# Patient Record
Sex: FEMALE | Race: BLACK OR AFRICAN AMERICAN | Employment: FULL TIME | URBAN - METROPOLITAN AREA
[De-identification: names, ages, dates, MRNs, and addresses within clinical notes are randomized per-mention and may not be internally consistent; named-entity substitution may affect disease eponyms.]

---

## 2018-08-27 ENCOUNTER — OFFICE VISIT (OUTPATIENT)
Dept: SLEEP MEDICINE | Age: 48
End: 2018-08-27

## 2018-08-27 VITALS
SYSTOLIC BLOOD PRESSURE: 130 MMHG | WEIGHT: 246.8 LBS | RESPIRATION RATE: 18 BRPM | OXYGEN SATURATION: 98 % | HEART RATE: 89 BPM | DIASTOLIC BLOOD PRESSURE: 80 MMHG | BODY MASS INDEX: 49.76 KG/M2 | HEIGHT: 59 IN | TEMPERATURE: 98.9 F

## 2018-08-27 DIAGNOSIS — Z86.79 H/O: HTN (HYPERTENSION): ICD-10-CM

## 2018-08-27 DIAGNOSIS — G47.33 OSA (OBSTRUCTIVE SLEEP APNEA): Primary | ICD-10-CM

## 2018-08-27 PROCEDURE — 99204 OFFICE O/P NEW MOD 45 MIN: CPT | Performed by: PSYCHIATRY & NEUROLOGY

## 2018-08-27 ASSESSMENT — SLEEP AND FATIGUE QUESTIONNAIRES
HOW LIKELY ARE YOU TO NOD OFF OR FALL ASLEEP IN A CAR, WHILE STOPPED FOR A FEW MINUTES IN TRAFFIC: 0
NECK CIRCUMFERENCE (INCHES): 17
HOW LIKELY ARE YOU TO NOD OFF OR FALL ASLEEP WHILE SITTING AND READING: 0
ESS TOTAL SCORE: 3
HOW LIKELY ARE YOU TO NOD OFF OR FALL ASLEEP WHILE SITTING INACTIVE IN A PUBLIC PLACE: 0
HOW LIKELY ARE YOU TO NOD OFF OR FALL ASLEEP WHEN YOU ARE A PASSENGER IN A CAR FOR AN HOUR WITHOUT A BREAK: 0
HOW LIKELY ARE YOU TO NOD OFF OR FALL ASLEEP WHILE LYING DOWN TO REST IN THE AFTERNOON WHEN CIRCUMSTANCES PERMIT: 1
HOW LIKELY ARE YOU TO NOD OFF OR FALL ASLEEP WHILE SITTING QUIETLY AFTER LUNCH WITHOUT ALCOHOL: 0
HOW LIKELY ARE YOU TO NOD OFF OR FALL ASLEEP WHILE SITTING AND TALKING TO SOMEONE: 1
HOW LIKELY ARE YOU TO NOD OFF OR FALL ASLEEP WHILE WATCHING TV: 1

## 2018-08-27 ASSESSMENT — ENCOUNTER SYMPTOMS
RESPIRATORY NEGATIVE: 1
EYES NEGATIVE: 1
ALLERGIC/IMMUNOLOGIC NEGATIVE: 1
GASTROINTESTINAL NEGATIVE: 1

## 2018-08-27 NOTE — PROGRESS NOTES
- N/A  FAA/'s license - N/A      Previous Report(s) Reviewed: historical medical records         Social History     Social History    Marital status: Single     Spouse name: N/A    Number of children: N/A    Years of education: N/A     Occupational History    Not on file. Social History Main Topics    Smoking status: Never Smoker    Smokeless tobacco: Never Used    Alcohol use No    Drug use: No    Sexual activity: Not on file     Other Topics Concern    Not on file     Social History Narrative    No narrative on file       Prior to Admission medications    Medication Sig Start Date End Date Taking? Authorizing Provider   lisinopril-hydrochlorothiazide (PRINZIDE;ZESTORETIC) 20-12.5 MG per tablet Take 1 tablet by mouth daily 18  Yes Oscar Olmedo MD       Allergies as of 2018    (No Known Allergies)       There is no problem list on file for this patient. Past Medical History:   Diagnosis Date    Hypertension     Obesity        Past Surgical History:   Procedure Laterality Date     SECTION         History reviewed. No pertinent family history. Review of Systems   Constitutional: Positive for fatigue. HENT: Negative. Eyes: Negative. Respiratory: Negative. Cardiovascular: Negative. Negative for leg swelling. Gastrointestinal: Negative. Endocrine: Negative. Genitourinary: Positive for frequency (nighttime). Musculoskeletal: Negative. Allergic/Immunologic: Negative. Neurological: Negative. Hematological: Negative. Psychiatric/Behavioral: Negative. All other systems reviewed and are negative. Objective:     Vitals:  Weight BMI Neck circumference    Wt Readings from Last 3 Encounters:   18 246 lb 12.8 oz (111.9 kg)   18 249 lb (112.9 kg)    Body mass index is 49.85 kg/m².  Neck circumference: 17     BP HR SaO2   BP Readings from Last 3 Encounters:   18 130/80   18 128/82    Pulse Readings from Last 3 Encounters:   08/27/18 89   08/13/18 81    SpO2 Readings from Last 3 Encounters:   08/27/18 98%   08/13/18 98%        The mandibular molar Class :   [x]1 []2 []3      Mallampati I Airway Classification:   []1 []2 []3 [x]4        Physical Exam   Constitutional: No distress. HENT:   Mallampati class 4, no retrognathia or hypognathia , normal airflow in bilateral nostrils, no septum deviation , crowded oropharynx with low soft palate, high arched hard palate,no tonsils enlargement. Eyes: EOM are normal.   Neck: Normal range of motion. Neck supple. No tracheal deviation present. No thyromegaly present. Cardiovascular: Normal rate, normal heart sounds and intact distal pulses. Pulmonary/Chest: Effort normal and breath sounds normal. No respiratory distress. She has no wheezes. Musculoskeletal: Normal range of motion. She exhibits no edema or tenderness. Neurological: She is alert. She has normal reflexes. No cranial nerve deficit. Skin: Skin is warm. Psychiatric: She has a normal mood and affect. Nursing note and vitals reviewed. Assessment:   Obstructive sleep apnea especially with snoring,  daytime sleepiness, large neck circumference, Mallampati class of 4 and obesity. Diagnosis Orders   1. EMERY (obstructive sleep apnea)  Home Sleep Study    Sleep Study with PAP Titration   2. H/O: HTN (hypertension)  Home Sleep Study   3. Class 3 obesity with body mass index (BMI) of 45.0 to 49.9 in adult, unspecified obesity type, unspecified whether serious comorbidity present [facility])  Home Sleep Study     Plan:     Patient was counseled about the pathophysiology of obstructive sleep apnea syndrome and the methods for evaluating its presence and severity. Patient was counseled to avoid driving and other potentially hazardous circumstances if the patient is experiencing excessive sleepiness.   Treatment considerations include the use of nasal CPAP, oral dental appliance or a surgical intervention,

## 2018-09-28 DIAGNOSIS — G47.33 OSA (OBSTRUCTIVE SLEEP APNEA): ICD-10-CM

## 2018-09-28 DIAGNOSIS — Z86.79 H/O: HTN (HYPERTENSION): ICD-10-CM

## 2018-10-01 ENCOUNTER — TELEPHONE (OUTPATIENT)
Dept: PULMONOLOGY | Age: 48
End: 2018-10-01

## 2018-11-20 ENCOUNTER — TELEPHONE (OUTPATIENT)
Dept: PULMONOLOGY | Age: 48
End: 2018-11-20

## 2018-11-20 NOTE — TELEPHONE ENCOUNTER
Liane Irwin called stating she received a letter in the mail indicating we had sleep study results since we had tried to call and her vm was full.   Per prior phone encounter I gave her her results indicating she was positive for miild sleep apnea and gave the number to the sleep lab to call and schedule the recommended titration study

## 2018-12-27 ENCOUNTER — HOSPITAL ENCOUNTER (OUTPATIENT)
Dept: SLEEP CENTER | Age: 48
Discharge: HOME | End: 2018-12-27
Payer: COMMERCIAL

## 2018-12-27 DIAGNOSIS — G47.33 OSA (OBSTRUCTIVE SLEEP APNEA): ICD-10-CM

## 2018-12-27 PROCEDURE — 95811 POLYSOM 6/>YRS CPAP 4/> PARM: CPT

## 2018-12-28 PROCEDURE — 95811 POLYSOM 6/>YRS CPAP 4/> PARM: CPT | Performed by: PSYCHIATRY & NEUROLOGY

## 2019-01-02 ENCOUNTER — TELEPHONE (OUTPATIENT)
Dept: PULMONOLOGY | Age: 49
End: 2019-01-02

## 2019-01-03 ENCOUNTER — TELEPHONE (OUTPATIENT)
Dept: PULMONOLOGY | Age: 49
End: 2019-01-03

## 2019-08-01 ENCOUNTER — OFFICE VISIT (OUTPATIENT)
Dept: SLEEP MEDICINE | Age: 49
End: 2019-08-01
Payer: COMMERCIAL

## 2019-08-01 VITALS
HEIGHT: 59 IN | WEIGHT: 245 LBS | OXYGEN SATURATION: 98 % | HEART RATE: 86 BPM | SYSTOLIC BLOOD PRESSURE: 177 MMHG | BODY MASS INDEX: 49.39 KG/M2 | DIASTOLIC BLOOD PRESSURE: 116 MMHG | RESPIRATION RATE: 16 BRPM

## 2019-08-01 DIAGNOSIS — G47.33 OBSTRUCTIVE SLEEP APNEA: Primary | ICD-10-CM

## 2019-08-01 PROCEDURE — 99212 OFFICE O/P EST SF 10 MIN: CPT | Performed by: PSYCHIATRY & NEUROLOGY

## 2019-08-01 ASSESSMENT — SLEEP AND FATIGUE QUESTIONNAIRES
HOW LIKELY ARE YOU TO NOD OFF OR FALL ASLEEP WHILE LYING DOWN TO REST IN THE AFTERNOON WHEN CIRCUMSTANCES PERMIT: 3
ESS TOTAL SCORE: 13
HOW LIKELY ARE YOU TO NOD OFF OR FALL ASLEEP WHILE SITTING INACTIVE IN A PUBLIC PLACE: 3
HOW LIKELY ARE YOU TO NOD OFF OR FALL ASLEEP WHILE SITTING QUIETLY AFTER LUNCH WITHOUT ALCOHOL: 0
HOW LIKELY ARE YOU TO NOD OFF OR FALL ASLEEP IN A CAR, WHILE STOPPED FOR A FEW MINUTES IN TRAFFIC: 0
HOW LIKELY ARE YOU TO NOD OFF OR FALL ASLEEP WHEN YOU ARE A PASSENGER IN A CAR FOR AN HOUR WITHOUT A BREAK: 1
HOW LIKELY ARE YOU TO NOD OFF OR FALL ASLEEP WHILE SITTING AND READING: 3
HOW LIKELY ARE YOU TO NOD OFF OR FALL ASLEEP WHILE WATCHING TV: 3
HOW LIKELY ARE YOU TO NOD OFF OR FALL ASLEEP WHILE SITTING AND TALKING TO SOMEONE: 0

## 2019-08-01 NOTE — PROGRESS NOTES
per session: Not on file    Stress: Not on file   Relationships    Social connections:     Talks on phone: Not on file     Gets together: Not on file     Attends Orthodoxy service: Not on file     Active member of club or organization: Not on file     Attends meetings of clubs or organizations: Not on file     Relationship status: Not on file    Intimate partner violence:     Fear of current or ex partner: Not on file     Emotionally abused: Not on file     Physically abused: Not on file     Forced sexual activity: Not on file   Other Topics Concern    Not on file   Social History Narrative    Not on file       Prior to Admission medications    Medication Sig Start Date End Date Taking? Authorizing Provider   amLODIPine-benazepril (LOTREL) 5-20 MG per capsule Take 1 capsule by mouth daily 19  Jessi Mendez MD   metFORMIN (GLUCOPHAGE) 500 MG tablet Take 1 tablet by mouth 2 times daily (with meals) 19   Jessi Mendez MD   furosemide (LASIX) 20 MG tablet Take 1 tablet by mouth daily 19   Jessi Mendez MD       Allergies as of 2019    (No Known Allergies)       Patient Active Problem List   Diagnosis    EMERY (obstructive sleep apnea)       Past Medical History:   Diagnosis Date    Hypertension     Obesity        Past Surgical History:   Procedure Laterality Date     SECTION         No family history on file. Review of Systems    Objective:     Vitals:  Weight BMI Neck circumference    Wt Readings from Last 3 Encounters:   19 245 lb (111.1 kg)   19 244 lb 8 oz (110.9 kg)   19 239 lb (108.4 kg)    Body mass index is 49.48 kg/m².        BP HR SaO2   BP Readings from Last 3 Encounters:   19 (!) 175/102   19 130/78   19 130/80    Pulse Readings from Last 3 Encounters:   19 86   19 78   19 84    SpO2 Readings from Last 3 Encounters:   19 98%   19 92%   18 98%      Themandibular molar Class :

## 2020-07-06 ENCOUNTER — APPOINTMENT (OUTPATIENT)
Dept: CT IMAGING | Age: 50
DRG: 853 | End: 2020-07-06
Payer: COMMERCIAL

## 2020-07-06 ENCOUNTER — HOSPITAL ENCOUNTER (INPATIENT)
Age: 50
LOS: 18 days | Discharge: HOME | DRG: 853 | End: 2020-07-24
Attending: STUDENT IN AN ORGANIZED HEALTH CARE EDUCATION/TRAINING PROGRAM | Admitting: INTERNAL MEDICINE
Payer: COMMERCIAL

## 2020-07-06 ENCOUNTER — APPOINTMENT (OUTPATIENT)
Dept: ULTRASOUND IMAGING | Age: 50
DRG: 853 | End: 2020-07-06
Payer: COMMERCIAL

## 2020-07-06 PROBLEM — N17.9 ARF (ACUTE RENAL FAILURE) (HCC): Status: ACTIVE | Noted: 2020-07-06

## 2020-07-06 PROBLEM — D72.829 LEUKOCYTOSIS: Status: ACTIVE | Noted: 2020-07-06

## 2020-07-06 PROBLEM — K56.609 SBO (SMALL BOWEL OBSTRUCTION) (HCC): Status: ACTIVE | Noted: 2020-07-06

## 2020-07-06 LAB
A/G RATIO: 0.5 (ref 1.1–2.2)
ALBUMIN SERPL-MCNC: 2.3 G/DL (ref 3.4–5)
ALP BLD-CCNC: 122 U/L (ref 40–129)
ALT SERPL-CCNC: 74 U/L (ref 10–40)
ANION GAP SERPL CALCULATED.3IONS-SCNC: 28 MMOL/L (ref 3–16)
AST SERPL-CCNC: 165 U/L (ref 15–37)
BILIRUB SERPL-MCNC: 3.3 MG/DL (ref 0–1)
BUN BLDV-MCNC: 54 MG/DL (ref 7–20)
CALCIUM SERPL-MCNC: 8.4 MG/DL (ref 8.3–10.6)
CHLORIDE BLD-SCNC: 88 MMOL/L (ref 99–110)
CO2: 13 MMOL/L (ref 21–32)
CREAT SERPL-MCNC: 3.8 MG/DL (ref 0.6–1.1)
GFR AFRICAN AMERICAN: 15
GFR NON-AFRICAN AMERICAN: 13
GLOBULIN: 4.9 G/DL
GLUCOSE BLD-MCNC: 103 MG/DL (ref 70–99)
GLUCOSE BLD-MCNC: 76 MG/DL (ref 70–99)
HCG QUALITATIVE: NEGATIVE
LACTIC ACID, SEPSIS: 4.2 MMOL/L (ref 0.4–1.9)
LACTIC ACID, SEPSIS: 4.9 MMOL/L (ref 0.4–1.9)
LACTIC ACID: 5.8 MMOL/L (ref 0.4–2)
LIPASE: 12 U/L (ref 13–60)
PERFORMED ON: NORMAL
POTASSIUM REFLEX MAGNESIUM: 4.4 MMOL/L (ref 3.5–5.1)
SODIUM BLD-SCNC: 129 MMOL/L (ref 136–145)
TOTAL PROTEIN: 7.2 G/DL (ref 6.4–8.2)

## 2020-07-06 PROCEDURE — 94760 N-INVAS EAR/PLS OXIMETRY 1: CPT

## 2020-07-06 PROCEDURE — 2580000003 HC RX 258: Performed by: INTERNAL MEDICINE

## 2020-07-06 PROCEDURE — 2580000003 HC RX 258: Performed by: STUDENT IN AN ORGANIZED HEALTH CARE EDUCATION/TRAINING PROGRAM

## 2020-07-06 PROCEDURE — 36415 COLL VENOUS BLD VENIPUNCTURE: CPT

## 2020-07-06 PROCEDURE — 80053 COMPREHEN METABOLIC PANEL: CPT

## 2020-07-06 PROCEDURE — APPNB180 APP NON BILLABLE TIME > 60 MINS: Performed by: PHYSICIAN ASSISTANT

## 2020-07-06 PROCEDURE — 83690 ASSAY OF LIPASE: CPT

## 2020-07-06 PROCEDURE — 99254 IP/OBS CNSLTJ NEW/EST MOD 60: CPT | Performed by: SURGERY

## 2020-07-06 PROCEDURE — 84703 CHORIONIC GONADOTROPIN ASSAY: CPT

## 2020-07-06 PROCEDURE — 74176 CT ABD & PELVIS W/O CONTRAST: CPT

## 2020-07-06 PROCEDURE — 99285 EMERGENCY DEPT VISIT HI MDM: CPT

## 2020-07-06 PROCEDURE — 6360000002 HC RX W HCPCS: Performed by: STUDENT IN AN ORGANIZED HEALTH CARE EDUCATION/TRAINING PROGRAM

## 2020-07-06 PROCEDURE — APPSS180 APP SPLIT SHARED TIME > 60 MINUTES: Performed by: PHYSICIAN ASSISTANT

## 2020-07-06 PROCEDURE — 87040 BLOOD CULTURE FOR BACTERIA: CPT

## 2020-07-06 PROCEDURE — 2060000000 HC ICU INTERMEDIATE R&B

## 2020-07-06 PROCEDURE — 6360000002 HC RX W HCPCS: Performed by: INTERNAL MEDICINE

## 2020-07-06 PROCEDURE — 85025 COMPLETE CBC W/AUTO DIFF WBC: CPT

## 2020-07-06 PROCEDURE — 83605 ASSAY OF LACTIC ACID: CPT

## 2020-07-06 PROCEDURE — 76705 ECHO EXAM OF ABDOMEN: CPT

## 2020-07-06 PROCEDURE — 96374 THER/PROPH/DIAG INJ IV PUSH: CPT

## 2020-07-06 RX ORDER — MORPHINE SULFATE 2 MG/ML
2 INJECTION, SOLUTION INTRAMUSCULAR; INTRAVENOUS
Status: DISCONTINUED | OUTPATIENT
Start: 2020-07-06 | End: 2020-07-08

## 2020-07-06 RX ORDER — LEVOFLOXACIN 5 MG/ML
250 INJECTION, SOLUTION INTRAVENOUS EVERY 24 HOURS
Status: DISCONTINUED | OUTPATIENT
Start: 2020-07-07 | End: 2020-07-08

## 2020-07-06 RX ORDER — ACETAMINOPHEN 325 MG/1
650 TABLET ORAL EVERY 6 HOURS PRN
Status: DISCONTINUED | OUTPATIENT
Start: 2020-07-06 | End: 2020-07-10

## 2020-07-06 RX ORDER — PROMETHAZINE HYDROCHLORIDE 25 MG/1
12.5 TABLET ORAL EVERY 6 HOURS PRN
Status: DISCONTINUED | OUTPATIENT
Start: 2020-07-06 | End: 2020-07-24 | Stop reason: HOSPADM

## 2020-07-06 RX ORDER — 0.9 % SODIUM CHLORIDE 0.9 %
2000 INTRAVENOUS SOLUTION INTRAVENOUS ONCE
Status: COMPLETED | OUTPATIENT
Start: 2020-07-06 | End: 2020-07-06

## 2020-07-06 RX ORDER — HEPARIN SODIUM 5000 [USP'U]/ML
5000 INJECTION, SOLUTION INTRAVENOUS; SUBCUTANEOUS 2 TIMES DAILY
Status: DISCONTINUED | OUTPATIENT
Start: 2020-07-06 | End: 2020-07-24 | Stop reason: HOSPADM

## 2020-07-06 RX ORDER — ONDANSETRON 2 MG/ML
4 INJECTION INTRAMUSCULAR; INTRAVENOUS ONCE
Status: COMPLETED | OUTPATIENT
Start: 2020-07-06 | End: 2020-07-06

## 2020-07-06 RX ORDER — ACETAMINOPHEN 650 MG/1
650 SUPPOSITORY RECTAL EVERY 6 HOURS PRN
Status: DISCONTINUED | OUTPATIENT
Start: 2020-07-06 | End: 2020-07-10

## 2020-07-06 RX ORDER — SODIUM CHLORIDE 0.9 % (FLUSH) 0.9 %
10 SYRINGE (ML) INJECTION PRN
Status: DISCONTINUED | OUTPATIENT
Start: 2020-07-06 | End: 2020-07-20 | Stop reason: SDUPTHER

## 2020-07-06 RX ORDER — SODIUM CHLORIDE 9 MG/ML
INJECTION, SOLUTION INTRAVENOUS CONTINUOUS
Status: DISCONTINUED | OUTPATIENT
Start: 2020-07-06 | End: 2020-07-07

## 2020-07-06 RX ORDER — OXYCODONE HYDROCHLORIDE 5 MG/1
5 TABLET ORAL ONCE
Status: DISCONTINUED | OUTPATIENT
Start: 2020-07-06 | End: 2020-07-06

## 2020-07-06 RX ORDER — LEVOFLOXACIN 5 MG/ML
500 INJECTION, SOLUTION INTRAVENOUS ONCE
Status: COMPLETED | OUTPATIENT
Start: 2020-07-06 | End: 2020-07-06

## 2020-07-06 RX ORDER — PANTOPRAZOLE SODIUM 40 MG/1
40 TABLET, DELAYED RELEASE ORAL
Status: DISCONTINUED | OUTPATIENT
Start: 2020-07-07 | End: 2020-07-08

## 2020-07-06 RX ORDER — SODIUM CHLORIDE 0.9 % (FLUSH) 0.9 %
10 SYRINGE (ML) INJECTION EVERY 12 HOURS SCHEDULED
Status: DISCONTINUED | OUTPATIENT
Start: 2020-07-06 | End: 2020-07-24 | Stop reason: HOSPADM

## 2020-07-06 RX ORDER — ONDANSETRON 2 MG/ML
4 INJECTION INTRAMUSCULAR; INTRAVENOUS EVERY 6 HOURS PRN
Status: DISCONTINUED | OUTPATIENT
Start: 2020-07-06 | End: 2020-07-24 | Stop reason: HOSPADM

## 2020-07-06 RX ORDER — MORPHINE SULFATE 4 MG/ML
4 INJECTION, SOLUTION INTRAMUSCULAR; INTRAVENOUS
Status: DISCONTINUED | OUTPATIENT
Start: 2020-07-06 | End: 2020-07-08

## 2020-07-06 RX ADMIN — MORPHINE SULFATE 2 MG: 2 INJECTION, SOLUTION INTRAMUSCULAR; INTRAVENOUS at 20:40

## 2020-07-06 RX ADMIN — PIPERACILLIN AND TAZOBACTAM 3.38 G: 3; .375 INJECTION, POWDER, LYOPHILIZED, FOR SOLUTION INTRAVENOUS at 16:11

## 2020-07-06 RX ADMIN — ONDANSETRON 4 MG: 2 INJECTION INTRAMUSCULAR; INTRAVENOUS at 13:42

## 2020-07-06 RX ADMIN — SODIUM CHLORIDE, PRESERVATIVE FREE 10 ML: 5 INJECTION INTRAVENOUS at 22:12

## 2020-07-06 RX ADMIN — HEPARIN SODIUM 5000 UNITS: 5000 INJECTION INTRAVENOUS; SUBCUTANEOUS at 22:13

## 2020-07-06 RX ADMIN — SODIUM CHLORIDE 2000 ML: 9 INJECTION, SOLUTION INTRAVENOUS at 14:31

## 2020-07-06 RX ADMIN — LEVOFLOXACIN 500 MG: 5 INJECTION, SOLUTION INTRAVENOUS at 17:26

## 2020-07-06 RX ADMIN — SODIUM CHLORIDE: 9 INJECTION, SOLUTION INTRAVENOUS at 17:26

## 2020-07-06 ASSESSMENT — PAIN SCALES - GENERAL
PAINLEVEL_OUTOF10: 4
PAINLEVEL_OUTOF10: 10

## 2020-07-06 ASSESSMENT — PAIN DESCRIPTION - DESCRIPTORS: DESCRIPTORS: SHARP

## 2020-07-06 ASSESSMENT — PAIN DESCRIPTION - PROGRESSION: CLINICAL_PROGRESSION: GRADUALLY WORSENING

## 2020-07-06 ASSESSMENT — PAIN DESCRIPTION - FREQUENCY: FREQUENCY: CONTINUOUS

## 2020-07-06 ASSESSMENT — PAIN DESCRIPTION - ORIENTATION: ORIENTATION: UPPER;LOWER

## 2020-07-06 ASSESSMENT — PAIN DESCRIPTION - LOCATION: LOCATION: ABDOMEN

## 2020-07-06 ASSESSMENT — PAIN DESCRIPTION - PAIN TYPE: TYPE: ACUTE PAIN

## 2020-07-06 NOTE — PROGRESS NOTES
Pharmacy to dose Levofloxacin per Dr. Maricruz White MD:    CLcr ~ 23 mL/min (using adjusted body weight of 70.9 kg)  Levofloxacin 500 mg IVPB x 1 followed by 250 mg IVPB Q24H thereafter. Will continue to follow renal function and adjust dose accordingly.     Sierra Stewart PharmD, BCPS  7/6/2020 4:33 PM

## 2020-07-06 NOTE — CONSULTS
Surgery Consult Note     Rimma Beltran PA-C  Pt Name: Néstor Reza  MRN: [id number]  [place]: 1970  Date of evaluation: 2020  Primary Care Physician: Anselmo Anderson MD  Chief Complaint:abdominal pain  IMPRESSIONS:   1. SBO  2. KIKI  3. Tachycardic   4. Leukocytosis 27.7  PLANS:   1. To be admitted to the hospital  2. NPO  3. Place NGT to LWS  4. IVF  5. Monitor and control pain  6. Repeat abdominal xrays tomorrow AM  SUBJECTIVE:   History of Chief Complaint:    Néstor Reza is a 53 y.o. female who presents with abdominal pain. She stated that this pain began one week ago, and was seen on 20 at [facility] And was diagnoses with some gastroenteritis. The pain has continued to increase in intensity and she came to [facility]. She stated that she hasn't had a BM since Saturday, 20. He has significant diffuse abdominal pain. She had a CT scan performed and that showed her to have a mid to distal small bowel obstruction. She admits to having associated nausea and emesis. She denies any CP ,SOB, cough, lightheadedness or dizziness. Past Medical History  Reviewed  has a past medical history of Hypertension and Obesity. Past Surgical History  Reviewed has a past surgical history that includes  section. Medications  Prior to Admission medications    Medication Sig Start Date End Date Taking?  Authorizing Provider   ferrous sulfate (FE TABS) 325 (65 Fe) MG EC tablet Take 1 tablet by mouth 2 times daily 6/3/20  Yes Anselmo Anderson MD   Multiple Vitamins-Minerals (THERAPEUTIC MULTIVITAMIN-MINERALS) tablet Take 1 tablet by mouth daily 6/3/20 6/3/21 Yes Anselmo Anderson MD   furosemide (LASIX) 20 MG tablet Take 1 tablet by mouth 2 times daily 6/3/20  Yes Anselmo Anderson MD   metFORMIN (GLUCOPHAGE) 500 MG tablet Take 1 tablet by mouth 2 times daily (with meals) 6/3/20  Yes Anselmo Anderson MD   amLODIPine-benazepril (LOTREL) 5-20 MG per capsule Take 1 capsule by mouth daily 6/3/20 6/3/21 Yes Amanda Nelsno MD   vitamin D (ERGOCALCIFEROL) 1.25 MG (11668 UT) CAPS capsule Take 1 capsule by mouth once a week 6/3/20  Yes Amanda Nelson MD    Scheduled Meds:   sodium chloride  2,000 mL Intravenous Once    sodium chloride flush  10 mL Intravenous 2 times per day    enoxaparin  40 mg Subcutaneous Daily    [START ON 7/7/2020] pantoprazole  40 mg Oral QAM AC    heparin (porcine)  5,000 Units Subcutaneous BID    insulin lispro  0-6 Units Subcutaneous TID WC    insulin lispro  0-3 Units Subcutaneous Nightly    piperacillin-tazobactam  3.375 g Intravenous Once     Continuous Infusions:   sodium chloride       PRN Meds:.sodium chloride flush, acetaminophen **OR** acetaminophen, promethazine **OR** ondansetron, morphine **OR** morphine, iopamidol  Allergies  has No Known Allergies. Family History  Reviewedfamily history is not on file. Social History   reports that she has never smoked. She has never used smokeless tobacco. She reports that she does not drink alcohol or use drugs. EDUCATION  Patient educated about their illness/diagnosis, stated above, and all questions answered. We discussed the importance of nutrition, medications they are taking, and healthy lifestyle. Review of Systems:  General Denies any fever or chills  HEENT Denies any diplopia, tinnitus or vertigo  Resp Denies any shortness of breath, cough or wheezing  Cardiac Denies any chest pain, palpitations, claudication or edema  GI Denies any melena, hematochezia, hematemesis or pyrosis   Denies any frequency, urgency, hesitancy or incontinence  Heme Denies bruising or bleeding easily  Neuro Denies any focal motor or sensory deficits  OBJECTIVE:   VITALS:  height is 4' 11\" (1.499 m) and weight is 240 lb 4.8 oz (109 kg). Her oral temperature is 97.9 °F (36.6 °C). Her blood pressure is 138/75 and her pulse is 110. Her respiration is 29 and oxygen saturation is 94%.    CONSTITUTIONAL: Alert and oriented times 3, no acute distress and cooperative to examination with proper mood and affect. SKIN: Skin color, texture, turgor normal. No rashes or lesions. LYMPH: no cervical nodes, no inguinal nodes  HEENT: Head is normocephalic, atraumatic. EOMI, PERRLA. NECK: Supple, symmetrical, trachea midline, no adenopathy, thyroid symmetric, not enlarged and no tenderness, skin normal.  CHEST/LUNGS: chest symmetric with normal A/P diameter, normal respiratory rate and rhythm  CARDIOVASCULAR: Tachycardia  ABDOMEN: obese. Tenderness: diffuse  RECTAL: deferred, not clinically indicated  NEUROLOGIC: There are no focalizing motor or sensory deficits. CN II-XII are grossly intact. Ricka Distad EXTREMITIES: no cyanosis, no clubbing and no edema.   LABS:     Recent Labs     07/06/20  1337   WBC 27.7*   HGB 11.2*   HCT 37.5   *   *   K 4.4   CL 88*   CO2 13*   BUN 54*   CREATININE 3.8*   CALCIUM 8.4   *   ALT 74*   BILITOT 3.3*     Recent Labs     07/06/20  1337   ALKPHOS 122   ALT 74*   *   BILITOT 3.3*   LABALBU 2.3*   LIPASE 12.0*     CBC:   Lab Results   Component Value Date    WBC 27.7 07/06/2020    RBC 5.91 07/06/2020    HGB 11.2 07/06/2020    HCT 37.5 07/06/2020    MCV 63.4 07/06/2020    MCH 18.9 07/06/2020    MCHC 29.8 07/06/2020    RDW 19.0 07/06/2020     07/06/2020    MPV 8.1 07/06/2020     CMP:    Lab Results   Component Value Date     07/06/2020    K 4.4 07/06/2020    CL 88 07/06/2020    CO2 13 07/06/2020    BUN 54 07/06/2020    CREATININE 3.8 07/06/2020    GFRAA 15 07/06/2020    AGRATIO 0.5 07/06/2020    LABGLOM 13 07/06/2020    GLUCOSE 103 07/06/2020    PROT 7.2 07/06/2020    LABALBU 2.3 07/06/2020    CALCIUM 8.4 07/06/2020    BILITOT 3.3 07/06/2020    ALKPHOS 122 07/06/2020     07/06/2020    ALT 74 07/06/2020     Urine Culture:  No components found for: CURINE  Blood Culture:  No components found for: CBLOOD, CFUNGUSBL  RADIOLOGY:     CT scan abd/pelvis:  Mid to distal small bowel obstruction     Ultrasound:   Questionable small amount of sludge within the gallbladder.  Otherwise   unremarkable right upper quadrant ultrasound. Tonie Saucedo [facility][note 3]  General and Vascular Surgery [phone]  Electronically signed by RUBÉN Godoy on 7/6/2020 at 4:29 PM      Surgery Staff  I have examined this patient and read and agree with the note by Christal Lee PA-C from today. 54 yo female with history HTN, obesity. Reports 4 day history abdominal pain, nausea, emesis. Preceded by diarrhea. No aggravating or alleviating factors. Evaluated by OSH 4 days ago and CT with likely enteritis. Symptoms worsened and returned today    Vitals:    07/06/20 1701 07/06/20 1716 07/06/20 1747 07/06/20 1801   BP: (!) 145/84 (!) 142/87 (!) 137/91 136/83   Pulse: 108 111 115 115   Resp: 29 25 30 28   Temp:       TempSrc:       SpO2: 92% 91%     Weight:       Height:       NAD, alert oriented  Clear bilaterally  Sinus tachy  Abdomen soft, distended with diffuse tenderness. Vertical midline scars    WBC 27.7k, Cr 3.8 with lactic acidosis. LFTs mildly elevated as well with Tbili 3.3  CT noncon with likely SBO    SBO - place NG tube, NPO, IVF resuscitation. Repeat films in AM.  May need to repeat CT with contrast vs SBFT if not improved in a day or two. No need for abx from my standpoint. Leukocytosis likely reactive as part of severe dehydration    KIKI - secondary to SBO. Continue hydration    Lactic acidosis secondary to dehydration. Continue to resuscitate. Repeat labs this evening    Elevated LFTs nonspecific. Consider checking U/S in AM.  ?  Related to viral gastroenteritis as suspected at outside hospital    Electronically signed by Erwin Sierra MD on 7/6/2020 at 6:36 PM

## 2020-07-06 NOTE — ED NOTES
ED SBAR report provider to [facility]. Patient to be transported to Room 5113 via stretcher by transport tech. Patient transported with bedside cardiac monitor and with IV medications infusing. IV site clean, dry, and intact. MEWS score and pain assessed as 6 and documented. Updated patient on plan of care.        Ty Burden RN  07/06/20 5603

## 2020-07-06 NOTE — ED NOTES
RN attempted to straight cath patient. No urine output at this time. Fluids infusing. Patent to CT. Patient alert and orientated x4. Patient is lethargic. RN to monitor.        Tim Bar RN  07/06/20 1489

## 2020-07-06 NOTE — ED PROVIDER NOTES
Primary Care Physician: Kit Hoyt MD   Attending Physician: Hong Otero MD     History   Chief Complaint   Patient presents with    Abdominal Pain     for 8 days was seen at [facility] and told she bowel inflammation  No emesis        HPI   Lily Astudillo is a 53 y.o. female history of hypertension, obesity who presents this morning with complaint of abdominal pain started 7 days ago and persisted forcing her to seek care. Describes her pain is right upper quadrant sharp in nature 8 on 10 and now she feels weak. Denies any dysuria, no abdominal surgeries, no nausea, vomiting, fevers, or chills. She denies any indication that she could be pregnant at this time. She was seen at [facility] 8 days ago with a CT scan that showed inflammatory changes however, she was discharged home. Past Medical History:   Diagnosis Date    Hypertension     Obesity         Past Surgical History:   Procedure Laterality Date     SECTION          History reviewed. No pertinent family history.      Social History     Socioeconomic History    Marital status: Single     Spouse name: None    Number of children: None    Years of education: None    Highest education level: None   Occupational History    None   Social Needs    Financial resource strain: None    Food insecurity     Worry: None     Inability: None    Transportation needs     Medical: None     Non-medical: None   Tobacco Use    Smoking status: Never Smoker    Smokeless tobacco: Never Used   Substance and Sexual Activity    Alcohol use: No    Drug use: No    Sexual activity: None   Lifestyle    Physical activity     Days per week: None     Minutes per session: None    Stress: None   Relationships    Social connections     Talks on phone: None     Gets together: None     Attends Mu-ism service: None     Active member of club or organization: None     Attends meetings of clubs or organizations: None     Relationship status: None  Intimate partner violence     Fear of current or ex partner: None     Emotionally abused: None     Physically abused: None     Forced sexual activity: None   Other Topics Concern    None   Social History Narrative    None        Review of Systems   10 total systems reviewed and found to be negative unless otherwise noted in HPI     Physical Exam   BP (!) 144/84   Pulse 112   Temp 97.9 °F (36.6 °C) (Oral)   Resp 28   Ht 4' 11\" (1.499 m)   Wt 240 lb 4.8 oz (109 kg)   SpO2 98%   BMI 48.53 kg/m²      CONSTITUTIONAL: Ill looking  HEAD: atraumatic, normocephalic   EYES: PERRL, No injection, discharge or scleral icterus. ENT: Moist mucous membranes. NECK: Normal ROM, NO LAD   CARDIOVASCULAR: Regular rate and rhythm. No murmurs or gallop. PULMONARY/CHEST: Airway patent. No retractions. Breath sounds clear with good air entry bilaterally. ABDOMEN: firm, distended and tender to palpation without guarding or rebound. SKIN: Acyanotic, warm, dry   MUSCULOSKELETAL: No swelling, tenderness or deformity   NEUROLOGICAL: Lethargic sleeping more  Nursing note and vitals reviewed.      ED Course & Medical Decision Making   Medications   0.9 % sodium chloride bolus (2,000 mLs Intravenous New Bag 7/6/20 1431)   iopamidol (ISOVUE-370) 76 % injection 75 mL (has no administration in time range)   piperacillin-tazobactam (ZOSYN) 3.375 g in dextrose 5 % 50 mL IVPB (mini-bag) (has no administration in time range)   ondansetron (ZOFRAN) injection 4 mg (4 mg Intravenous Given 7/6/20 1342)      Labs Reviewed   CBC WITH AUTO DIFFERENTIAL - Abnormal; Notable for the following components:       Result Value    WBC 27.7 (*)     RBC 5.91 (*)     Hemoglobin 11.2 (*)     MCV 63.4 (*)     MCH 18.9 (*)     MCHC 29.8 (*)     RDW 19.0 (*)     Platelets 878 (*)     All other components within normal limits    Narrative:     Performed at:  [facility]  [address] [place] [address]   Phone unremarkable right upper quadrant ultrasound. CT ABDOMEN PELVIS WO CONTRAST Additional Contrast? None    (Results Pending)      Ct Abdomen Pelvis W Iv Contrast Result Date: 7/2/2020  Site: Brody Johnson #: [id number] #: [id number]: BNEDAccount #: [de-identified] #: [id number] #: [id number]: CT ABDOMEN PELVIS W CONTRASTExam Date/Time: 07/02/2020 05:25 AMAdmitting Diagnosis: Abd infection (incl peritonitis)Reason for Exam: Abd infection (incl peritonitis) Dictated by: Elza Alanis SUJIT: 07/02/2020 05:47 AMT: This document is confidential medical information. Unauthorized disclosure or use of this information is prohibited by law. If you are not the intended recipient of this document, please advise us by calling immediately [phone]. Impression/Conclusion below 75 HISTORY:   Abd infection (incl peritonitis) centeral abd pain COMPARISON:  None TECHNIQUE: Post IV contrast multiplanar CT images of the abdomen and pelvis NOTE:  If there are questions about the content of this report, please contact [facility] radiology by calling [phone] FINDINGS: LOWER CHEST:  Unremarkable LIVER:  Unremarkable GALLBLADDER/BILE DUCTS:  Unremarkable. No opaque gallstones PANCREAS:  Unremarkable. No mass or duct dilation SPLEEN:  Unremarkable ADRENALS:  Unremarkable  KIDNEYS/URETERS:  Unremarkable. No hydronephrosis, stone, or suspicious mass GI TRACT:  No small bowel obstruction. There is mural thickening of multiple contiguous loops of small bowel in the left hemiabdomen with associated mesenteric stranding. Several of these loops appear matted together but there is no evidence of a discrete fistula or stricture. No discrete abscess. No free intraperitoneal gas. Normal appendix. No colitis or diverticulitis. VESSELS:  Unremarkable. No aneurysm or dissection LYMPH NODES: Unremarkable. No enlarged lymph nodes ABD WALL:  Midline divarication PELVIS:  Calcified uterine leiomyomas. Decompressed bladder. BONES:  Unremarkable OTHER:  None IMPRESSION: 1. Left hemiabdomen enteritis. Infectious enteritis is the leading differential consideration but inflammatory bowel disease could have this appearance. SIGNED BY: Romario Cummings MD on 7/2/2020  5:44 AM   [address] [facility] [phone] [facility] Call Center: [phone]       PROCEDURES:   Procedures    ASSESSMENT AND PLAN:  Ghanshyam Carrillo is a 53 y.o. female who presents this morning complaining of abdominal pain and discomfort on the right upper quadrant with some distention. Exam patient appears ill, but nontoxic hemodynamic stable. However, her belly is distended with tender to palpation right upper quadrant and appears to be firm. Given her presentation and exam I was concerned for possible abdominal surgical pathology and did obtain labs as well as imaging. Labs showed elevated lactate of 5.6, elevated creatinine above 3, BUN of greater than 100 and leukocytosis of 27,000. LFTs were also abnormal.  Additional labs pending. Ultrasound showed gallbladder sludge. In the distention of her abdomen I did obtain a CT which is pending. Nonetheless, patient will need admission for further treatment and evaluation. Blood cultures were obtained and patient started on antibiotics. She will need GI consult for an ERCP. her symptoms are concerning for gallbladder disease probably secondary to choledocholithiasis versus cholangitis. Internal medicine was consulted and patient admitted to their service for further treatment. ClINICAL IMPRESSION:  1. Septicemia ([address]    2. KIKI (acute kidney injury) ([address]    3. Gallbladder disease    4. Acidosis        DISPOSITION    Internal medicine admit  -Findings and recommendations explained to patient. She expressed understanding and agreed with the plan.   Gerardo Oleary MD (electronically signed)  7/6/2020  _________________________________________________________________________________________  Amount and/or Complexity of Data Reviewed:  Clinical lab tests: ordered and reviewed   Tests in the radiology section of CPT®: ordered and reviewed   Tests in the medicine section of CPT®: ordered and reviewed   Decide to obtain previous medical records or to obtain history from someone other than the patient: no  Obtain history from someone other than the patient: no  Review and summarize past medical records:yes  I looked up the patient in our electronic medical record:yes  Discuss the patient with other providers:yes  Independent visualization of images, tracings, or specimens:yes  Risk of Complications, Morbidity, and/or Mortality:Moderate  Presenting problems: moderate  Management options: moderate     Critical Care Attestation   Total critical care time: 35 minutes minimum   Critical care time does not include separately billable procedures and treating other patients. Critical care was necessary to treat or prevent imminent or life-threatening deterioration of the following conditions: Sepsis probably secondary to gallbladder infection. Patient treated urgently in the ED with antibiotics. Case discussed with consultants.     _________________________________________________________________________________________  This record is transcribed utilizing voice recognition technology. There are inherent limitations in this technology. In addition, there may be limitations in editing of this report. If there are any discrepancies, please contact me directly.         Anna Crowder MD  07/06/20 1252

## 2020-07-06 NOTE — ED NOTES
Per MD ok to wait till bolus fluids complete before redrawing lactic.        Danyel Bar RN  07/06/20 0101

## 2020-07-07 ENCOUNTER — APPOINTMENT (OUTPATIENT)
Dept: MRI IMAGING | Age: 50
DRG: 853 | End: 2020-07-07
Payer: COMMERCIAL

## 2020-07-07 LAB
ALBUMIN SERPL-MCNC: 2.1 G/DL (ref 3.4–5)
ALP BLD-CCNC: 121 U/L (ref 40–129)
ALT SERPL-CCNC: 100 U/L (ref 10–40)
ANION GAP SERPL CALCULATED.3IONS-SCNC: 23 MMOL/L (ref 3–16)
ANISOCYTOSIS: ABNORMAL
ANISOCYTOSIS: ABNORMAL
AST SERPL-CCNC: 252 U/L (ref 15–37)
ATYPICAL LYMPHOCYTE RELATIVE PERCENT: 2 % (ref 0–6)
BACTERIA: ABNORMAL /HPF
BANDED NEUTROPHILS RELATIVE PERCENT: 10 % (ref 0–7)
BANDED NEUTROPHILS RELATIVE PERCENT: 15 % (ref 0–7)
BASOPHILS ABSOLUTE: 0 K/UL (ref 0–0.2)
BASOPHILS ABSOLUTE: 0 K/UL (ref 0–0.2)
BASOPHILS RELATIVE PERCENT: 0 %
BASOPHILS RELATIVE PERCENT: 0 %
BILIRUB SERPL-MCNC: 2.9 MG/DL (ref 0–1)
BILIRUBIN DIRECT: 2.5 MG/DL (ref 0–0.3)
BILIRUBIN URINE: ABNORMAL
BILIRUBIN, INDIRECT: 0.4 MG/DL (ref 0–1)
BLOOD, URINE: ABNORMAL
BUN BLDV-MCNC: 71 MG/DL (ref 7–20)
CALCIUM SERPL-MCNC: 7.5 MG/DL (ref 8.3–10.6)
CHLORIDE BLD-SCNC: 96 MMOL/L (ref 99–110)
CLARITY: ABNORMAL
CO2: 14 MMOL/L (ref 21–32)
COLOR: ABNORMAL
COMMENT UA: ABNORMAL
CREAT SERPL-MCNC: 4.3 MG/DL (ref 0.6–1.1)
CREATININE URINE: 53.9 MG/DL (ref 28–259)
DOHLE BODIES: PRESENT
EOSINOPHILS ABSOLUTE: 0 K/UL (ref 0–0.6)
EOSINOPHILS ABSOLUTE: 0 K/UL (ref 0–0.6)
EOSINOPHILS RELATIVE PERCENT: 0 %
EOSINOPHILS RELATIVE PERCENT: 0 %
EPITHELIAL CELLS, UA: 2 /HPF (ref 0–5)
GFR AFRICAN AMERICAN: 13
GFR NON-AFRICAN AMERICAN: 11
GLUCOSE BLD-MCNC: 53 MG/DL (ref 70–99)
GLUCOSE BLD-MCNC: 57 MG/DL (ref 70–99)
GLUCOSE BLD-MCNC: 58 MG/DL (ref 70–99)
GLUCOSE BLD-MCNC: 62 MG/DL (ref 70–99)
GLUCOSE BLD-MCNC: 64 MG/DL (ref 70–99)
GLUCOSE BLD-MCNC: 66 MG/DL (ref 70–99)
GLUCOSE BLD-MCNC: 69 MG/DL (ref 70–99)
GLUCOSE BLD-MCNC: 69 MG/DL (ref 70–99)
GLUCOSE BLD-MCNC: 77 MG/DL (ref 70–99)
GLUCOSE BLD-MCNC: 80 MG/DL (ref 70–99)
GLUCOSE BLD-MCNC: 91 MG/DL (ref 70–99)
GLUCOSE URINE: 100 MG/DL
HCT VFR BLD CALC: 32.5 % (ref 36–48)
HCT VFR BLD CALC: 37.5 % (ref 36–48)
HEMATOLOGY PATH CONSULT: NO
HEMATOLOGY PATH CONSULT: NO
HEMOGLOBIN: 11.2 G/DL (ref 12–16)
HEMOGLOBIN: 9.9 G/DL (ref 12–16)
HYALINE CASTS: 1 /LPF (ref 0–8)
HYPOCHROMIA: ABNORMAL
HYPOCHROMIA: ABNORMAL
KETONES, URINE: NEGATIVE MG/DL
LEUKOCYTE ESTERASE, URINE: ABNORMAL
LYMPHOCYTES ABSOLUTE: 1.1 K/UL (ref 1–5.1)
LYMPHOCYTES ABSOLUTE: 2.5 K/UL (ref 1–5.1)
LYMPHOCYTES RELATIVE PERCENT: 5 %
LYMPHOCYTES RELATIVE PERCENT: 7 %
MCH RBC QN AUTO: 18.9 PG (ref 26–34)
MCH RBC QN AUTO: 19.1 PG (ref 26–34)
MCHC RBC AUTO-ENTMCNC: 29.8 G/DL (ref 31–36)
MCHC RBC AUTO-ENTMCNC: 30.4 G/DL (ref 31–36)
MCV RBC AUTO: 62.9 FL (ref 80–100)
MCV RBC AUTO: 63.4 FL (ref 80–100)
METAMYELOCYTES RELATIVE PERCENT: 1 %
MICROCYTES: ABNORMAL
MICROCYTES: ABNORMAL
MICROSCOPIC EXAMINATION: YES
MONOCYTES ABSOLUTE: 1.1 K/UL (ref 0–1.3)
MONOCYTES ABSOLUTE: 1.9 K/UL (ref 0–1.3)
MONOCYTES RELATIVE PERCENT: 5 %
MONOCYTES RELATIVE PERCENT: 7 %
MYELOCYTE PERCENT: 3 %
MYELOCYTE PERCENT: 3 %
NEUTROPHILS ABSOLUTE: 20.4 K/UL (ref 1.7–7.7)
NEUTROPHILS ABSOLUTE: 23.3 K/UL (ref 1.7–7.7)
NEUTROPHILS RELATIVE PERCENT: 70 %
NEUTROPHILS RELATIVE PERCENT: 72 %
NITRITE, URINE: NEGATIVE
NUCLEATED RED BLOOD CELLS: 0 /100 WBC
NUCLEATED RED BLOOD CELLS: 0 /100 WBC
NUCLEATED RED BLOOD CELLS: 1 /100 WBC
NUCLEATED RED BLOOD CELLS: 1 /100 WBC
OVALOCYTES: ABNORMAL
PDW BLD-RTO: 18.8 % (ref 12.4–15.4)
PDW BLD-RTO: 19 % (ref 12.4–15.4)
PERFORMED ON: ABNORMAL
PERFORMED ON: NORMAL
PH UA: 6 (ref 5–8)
PLATELET # BLD: 614 K/UL (ref 135–450)
PLATELET # BLD: 749 K/UL (ref 135–450)
PMV BLD AUTO: 7.9 FL (ref 5–10.5)
PMV BLD AUTO: 8.1 FL (ref 5–10.5)
POLYCHROMASIA: ABNORMAL
POTASSIUM SERPL-SCNC: 4.8 MMOL/L (ref 3.5–5.1)
PROTEIN UA: 100 MG/DL
RBC # BLD: 5.17 M/UL (ref 4–5.2)
RBC # BLD: 5.91 M/UL (ref 4–5.2)
RBC UA: ABNORMAL /HPF (ref 0–4)
SLIDE REVIEW: ABNORMAL
SLIDE REVIEW: ABNORMAL
SODIUM BLD-SCNC: 133 MMOL/L (ref 136–145)
SODIUM URINE: 25 MMOL/L
SPECIFIC GRAVITY UA: 1.01 (ref 1–1.03)
TARGET CELLS: ABNORMAL
TEAR DROP CELLS: ABNORMAL
TOTAL PROTEIN: 6.4 G/DL (ref 6.4–8.2)
TOXIC GRANULATION: PRESENT
UREA NITROGEN, UR: 449.6 MG/DL (ref 800–1666)
URINE TYPE: ABNORMAL
UROBILINOGEN, URINE: 1 E.U./DL
VACUOLATED NEUTROPHILS: PRESENT
VACUOLATED NEUTROPHILS: PRESENT
WBC # BLD: 22.7 K/UL (ref 4–11)
WBC # BLD: 27.7 K/UL (ref 4–11)
WBC UA: 26 /HPF (ref 0–5)
YEAST: PRESENT /HPF

## 2020-07-07 PROCEDURE — 81001 URINALYSIS AUTO W/SCOPE: CPT

## 2020-07-07 PROCEDURE — 2580000003 HC RX 258

## 2020-07-07 PROCEDURE — 82570 ASSAY OF URINE CREATININE: CPT

## 2020-07-07 PROCEDURE — 99232 SBSQ HOSP IP/OBS MODERATE 35: CPT | Performed by: SURGERY

## 2020-07-07 PROCEDURE — 2500000003 HC RX 250 WO HCPCS: Performed by: INTERNAL MEDICINE

## 2020-07-07 PROCEDURE — 94760 N-INVAS EAR/PLS OXIMETRY 1: CPT

## 2020-07-07 PROCEDURE — 36415 COLL VENOUS BLD VENIPUNCTURE: CPT

## 2020-07-07 PROCEDURE — APPSS15 APP SPLIT SHARED TIME 0-15 MINUTES: Performed by: PHYSICIAN ASSISTANT

## 2020-07-07 PROCEDURE — 99223 1ST HOSP IP/OBS HIGH 75: CPT | Performed by: INTERNAL MEDICINE

## 2020-07-07 PROCEDURE — 74181 MRI ABDOMEN W/O CONTRAST: CPT

## 2020-07-07 PROCEDURE — APPNB30 APP NON BILLABLE TIME 0-30 MINS: Performed by: PHYSICIAN ASSISTANT

## 2020-07-07 PROCEDURE — 80076 HEPATIC FUNCTION PANEL: CPT

## 2020-07-07 PROCEDURE — 85025 COMPLETE CBC W/AUTO DIFF WBC: CPT

## 2020-07-07 PROCEDURE — 2060000000 HC ICU INTERMEDIATE R&B

## 2020-07-07 PROCEDURE — 2580000003 HC RX 258: Performed by: INTERNAL MEDICINE

## 2020-07-07 PROCEDURE — 6360000002 HC RX W HCPCS: Performed by: INTERNAL MEDICINE

## 2020-07-07 PROCEDURE — 80048 BASIC METABOLIC PNL TOTAL CA: CPT

## 2020-07-07 PROCEDURE — 84300 ASSAY OF URINE SODIUM: CPT

## 2020-07-07 PROCEDURE — 84540 ASSAY OF URINE/UREA-N: CPT

## 2020-07-07 RX ORDER — DEXTROSE MONOHYDRATE 50 MG/ML
100 INJECTION, SOLUTION INTRAVENOUS PRN
Status: DISCONTINUED | OUTPATIENT
Start: 2020-07-07 | End: 2020-07-24 | Stop reason: HOSPADM

## 2020-07-07 RX ORDER — DEXTROSE MONOHYDRATE 25 G/50ML
INJECTION, SOLUTION INTRAVENOUS
Status: COMPLETED
Start: 2020-07-07 | End: 2020-07-07

## 2020-07-07 RX ORDER — NICOTINE POLACRILEX 4 MG
15 LOZENGE BUCCAL PRN
Status: DISCONTINUED | OUTPATIENT
Start: 2020-07-07 | End: 2020-07-24 | Stop reason: HOSPADM

## 2020-07-07 RX ORDER — DEXTROSE MONOHYDRATE 25 G/50ML
12.5 INJECTION, SOLUTION INTRAVENOUS PRN
Status: DISCONTINUED | OUTPATIENT
Start: 2020-07-07 | End: 2020-07-24 | Stop reason: HOSPADM

## 2020-07-07 RX ADMIN — DEXTROSE MONOHYDRATE 50 ML: 25 INJECTION, SOLUTION INTRAVENOUS at 16:40

## 2020-07-07 RX ADMIN — DEXTROSE MONOHYDRATE 12.5 G: 25 INJECTION, SOLUTION INTRAVENOUS at 11:20

## 2020-07-07 RX ADMIN — SODIUM CHLORIDE: 9 INJECTION, SOLUTION INTRAVENOUS at 09:24

## 2020-07-07 RX ADMIN — SODIUM CHLORIDE: 9 INJECTION, SOLUTION INTRAVENOUS at 02:08

## 2020-07-07 RX ADMIN — LEVOFLOXACIN 250 MG: 5 INJECTION, SOLUTION INTRAVENOUS at 17:52

## 2020-07-07 RX ADMIN — DEXTROSE MONOHYDRATE 12.5 G: 25 INJECTION, SOLUTION INTRAVENOUS at 22:53

## 2020-07-07 RX ADMIN — HEPARIN SODIUM 5000 UNITS: 5000 INJECTION INTRAVENOUS; SUBCUTANEOUS at 11:31

## 2020-07-07 RX ADMIN — HEPARIN SODIUM 5000 UNITS: 5000 INJECTION INTRAVENOUS; SUBCUTANEOUS at 22:56

## 2020-07-07 RX ADMIN — SODIUM BICARBONATE: 84 INJECTION, SOLUTION INTRAVENOUS at 17:53

## 2020-07-07 RX ADMIN — DEXTROSE MONOHYDRATE 12.5 G: 25 INJECTION, SOLUTION INTRAVENOUS at 17:00

## 2020-07-07 RX ADMIN — MORPHINE SULFATE 2 MG: 2 INJECTION, SOLUTION INTRAMUSCULAR; INTRAVENOUS at 00:48

## 2020-07-07 RX ADMIN — DEXTROSE MONOHYDRATE 12.5 G: 25 INJECTION, SOLUTION INTRAVENOUS at 16:40

## 2020-07-07 ASSESSMENT — PAIN SCALES - GENERAL
PAINLEVEL_OUTOF10: 6
PAINLEVEL_OUTOF10: 10
PAINLEVEL_OUTOF10: 2
PAINLEVEL_OUTOF10: 9

## 2020-07-07 NOTE — PROGRESS NOTES
General and Vascular Surgery                                                           Daily Progress Note                                                             Souleymane Aguirre PA-C     Pt Name: Karsten Lange  Medical Record Number: [id number]  Date of Birth 1970   Today's Date: 7/7/2020    Chief Complaint: abdominal pain    ASSESSMENT/PLAN  1. SBO. +abdominal pain and distention  2. KIKI   3. Elevated LFT's:  MRCP done this AM showed no biliary ductal obstruction  4. Tachycardic   5. Leukocytosis 27.7-->22.7  6. NPO  7. Continue NGT to LWS  8. IVF  9. Monitor and control pain  10. OOB as tolerated  11. Will obtain abdominal xray's tomorrow AM to further evaluate      EDUCATION  Patient educated about their illness/diagnosis, stated above, and all questions answered. We discussed the importance of nutrition, medications they are taking, and healthy lifestyle. Aida Thomasrow is unchanged from yesterday. Pain is well controlled. OBJECTIVE  VITALS:  height is 4' 11\" (1.499 m) and weight is 242 lb 4.6 oz (109.9 kg). Her oral temperature is 98 °F (36.7 °C). Her blood pressure is 133/85 and her pulse is 102. Her respiration is 28 and oxygen saturation is 90%. VITALS:  /85   Pulse 102   Temp 98 °F (36.7 °C) (Oral)   Resp 28   Ht 4' 11\" (1.499 m)   Wt 242 lb 4.6 oz (109.9 kg)   SpO2 90%   BMI 48.94 kg/m²   GENERAL: alert, no distress  ABDOMEN: tenderness present- diffuse,  without rebound and guarding and distention present  I/O last 3 completed shifts: In: 1700 [I.V.:1700]  Out: 1000 [Urine:450; Emesis/NG output:550]  No intake/output data recorded.     LABS  Recent Labs     07/07/20  0506   WBC 22.7*   HGB 9.9*   HCT 32.5*   *   *   K 4.8   CL 96*   CO2 14*   BUN 71*   CREATININE 4.3*   CALCIUM 7.5*   *   *   BILITOT 2.9*   BILIDIR 2.5*     CBC:   Lab Results   Component Value Date    WBC 22.7 07/07/2020 RBC 5.17 07/07/2020    HGB 9.9 07/07/2020    HCT 32.5 07/07/2020    MCV 62.9 07/07/2020    MCH 19.1 07/07/2020    MCHC 30.4 07/07/2020    RDW 18.8 07/07/2020     07/07/2020    MPV 7.9 07/07/2020     CMP:    Lab Results   Component Value Date     07/07/2020    K 4.8 07/07/2020    K 4.4 07/06/2020    CL 96 07/07/2020    CO2 14 07/07/2020    BUN 71 07/07/2020    CREATININE 4.3 07/07/2020    GFRAA 13 07/07/2020    AGRATIO 0.5 07/06/2020    LABGLOM 11 07/07/2020    GLUCOSE 64 07/07/2020    PROT 6.4 07/07/2020    LABALBU 2.1 07/07/2020    CALCIUM 7.5 07/07/2020    BILITOT 2.9 07/07/2020    ALKPHOS 121 07/07/2020     07/07/2020     07/07/2020         Patricio Barrios PA-C  Electronically signed 7/7/2020 at 1:10 PM      Surgery Staff  I have examined this patient and read and agree with the note by Linda Wang PA-C from today. Pain mildly better. Denies flatus or BM    Vitals:    07/06/20 2337 07/07/20 0412 07/07/20 0843 07/07/20 1110   BP: (!) 158/95 (!) 144/96 (!) 147/91 133/85   Pulse: 80 106 107 102   Resp: 16 16 24 28   Temp: 98.6 °F (37 °C) 98.1 °F (36.7 °C) 98.2 °F (36.8 °C) 98 °F (36.7 °C)   TempSrc: Oral Oral Oral Oral   SpO2: 93% 91% 90% 90%   Weight:  242 lb 4.6 oz (109.9 kg)     Height:         NAD, alert  Abdomen remains distended. Diffusely tender however slightly improved from yesterday  WBC trending down but Cr up. LFTs fairly stable  MRCP without choledocholithaisis    SBO vs viral enteritis  Continue NG, NPO, supportive care another day. Repeat KUB today. May need exploration if no significant improvement  GI following for elevated LFTs. Await their opinion. Nephrology consulted for KIKI.   Electronically signed by Mannie Vazquez MD on 7/7/2020 at 2:39 PM

## 2020-07-07 NOTE — PROGRESS NOTES
Patient arrived to unit via stretcher to Rm 2113. Patient assisted to bed and placed on monitors per MD orders. NG to right nare hooked up to suction. Green drainage noted, Patient oriented to room, pain management and call light procedure. Admission completed. Physical assessment completed. See low sheets. Plan of care discussed and all questions answered. Bed locked and lowered. Side rails up 2/4. Bedside table, call light and personal belongings within reach. Medicated for pain per Mar. Patient denies additional needs at this time.

## 2020-07-07 NOTE — H&P
Hospital Medicine History & Physical    Patient:  Cullen Terry  YOB: 1970  Date of Service: 20  MRN: [id number]    PCP: Blaise Smith MD    Date of Admission: 2020      Chief Complaint:    Chief Complaint   Patient presents with    Abdominal Pain     [place] 8 days [place] [facility] [place] bowel inflammation  No emesis         History Of Present Illness: The patient is a 53 y.o. female who presents to [facility] with c/o as above  Still is in pain  Has ngt  going for mri  Denies any other complaints  More alert than yesterday    Past Medical History:        Diagnosis Date    Hypertension     Obesity        Past Surgical History:        Procedure Laterality Date     SECTION         Family History:  History reviewed. No pertinent family history. Medications Prior to Admission:    Prior to Admission medications    Medication Sig Start Date End Date Taking? Authorizing Provider   ferrous sulfate (FE TABS) 325 (65 Fe) MG EC tablet Take 1 tablet by mouth 2 times daily 6/3/20  Yes Will MD Sarah   Multiple Vitamins-Minerals (THERAPEUTIC MULTIVITAMIN-MINERALS) tablet Take 1 tablet by mouth daily 6/3/20 6/3/21 Yes Will MD Sarah   furosemide (LASIX) 20 MG tablet Take 1 tablet by mouth 2 times daily 6/3/20  Yes Will MD Sarah   metFORMIN (GLUCOPHAGE) 500 MG tablet Take 1 tablet by mouth 2 times daily (with meals) 6/3/20  Yes Will MD Sarah   amLODIPine-benazepril (LOTREL) 5-20 MG per capsule Take 1 capsule by mouth daily 6/3/20 6/3/21 Yes Will MD Sarah   vitamin D (ERGOCALCIFEROL) 1.25 MG (60741 UT) CAPS capsule Take 1 capsule by mouth once a week 6/3/20  Yes Will MD Sarah       Allergies:  Patient has no known allergies.     Social History:    Social History     Socioeconomic History    Marital status: Single     Spouse name: Not on file    Number of children: Not on file    Years of education: Not on file    Highest education level: Not on file   Occupational History    Not on file   Social Needs    Financial resource strain: Not on file    Food insecurity     Worry: Not on file     Inability: Not on file    Transportation needs     Medical: Not on file     Non-medical: Not on file   Tobacco Use    Smoking status: Never Smoker    Smokeless tobacco: Never Used   Substance and Sexual Activity    Alcohol use: No    Drug use: No    Sexual activity: Not on file   Lifestyle    Physical activity     Days per week: Not on file     Minutes per session: Not on file    Stress: Not on file   Relationships    Social connections     Talks on phone: Not on file     Gets together: Not on file     Attends Judaism service: Not on file     Active member of club or organization: Not on file     Attends meetings of clubs or organizations: Not on file     Relationship status: Not on file    Intimate partner violence     Fear of current or ex partner: Not on file     Emotionally abused: Not on file     Physically abused: Not on file     Forced sexual activity: Not on file   Other Topics Concern    Not on file   Social History Narrative    Not on file       TOBACCO:   reports that she has never smoked. She has never used smokeless tobacco.  ETOH:   reports no history of alcohol use. REVIEW OF SYSTEMS:    Vital: /79   Pulse 105   Temp 98 °F (36.7 °C) (Oral)   Resp 20   Ht 4' 11\" (1.499 m)   Wt 242 lb 4.6 oz (109.9 kg)   SpO2 (!) 89%   BMI 48.94 kg/m²   Constitutional: no fever, no night sweats, no fatigue  Head: no headache, no head injury, no migranes. Eye: no blurring of vision, no double vision.   Ears: no hearing difficulty, no tinnitus  Mouth/throat: no ulceration, dental caries, dysphagia  Lungs: no cough, no shortness of breath, no wheeze  CVS: no palpitation, no chest pain, no shortness of breath  GI: no abdominal pain, no nausea , no vomiting, no constipation  ANGELA: no dysuria, frequency and urgency, no hematuria, no kidney stones  Musculoskeletal:back pain  Endocrine: no polyuria, polydypsia, no cold or heat intolerence  Hematology: no anemia, no easy brusing or bleeding, no hx of clotting disorder  Dermatology: no skin rash, no eczema, no prurities,  Psychiatry: no depression, no anxiety,no panic attacks, no suicide ideation  Neurology: no syncope, no seizures, no numbness or tingling of hands, no numbness or tingling of feet, no paresis      PHYSICAL EXAM:  General:  Awake, alert, not in distress. Appears to be stated age. HEENT: Atraumatic, normocephalic. PERRLA. EOM intact. Pink conjunctiva, anicteric sclera. Pink and moist oral mucosa. No lymphadenopathy. Trachea midline. Thyroid non palpable. Neck supple. No carotid bruit. No JVD. Chest: Bilateal air entry, Clear to auscultation, no wheezing, rhonchi or rales. Cardiovascular: RRR, K3I5, no murmur, click, rub or gallop. No lower extremity edema. Pedal and posterior tibialis 2+. Abdomen: Soft,  tender to palpation. Active bowel sounds x 4 quadrants. distended  Musculoskeletal: Limitation of the rom of the joints and LSS  SLR is positive on the affected side  No gross weak ness appreciated  CNS: Oriented to person, place and time. CXR:   MRI ABDOMEN WO CONTRAST MRCP   Final Result   No biliary ductal obstruction. No findings to explain elevated LFTs. CT ABDOMEN PELVIS WO CONTRAST Additional Contrast? None   Final Result   Mid to distal small bowel obstruction         US GALLBLADDER RUQ   Final Result   Questionable small amount of sludge within the gallbladder. Otherwise   unremarkable right upper quadrant ultrasound. XR ABDOMEN (2 VIEWS)    (Results Pending)     EKG:  I have reviewed the EKG.     CBC   Recent Labs     07/06/20  1337 07/07/20  0506   WBC 27.7* 22.7*   HGB 11.2* 9.9*   HCT 37.5 32.5*   PLT 749* 614*      RENAL  Recent Labs     07/06/20  1337 07/07/20  0506   * 133*   K 4.4 4.8   CL 88* 96*   CO2 13* 14*   BUN 54* 71*   CREATININE 3.8* 4.3*     LFT'S  Recent Labs     07/06/20  1337 07/07/20  0506   * 252*   ALT 74* 100*   BILIDIR  --  2.5*   BILITOT 3.3* 2.9*   ALKPHOS 122 121     COAG  No results for input(s): INR in the last 72 hours. CARDIAC ENZYMES  No results for input(s): CKTOTAL, CKMB, CKMBINDEX, TROPONINI in the last 72 hours. U/A:  No results found for: NITRITE, COLORU, WBCUA, RBCUA, MUCUS, BACTERIA, CLARITYU, SPECGRAV, LEUKOCYTESUR, BLOODU, GLUCOSEU, AMORPHOUS    ABG  No results found for: NJH8AHD, BEART, A0CEECZU, PHART, THGBART, MQH2NDM, PO2ART, QMW8VEM        Active Hospital Problems    Diagnosis Date Noted    ARF (acute renal failure) ([facility] [address] [N17.9] 07/06/2020    SBO (small bowel obstruction) ([facility] [address] [K56.609] 07/06/2020    Leukocytosis [D72.829] 07/06/2020           ASSESSMENT/PLAN:  SBO= ngt suction  Abnormal LFTS  htn-monitor  uti-on abx  Dm-monitor  KIKI- seen by  - on ringer lactate    Pt is stable. Discussed with staff and pt about the plan. See the orders for further plan. Will proceed further acc to pt's progress.     Electronically signed by Isai Burton MD on 7/7/2020 at 5:38 PM

## 2020-07-07 NOTE — PROGRESS NOTES
Inventory of home medication brought in by patient:    Betamethasone cream  1 tube  Saline Mist Nasal Spray  Furosemide 20 mg  # 59  Metformin  500mg  #6  Furosemide 20mg  #13  Ondansetron ODT 4mg  #4  Daily Marcelle tabs # 30  Amlodipine 5/20mg  # 28  Dicyclomine 20mg  #22  Metformin 500mg  # 56    Meds locked up in safe in patient's room. Safe code id [id number]    Patient wallet, credit cards and cash also locked up in safe.

## 2020-07-07 NOTE — PLAN OF CARE
Problem: Pain:  Goal: Pain level will decrease  Description: Pain level will decrease  Outcome: Ongoing     Problem: Falls - Risk of:  Goal: Will remain free from falls  Description: Will remain free from falls  Outcome: Ongoing     Problem: Nausea/Vomiting:  Goal: Absence of nausea/vomiting  Description: Absence of nausea/vomiting  Outcome: Ongoing

## 2020-07-07 NOTE — CONSULTS
GASTROENTEROLOGY INPATIENT CONSULTATION      IDENTIFYING DATA/REASON FOR CONSULTATION   PATIENT:  Edel Huitron  MRN:  [id number]  ADMIT DATE: 7/6/2020  TIME OF EVALUATION: 7/7/2020 6:35 AM  HOSPITAL STAY:   LOS: 1 day     REASON FOR CONSULTATION:  Possible choledocholithiasis     HISTORY OF PRESENT ILLNESS   Edel Huitron is a 53 y.o. female with a PMH of hypertension and obesity who presented on 7/6/2020 with abdominal pain. CT A/P showed mid to distal small bowel obstruction. She was also noted to have elevated LFTs. GI consulted regarding possible choledocholithiasis. Patient reports pain started last week. She was initially seen at [facility].  CT at that time showed mural thickening of multiple small bowel loops in the left hemiabdomen with associated mesenteric stranding. Her LFTs were normal at that time. She was diagnosed with presumed infectious enteritis and discharged home on dicyclomine and Zofran. She reports her pain did not improve pain located mid stomach. Last bowel movement was Saturday and very runny. She is unsure when her last normal BM was. She has not been passing flatus. she has not been able to tolerate food very well. She ate applesauce yesterday. Eating made the pain worse. She denies vomiting. No fevers. She denies being around any recent sick contacts. No new medications. She is a history of prior 2 C-sections. Repeat CT A/P here showed distended multiple proximal loops of small bowel with transition point in the right lower quadrant. Blood work noted white count of 22.7, elevated lactic acid, BUN 54, creatinine 3.8, bilirubin 3.3, , ALT 74. Right upper quadrant ultrasound showed questionable small amount of sludge within the gallbladder otherwise unremarkable. She has been evaluated by General Surgery. NG tube placed for decompression.   She has been started on Zosyn and Levaquin        PAST MEDICAL, SURGICAL, FAMILY, and SOCIAL HISTORY Past Medical History:   Diagnosis Date    Hypertension     Obesity      Past Surgical History:   Procedure Laterality Date     SECTION       History reviewed. No pertinent family history.   Social History     Socioeconomic History    Marital status: Single     Spouse name: None    Number of children: None    Years of education: None    Highest education level: None   Occupational History    None   Social Needs    Financial resource strain: None    Food insecurity     Worry: None     Inability: None    Transportation needs     Medical: None     Non-medical: None   Tobacco Use    Smoking status: Never Smoker    Smokeless tobacco: Never Used   Substance and Sexual Activity    Alcohol use: No    Drug use: No    Sexual activity: None   Lifestyle    Physical activity     Days per week: None     Minutes per session: None    Stress: None   Relationships    Social connections     Talks on phone: None     Gets together: None     Attends Zoroastrianism service: None     Active member of club or organization: None     Attends meetings of clubs or organizations: None     Relationship status: None    Intimate partner violence     Fear of current or ex partner: None     Emotionally abused: None     Physically abused: None     Forced sexual activity: None   Other Topics Concern    None   Social History Narrative    None       MEDICATIONS   SCHEDULED:  sodium chloride flush, 10 mL, 2 times per day  pantoprazole, 40 mg, QAM AC  heparin (porcine), 5,000 Units, BID  insulin lispro, 0-6 Units, TID WC  insulin lispro, 0-3 Units, Nightly  levofloxacin, 250 mg, Q24H      FLUIDS/DRIPS:     sodium chloride 125 mL/hr at 20 0208     PRNs: sodium chloride flush, 10 mL, PRN  acetaminophen, 650 mg, Q6H PRN    Or  acetaminophen, 650 mg, Q6H PRN  promethazine, 12.5 mg, Q6H PRN    Or  ondansetron, 4 mg, Q6H PRN  morphine, 2 mg, Q2H PRN    Or  morphine, 4 mg, Q2H PRN  iopamidol, 75 mL, ONCE PRN      ALLERGIES:  She No Known Allergies    REVIEW OF SYSTEMS   Pertinent ROS noted in HPI    PHYSICAL EXAM     Vitals:    07/06/20 2000 07/06/20 2127 07/06/20 2337 07/07/20 0412   BP: 115/82  (!) 158/95 (!) 144/96   Pulse: 115  80 106   Resp: 22 20 16 16   Temp: 98 °F (36.7 °C)  98.6 °F (37 °C) 98.1 °F (36.7 °C)   TempSrc: Oral  Oral Oral   SpO2: 95% 97% 93% 91%   Weight: 238 lb 15.7 oz (108.4 kg)   242 lb 4.6 oz (109.9 kg)   Height: 4' 11\" (1.499 m)          No intake/output data recorded. Physical Exam:  General appearance: alert, cooperative, +NGT  Eyes: Anicteric  Head: Normocephalic, without obvious abnormality  Lungs: clear to auscultation bilaterally, Normal Effort  Heart: regular rate and rhythm, normal S1 and S2, no murmurs or rubs  Abdomen: distended, diffusely tender. No rebound or guarding. Extremities: atraumatic, no cyanosis or edema  Skin: warm and dry, no jaundice  Neuro: Grossly intact, A&OX3      LABS AND IMAGING   Laboratory   Recent Labs     07/06/20  1337 07/07/20  0506   WBC 27.7* 22.7*   HGB 11.2* 9.9*   HCT 37.5 32.5*   MCV 63.4* 62.9*   * 614*     Recent Labs     07/06/20  1337   *   K 4.4   CL 88*   CO2 13*   BUN 54*   CREATININE 3.8*     Recent Labs     07/06/20  1337   *   ALT 74*   BILITOT 3.3*   ALKPHOS 122     Recent Labs     07/06/20  1337   LIPASE 12.0*     No results for input(s): PROTIME, INR in the last 72 hours. Imaging  CT ABDOMEN PELVIS WO CONTRAST Additional Contrast? None   Final Result   Mid to distal small bowel obstruction         US GALLBLADDER RUQ   Final Result   Questionable small amount of sludge within the gallbladder. Otherwise   unremarkable right upper quadrant ultrasound. ASSESSMENT AND RECOMMENDATIONS   53 y.o. female with a PMH of hypertension and obesity who presented on 7/6/2020 with abdominal pain. CT A/P showed mid to distal small bowel obstruction. She was also noted to have elevated LFTs.   GI consulted regarding possible choledocholithiasis. IMPRESSION:  1. Small Bowel Obstruction: Surgery following. NG tube placed for decompression  2. Elevated LFTs: Labs were normal 1 week ago. Right upper quadrant ultrasound showed sludge in the gallbladder otherwise unremarkable. Will obtain MRCP to further evaluate the biliary ducts  3. Leukocytosis  4. KIKI      RECOMMENDATIONS:    Will order MRCP  Monitor LFT trend  NG tube management per General Surgery      If you have any questions or need any further information, please feel free to contact our consult team.  Thank you for allowing us to participate in the care of Karly Shen. The note was completed using Dragon voice recognition transcription. Every effort was made to ensure accuracy; however, inadvertent transcription errors may be present despite my best efforts to edit errors.       Stephan Morocho PA-C

## 2020-07-07 NOTE — CONSULTS
Nephrology ([facility]) Consult Note    Claudia Callaway is a 53 y.o. female whom we were asked to see for KIKI. The patient presents with a 1 week history of RUQ abdominal discomfort, similar to her menses discomfort, but it did not resolve. She was seen at [facility] ER and diagnosed with gastroenteritis. Because of worsening symptoms, she came here. Abdominal CT showed small bowel obstruction. She reports decreased poor PO intake. She is on benazepril, and she did take ibuprofen x 2. She reported \"blood\" on Purewick. Past Medical History:  HTN  obesity  DM2  h/o     Review of System:  Otherwise unremarkable    Allergies:  Patient has no known allergies. Medications:  Current medications reviewed. Social History:  no tobacco  Family Medical History:  Negative for kidney disase    Physical Exam:  Blood pressure 134/79, pulse 105, temperature 98 °F (36.7 °C), temperature source Oral, resp. rate 20, height 4' 11\" (1.499 m), weight 242 lb 4.6 oz (109.9 kg), SpO2 (!) 89 %, not currently breastfeeding. General:  NAD, A+Ox3, ill-appearing, obese body habitus  HEENT:  deferred due to pandemic  Neck:  deferred  Chest:  deferred  CV:  deferred  Abdomen:  deferred  Extremities:  No peripheral edema  Neurological:  deferred  Lymphatics:  deferred  Skin:  No rash, no jaundice  Psychiatric:  deferred    Laboratory Studies:  Lab Results   Component Value Date/Time     (L) 2020 05:06 AM    K 4.8 2020 05:06 AM    K 4.4 2020 01:37 PM    CL 96 (L) 2020 05:06 AM    CO2 14 (LL) 2020 05:06 AM    BUN 71 (H) 2020 05:06 AM    CREATININE 4.3 (H) 2020 05:06 AM    CALCIUM 7.5 (L) 2020 05:06 AM     Lab Results   Component Value Date/Time    WBC 22.7 (H) 2020 05:06 AM    HGB 9.9 (L) 2020 05:06 AM    HCT 32.5 (L) 2020 05:06 AM     (H) 2020 05:06 AM       Assessment/Plan:  Reviewed old records and labs.     1) KIKI   -

## 2020-07-07 NOTE — PROGRESS NOTES
Patient blood sugar was 62 at 1114 this morning. Half an amp of D50 was administered, per protocol. Blood sugar was rechecked at 1125 and was 91. Blood sugar at 1400 was 80. At 9845 8544, blood sugar was tested again and was 53. Half an amp of D50 was administered. Blood sugar was rechecked at 1647 and was 58. Another half amp of D50 was administered at that point and blood sugar aisha to 69, at 1713. Dr. Ann Gale was called, per protocol. She suggested the patient's fluids be changed to D5 half-normal. While attending to the patient, nephrology changed her fluid orders to sodium bicarb in dextrose 5%. Called pharmacy and confirmed that this would be a comparable solution for the patient's varying blood glucose levels. Will continue to monitor blood glucose levels q 4 hours.     Electronically signed by Meghann Moffett RN on 7/7/2020 at 8:03 PM

## 2020-07-08 ENCOUNTER — ANESTHESIA (OUTPATIENT)
Dept: OPERATING ROOM | Age: 50
DRG: 853 | End: 2020-07-08
Payer: COMMERCIAL

## 2020-07-08 ENCOUNTER — APPOINTMENT (OUTPATIENT)
Dept: GENERAL RADIOLOGY | Age: 50
DRG: 853 | End: 2020-07-08
Payer: COMMERCIAL

## 2020-07-08 ENCOUNTER — ANESTHESIA EVENT (OUTPATIENT)
Dept: OPERATING ROOM | Age: 50
DRG: 853 | End: 2020-07-08
Payer: COMMERCIAL

## 2020-07-08 VITALS
TEMPERATURE: 98.8 F | OXYGEN SATURATION: 93 % | SYSTOLIC BLOOD PRESSURE: 104 MMHG | RESPIRATION RATE: 14 BRPM | DIASTOLIC BLOOD PRESSURE: 54 MMHG

## 2020-07-08 LAB
A/G RATIO: 0.5 (ref 1.1–2.2)
ALBUMIN SERPL-MCNC: 2.2 G/DL (ref 3.4–5)
ALBUMIN SERPL-MCNC: 2.2 G/DL (ref 3.4–5)
ALP BLD-CCNC: 146 U/L (ref 40–129)
ALP BLD-CCNC: 150 U/L (ref 40–129)
ALT SERPL-CCNC: 70 U/L (ref 10–40)
ALT SERPL-CCNC: 71 U/L (ref 10–40)
ANION GAP SERPL CALCULATED.3IONS-SCNC: 22 MMOL/L (ref 3–16)
ANISOCYTOSIS: ABNORMAL
AST SERPL-CCNC: 153 U/L (ref 15–37)
AST SERPL-CCNC: 154 U/L (ref 15–37)
ATYPICAL LYMPHOCYTE RELATIVE PERCENT: 0 % (ref 0–6)
BANDED NEUTROPHILS RELATIVE PERCENT: 8 % (ref 0–7)
BASE EXCESS ARTERIAL: -5.1 MMOL/L (ref -3–3)
BASOPHILS ABSOLUTE: 0 K/UL (ref 0–0.2)
BASOPHILS RELATIVE PERCENT: 0 %
BILIRUB SERPL-MCNC: 3.1 MG/DL (ref 0–1)
BILIRUB SERPL-MCNC: 3.1 MG/DL (ref 0–1)
BILIRUBIN DIRECT: 2.7 MG/DL (ref 0–0.3)
BILIRUBIN, INDIRECT: 0.4 MG/DL (ref 0–1)
BUN BLDV-MCNC: 105 MG/DL (ref 7–20)
CALCIUM SERPL-MCNC: 7.1 MG/DL (ref 8.3–10.6)
CARBOXYHEMOGLOBIN ARTERIAL: 1.4 % (ref 0–1.5)
CHLORIDE BLD-SCNC: 90 MMOL/L (ref 99–110)
CO2: 22 MMOL/L (ref 21–32)
CREAT SERPL-MCNC: 4.1 MG/DL (ref 0.6–1.1)
DOHLE BODIES: PRESENT
EOSINOPHILS ABSOLUTE: 0 K/UL (ref 0–0.6)
EOSINOPHILS RELATIVE PERCENT: 0 %
GFR AFRICAN AMERICAN: 14
GFR NON-AFRICAN AMERICAN: 12
GLOBULIN: 4.7 G/DL
GLUCOSE BLD-MCNC: 102 MG/DL (ref 70–99)
GLUCOSE BLD-MCNC: 102 MG/DL (ref 70–99)
GLUCOSE BLD-MCNC: 119 MG/DL (ref 70–99)
GLUCOSE BLD-MCNC: 93 MG/DL (ref 70–99)
GLUCOSE BLD-MCNC: 94 MG/DL (ref 70–99)
GLUCOSE BLD-MCNC: 95 MG/DL (ref 70–99)
HAV IGM SER IA-ACNC: NORMAL
HCG QUALITATIVE: NEGATIVE
HCO3 ARTERIAL: 19.4 MMOL/L (ref 21–29)
HCT VFR BLD CALC: 28.5 % (ref 36–48)
HEMATOLOGY PATH CONSULT: NO
HEMOGLOBIN, ART, EXTENDED: 8.5 G/DL (ref 12–16)
HEMOGLOBIN: 8.9 G/DL (ref 12–16)
INR BLD: 1.32 (ref 0.86–1.14)
LACTIC ACID: 3.1 MMOL/L (ref 0.4–2)
LYMPHOCYTES ABSOLUTE: 3 K/UL (ref 1–5.1)
LYMPHOCYTES RELATIVE PERCENT: 9 %
MCH RBC QN AUTO: 18.9 PG (ref 26–34)
MCHC RBC AUTO-ENTMCNC: 31.3 G/DL (ref 31–36)
MCV RBC AUTO: 60.3 FL (ref 80–100)
METHEMOGLOBIN ARTERIAL: 1.1 %
MICROCYTES: ABNORMAL
MONOCYTES ABSOLUTE: 2.7 K/UL (ref 0–1.3)
MONOCYTES RELATIVE PERCENT: 8 %
MYELOCYTE PERCENT: 3 %
NEUTROPHILS ABSOLUTE: 27.9 K/UL (ref 1.7–7.7)
NEUTROPHILS RELATIVE PERCENT: 72 %
NUCLEATED RED BLOOD CELLS: 2 /100 WBC
NUCLEATED RED BLOOD CELLS: 2 /100 WBC
O2 CONTENT ARTERIAL: 11 ML/DL
O2 SAT, ARTERIAL: 93.1 %
O2 THERAPY: ABNORMAL
OVALOCYTES: ABNORMAL
PCO2 ARTERIAL: 33 MMHG (ref 35–45)
PDW BLD-RTO: 18.9 % (ref 12.4–15.4)
PERFORMED ON: ABNORMAL
PERFORMED ON: NORMAL
PERFORMED ON: NORMAL
PH ARTERIAL: 7.38 (ref 7.35–7.45)
PLATELET # BLD: 617 K/UL (ref 135–450)
PMV BLD AUTO: 7.9 FL (ref 5–10.5)
PO2 ARTERIAL: 72.9 MMHG (ref 75–108)
POLYCHROMASIA: ABNORMAL
POTASSIUM REFLEX MAGNESIUM: 4.1 MMOL/L (ref 3.5–5.1)
POTASSIUM SERPL-SCNC: 4.1 MMOL/L (ref 3.5–5.1)
PROTHROMBIN TIME: 15.4 SEC (ref 10–13.2)
RBC # BLD: 4.72 M/UL (ref 4–5.2)
SARS-COV-2, NAAT: NOT DETECTED
SLIDE REVIEW: ABNORMAL
SODIUM BLD-SCNC: 134 MMOL/L (ref 136–145)
TARGET CELLS: ABNORMAL
TCO2 ARTERIAL: 20.4 MMOL/L
TEAR DROP CELLS: ABNORMAL
TOTAL CK: 229 U/L (ref 26–192)
TOTAL PROTEIN: 6.8 G/DL (ref 6.4–8.2)
TOTAL PROTEIN: 6.9 G/DL (ref 6.4–8.2)
TOXIC GRANULATION: PRESENT
VACUOLATED NEUTROPHILS: PRESENT
WBC # BLD: 33.6 K/UL (ref 4–11)

## 2020-07-08 PROCEDURE — 0DBU0ZZ EXCISION OF OMENTUM, OPEN APPROACH: ICD-10-PCS | Performed by: SURGERY

## 2020-07-08 PROCEDURE — P9041 ALBUMIN (HUMAN),5%, 50ML: HCPCS | Performed by: NURSE ANESTHETIST, CERTIFIED REGISTERED

## 2020-07-08 PROCEDURE — 94002 VENT MGMT INPAT INIT DAY: CPT

## 2020-07-08 PROCEDURE — 82803 BLOOD GASES ANY COMBINATION: CPT

## 2020-07-08 PROCEDURE — 2580000003 HC RX 258: Performed by: INTERNAL MEDICINE

## 2020-07-08 PROCEDURE — 2500000003 HC RX 250 WO HCPCS: Performed by: INTERNAL MEDICINE

## 2020-07-08 PROCEDURE — 6360000002 HC RX W HCPCS: Performed by: SURGERY

## 2020-07-08 PROCEDURE — 83605 ASSAY OF LACTIC ACID: CPT

## 2020-07-08 PROCEDURE — 0UT60ZZ RESECTION OF LEFT FALLOPIAN TUBE, OPEN APPROACH: ICD-10-PCS | Performed by: SURGERY

## 2020-07-08 PROCEDURE — 82550 ASSAY OF CK (CPK): CPT

## 2020-07-08 PROCEDURE — 2580000003 HC RX 258: Performed by: SURGERY

## 2020-07-08 PROCEDURE — 86708 HEPATITIS A ANTIBODY: CPT

## 2020-07-08 PROCEDURE — 3600000014 HC SURGERY LEVEL 4 ADDTL 15MIN: Performed by: SURGERY

## 2020-07-08 PROCEDURE — 87176 TISSUE HOMOGENIZATION CULTR: CPT

## 2020-07-08 PROCEDURE — 86663 EPSTEIN-BARR ANTIBODY: CPT

## 2020-07-08 PROCEDURE — 36415 COLL VENOUS BLD VENIPUNCTURE: CPT

## 2020-07-08 PROCEDURE — 85610 PROTHROMBIN TIME: CPT

## 2020-07-08 PROCEDURE — 0WJG0ZZ INSPECTION OF PERITONEAL CAVITY, OPEN APPROACH: ICD-10-PCS | Performed by: SURGERY

## 2020-07-08 PROCEDURE — 2580000003 HC RX 258: Performed by: ANESTHESIOLOGY

## 2020-07-08 PROCEDURE — 2500000003 HC RX 250 WO HCPCS: Performed by: NURSE ANESTHETIST, CERTIFIED REGISTERED

## 2020-07-08 PROCEDURE — 88312 SPECIAL STAINS GROUP 1: CPT

## 2020-07-08 PROCEDURE — 86664 EPSTEIN-BARR NUCLEAR ANTIGEN: CPT

## 2020-07-08 PROCEDURE — 86665 EPSTEIN-BARR CAPSID VCA: CPT

## 2020-07-08 PROCEDURE — 3700000000 HC ANESTHESIA ATTENDED CARE: Performed by: SURGERY

## 2020-07-08 PROCEDURE — 05HM33Z INSERTION OF INFUSION DEVICE INTO RIGHT INTERNAL JUGULAR VEIN, PERCUTANEOUS APPROACH: ICD-10-PCS | Performed by: SURGERY

## 2020-07-08 PROCEDURE — 94760 N-INVAS EAR/PLS OXIMETRY 1: CPT

## 2020-07-08 PROCEDURE — 87205 SMEAR GRAM STAIN: CPT

## 2020-07-08 PROCEDURE — 85025 COMPLETE CBC W/AUTO DIFF WBC: CPT

## 2020-07-08 PROCEDURE — 87591 N.GONORRHOEAE DNA AMP PROB: CPT

## 2020-07-08 PROCEDURE — 0BH17EZ INSERTION OF ENDOTRACHEAL AIRWAY INTO TRACHEA, VIA NATURAL OR ARTIFICIAL OPENING: ICD-10-PCS | Performed by: ANESTHESIOLOGY

## 2020-07-08 PROCEDURE — U0002 COVID-19 LAB TEST NON-CDC: HCPCS

## 2020-07-08 PROCEDURE — 2000000000 HC ICU R&B

## 2020-07-08 PROCEDURE — 84703 CHORIONIC GONADOTROPIN ASSAY: CPT

## 2020-07-08 PROCEDURE — 86709 HEPATITIS A IGM ANTIBODY: CPT

## 2020-07-08 PROCEDURE — 6360000002 HC RX W HCPCS: Performed by: NURSE ANESTHETIST, CERTIFIED REGISTERED

## 2020-07-08 PROCEDURE — C9113 INJ PANTOPRAZOLE SODIUM, VIA: HCPCS | Performed by: INTERNAL MEDICINE

## 2020-07-08 PROCEDURE — 86645 CMV ANTIBODY IGM: CPT

## 2020-07-08 PROCEDURE — 99233 SBSQ HOSP IP/OBS HIGH 50: CPT | Performed by: INTERNAL MEDICINE

## 2020-07-08 PROCEDURE — 74019 RADEX ABDOMEN 2 VIEWS: CPT

## 2020-07-08 PROCEDURE — 5A1955Z RESPIRATORY VENTILATION, GREATER THAN 96 CONSECUTIVE HOURS: ICD-10-PCS | Performed by: ANESTHESIOLOGY

## 2020-07-08 PROCEDURE — 87491 CHLMYD TRACH DNA AMP PROBE: CPT

## 2020-07-08 PROCEDURE — 6370000000 HC RX 637 (ALT 250 FOR IP): Performed by: INTERNAL MEDICINE

## 2020-07-08 PROCEDURE — APPNB30 APP NON BILLABLE TIME 0-30 MINS: Performed by: PHYSICIAN ASSISTANT

## 2020-07-08 PROCEDURE — 87070 CULTURE OTHR SPECIMN AEROBIC: CPT

## 2020-07-08 PROCEDURE — 3600000004 HC SURGERY LEVEL 4 BASE: Performed by: SURGERY

## 2020-07-08 PROCEDURE — 80053 COMPREHEN METABOLIC PANEL: CPT

## 2020-07-08 PROCEDURE — 88305 TISSUE EXAM BY PATHOLOGIST: CPT

## 2020-07-08 PROCEDURE — 6360000002 HC RX W HCPCS: Performed by: INTERNAL MEDICINE

## 2020-07-08 PROCEDURE — 3700000001 HC ADD 15 MINUTES (ANESTHESIA): Performed by: SURGERY

## 2020-07-08 PROCEDURE — B543ZZA ULTRASONOGRAPHY OF RIGHT JUGULAR VEINS, GUIDANCE: ICD-10-PCS | Performed by: SURGERY

## 2020-07-08 PROCEDURE — 87076 CULTURE ANAEROBE IDENT EACH: CPT

## 2020-07-08 PROCEDURE — 71045 X-RAY EXAM CHEST 1 VIEW: CPT

## 2020-07-08 PROCEDURE — 2709999900 HC NON-CHARGEABLE SUPPLY: Performed by: SURGERY

## 2020-07-08 PROCEDURE — C9290 INJ, BUPIVACAINE LIPOSOME: HCPCS | Performed by: SURGERY

## 2020-07-08 PROCEDURE — 2720000010 HC SURG SUPPLY STERILE: Performed by: SURGERY

## 2020-07-08 RX ORDER — FENTANYL CITRATE 50 UG/ML
25 INJECTION, SOLUTION INTRAMUSCULAR; INTRAVENOUS
Status: DISCONTINUED | OUTPATIENT
Start: 2020-07-08 | End: 2020-07-10

## 2020-07-08 RX ORDER — PANTOPRAZOLE SODIUM 40 MG/10ML
40 INJECTION, POWDER, LYOPHILIZED, FOR SOLUTION INTRAVENOUS DAILY
Status: DISCONTINUED | OUTPATIENT
Start: 2020-07-08 | End: 2020-07-19

## 2020-07-08 RX ORDER — OXYCODONE HYDROCHLORIDE AND ACETAMINOPHEN 5; 325 MG/1; MG/1
2 TABLET ORAL PRN
Status: DISCONTINUED | OUTPATIENT
Start: 2020-07-08 | End: 2020-07-08 | Stop reason: HOSPADM

## 2020-07-08 RX ORDER — PROPOFOL 10 MG/ML
12 INJECTION, EMULSION INTRAVENOUS CONTINUOUS PRN
Status: DISCONTINUED | OUTPATIENT
Start: 2020-07-08 | End: 2020-07-08

## 2020-07-08 RX ORDER — FENTANYL CITRATE 50 UG/ML
25 INJECTION, SOLUTION INTRAMUSCULAR; INTRAVENOUS EVERY 5 MIN PRN
Status: DISCONTINUED | OUTPATIENT
Start: 2020-07-08 | End: 2020-07-08 | Stop reason: HOSPADM

## 2020-07-08 RX ORDER — ONDANSETRON 2 MG/ML
INJECTION INTRAMUSCULAR; INTRAVENOUS PRN
Status: DISCONTINUED | OUTPATIENT
Start: 2020-07-08 | End: 2020-07-08 | Stop reason: SDUPTHER

## 2020-07-08 RX ORDER — CLONIDINE 0.3 MG/24H
1 PATCH, EXTENDED RELEASE TRANSDERMAL WEEKLY
Status: DISCONTINUED | OUTPATIENT
Start: 2020-07-08 | End: 2020-07-14

## 2020-07-08 RX ORDER — OXYCODONE HYDROCHLORIDE AND ACETAMINOPHEN 5; 325 MG/1; MG/1
1 TABLET ORAL PRN
Status: DISCONTINUED | OUTPATIENT
Start: 2020-07-08 | End: 2020-07-08 | Stop reason: HOSPADM

## 2020-07-08 RX ORDER — ONDANSETRON 2 MG/ML
4 INJECTION INTRAMUSCULAR; INTRAVENOUS
Status: DISCONTINUED | OUTPATIENT
Start: 2020-07-08 | End: 2020-07-08 | Stop reason: HOSPADM

## 2020-07-08 RX ORDER — PROPOFOL 10 MG/ML
20 INJECTION, EMULSION INTRAVENOUS
Status: DISCONTINUED | OUTPATIENT
Start: 2020-07-08 | End: 2020-07-08

## 2020-07-08 RX ORDER — SODIUM CHLORIDE 0.9 % (FLUSH) 0.9 %
10 SYRINGE (ML) INJECTION EVERY 12 HOURS SCHEDULED
Status: DISCONTINUED | OUTPATIENT
Start: 2020-07-08 | End: 2020-07-08 | Stop reason: HOSPADM

## 2020-07-08 RX ORDER — MAGNESIUM HYDROXIDE 1200 MG/15ML
LIQUID ORAL CONTINUOUS PRN
Status: COMPLETED | OUTPATIENT
Start: 2020-07-08 | End: 2020-07-08

## 2020-07-08 RX ORDER — LIDOCAINE HYDROCHLORIDE 20 MG/ML
INJECTION, SOLUTION EPIDURAL; INFILTRATION; INTRACAUDAL; PERINEURAL PRN
Status: DISCONTINUED | OUTPATIENT
Start: 2020-07-08 | End: 2020-07-08 | Stop reason: SDUPTHER

## 2020-07-08 RX ORDER — SODIUM CHLORIDE 9 MG/ML
INJECTION, SOLUTION INTRAVENOUS CONTINUOUS
Status: DISCONTINUED | OUTPATIENT
Start: 2020-07-08 | End: 2020-07-08

## 2020-07-08 RX ORDER — SODIUM CHLORIDE 9 MG/ML
10 INJECTION INTRAVENOUS DAILY
Status: DISCONTINUED | OUTPATIENT
Start: 2020-07-08 | End: 2020-07-24 | Stop reason: HOSPADM

## 2020-07-08 RX ORDER — ROCURONIUM BROMIDE 10 MG/ML
INJECTION, SOLUTION INTRAVENOUS PRN
Status: DISCONTINUED | OUTPATIENT
Start: 2020-07-08 | End: 2020-07-08 | Stop reason: SDUPTHER

## 2020-07-08 RX ORDER — DILTIAZEM HYDROCHLORIDE 5 MG/ML
10 INJECTION INTRAVENOUS ONCE
Status: COMPLETED | OUTPATIENT
Start: 2020-07-08 | End: 2020-07-08

## 2020-07-08 RX ORDER — SODIUM CHLORIDE 0.9 % (FLUSH) 0.9 %
10 SYRINGE (ML) INJECTION PRN
Status: DISCONTINUED | OUTPATIENT
Start: 2020-07-08 | End: 2020-07-08 | Stop reason: HOSPADM

## 2020-07-08 RX ORDER — SODIUM CHLORIDE 9 MG/ML
INJECTION, SOLUTION INTRAVENOUS CONTINUOUS
Status: DISCONTINUED | OUTPATIENT
Start: 2020-07-08 | End: 2020-07-10

## 2020-07-08 RX ORDER — SUCCINYLCHOLINE/SOD CL,ISO/PF 200MG/10ML
SYRINGE (ML) INTRAVENOUS PRN
Status: DISCONTINUED | OUTPATIENT
Start: 2020-07-08 | End: 2020-07-08 | Stop reason: SDUPTHER

## 2020-07-08 RX ORDER — PHENYLEPHRINE HCL IN 0.9% NACL 1 MG/10 ML
SYRINGE (ML) INTRAVENOUS PRN
Status: DISCONTINUED | OUTPATIENT
Start: 2020-07-08 | End: 2020-07-08 | Stop reason: SDUPTHER

## 2020-07-08 RX ORDER — MIDAZOLAM HYDROCHLORIDE 1 MG/ML
INJECTION INTRAMUSCULAR; INTRAVENOUS PRN
Status: DISCONTINUED | OUTPATIENT
Start: 2020-07-08 | End: 2020-07-08 | Stop reason: SDUPTHER

## 2020-07-08 RX ORDER — FENTANYL CITRATE 50 UG/ML
INJECTION, SOLUTION INTRAMUSCULAR; INTRAVENOUS PRN
Status: DISCONTINUED | OUTPATIENT
Start: 2020-07-08 | End: 2020-07-08 | Stop reason: SDUPTHER

## 2020-07-08 RX ORDER — PROPOFOL 10 MG/ML
INJECTION, EMULSION INTRAVENOUS
Status: DISCONTINUED
Start: 2020-07-08 | End: 2020-07-09

## 2020-07-08 RX ORDER — ALBUMIN, HUMAN INJ 5% 5 %
SOLUTION INTRAVENOUS PRN
Status: DISCONTINUED | OUTPATIENT
Start: 2020-07-08 | End: 2020-07-08 | Stop reason: SDUPTHER

## 2020-07-08 RX ORDER — CHLORHEXIDINE GLUCONATE 0.12 MG/ML
15 RINSE ORAL 2 TIMES DAILY
Status: DISCONTINUED | OUTPATIENT
Start: 2020-07-08 | End: 2020-07-09

## 2020-07-08 RX ORDER — PROPOFOL 10 MG/ML
INJECTION, EMULSION INTRAVENOUS PRN
Status: DISCONTINUED | OUTPATIENT
Start: 2020-07-08 | End: 2020-07-08 | Stop reason: SDUPTHER

## 2020-07-08 RX ADMIN — HEPARIN SODIUM 5000 UNITS: 5000 INJECTION INTRAVENOUS; SUBCUTANEOUS at 20:52

## 2020-07-08 RX ADMIN — PANTOPRAZOLE SODIUM 40 MG: 40 INJECTION, POWDER, FOR SOLUTION INTRAVENOUS at 20:51

## 2020-07-08 RX ADMIN — SODIUM BICARBONATE: 84 INJECTION, SOLUTION INTRAVENOUS at 04:37

## 2020-07-08 RX ADMIN — Medication 200 MCG: at 16:25

## 2020-07-08 RX ADMIN — SODIUM CHLORIDE: 9 INJECTION, SOLUTION INTRAVENOUS at 16:56

## 2020-07-08 RX ADMIN — CHLORHEXIDINE GLUCONATE 15 ML: 1.2 RINSE ORAL at 20:38

## 2020-07-08 RX ADMIN — Medication 160 MG: at 15:06

## 2020-07-08 RX ADMIN — FENTANYL CITRATE 50 MCG: 50 INJECTION INTRAMUSCULAR; INTRAVENOUS at 15:03

## 2020-07-08 RX ADMIN — MIDAZOLAM 1 MG: 1 INJECTION INTRAMUSCULAR; INTRAVENOUS at 15:20

## 2020-07-08 RX ADMIN — MIDAZOLAM 1 MG: 1 INJECTION INTRAMUSCULAR; INTRAVENOUS at 14:52

## 2020-07-08 RX ADMIN — DILTIAZEM HYDROCHLORIDE 10 MG: 5 INJECTION INTRAVENOUS at 09:54

## 2020-07-08 RX ADMIN — SODIUM CHLORIDE, PRESERVATIVE FREE 10 ML: 5 INJECTION INTRAVENOUS at 20:51

## 2020-07-08 RX ADMIN — Medication 10 ML: at 20:51

## 2020-07-08 RX ADMIN — Medication 200 MCG: at 16:42

## 2020-07-08 RX ADMIN — Medication 50 MCG/HR: at 18:26

## 2020-07-08 RX ADMIN — ROCURONIUM BROMIDE 20 MG: 10 INJECTION INTRAVENOUS at 16:43

## 2020-07-08 RX ADMIN — HYDROMORPHONE HYDROCHLORIDE 1 MG: 1 INJECTION, SOLUTION INTRAMUSCULAR; INTRAVENOUS; SUBCUTANEOUS at 15:41

## 2020-07-08 RX ADMIN — LIDOCAINE HYDROCHLORIDE 100 MG: 20 INJECTION, SOLUTION EPIDURAL; INFILTRATION; INTRACAUDAL; PERINEURAL at 15:06

## 2020-07-08 RX ADMIN — PROPOFOL 200 MG: 10 INJECTION, EMULSION INTRAVENOUS at 15:06

## 2020-07-08 RX ADMIN — Medication 100 MCG: at 16:22

## 2020-07-08 RX ADMIN — HEPARIN SODIUM 5000 UNITS: 5000 INJECTION INTRAVENOUS; SUBCUTANEOUS at 09:54

## 2020-07-08 RX ADMIN — FENTANYL CITRATE 50 MCG: 50 INJECTION INTRAMUSCULAR; INTRAVENOUS at 15:20

## 2020-07-08 RX ADMIN — Medication 200 MCG: at 16:32

## 2020-07-08 RX ADMIN — MEROPENEM 500 MG: 500 INJECTION, POWDER, FOR SOLUTION INTRAVENOUS at 19:06

## 2020-07-08 RX ADMIN — SODIUM CHLORIDE: 9 INJECTION, SOLUTION INTRAVENOUS at 14:48

## 2020-07-08 RX ADMIN — SODIUM CHLORIDE: 9 INJECTION, SOLUTION INTRAVENOUS at 20:45

## 2020-07-08 RX ADMIN — MIDAZOLAM 2 MG: 1 INJECTION INTRAMUSCULAR; INTRAVENOUS at 16:38

## 2020-07-08 RX ADMIN — ALBUMIN (HUMAN) 250 ML: 12.5 INJECTION, SOLUTION INTRAVENOUS at 16:24

## 2020-07-08 RX ADMIN — VANCOMYCIN HYDROCHLORIDE 1250 MG: 10 INJECTION, POWDER, LYOPHILIZED, FOR SOLUTION INTRAVENOUS at 20:55

## 2020-07-08 RX ADMIN — ALBUMIN (HUMAN) 250 ML: 12.5 INJECTION, SOLUTION INTRAVENOUS at 16:42

## 2020-07-08 RX ADMIN — Medication 200 MCG: at 16:46

## 2020-07-08 RX ADMIN — ONDANSETRON 4 MG: 2 INJECTION INTRAMUSCULAR; INTRAVENOUS at 16:56

## 2020-07-08 RX ADMIN — ROCURONIUM BROMIDE 20 MG: 10 INJECTION INTRAVENOUS at 16:19

## 2020-07-08 RX ADMIN — ROCURONIUM BROMIDE 50 MG: 10 INJECTION INTRAVENOUS at 15:19

## 2020-07-08 ASSESSMENT — PULMONARY FUNCTION TESTS
PIF_VALUE: 36
PIF_VALUE: 36
PIF_VALUE: 7
PIF_VALUE: 31
PIF_VALUE: 29
PIF_VALUE: 35
PIF_VALUE: 30
PIF_VALUE: 31
PIF_VALUE: 34
PIF_VALUE: 32
PIF_VALUE: 34
PIF_VALUE: 31
PIF_VALUE: 30
PIF_VALUE: 36
PIF_VALUE: 33
PIF_VALUE: 36
PIF_VALUE: 33
PIF_VALUE: 32
PIF_VALUE: 35
PIF_VALUE: 31
PIF_VALUE: 32
PIF_VALUE: 35
PIF_VALUE: 31
PIF_VALUE: 31
PIF_VALUE: 35
PIF_VALUE: 36
PIF_VALUE: 29
PIF_VALUE: 34
PIF_VALUE: 30
PIF_VALUE: 36
PIF_VALUE: 33
PIF_VALUE: 32
PIF_VALUE: 34
PIF_VALUE: 33
PIF_VALUE: 32
PIF_VALUE: 0
PIF_VALUE: 36
PIF_VALUE: 29
PIF_VALUE: 34
PIF_VALUE: 36
PIF_VALUE: 31
PIF_VALUE: 0
PIF_VALUE: 32
PIF_VALUE: 34
PIF_VALUE: 34
PIF_VALUE: 35
PIF_VALUE: 34
PIF_VALUE: 31
PIF_VALUE: 6
PIF_VALUE: 35
PIF_VALUE: 34
PIF_VALUE: 30
PIF_VALUE: 36
PIF_VALUE: 31
PIF_VALUE: 35
PIF_VALUE: 30
PIF_VALUE: 0
PIF_VALUE: 31
PIF_VALUE: 34
PIF_VALUE: 31
PIF_VALUE: 6
PIF_VALUE: 31
PIF_VALUE: 33
PIF_VALUE: 30
PIF_VALUE: 34
PIF_VALUE: 34
PIF_VALUE: 33
PIF_VALUE: 36
PIF_VALUE: 29
PIF_VALUE: 31
PIF_VALUE: 29
PIF_VALUE: 1
PIF_VALUE: 36
PIF_VALUE: 31
PIF_VALUE: 32
PIF_VALUE: 33
PIF_VALUE: 34
PIF_VALUE: 30
PIF_VALUE: 32
PIF_VALUE: 7
PIF_VALUE: 35
PIF_VALUE: 33
PIF_VALUE: 32
PIF_VALUE: 35
PIF_VALUE: 35
PIF_VALUE: 36
PIF_VALUE: 31
PIF_VALUE: 34
PIF_VALUE: 33
PIF_VALUE: 34
PIF_VALUE: 33
PIF_VALUE: 34
PIF_VALUE: 30
PIF_VALUE: 30
PIF_VALUE: 34
PIF_VALUE: 36
PIF_VALUE: 36
PIF_VALUE: 32
PIF_VALUE: 34
PIF_VALUE: 35
PIF_VALUE: 34
PIF_VALUE: 34
PIF_VALUE: 36
PIF_VALUE: 33
PIF_VALUE: 31
PIF_VALUE: 29
PIF_VALUE: 34
PIF_VALUE: 35
PIF_VALUE: 30
PIF_VALUE: 29
PIF_VALUE: 0
PIF_VALUE: 30
PIF_VALUE: 32
PIF_VALUE: 30
PIF_VALUE: 31
PIF_VALUE: 32
PIF_VALUE: 32
PIF_VALUE: 33
PIF_VALUE: 1
PIF_VALUE: 32
PIF_VALUE: 29
PIF_VALUE: 35
PIF_VALUE: 32
PIF_VALUE: 35
PIF_VALUE: 32
PIF_VALUE: 32
PIF_VALUE: 8
PIF_VALUE: 34
PIF_VALUE: 30
PIF_VALUE: 32
PIF_VALUE: 34
PIF_VALUE: 31
PIF_VALUE: 32
PIF_VALUE: 33
PIF_VALUE: 36
PIF_VALUE: 29
PIF_VALUE: 34
PIF_VALUE: 32
PIF_VALUE: 30
PIF_VALUE: 31
PIF_VALUE: 34
PIF_VALUE: 32
PIF_VALUE: 34
PIF_VALUE: 31
PIF_VALUE: 30
PIF_VALUE: 29
PIF_VALUE: 31
PIF_VALUE: 32
PIF_VALUE: 36
PIF_VALUE: 33
PIF_VALUE: 32

## 2020-07-08 ASSESSMENT — PAIN SCALES - GENERAL
PAINLEVEL_OUTOF10: 0
PAINLEVEL_OUTOF10: 3
PAINLEVEL_OUTOF10: 0
PAINLEVEL_OUTOF10: 0

## 2020-07-08 ASSESSMENT — PAIN DESCRIPTION - DESCRIPTORS: DESCRIPTORS: SHARP

## 2020-07-08 ASSESSMENT — PAIN - FUNCTIONAL ASSESSMENT: PAIN_FUNCTIONAL_ASSESSMENT: 0-10

## 2020-07-08 NOTE — ANESTHESIA PROCEDURE NOTES
Arterial Line:    An arterial line was placed using ultrasound guidance, in the pre-op for the following indication(s): continuous blood pressure monitoring and blood sampling needed. A 20 gauge (size), 1 and 3/8 inch (length), Arrow (type) catheter was placed, Seldinger technique used, into the left radial artery, secured by tape and Tegaderm. Anesthesia type: Local  Local infiltration: Injection    Events:  patient tolerated procedure well with no complications and EBL 0mL. Additional notes:  Dynamic ultrasound-guided placement of left radial arterial line for intraoperative monitoring and potential postop ICU use. One attempt. No blood loss. No complications. Pt tolerated well. 1% lidocaine for anesthetic.    7/8/2020 1:54 PM7/8/2020 1:54 PM  Anesthesiologist: Jeny Catalan MD  Performed: Anesthesiologist   Preanesthetic Checklist  Completed: patient identified, site marked, surgical consent, pre-op evaluation, timeout performed, IV checked, risks and benefits discussed, monitors and equipment checked, anesthesia consent given, oxygen available and patient being monitored

## 2020-07-08 NOTE — PROGRESS NOTES
Pt to ICU from OR. Report given by OR RN and CRNA Juli Cordova. Pt placed on bedside monitor on arrival.  VSS. No sedation orders at that time. MD Maya flores served and request to call RN back. MD Gonzales with walker called and placed sedation orders when no answer from MD Wynn Read immediatly. MD Wynn Read did call and was informed on pts procedure/history/status. Ordereds placed and ordered labs sent. Pt on vent. Pt not moving spontaneously on arrival.  Pt did begin to open eyes and move head when RNs at bedside for night shift report/handoff. Pts daughter called and was breifley updated on status.

## 2020-07-08 NOTE — H&P
Update History & Physical    The patient's History and Physical of July 6, 2020 was reviewed with the patient and I examined the patient. There was no change. The surgical site was confirmed by the patient and me. Abdomen remains distended but less tenderness. Unable to draw blood today to eval kidney function and WBC. AXR with stable SBO. Plan exploration today, possible bowel resection. Discussed with Dr. Gabe Santos - plan insertion HD catheter as well. Plan: The risks, benefits, expected outcome, and alternative to the recommended procedure have been discussed with the patient. Patient understands and wants to proceed with the procedure.      Electronically signed by Juan Pablo Pitts MD on 7/8/2020 at 1:13 PM

## 2020-07-08 NOTE — PLAN OF CARE
Problem: Pain:  Goal: Pain level will decrease  Description: Pain level will decrease  Outcome: Ongoing     Problem: Falls - Risk of:  Goal: Will remain free from falls  Description: Will remain free from falls  Outcome: Ongoing     Problem:  Bowel/Gastric:  Goal: Control of bowel function will improve  Description: Control of bowel function will improve  Outcome: Ongoing     Problem: Nausea/Vomiting:  Goal: Absence of nausea/vomiting  Description: Absence of nausea/vomiting  Outcome: Ongoing     Problem: Skin Integrity:  Goal: Will show no infection signs and symptoms  Description: Will show no infection signs and symptoms  Outcome: Ongoing

## 2020-07-08 NOTE — PROGRESS NOTES
INPATIENT CONSULTATION:    IDENTIFYING DATA/REASON FOR CONSULTATION   PATIENT:  Elizabeth Mcdonald  MRN:  [id number]  ADMIT DATE: 2020  TIME OF EVALUATION: 2020 7:09 AM  HOSPITAL STAY:   LOS: 2 days   CONSULTING PHYSICIAN: Amanda Nelson MD   REASON FOR CONSULTATION: elevated LFTs, SBO    Subjective:    Patient reports no BMs or passing flatus. Abd remains distended and tender with palpitation, rates 4/10. MEDICATIONS   SCHEDULED:  sodium chloride flush, 10 mL, 2 times per day  pantoprazole, 40 mg, QAM AC  heparin (porcine), 5,000 Units, BID  insulin lispro, 0-6 Units, TID WC  insulin lispro, 0-3 Units, Nightly  levofloxacin, 250 mg, Q24H      FLUIDS/DRIPS:     dextrose      IV infusion builder 125 mL/hr at 20 0437     PRNs: glucose, 15 g, PRN  dextrose, 12.5 g, PRN  glucagon (rDNA), 1 mg, PRN  dextrose, 100 mL/hr, PRN  sodium chloride flush, 10 mL, PRN  acetaminophen, 650 mg, Q6H PRN    Or  acetaminophen, 650 mg, Q6H PRN  promethazine, 12.5 mg, Q6H PRN    Or  ondansetron, 4 mg, Q6H PRN  morphine, 2 mg, Q2H PRN    Or  morphine, 4 mg, Q2H PRN  iopamidol, 75 mL, ONCE PRN      ALLERGIES:  No Known Allergies      PHYSICAL EXAM   VITALS:  /81   Pulse 102   Temp 98.4 °F (36.9 °C) (Oral)   Resp 16   Ht 4' 11\" (1.499 m)   Wt 244 lb 11.4 oz (111 kg)   SpO2 (!) 89%   BMI 49.43 kg/m²   TEMPERATURE:  Current - Temp: 98.4 °F (36.9 °C); Max - Temp  Av.2 °F (36.8 °C)  Min: 98 °F (36.7 °C)  Max: 98.4 °F (36.9 °C)    Physical Exam:  General appearance: alert, cooperative, +NGT  Eyes: Anicteric  Head: Normocephalic, without obvious abnormality  Lungs: clear to auscultation bilaterally, Normal Effort  Heart: regular rate and rhythm, normal S1 and S2, no murmurs or rubs  Abdomen: distended, diffusely tender. No rebound or guarding.     Extremities: atraumatic, no cyanosis or edema  Skin: warm and dry, no jaundice  Neuro: Grossly intact, A&OX3    LABS AND IMAGING   Laboratory   Recent Labs 07/06/20  1337 07/07/20  0506   WBC 27.7* 22.7*   HGB 11.2* 9.9*   HCT 37.5 32.5*   MCV 63.4* 62.9*   * 614*     Recent Labs     07/06/20  1337 07/07/20  0506   * 133*   K 4.4 4.8   CL 88* 96*   CO2 13* 14*   BUN 54* 71*   CREATININE 3.8* 4.3*     Recent Labs     07/06/20  1337 07/07/20  0506   * 252*   ALT 74* 100*   BILIDIR  --  2.5*   BILITOT 3.3* 2.9*   ALKPHOS 122 121     Recent Labs     07/06/20  1337   LIPASE 12.0*     No results for input(s): PROTIME, INR in the last 72 hours. Imaging  MRI ABDOMEN WO CONTRAST MRCP   Final Result   No biliary ductal obstruction. No findings to explain elevated LFTs. CT ABDOMEN PELVIS WO CONTRAST Additional Contrast? None   Final Result   Mid to distal small bowel obstruction         US GALLBLADDER RUQ   Final Result   Questionable small amount of sludge within the gallbladder. Otherwise   unremarkable right upper quadrant ultrasound. XR ABDOMEN (2 VIEWS)    (Results Pending)       Endoscopy      ASSESSMENT AND RECOMMENDATIONS   Edi Pratt is a 53 y.o. female with PMH of hypertension and obesity who presented on 7/6/2020 with abdominal pain. CT A/P showed mid to distal small bowel obstruction. She was also noted to have elevated LFTs. GI consulted regarding possible choledocholithiasis. 1. Small bowel obstruction: Remains significantly distended. NG tube in place to wall suction. Surgery following considering abdominal exploration later today. 2. Abnormal LFTs: Repeat blood work today pending. Etiology unclear at this time. MRCP negative for biliary obstruction. Viral serologies sent and pending. Possibly related to cholestasis from sepsis  3. Leukocytosis: On Levaquin. Suspect secondary to #1  4. KIKI: Nephrology following. On IV fluids. Repeat labs today pending      RECOMMENDATIONS:    Abdominal x-ray ordered for today.   Surgery following considering abdominal exploration later today  We will follow-up on viral

## 2020-07-08 NOTE — PROGRESS NOTES
Hospitalist Progress Note  7/8/2020 1:35 PM  Subjective:   Admit Date: 7/6/2020  PCP: [place] Philippe MD  Interval History: pt is lethargic  Feels slightly better  meds are helping  Denies any other complaints  Blood test was not done  Chart reviewed. Diet: Diet NPO Effective Now  Medications:   Scheduled Meds:   cloNIDine  1 patch Transdermal Weekly    sodium chloride flush  10 mL Intravenous 2 times per day    sodium chloride flush  10 mL Intravenous 2 times per day    pantoprazole  40 mg Oral QAM AC    heparin (porcine)  5,000 Units Subcutaneous BID    insulin lispro  0-6 Units Subcutaneous TID WC    insulin lispro  0-3 Units Subcutaneous Nightly    levofloxacin  250 mg Intravenous Q24H     Continuous Infusions:   sodium chloride      dextrose      IV infusion builder 125 mL/hr at 07/08/20 0437     CBC:   Recent Labs     07/06/20  1337 07/07/20  0506   WBC 27.7* 22.7*   HGB 11.2* 9.9*   * 614*     BMP:    Recent Labs     07/06/20  1337 07/07/20  0506   * 133*   K 4.4 4.8   CL 88* 96*   CO2 13* 14*   BUN 54* 71*   CREATININE 3.8* 4.3*   GLUCOSE 103* 64*     Hepatic:   Recent Labs     07/06/20  1337 07/07/20  0506   * 252*   ALT 74* 100*   BILITOT 3.3* 2.9*   ALKPHOS 122 121     Troponin: No results for input(s): TROPONINI in the last 72 hours. BNP: No results for input(s): BNP in the last 72 hours. Lipids: No results for input(s): CHOL, HDL in the last 72 hours. Invalid input(s): LDLCALCU  INR: No results for input(s): INR in the last 72 hours. Objective:   Vitals: BP (!) 148/86   Pulse 103   Temp 97.1 °F (36.2 °C) (Temporal)   Resp 26   Ht 4' 11\" (1.499 m)   Wt 244 lb 11.4 oz (111 kg)   SpO2 94%   BMI 49.43 kg/m²   General appearance: lethargic  Has NGT  HEENT: Head: Normal, normocephalic, atraumatic, anicteric, pupils are reactive to light. Dry mucous membrane.   Neck: no adenopathy, no carotid bruit, supple, symmetrical, trachea midline and thyroid not enlarged, symmetric, no tenderness/mass/nodules  Lungs: clear to auscultation bilaterally  Heart: regular rate and rhythm, S1, S2 normal, no murmur, click, rub or gallop  Abdomen: soft, -tender; bowel sounds normal; no masses,  no organomegaly  Extremities: extremities normal, atraumatic, no cyanosis or edema  Neurologic: Mental status: lethargi    Assessment and Plan:   1. KIKI= check blood test results  2. Uncontrolled HTN-see orders  3. Dm- monitor sugars  4. SBO- for surgery today    Patient Active Problem List:     EMERY (obstructive sleep apnea)     ARF (acute renal failure) (HCC)     SBO (small bowel obstruction) (HCC)     Leukocytosis     Septicemia (HCC)     Acidosis     KIKI (acute kidney injury) ([facility]     Gallbladder disease     Hypertension     Type 2 diabetes mellitus with hyperglycemia ([facility] [facility]    Pt is stable. Discussed with staff  about the plan. See the orders for further plan. Will proceed further acc to pt's progress.   Time spent with management of the pt is-30 minutes      Electronically signed by: Eric Chisholm MD, on 7/8/2020, at 1:35 PM

## 2020-07-08 NOTE — PROGRESS NOTES
Called to give report to Yajaira Hinkle RN in ICU on patient upon learning they were being transferred to ICU post-op. ICU nurse unable to talk at time of call, so gave her my phone number should she have any questions.     Electronically signed by Pelon Tao RN on 7/8/2020 at 6:03 PM

## 2020-07-08 NOTE — BRIEF OP NOTE
Brief Postoperative Note      Patient: Alejandra Han  YOB: 1970  MRN: [id number]    Date of Procedure: 7/8/2020    Pre-Op Diagnosis: SBO. Post-Op Diagnosis: PURULENT PERITONITIS       Procedure(s):  EXPLORATORY LAPAROTOMY, OMENTECTOMY, LEFT SALPINGECTOMY WITH REMOVAL OF FIBROID, REPAIR SEROSAL TEAR, U/S GUIDED INSERTION R IJ HD CATHETER    Surgeon(s):  MD Leeann Gray MD Yolande Forth, MD    Assistant:  Surgical Assistant: Marcus Gonzalez    Anesthesia: General    Estimated Blood Loss (mL): less than 647     Complications: None    Specimens:   ID Type Source Tests Collected by Time Destination   1 : Omentum Tissue Tissue CULTURE, TISSUE Luis Carlos Bhardwaj MD 7/8/2020 1644    A : left fallopian tube Specimen Abdomen SURGICAL PATHOLOGY Nereida Duke MD 7/8/2020 1631    B : fibroid Specimen Abdomen 1350 Gurmeet Lewis MD 7/8/2020 1632        Implants:  * No implants in log *      Drains:   Closed/Suction Drain Inferior Abdomen Bulb (Active)       NG/OG/NJ/NE Tube Nasogastric 16 fr Right nostril (Active)   Surrounding Skin Intact; Non reddened 7/7/2020  2:00 PM   Securement device Yes 7/7/2020  2:00 PM   Status Suction-low intermittent 7/7/2020  2:00 PM   NG/OG/NJ/NE External Measurement (cm) 53 cm 7/7/2020  2:00 PM   Drainage Appearance Green;Brown 7/7/2020  2:00 PM   Output (mL) 550 ml 7/8/2020  6:24 AM       Urethral Catheter Non-latex 16 fr (Active)       Findings: DIFFUSE PURULENT PERITONITIS SUSPICIOUS FOR RUPTURED TOA. REPAIR SEROSAL TEAR INHERENT TO THE DENSE INFLAMMATION.   NECROTIC OMENTUM SENT FOR TISSUE CULTURE    Electronically signed by Luis Carlos Bhardwaj MD on 7/8/2020 at 4:55 PM

## 2020-07-08 NOTE — ANESTHESIA PRE PROCEDURE
(CATAPRES) 0.3 MG/24HR 1 patch  1 patch Transdermal Weekly Eric Chisholm MD   1 patch at 07/08/20 0954    glucose (GLUTOSE) 40 % oral gel 15 g  15 g Oral PRN Eric Chisholm MD        dextrose 50 % IV solution  12.5 g Intravenous PRN Eric Chisholm MD   12.5 g at 07/07/20 2253    glucagon (rDNA) injection 1 mg  1 mg Intramuscular PRN Eric Chisholm MD        dextrose 5 % solution  100 mL/hr Intravenous PRN Eric Chisholm MD        sodium bicarbonate 150 mEq in dextrose 5 % 1,000 mL infusion   Intravenous Continuous Alethea Cancino  mL/hr at 07/08/20 0437      sodium chloride flush 0.9 % injection 10 mL  10 mL Intravenous 2 times per day Eric Chisholm MD   10 mL at 07/06/20 2212    sodium chloride flush 0.9 % injection 10 mL  10 mL Intravenous PRN Eric Chisholm MD        acetaminophen (TYLENOL) tablet 650 mg  650 mg Oral Q6H PRN Eric Chisholm MD        Or    acetaminophen (TYLENOL) suppository 650 mg  650 mg Rectal Q6H PRN Eric Chisholm MD        promethazine (PHENERGAN) tablet 12.5 mg  12.5 mg Oral Q6H PRN Teresa Olson MD        Or    ondansetron (ZOFRAN) injection 4 mg  4 mg Intravenous Q6H PRN Eric Chisholm MD        pantoprazole (PROTONIX) tablet 40 mg  40 mg Oral QAM AC Eric Chisholm MD   Stopped at 07/07/20 0700    morphine (PF) injection 2 mg  2 mg Intravenous Q2H PRN Eric Chisholm MD   2 mg at 07/07/20 0048    Or    morphine (PF) injection 4 mg  4 mg Intravenous Q2H PRN Eric Chisholm MD        heparin (porcine) injection 5,000 Units  5,000 Units Subcutaneous BID Eric Chisholm MD   5,000 Units at 07/08/20 0954    insulin lispro (HUMALOG) injection vial 0-6 Units  0-6 Units Subcutaneous TID WC Teresa Olson MD        insulin lispro (HUMALOG) injection vial 0-3 Units  0-3 Units Subcutaneous Nightly Teresa Olson MD        iopamidol (ISOVUE-370) 76 % injection 75 mL  75 mL Intravenous ONCE PRN Nsehniitooh A MD Man        levoFLOXacin (LEVAQUIN) 250 MG/50ML infusion 250 mg  250 mg Intravenous Q24H Dc Nelson MD   Stopped at 20 1852     Vital Signs (Current)   Vitals:    20 0424 20 0800 20 0912 20 1156   BP:  (!) 168/118  (!) 164/95   Pulse:  106  102   Resp:  28  28   Temp:  97.7 °F (36.5 °C)  98.4 °F (36.9 °C)   TempSrc:  Oral  Oral   SpO2:  94% 92% 90%   Weight: 244 lb 11.4 oz (111 kg)      Height:                                              BP Readings from Last 3 Encounters:   20 (!) 164/95   20 100/78   20 130/78     Vital Signs Statistics (for past 48 hrs)     Temp  Av.1 °F (36.7 °C)  Min: 97.7 °F (36.5 °C)   Min taken time: 20 0800  Max: 98.6 °F (37 °C)   Max taken time: 20 2337  Pulse  Av.4  Min: [de-identified]   Min taken time: 20 2337  Max: 116   Max taken time: 20 1901  Resp  Av.8  Min: 12   Min taken time: 20 0320  Max: 35   Max taken time: 20 1852  BP  Min: 111/63   Min taken time: 20 1315  Max: 168/118   Max taken time: 20 0800  MAP (mmHg)  Av.2  Min: 79   Min taken time: 20 1817  Max: 135   Max taken time: 20 0800  SpO2  Av.8 %  Min: 89 %   Min taken time: 20 0320  Max: 98 %   Max taken time: 20 1315  BP Readings from Last 3 Encounters:   20 (!) 164/95   20 100/78   20 130/78       BMI  Body mass index is 49.43 kg/m². Estimated body mass index is 49.43 kg/m² as calculated from the following:    Height as of this encounter: 4' 11\" (1.499 m). Weight as of this encounter: 244 lb 11.4 oz (111 kg).     CBC   Lab Results   Component Value Date    WBC 22.7 2020    RBC 5.17 2020    HGB 9.9 2020    HCT 32.5 2020    MCV 62.9 2020    RDW 18.8 2020     2020     CMP    Lab Results   Component Value Date     2020    K 4.8 2020    K 4.4 2020    CL 96 2020    CO2 14 2020

## 2020-07-08 NOTE — ANESTHESIA POSTPROCEDURE EVALUATION
Department of Anesthesiology  Postprocedure Note    Patient: Claudia Callaway  MRN: [id number]  YOB: 1970  Date of evaluation: 7/8/2020  Time:  6:05 PM     Procedure Summary     Date:  07/08/20 Room / Location:  [address] [place] [address]    Anesthesia Start:  3078 Anesthesia Stop:  7975    Procedure:  EXPLORATORY LAPAROTOMY, OMENTECTOMY, LEFT SALPINGECTOMY WITH REMOVAL OF FIBROID (N/A Abdomen) Diagnosis:  (.)    Surgeon:  Shalom Hudson MD Responsible Provider:  Loly Baker MD    Anesthesia Type:  general ASA Status:  4          Anesthesia Type: general    Hai Phase I: Hai Score: 9    Hai Phase II:      Last vitals: Reviewed and per EMR flowsheets. Anesthesia Post Evaluation    Patient location during evaluation: ICU  Patient participation: complete - patient cannot participate  Level of consciousness: awake and sedated and ventilated  Pain score: 0  Airway patency: patent  Nausea & Vomiting: no nausea and no vomiting  Complications: no  Cardiovascular status: blood pressure returned to baseline  Respiratory status: acceptable and intubated  Hydration status: euvolemic  Comments: Critically ill. Stable. Spoke with recovery nurse and added orders.

## 2020-07-08 NOTE — PROGRESS NOTES
Nephrology ([facility]) Progress Note    CC: KIKI    Subjective:    HPI:  Breathing comfortably. Hypoglycemia overnight. Still has abdominal pain with no flatus or BM. Unable to draw blood this AM.  ROS:  In bed. No fever. [address] [place]:  medications reviewed. Objective:  Blood pressure (!) 148/86, pulse 103, temperature 97.1 °F (36.2 °C), temperature source Temporal, resp. rate 26, height 4' 11\" (1.499 m), weight 244 lb 11.4 oz (111 kg), SpO2 94 %, not currently breastfeeding. Intake/Output Summary (Last 24 hours) at 7/8/2020 1400  Last data filed at 7/8/2020 1053  Gross per 24 hour   Intake 1578 ml   Output 590 ml   Net 988 ml     General:  NAD, A+Ox3  Chest:  deferred due to pandemic  CVS:  deferred  Abdominal:  deferred  Extremities:  no edema  Skin:  no rash    Labs:  Renal panel:  Lab Results   Component Value Date/Time     (L) 07/07/2020 05:06 AM    K 4.8 07/07/2020 05:06 AM    K 4.4 07/06/2020 01:37 PM    CO2 14 (LL) 07/07/2020 05:06 AM    BUN 71 (H) 07/07/2020 05:06 AM    CREATININE 4.3 (H) 07/07/2020 05:06 AM    CALCIUM 7.5 (L) 07/07/2020 05:06 AM     CBC:  Lab Results   Component Value Date/Time    WBC 33.6 (H) 07/08/2020 01:25 PM    HGB 8.9 (L) 07/08/2020 01:25 PM    HCT 28.5 (L) 07/08/2020 01:25 PM     (H) 07/08/2020 01:25 PM       Assessment/Plan:  Reviewed old records and labs.     1) KIKI              - baseline 06/03/20 Cr 0.6              - ddx:  ATN vs. pre-renal and benazepril and ibuprofen did not help              - awaiting labs   - marginal UO              - unclear if she will need dialysis or not              - on IVF              - hold benazepril              - counseled against NSAID use     2) SBO              - surgery consulted              - NG to wall suction   - d/w Dr. Kathleen Mahoney and since he is taking her to OR, he will place temporary dialysis catheter     3) ID              - on empiric zosyn, levaquin     4) FEN              - hyponatremia - monitor              - metabolic acidosis                          - on NaHCO3 gtt     5) \"hematuria\"   - UA does show moderate blood   - unclear significance    Addendum:  Patient seen after surgery. Exp lap showed purulent peritonitis s/p omentectomy, s/p left salpingectomy with fibroid removal, and repair or serosal tear. She has been transferred to the ICU. Her urine output remains poor. Labs were obtained, and renal function appears stable, but that could be due to hemodilution. Metabolic acidosis appears better, so will use 0.9NS for volume resuscitation.   Appreciate empiric dialysis catheter placement by Dr. Damien Bradley.    critical care:  30+ min

## 2020-07-09 LAB
A/G RATIO: 0.6 (ref 1.1–2.2)
ALBUMIN SERPL-MCNC: 2 G/DL (ref 3.4–5)
ALP BLD-CCNC: 101 U/L (ref 40–129)
ALT SERPL-CCNC: 48 U/L (ref 10–40)
ANION GAP SERPL CALCULATED.3IONS-SCNC: 25 MMOL/L (ref 3–16)
ANISOCYTOSIS: ABNORMAL
AST SERPL-CCNC: 103 U/L (ref 15–37)
ATYPICAL LYMPHOCYTE RELATIVE PERCENT: 1 % (ref 0–6)
BANDED NEUTROPHILS RELATIVE PERCENT: 5 % (ref 0–7)
BASE EXCESS ARTERIAL: -4.9 MMOL/L (ref -3–3)
BASE EXCESS ARTERIAL: -5.5 MMOL/L (ref -3–3)
BASOPHILS ABSOLUTE: 0 K/UL (ref 0–0.2)
BASOPHILS RELATIVE PERCENT: 0 %
BILIRUB SERPL-MCNC: 4.1 MG/DL (ref 0–1)
BILIRUBIN DIRECT: 4 MG/DL (ref 0–0.3)
BILIRUBIN, INDIRECT: 0.1 MG/DL (ref 0–1)
BUN BLDV-MCNC: 114 MG/DL (ref 7–20)
CALCIUM IONIZED: 0.81 MMOL/L (ref 1.12–1.32)
CALCIUM SERPL-MCNC: 5.8 MG/DL (ref 8.3–10.6)
CARBOXYHEMOGLOBIN ARTERIAL: 1.2 % (ref 0–1.5)
CARBOXYHEMOGLOBIN ARTERIAL: 1.2 % (ref 0–1.5)
CHLORIDE BLD-SCNC: 95 MMOL/L (ref 99–110)
CO2: 17 MMOL/L (ref 21–32)
CREAT SERPL-MCNC: 4.5 MG/DL (ref 0.6–1.1)
DOHLE BODIES: PRESENT
EOSINOPHILS ABSOLUTE: 0 K/UL (ref 0–0.6)
EOSINOPHILS RELATIVE PERCENT: 0 %
GFR AFRICAN AMERICAN: 13
GFR NON-AFRICAN AMERICAN: 10
GLOBULIN: 3.2 G/DL
GLUCOSE BLD-MCNC: 101 MG/DL (ref 70–99)
GLUCOSE BLD-MCNC: 104 MG/DL (ref 70–99)
GLUCOSE BLD-MCNC: 110 MG/DL (ref 70–99)
GLUCOSE BLD-MCNC: 116 MG/DL (ref 70–99)
GLUCOSE BLD-MCNC: 122 MG/DL (ref 70–99)
GLUCOSE BLD-MCNC: 128 MG/DL (ref 70–99)
GLUCOSE BLD-MCNC: 132 MG/DL (ref 70–99)
HCO3 ARTERIAL: 18.5 MMOL/L (ref 21–29)
HCO3 ARTERIAL: 18.8 MMOL/L (ref 21–29)
HCT VFR BLD CALC: 24.8 % (ref 36–48)
HEMATOLOGY PATH CONSULT: NO
HEMOGLOBIN, ART, EXTENDED: 7.8 G/DL (ref 12–16)
HEMOGLOBIN, ART, EXTENDED: 7.8 G/DL (ref 12–16)
HEMOGLOBIN: 7.5 G/DL (ref 12–16)
HYPOCHROMIA: ABNORMAL
INR BLD: 1.29 (ref 0.86–1.14)
LACTIC ACID: 1.6 MMOL/L (ref 0.4–2)
LACTIC ACID: 1.9 MMOL/L (ref 0.4–2)
LACTIC ACID: 2.5 MMOL/L (ref 0.4–2)
LACTIC ACID: 2.9 MMOL/L (ref 0.4–2)
LYMPHOCYTES ABSOLUTE: 2.2 K/UL (ref 1–5.1)
LYMPHOCYTES RELATIVE PERCENT: 5 %
MCH RBC QN AUTO: 18.3 PG (ref 26–34)
MCHC RBC AUTO-ENTMCNC: 30.3 G/DL (ref 31–36)
MCV RBC AUTO: 60.3 FL (ref 80–100)
METAMYELOCYTES RELATIVE PERCENT: 6 %
METHEMOGLOBIN ARTERIAL: 1 %
METHEMOGLOBIN ARTERIAL: 1.1 %
MICROCYTES: ABNORMAL
MONOCYTES ABSOLUTE: 0.4 K/UL (ref 0–1.3)
MONOCYTES RELATIVE PERCENT: 1 %
MYELOCYTE PERCENT: 3 %
NEUTROPHILS ABSOLUTE: 33.9 K/UL (ref 1.7–7.7)
NEUTROPHILS RELATIVE PERCENT: 79 %
NUCLEATED RED BLOOD CELLS: 2 /100 WBC
NUCLEATED RED BLOOD CELLS: 2 /100 WBC
O2 CONTENT ARTERIAL: 10 ML/DL
O2 CONTENT ARTERIAL: 11 ML/DL
O2 SAT, ARTERIAL: 95.3 %
O2 SAT, ARTERIAL: 96.7 %
O2 THERAPY: ABNORMAL
O2 THERAPY: ABNORMAL
OVALOCYTES: ABNORMAL
PCO2 ARTERIAL: 28.7 MMHG (ref 35–45)
PCO2 ARTERIAL: 29.4 MMHG (ref 35–45)
PDW BLD-RTO: 18.6 % (ref 12.4–15.4)
PERFORMED ON: ABNORMAL
PH ARTERIAL: 7.41 (ref 7.35–7.45)
PH ARTERIAL: 7.42 (ref 7.35–7.45)
PH VENOUS: 7.37 (ref 7.35–7.45)
PHOSPHORUS: 7.8 MG/DL (ref 2.5–4.9)
PLATELET # BLD: 463 K/UL (ref 135–450)
PLATELET SLIDE REVIEW: ABNORMAL
PMV BLD AUTO: 8.1 FL (ref 5–10.5)
PO2 ARTERIAL: 80.8 MMHG (ref 75–108)
PO2 ARTERIAL: 88.8 MMHG (ref 75–108)
POIKILOCYTES: ABNORMAL
POTASSIUM REFLEX MAGNESIUM: 4.6 MMOL/L (ref 3.5–5.1)
PROTHROMBIN TIME: 15 SEC (ref 10–13.2)
RBC # BLD: 4.12 M/UL (ref 4–5.2)
SLIDE REVIEW: ABNORMAL
SODIUM BLD-SCNC: 137 MMOL/L (ref 136–145)
TARGET CELLS: ABNORMAL
TCO2 ARTERIAL: 19.4 MMOL/L
TCO2 ARTERIAL: 19.7 MMOL/L
TOTAL PROTEIN: 5.2 G/DL (ref 6.4–8.2)
TOXIC GRANULATION: PRESENT
VANCOMYCIN RANDOM: 13 UG/ML
WBC # BLD: 36.5 K/UL (ref 4–11)

## 2020-07-09 PROCEDURE — 99291 CRITICAL CARE FIRST HOUR: CPT | Performed by: INTERNAL MEDICINE

## 2020-07-09 PROCEDURE — 6360000002 HC RX W HCPCS: Performed by: PEDIATRICS

## 2020-07-09 PROCEDURE — 2580000003 HC RX 258: Performed by: INTERNAL MEDICINE

## 2020-07-09 PROCEDURE — 97530 THERAPEUTIC ACTIVITIES: CPT

## 2020-07-09 PROCEDURE — 82803 BLOOD GASES ANY COMBINATION: CPT

## 2020-07-09 PROCEDURE — 2580000003 HC RX 258: Performed by: SURGERY

## 2020-07-09 PROCEDURE — 83605 ASSAY OF LACTIC ACID: CPT

## 2020-07-09 PROCEDURE — APPNB30 APP NON BILLABLE TIME 0-30 MINS: Performed by: NURSE PRACTITIONER

## 2020-07-09 PROCEDURE — 94003 VENT MGMT INPAT SUBQ DAY: CPT

## 2020-07-09 PROCEDURE — 99255 IP/OBS CONSLTJ NEW/EST HI 80: CPT | Performed by: INTERNAL MEDICINE

## 2020-07-09 PROCEDURE — 99233 SBSQ HOSP IP/OBS HIGH 50: CPT | Performed by: INTERNAL MEDICINE

## 2020-07-09 PROCEDURE — 85025 COMPLETE CBC W/AUTO DIFF WBC: CPT

## 2020-07-09 PROCEDURE — C9113 INJ PANTOPRAZOLE SODIUM, VIA: HCPCS | Performed by: INTERNAL MEDICINE

## 2020-07-09 PROCEDURE — 80053 COMPREHEN METABOLIC PANEL: CPT

## 2020-07-09 PROCEDURE — 2580000003 HC RX 258: Performed by: PEDIATRICS

## 2020-07-09 PROCEDURE — 85610 PROTHROMBIN TIME: CPT

## 2020-07-09 PROCEDURE — 2000000000 HC ICU R&B

## 2020-07-09 PROCEDURE — 82330 ASSAY OF CALCIUM: CPT

## 2020-07-09 PROCEDURE — 6360000002 HC RX W HCPCS: Performed by: INTERNAL MEDICINE

## 2020-07-09 PROCEDURE — 99024 POSTOP FOLLOW-UP VISIT: CPT | Performed by: SURGERY

## 2020-07-09 PROCEDURE — 80202 ASSAY OF VANCOMYCIN: CPT

## 2020-07-09 PROCEDURE — 84100 ASSAY OF PHOSPHORUS: CPT

## 2020-07-09 PROCEDURE — 6360000002 HC RX W HCPCS: Performed by: SURGERY

## 2020-07-09 PROCEDURE — 97166 OT EVAL MOD COMPLEX 45 MIN: CPT

## 2020-07-09 PROCEDURE — 2500000003 HC RX 250 WO HCPCS: Performed by: INTERNAL MEDICINE

## 2020-07-09 PROCEDURE — 94750 HC PULMONARY COMPLIANCE STUDY: CPT

## 2020-07-09 RX ORDER — FLUCONAZOLE 2 MG/ML
100 INJECTION, SOLUTION INTRAVENOUS EVERY 24 HOURS
Status: DISCONTINUED | OUTPATIENT
Start: 2020-07-09 | End: 2020-07-14

## 2020-07-09 RX ADMIN — HYDROMORPHONE HYDROCHLORIDE 0.5 MG: 1 INJECTION, SOLUTION INTRAMUSCULAR; INTRAVENOUS; SUBCUTANEOUS at 13:06

## 2020-07-09 RX ADMIN — SODIUM CHLORIDE, PRESERVATIVE FREE 10 ML: 5 INJECTION INTRAVENOUS at 11:17

## 2020-07-09 RX ADMIN — FLUCONAZOLE 100 MG: 2 INJECTION, SOLUTION INTRAVENOUS at 17:15

## 2020-07-09 RX ADMIN — SODIUM CHLORIDE: 9 INJECTION, SOLUTION INTRAVENOUS at 00:34

## 2020-07-09 RX ADMIN — CALCIUM GLUCONATE 2 G: 98 INJECTION, SOLUTION INTRAVENOUS at 22:56

## 2020-07-09 RX ADMIN — Medication 10 ML: at 11:16

## 2020-07-09 RX ADMIN — PANTOPRAZOLE SODIUM 40 MG: 40 INJECTION, POWDER, FOR SOLUTION INTRAVENOUS at 11:16

## 2020-07-09 RX ADMIN — HEPARIN SODIUM 5000 UNITS: 5000 INJECTION INTRAVENOUS; SUBCUTANEOUS at 21:21

## 2020-07-09 RX ADMIN — SODIUM CHLORIDE, PRESERVATIVE FREE 10 ML: 5 INJECTION INTRAVENOUS at 21:23

## 2020-07-09 RX ADMIN — MEROPENEM 500 MG: 500 INJECTION, POWDER, FOR SOLUTION INTRAVENOUS at 18:28

## 2020-07-09 RX ADMIN — SODIUM BICARBONATE 50 MEQ: 84 INJECTION, SOLUTION INTRAVENOUS at 11:16

## 2020-07-09 RX ADMIN — SODIUM CHLORIDE: 9 INJECTION, SOLUTION INTRAVENOUS at 11:35

## 2020-07-09 RX ADMIN — SODIUM CHLORIDE: 9 INJECTION, SOLUTION INTRAVENOUS at 19:45

## 2020-07-09 RX ADMIN — HEPARIN SODIUM 5000 UNITS: 5000 INJECTION INTRAVENOUS; SUBCUTANEOUS at 11:17

## 2020-07-09 RX ADMIN — FENTANYL CITRATE 25 MCG: 50 INJECTION, SOLUTION INTRAMUSCULAR; INTRAVENOUS at 04:13

## 2020-07-09 RX ADMIN — Medication 50 MCG/HR: at 04:00

## 2020-07-09 RX ADMIN — MEROPENEM 500 MG: 500 INJECTION, POWDER, FOR SOLUTION INTRAVENOUS at 11:15

## 2020-07-09 RX ADMIN — MEROPENEM 500 MG: 500 INJECTION, POWDER, FOR SOLUTION INTRAVENOUS at 02:07

## 2020-07-09 ASSESSMENT — PAIN DESCRIPTION - PAIN TYPE: TYPE: ACUTE PAIN

## 2020-07-09 ASSESSMENT — PAIN SCALES - GENERAL
PAINLEVEL_OUTOF10: 5
PAINLEVEL_OUTOF10: 0
PAINLEVEL_OUTOF10: 8
PAINLEVEL_OUTOF10: 3
PAINLEVEL_OUTOF10: 0

## 2020-07-09 ASSESSMENT — PULMONARY FUNCTION TESTS
PIF_VALUE: 29
PIF_VALUE: 30
PIF_VALUE: 30
PIF_VALUE: 10
PIF_VALUE: 30
PIF_VALUE: 30
PIF_VALUE: 28
PIF_VALUE: 11
PIF_VALUE: 11
PIF_VALUE: 29
PIF_VALUE: 12
PIF_VALUE: 12
PIF_VALUE: 30
PIF_VALUE: 39
PIF_VALUE: 30

## 2020-07-09 ASSESSMENT — PAIN DESCRIPTION - FREQUENCY: FREQUENCY: INTERMITTENT

## 2020-07-09 ASSESSMENT — PAIN DESCRIPTION - ONSET: ONSET: ON-GOING

## 2020-07-09 ASSESSMENT — PAIN DESCRIPTION - DESCRIPTORS: DESCRIPTORS: SHARP

## 2020-07-09 ASSESSMENT — PAIN DESCRIPTION - ORIENTATION: ORIENTATION: UPPER

## 2020-07-09 ASSESSMENT — PAIN DESCRIPTION - LOCATION: LOCATION: ABDOMEN

## 2020-07-09 ASSESSMENT — PAIN - FUNCTIONAL ASSESSMENT: PAIN_FUNCTIONAL_ASSESSMENT: PREVENTS OR INTERFERES WITH MANY ACTIVE NOT PASSIVE ACTIVITIES

## 2020-07-09 ASSESSMENT — PAIN DESCRIPTION - PROGRESSION: CLINICAL_PROGRESSION: RAPIDLY WORSENING

## 2020-07-09 NOTE — PROGRESS NOTES
Physician Progress Note      PATIENT:               Devonte Perdomo  [facility] #:                  [id number]  :                       1970  ADMIT DATE:       2020 12:11 PM  [address] [facility] DATE:  RESPONDING  PROVIDER #:        Simon Otero MD          QUERY TEXT:    Sepsis [facility]    Pt admitted with SBO and KIKI. Pt noted to have Leukocytosis, Lactic Acidosis,   tachycardia,+UTI  . If possible, please document in the progress notes and   discharge summary if you are evaluating and /or treating any of the following: The medical record reflects the following:  Risk Factors:Hx HTN, Obesity, Current SBO and +UTI and KIKI  Clinical Indicators: Presents in ED with Abd pain, EV=835, Resp=28,  WBC=27.7,   Neutrophils=23.3, Lactic acid=5.8, Creat=3.8, ED notes Septicemia  Treatment: Tx with bolus IV fluids 2L, Zosyn IV, Levaquin  Options provided:  -- Sepsis, present on admission  -- Sepsis, now resolved,  -- Sepsis, not present on admission,  -- No Sepsis, localized infection only  -- Sepsis was ruled out  -- Refer to Clinical Documentation Reviewer    PROVIDER RESPONSE TEXT:    This patient has sepsis which was present on admission. Query created by: Karime Acosta on 2020 10:27 AM      QUERY TEXT:    Abnormal Lab/Diagnostic Finding [facility]    Patient admitted with SBO/KIKI, noted to have Elevated Liver Enzymes. If   possible, please document in progress notes and discharge summary if you are   evaluating and/or treating any of the following: The medical record reflects the following:  Risk Factors: Hx HTN, Obesity, Current SBO, KIKI, UTI, Leukocytosis, Lactic   Acidosis, Tachycardia  Clinical Indicators:  ALT/AST=74/165,Bilirubin=3.3 and  AST/ALT=252/100,   bili=2.9, Bili direct=2.5  GI notes The patient has a history of normal liver function tests, including   2020.   Treatment: GI consult, monitor labs  Options provided:  -- Acute and Subacute Hepatic Failure without coma  -- Elevated Liver Enzymes not

## 2020-07-09 NOTE — PROGRESS NOTES
NPO  Continued Inpatient Monitoring    Nutrition Evaluation:   · Evaluation: Goals set   · Goals:  Tolerate the most appropriate form of nutrition    · Monitoring: Nutrition Progression, Wound Healing, Skin Integrity, Weight, I&O, Monitor Bowel Function      Electronically signed by Мария Fitzpatrick RD, ROSALINDA on 7/9/20 at 8:18 AM EDT    Contact Number: [phone]

## 2020-07-09 NOTE — CONSULTS
PRN **OR** acetaminophen (TYLENOL) suppository 650 mg, 650 mg, Rectal, Q6H PRN  promethazine (PHENERGAN) tablet 12.5 mg, 12.5 mg, Oral, Q6H PRN **OR** ondansetron (ZOFRAN) injection 4 mg, 4 mg, Intravenous, Q6H PRN  heparin (porcine) injection 5,000 Units, 5,000 Units, Subcutaneous, BID  insulin lispro (HUMALOG) injection vial 0-6 Units, 0-6 Units, Subcutaneous, TID WC  insulin lispro (HUMALOG) injection vial 0-3 Units, 0-3 Units, Subcutaneous, Nightly  iopamidol (ISOVUE-370) 76 % injection 75 mL, 75 mL, Intravenous, ONCE PRN      ALLERGIES:  Patient has No Known Allergies.     FAMILY HISTORY:  Unable to obtain due to intubation    SOCIAL HISTORY:  Social History     Socioeconomic History    Marital status: Single     Spouse name: Not on file    Number of children: Not on file    Years of education: Not on file    Highest education level: Not on file   Occupational History    Not on file   Social Needs    Financial resource strain: Not on file    Food insecurity     Worry: Not on file     Inability: Not on file    Transportation needs     Medical: Not on file     Non-medical: Not on file   Tobacco Use    Smoking status: Never Smoker    Smokeless tobacco: Never Used   Substance and Sexual Activity    Alcohol use: No    Drug use: No    Sexual activity: Not on file   Lifestyle    Physical activity     Days per week: Not on file     Minutes per session: Not on file    Stress: Not on file   Relationships    Social connections     Talks on phone: Not on file     Gets together: Not on file     Attends Advent service: Not on file     Active member of club or organization: Not on file     Attends meetings of clubs or organizations: Not on file     Relationship status: Not on file    Intimate partner violence     Fear of current or ex partner: Not on file     Emotionally abused: Not on file     Physically abused: Not on file     Forced sexual activity: Not on file   Other Topics Concern    Not on file Social History Narrative    Not on file      reports that she has never smoked. She has never used smokeless tobacco.    REVIEW OF SYSTEMS:  Unable to obtain due to intubation    Objective:   PHYSICAL EXAM:  Blood pressure 107/68, pulse 108, temperature 99.5 °F (37.5 °C), temperature source Rectal, resp. rate 26, height 4' 11\" (1.499 m), weight 246 lb 4.1 oz (111.7 kg), SpO2 96 %, not currently breastfeeding. on PS 5/5, 40%    Gen: Well developed; well nourished  Eyes: No scleral icterus. No conjunctival injection. ENT:  Oropharynx with ETT. External appearance of ears and nose normal.  Neck: Trachea midline. No obvious mass. No visible thyroid enlargement    Respiratory: Clear to auscultation bilaterally on anterior exam, no accessory muscle use  Cardiovascular: Regular rate and rhythm, no appreciable murmurs. No edema. Gastrointestinal: Soft, dressing c/d/i. No hernia. MECHE drain with serosanguinous fluid   Skin: Warm and dry. No rashes or ulcers on visible areas. Normal texture and turgor  Lymphatic: No cervical LAD. No supraclavicular LAD. Musculoskeletal: No cyanosis, clubbing or joint deformity.     Psychiatric: Intubated, but awake and interactive    Data Reviewed:   LABS:  CBC:   Recent Labs     07/07/20  0506 07/08/20  1325 07/09/20  0545   WBC 22.7* 33.6* 36.5*   HGB 9.9* 8.9* 7.5*   HCT 32.5* 28.5* 24.8*   MCV 62.9* 60.3* 60.3*   * 617* 463*     BMP:   Recent Labs     07/07/20  0506 07/08/20  1325 07/09/20  0545   * 134* 137   K 4.8 4.1  4.1 4.6   CL 96* 90* 95*   CO2 14* 22 17*   BUN 71* 105* 114*   CREATININE 4.3* 4.1* 4.5*     LIVER PROFILE:   Recent Labs     07/06/20  1337 07/07/20  0506 07/08/20  1325 07/09/20  0545   * 252* 154*  153* 103*   ALT 74* 100* 70*  71* 48*   LIPASE 12.0*  --   --   --    BILIDIR  --  2.5* 2.7* 4.0*   BILITOT 3.3* 2.9* 3.1*  3.1* 4.1*   ALKPHOS 122 121 150*  146* 101     PT/INR:   Recent Labs     07/08/20  1344 07/09/20  0545   PROTIME 15.4* 15.0*   INR 1.32* 1.29*     APTT: No results for input(s): APTT in the last 72 hours. Images and reports from chest imaging were reviewed by me. My interpretation is:  CXR (7/8/20): Clear lung fields; ETT, HD catheter and gastric tube in place    Assessment:     Sepsis  Peritonitis  Lactic acidosis  Acute pulmonary insufficiency   Acute kidney injury  Metabolic acidosis  Elevated LFTs    Plan:      Sepsis  - Suspected ruptured tubo-ovarian abscess. S/p left salpingectomy  - IV fluids  - Meropenem and vancomycin     Peritonitis  - Suspected ruptured tubo-ovarian abscess. S/p left salpingectomy  - Meropenem and vancomycin    Lactic acidosis  - IV fluids  - Cycle every 6 hours    Acute pulmonary insufficiency   - On SBT. Check ABG    Acute kidney injury  - IV fluids  - Strict I/Os  - Nephrology following    Metabolic acidosis  - Give 1 amp sodium bicarbonate    Elevated LFTs  - May be due to sepsis  - Check labs in a.m  - GI following      Prophylaxis  DVT- SQ heparin  GI- protonix  VAP- peridex    I spent 55 minutes of critical care time with this patient excluding procedures.     Valerie Green MD  [facility] Pulmonology, Critical Care and Sleep

## 2020-07-09 NOTE — PROGRESS NOTES
no jaundice  Neuro: Grossly intact    LABS AND IMAGING   Laboratory   Recent Labs     07/07/20  0506 07/08/20  1325 07/09/20  0545   WBC 22.7* 33.6* 36.5*   HGB 9.9* 8.9* 7.5*   HCT 32.5* 28.5* 24.8*   MCV 62.9* 60.3* 60.3*   * 617* 463*     Recent Labs     07/07/20  0506 07/08/20  1325 07/09/20  0545   * 134* 137   K 4.8 4.1  4.1 4.6   CL 96* 90* 95*   CO2 14* 22 17*   BUN 71* 105* 114*   CREATININE 4.3* 4.1* 4.5*     Recent Labs     07/07/20  0506 07/08/20  1325 07/09/20  0545   * 154*  153* 103*   * 70*  71* 48*   BILIDIR 2.5* 2.7* 4.0*   BILITOT 2.9* 3.1*  3.1* 4.1*   ALKPHOS 121 150*  146* 101     Recent Labs     07/06/20  1337   LIPASE 12.0*     Recent Labs     07/08/20  1344 07/09/20  0545   PROTIME 15.4* 15.0*   INR 1.32* 1.29*         Imaging  XR CHEST PORTABLE   Final Result   Status post placement of right internal jugular central venous catheter,   without gross pneumothorax. Endotracheal tube tip is approximately 1.3 cm proximal to the kelly and   could be retracted approximately 1-2 cm. Low volume study with basilar atelectasis. Findings were called by the radiology call center. XR ABDOMEN (2 VIEWS)   Final Result   Obstructed bowel gas pattern, similar to prior CT         MRI ABDOMEN WO CONTRAST MRCP   Final Result   No biliary ductal obstruction. No findings to explain elevated LFTs. CT ABDOMEN PELVIS WO CONTRAST Additional Contrast? None   Final Result   Mid to distal small bowel obstruction         US GALLBLADDER RUQ   Final Result   Questionable small amount of sludge within the gallbladder. Otherwise   unremarkable right upper quadrant ultrasound. Endoscopy      ASSESSMENT AND RECOMMENDATIONS   Renetta Falcon is a 53 y.o. female with PMH of hypertension and obesity who presented on 7/6/2020 with abdominal pain. CT A/P showed mid to distal small bowel obstruction. NGT placed and Surgery consulted.   She was taken to OR for ex lap which noted diffuse purulent peritonitis suspicious for ruptured TOA. Underwent omentectomy, left salpingectomy with removal of fibroid, repair of serosal tear. 1. Peritonitis suspected 2/2 ruptured tubo-ovarian abscess. S/p omentectomy, left salpingectomy with removal of fibroid, repair of serosal tear. POD #1  2. Abnormal LFTs: Possibly related to cholestasis from sepsis 2/2 #1. LFTs were normal prior to admission on7/2. MRCP negative for biliary obstruction. Hep A IgM neg. Additional viral serologies pending. No concerning medications  3. KIKI: Nephrology following. Temporary dialysis cath placed yesterday      RECOMMENDATIONS:    Post op care per Surgery and Critical Care team  Will follow along, monitor LFTs, follow up on viral serologies        If you have any questions or need any further information, please feel free to contact us [phone]. Thank you for allowing us to participate in the care of Lolita Perez. The note was completed using Dragon voice recognition transcription. Every effort was made to ensure accuracy; however, inadvertent transcription errors may be present despite my best efforts to edit errors.     Tate [place]

## 2020-07-09 NOTE — PROGRESS NOTES
POD #1    Patient extubated with NG. Has some pain     /76   Pulse 103   Temp 98.9 °F (37.2 °C) (Oral)   Resp (!) 32   Ht 4' 11\" (1.499 m)   Wt 246 lb 4.1 oz (111.7 kg)   SpO2 96%   BMI 49.74 kg/m²     WDWN in NAD  A and O x 3  ABD-dressing CDI, soft, appropriately tender  EXT-has compression boots on  Skin-warm and dry  Pelvic-EFG with normal EFG-no lesions seen, menstral blood seen, nothing in vagina    Lab Results   Component Value Date    WBC 36.5 (H) 07/09/2020    HGB 7.5 (L) 07/09/2020    HCT 24.8 (L) 07/09/2020    MCV 60.3 (L) 07/09/2020     (H) 07/09/2020     Lab Results   Component Value Date     07/09/2020    K 4.6 07/09/2020    CL 95 (L) 07/09/2020    CO2 17 (L) 07/09/2020     (HH) 07/09/2020    CREATININE 4.5 (H) 07/09/2020    GLUCOSE 104 (H) 07/09/2020    CALCIUM 5.8 (LL) 07/09/2020    PROT 5.2 (L) 07/09/2020    LABALBU 2.0 (L) 07/09/2020    BILITOT 4.1 (H) 07/09/2020    ALKPHOS 101 07/09/2020     (H) 07/09/2020    ALT 48 (H) 07/09/2020    LABGLOM 10 (A) 07/09/2020    GFRAA 13 (A) 07/09/2020    AGRATIO 0.6 (L) 07/09/2020    GLOB 3.2 07/09/2020       Plan-patient s/p exp lap, left salpingectomy, fibroid removal  1-ID-had intraabdominal infection. Did have pus in pelvis-left tube was dilated and collapsed ovary on left with ? Cyst area with rupture? Unsure if infection secondary to inflammation on left side or primary. Had CT at [facility] on 7/2/20 with no mass noted in pelvis? Had recent normal pelvic exam at Dr. Seth Posadas with negative gc/chlamydia. Repeat CT chlamydia. Did have IUD 2 years ago but was removed. Would be unusual presentation for PID. Need to treat abdominal infection like surgeons are. Pathology pending tube.

## 2020-07-09 NOTE — PROGRESS NOTES
Occupational Therapy   Occupational Therapy Initial Assessment  Date: 2020   Patient Name: Korey De La Cruz  MRN: [id number]     : 1970    Date of Service: 2020    Discharge Recommendations:  3-5 sessions per week, 5-7 sessions per week, Patient would benefit from continued therapy after discharge       Assessment   Performance deficits / Impairments: Decreased functional mobility ; Decreased ADL status; Decreased endurance;Decreased strength;Decreased balance;Decreased ROM  Assessment: Pt 52 y/o female s/p exploratory laparotomy, omentectomy, left salpingectomy with removal of fibriod (N/A abdomen) on 2020. Pt has NGT, MECHE drain, veras and 3L O2. PTA, pt was independent in all ADL tasks/functional mobility. Today, pt attempted to sit EOB but was unable to acheive due to resisting hip flexion despite max A x2. Minimal initiation by pt. Pt groggy and had eyes closed most of the time. Anticipate pt to be total A x2 for LE/UE bathing/dressing and SBA/supervision for grooming as pt unable to tolerate unsupported sitting/standing this date. Pt would benefit from skilled therapy but will continue to assess at pt tolerates activity and mobility. Daughter present and hopeful pt can return to her home after d/c. Prognosis: Good  Decision Making: Medium Complexity  OT Education: OT Role;Plan of Care  REQUIRES OT FOLLOW UP: Yes  Activity Tolerance  Activity Tolerance: Patient limited by pain  Activity Tolerance: Minimal initiation at times, hesitant to flex hips, eyes closed  Safety Devices  Safety Devices in place: Yes  Type of devices: Left in chair;Call light within reach;Nurse notified           Patient Diagnosis(es): The primary encounter diagnosis was Septicemia ([facility] [address] Diagnoses of KIKI (acute kidney injury) ([facility] [address] Gallbladder disease, and Acidosis were also pertinent to this visit. has a past medical history of Hypertension and Obesity.    has a past surgical history that includes  section and laparotomy (N/A, 7/8/2020). Restrictions  Restrictions/Precautions  Restrictions/Precautions: Up as Tolerated, Fall Risk  Position Activity Restriction  Other position/activity restrictions: MECHE drain, NGT, 3L of O2, veras    Subjective   General  Chart Reviewed: Yes  Patient assessed for rehabilitation services?: Yes  Additional Pertinent Hx: Pt is a 53 y.o. female admitted to ED 7/6/2020 with abdominal pain. Pt stated that this pain began one week ago, and was seen on 7/2/20 at [facility] And was diagnosed with some gastroenteritis. Pt had a CT scan performed that showed a mid to distal small bowel obstruction. S/p exploratory laparotomy, omentectomy, left salpingectomy with removal of fibriod (N/A abdomen) on 7/8/2020. Family / Caregiver Present: Yes(Daughter present)  Referring Practitioner: Shayna Olson MD  Subjective  Subjective: Pt in bed on arrival and agreeable to therapy. Pt has NGT and MECHE drain in abdomen. Daughter present. Social/Functional History  Social/Functional History  Lives With: Family(Currently lives with father and two children, but will be recovering at daughter's home- following will be home set up of daughter's home)  Type of Home: House  Home Layout: Multi-level(daughter states pt will be able to recover on main floor if needed)  Home Access: Stairs to enter with rails  Entrance Stairs - Number of Steps: 1  Bathroom Shower/Tub: Tub/Shower unit, Walk-in shower(tub/shower main floor, walk-in shower upstairs)  Bathroom Toilet: Standard  Bathroom Equipment: Shower chair  Home Equipment: Rolling walker  Receives Help From: Family  ADL Assistance: Independent  Homemaking Assistance: Independent  Ambulation Assistance: Independent  Transfer Assistance: Independent  Active : Yes  Occupation: Full time employment(working two jobs)  Additional Comments:  Will be living with daughter, son-in-law, grandchildren and daughter's mother-in-law upon d/c     Objective   Vision: Impaired  Vision Exceptions: Wears glasses at all times  Hearing: Within functional limits    Orientation  Overall Orientation Status: Within Normal Limits     Balance  Sitting Balance: (attempted to sit EOB but unable to achieve due to pt resisting flexion at hip despite max A X2)    Functional Mobility  Functional Mobility Comments: bed to chair via maxi ciro lift    ADL  Toileting: Dependent/Total(eda)  Additional Comments: Anticipate pt to be total A x2 for LE/UE bathing/dressing and SBA/supervision for grooming as pt unable to tolerate unsupported sitting/standing this date. Bed mobility  Supine to Sit: Unable to assess  Comment: Attempted to sit EOB. Pt resisting flexing hips despite max A x2. Minimal initiation by pt. Transfers  Transfer Comments:  Unable to assess     Cognition  Overall Cognitive Status: WNL  Cognition Comment: Pt groggy and minimal initiation       LUE AROM (degrees)  LUE AROM : WFL  Left Hand AROM (degrees)  Left Hand AROM: WFL  RUE AROM (degrees)  RUE AROM : WFL  Right Hand AROM (degrees)  Right Hand AROM: WFL  LUE Strength  LUE Strength Comment: not tested due to abdominal surgery, pain and grogginess  RUE Strength  RUE Strength Comment: not tested due to abdominal surgery, pain and grogginess      Plan   Plan  Times per week: 3-5  Current Treatment Recommendations: Strengthening, ROM, Gait Training, Balance Training, Self-Care / ADL, Functional Mobility Training, Endurance Training, Patient/Caregiver Education & Training      AM-PAC Score  AM-[facility] Inpatient Daily Activity Raw Score: 12 (07/09/20 1601)  AM-PAC Inpatient ADL T-Scale Score : 30.6 (07/09/20 1601)  ADL Inpatient CMS 0-100% Score: 66.57 (07/09/20 1601)  ADL Inpatient CMS G-Code Modifier : CL (07/09/20 1601)    Goals  Short term goals  Time Frame for Short term goals: Prior to d/c:  Short term goal 1: Pt will complete fxl transfer to/from ADL surfaces with mod A x2.   Short term goal 2: Pt will tolerate standing >3

## 2020-07-09 NOTE — OP NOTE
[address] [place]af[place]te                                 OPERATIVE REPORT    PATIENT NAME: Andreas Shaffer                     :        1970  MED REC NO:   [id number]                          ROOM:       2106  ACCOUNT NO:   [de-identified]                           ADMIT DATE: 2020  PROVIDER:     Shalom Hudson MD    DATE OF PROCEDURE:  2020    PREOPERATIVE DIAGNOSIS:  Small bowel obstruction. POSTOPERATIVE DIAGNOSIS:  Purulent peritonitis secondary to presumed  ruptured tubo-ovarian abscess. OPERATIONS PERFORMED:  1. Exploratory laparotomy with partial omentectomy. 2.  Left salpingectomy and removal of uterine fibroid. 3.  Primary repair of serosal tear. 4.  Ultrasound-guided insertion of right 12-Macedonian x 16 cm right  internal jugular vein hemodialysis Trialysis catheter. SURGEON:  Shalom Hudson MD    INTRAOPERATIVE CONSULTATION:  Jose Frazier MD    ANESTHESIA:  General endotracheal anesthesia. ESTIMATED BLOOD LOSS:  100 mL. SPECIMENS:  1. Partial omentum tissue for culture. 2.  Left fallopian tube and uterine fibroid. FINDINGS:  Diffuse purulent peritonitis, suspicious for ruptured  tubo-ovarian abscess with dense inflammation, intra-loop abscesses  throughout the abdomen. Necrotic omentum was sent for tissue culture. INDICATIONS:  The patient is a 45-year-old female who presented to the  Emergency Department 48 hours ago with abdominal pain that was diffuse  in nature. She had a CAT scan that showed a tubo-ovarian abscess. She  had leukocytosis, was in acute renal failure, and was admitted for  conservative measures with NG and aggressive resuscitation. She had a  slight improvement in her creatinine and white blood cells the following  day and no changes were noted in her management; however, the following  day, she has complained of worsening pain.   We had difficulty obtaining  labs and once blood work did return, her white blood cells were worse as  well as her creatinine. As a result, I recommended exploration with  possible bowel resection. I discussed this with Nephrology and the plan  was to place a temporary dialysis line as well. Risks, benefits, and  alternatives were discussed at length and she was agreeable to proceed. OPERATIVE PROCEDURE:  The patient was brought to the operating suite and  placed in supine position on the operating room table. General  endotracheal anesthesia was induced that she tolerated fairly well. A  Jaime catheter was sterilely placed and the abdomen prepped and draped  in the usual sterile fashion and a time-out performed. A midline incision was performed and carried down around the umbilicus. I entered the peritoneal cavity and immediately had gross purulence. It  was foul smelling in nature consistent with infection versus  perforation. I extended our incision both cephalad and caudad to allow  visualization given her morbid obesity. The omentum was noted to be  necrotic and reflected cephalad to begin visualizing the small bowel. The small bowel itself was dilated. She had multiple intra-loop  adhesions secondary to inflammation as well as purulence within these  loops consistent with intra-loop abscesses. I began by breaking free  these by gently manipulating these. I was able to run the small bowel  from the ligament of Treitz all the way to terminal ileum and no  ischemia or gross perforation was noted to create such an inflammatory  response. The omentum was then taken off the colon, and the transverse colon  appeared healthy. The right colon was severely thickened from reaction,  but no perforation was noted. The appendix was identified and  visualized to be normal as well.   The incision was then extended  cephalad to evaluate the stomach, and the stomach and the first and  second portions of the duodenum appeared normal as well. The  gallbladder was inspected and had normal appearance as well. We then turned our attention down to the left colon and the sigmoid  colon. She did have a boggy uterus with fibroid to this. She had a  purulence anterior to the uterus as well as posterior where the large  pocket was, between that and the sigmoid colon. This was adequately  drained. The colon itself again felt reactive secondarily, but no  perforation to account for operative findings was noted as well. At that point, I called my partner for the second look and he agreed the  appendix looked normal as well as the bowel. The right tube and ovary  visualized normal.  The left one was extremely thickened and fairly large  and about 2 cm in diameter. As a result, I called in intraoperative  consult with Dr. Angelica Damon. She agreed that this could possibly be a  tubo-ovarian abscess, and as a result, the left tube was  removed and will be dictated separately by her. At that point, no other  abnormalities were noted that would account for this. She did have a small serosal tear that was inherent to the dense nature  and a very thin dilated part of the small bowel, but with really minimal  reaction around it. I did not think this was the primary source of the  contamination. This was closed with interrupted 3-0 Vicryls and  imbricated with 2-0 silks over top. The only place I really did not  expect was the retroperitoneal duodenum, but felt that all of her  contamination was intraperitoneally. This was low likelihood that that  would be the source. Given her significant obesity, it would be a  difficult dissection to evaluate. At that point, a drain was left in  the pelvis, brought out through a right lower quadrant stab incision,  and secured with a 2-0 silk to the skin. The abdomen was copiously  irrigated and the fascia closed with 0 looped PDS and the skin with  staples.   She remained critically ill and will be left intubated for  further resuscitation in the ICU. I will ask Critical Care to assist with management of her postop care.         Audrey Rai MD    D: 07/09/2020 7:51:51       T: 07/09/2020 10:43:53     [email or web address]  Job#: [id number]     Doc#: [id number]    CC:  Anselmo Anderson MD

## 2020-07-09 NOTE — PROGRESS NOTES
Nephrology ([facility]) Progress Note    CC: KIKI    Subjective:    HPI:  Extubated. Renal function slightly worse. Non-oliguric. Surgical pain. ROS:  In bed. No fever. [address]:  medications reviewed. Objective:  Blood pressure 132/70, pulse 106, temperature 99.7 °F (37.6 °C), temperature source Axillary, resp. rate 30, height 4' 11\" (1.499 m), weight 246 lb 4.1 oz (111.7 kg), SpO2 96 %, not currently breastfeeding. Intake/Output Summary (Last 24 hours) at 7/9/2020 1310  Last data filed at 7/9/2020 1253  Gross per 24 hour   Intake 4047.75 ml   Output 1150 ml   Net 2897.75 ml     General:  NAD, A+Ox3  Chest:  deferred due to pandemic  CVS:  deferred  Abdominal:  deferred  Extremities:  no edema  Skin:  no rash    Labs:  Renal panel:  Lab Results   Component Value Date/Time     07/09/2020 05:45 AM    K 4.6 07/09/2020 05:45 AM    CO2 17 (L) 07/09/2020 05:45 AM     (HH) 07/09/2020 05:45 AM    CREATININE 4.5 (H) 07/09/2020 05:45 AM    CALCIUM 5.8 (LL) 07/09/2020 05:45 AM     CBC:  Lab Results   Component Value Date/Time    WBC 36.5 (H) 07/09/2020 05:45 AM    HGB 7.5 (L) 07/09/2020 05:45 AM    HCT 24.8 (L) 07/09/2020 05:45 AM     (H) 07/09/2020 05:45 AM       Assessment/Plan:  Reviewed old records and labs.     1) KIKI              - baseline 06/03/20 Cr 0.6              - ddx:  ATN vs. pre-renal and benazepril and ibuprofen did not help   - renal function slightly worse              - unclear if she will need dialysis or not   - appreciate dialysis catheter by Dr. Shaun Mclaughlin              - on IVF              - hold benazepril              - counseled against NSAID use     2) SBO              - surgery consulted              - NG to wall suction   - d/w Dr. Shaun Mclaughlin and since he is taking her to OR, he will place temporary dialysis catheter     3) ID              - on empiric zosyn, levaquin     4) FEN              - hyponatremia                          - monitor              - metabolic acidosis                          - off NaHCO3 gtt   - hypocalcemia    - check ionized calcium and phos     5) \"hematuria\"   - UA does show moderate blood   - unclear significance

## 2020-07-09 NOTE — PROGRESS NOTES
0413:Pt was restless and grimacing during bath, 25 mcg fentanyl given per PRN order.    Electronically signed by Rao Paredes RN on 7/9/2020 at 4:58 AM

## 2020-07-09 NOTE — PROGRESS NOTES
4 Eyes Skin Assessment     The patient is being assess for  Shift Handoff    I agree that 2 RN's have performed a thorough Head to Toe Skin Assessment on the patient. ALL assessment sites listed below have been assessed. Areas assessed by both nurses:   [x]   Head, Face, and Ears   [x]   Shoulders, Back, and Chest  [x]   Arms, Elbows, and Hands   [x]   Coccyx, Sacrum, and IschIum  [x]   Legs, Feet, and Heels        Does the Patient have Skin Breakdown?   No         Andrey Prevention initiated:  Yes   Wound Care Orders initiated:  No      [facility] nurse consulted for Pressure Injury (Stage 3,4, Unstageable, DTI, NWPT, and Complex wounds), New and Established Ostomies:  No      Nurse 1 eSignature: Electronically signed by Cyndi Ureña RN on 7/8/20 at 8:24 PM EDT    **SHARE this note so that the co-signing nurse is able to place an eSignature**    Nurse 2 eSignature: Electronically signed by Saeed Gaspar RN on 7/8/20 at 8:25 PM EDT

## 2020-07-09 NOTE — CONSULTS
Infectious Diseases Inpatient Consult Note      Reason for Consult: WBC elevation , sepsis and s/p Exp lap for Purulent peritonitis     Requesting Physician:  Briseida Becerra     Primary Care Physician:  Jon Gonzalez MD    History Obtained From:  Pt and Medical records   CHIEF COMPLAINT:     Chief Complaint   Patient presents with    Abdominal Pain     [place] 8 [place] [facility] [place] bowel inflammation  No emesis       Peritonitis and s/p Exp lap and WBC elevation     HISTORY OF PRESENT ILLNESS:  53 y.o. woman with morbid obesity, Dysfunctional uterine bleeding and previous Endometrial Biopsy and is followed by Gynecology and last office visit  and now admitted with acute abdominal pain and not feeling well CT scan with SBO and concern for infection taken to OR on   s/p Exlap ,ometectomy, Left salpingectocmy and removal of Fibroid and repair of serosal tear with HD line placement. She is now extubated in ICU and  Her WBC continue to rise despite IV abx and tissue cx in process. No h/o STDs no HSV or HPV. H/o IUD from  to  and no recent  instrumentation. Labs abnormal with WBC elevation, lactic acidosis, KIKI and Lft elevation on on going we are consulted for recommendations.       Past Medical History:    Past Medical History:   Diagnosis Date    Hypertension     Obesity        Past Surgical History:    Past Surgical History:   Procedure Laterality Date     SECTION      LAPAROTOMY N/A 2020    EXPLORATORY LAPAROTOMY, OMENTECTOMY, LEFT SALPINGECTOMY WITH REMOVAL OF FIBROID performed by Sunny Haji MD at [place] [address]       Current Medications:    Outpatient Medications Marked as Taking for the 20 encounter [facility] Encounter)   Medication Sig Dispense Refill    ferrous sulfate (FE TABS) 325 (65 Fe) MG EC tablet Take 1 tablet by mouth 2 times daily 60 tablet 3    Multiple Vitamins-Minerals (THERAPEUTIC MULTIVITAMIN-MINERALS) tablet Take 1 tablet by mouth daily 30 tablet 11    furosemide (LASIX) 20 MG tablet Take 1 tablet by mouth 2 times daily 60 tablet 3    metFORMIN (GLUCOPHAGE) 500 MG tablet Take 1 tablet by mouth 2 times daily (with meals) 60 tablet 5    amLODIPine-benazepril (LOTREL) 5-20 MG per capsule Take 1 capsule by mouth daily 30 capsule 0    vitamin D (ERGOCALCIFEROL) 1.25 MG (59022 UT) CAPS capsule Take 1 capsule by mouth once a week 4 capsule 5       Allergies:  Patient has no known allergies. Immunizations : There is no immunization history on file for this patient. Social History:    Social History     Tobacco Use    Smoking status: Never Smoker    Smokeless tobacco: Never Used   Substance Use Topics    Alcohol use: No    Drug use: No     Social History     Tobacco Use   Smoking Status Never Smoker   Smokeless Tobacco Never Used      Family History :Problem Relation Age of Onset    Other (Other) Other   ovarian cancer- moteher    Other (Other) Other   sister with \"traces of lupus\"    Ovarian cancer Mother    Diabetes Father    Breast cancer Neg Hx         From care every where office note  6/29  REVIEW OF SYSTEMS:    No fever / chills / sweats. No weight loss. No visual change, eye pain, eye discharge. No oral lesion, sore throat, dysphagia. Denies cough / sputum/Sob   Denies chest pain, palpitations/ dizziness  Denies nausea/ vomiting/abdominal pain/diarrhea. Denies dysuria or change in urinary function. Denies joint swelling or pain. No myalgia, arthralgia. No rashes, skin lesions   Denies focal weakness, sensory change or other neurologic symptoms  No lymph node swelling or tenderness.     abd pain, irregular menses     PHYSICAL EXAM:      Vitals:    /76   Pulse 103   Temp 98.9 °F (37.2 °C) (Oral)   Resp (!) 32   Ht 4' 11\" (1.499 m)   Wt 246 lb 4.1 oz (111.7 kg)   SpO2 96%   BMI 49.74 kg/m²     General Appearance: alert,in some acute distress, +  pallor, no icterus on nasal cannula   Skin: warm and dry, no rash Negative 07/07/2020    LEUKOCYTESUR SMALL 07/07/2020    LABMICR YES 07/07/2020    URINETYPE NotGiven 07/07/2020      Urine Microscopic:   Lab Results   Component Value Date    BACTERIA 1+ 07/07/2020    COMU see below 07/07/2020    HYALCAST 1 07/07/2020    WBCUA 26 07/07/2020    RBCUA  07/07/2020    EPIU 2 07/07/2020         Scheduled Meds:   cloNIDine  1 patch Transdermal Weekly    pantoprazole  40 mg Intravenous Daily    And    sodium chloride (PF)  10 mL Intravenous Daily    vancomycin (VANCOCIN) intermittent dosing (placeholder)   Other RX Placeholder    meropenem  500 mg Intravenous Q8H    sodium chloride flush  10 mL Intravenous 2 times per day    heparin (porcine)  5,000 Units Subcutaneous BID    insulin lispro  0-6 Units Subcutaneous TID WC    insulin lispro  0-3 Units Subcutaneous Nightly       Continuous Infusions:   sodium chloride 125 mL/hr at 07/09/20 1135    dextrose         PRN Meds:  HYDROmorphone **OR** HYDROmorphone, fentanNYL, glucose, dextrose, glucagon (rDNA), dextrose, sodium chloride flush, acetaminophen **OR** acetaminophen, promethazine **OR** ondansetron, iopamidol    Creat  4.5   Lactic acid  5.8  Down to  1.9     WBC  36.5     hB  11.2  DOWN TO  7.5     MICRO: cultures reviewed and updated by me   Procedure Component Value Units Date/Time   Culture, Tissue [[id number]] Collected: 07/08/20 1644   Order Status: Completed Specimen: Tissue Updated: 07/09/20 1307    Gram Stain Result No Epithelial Cells seen   No WBCs or organisms seen     WOUND/ABSCESS No growth to date    Anaerobic Culture Anaerobic culture further report to follow   Narrative:     ORDER#: [id number]                          [facility]  SOURCE: Tissue                             COLLECTED:  07/08/20 16:44  [facility] :                      RECEIVED :  07/08/20 17:13  Performed at:  [facility]  [address] [place] [address]   Phone [phone]   C.trachomatis N.gonorrhoeae DNA, Urine [[id number]] Collected: 07/08/20 1835   Order Status: Sent Specimen: Urine Updated: 07/09/20 0110   HSV PCR [[id number]] Collected: 07/08/20 0000   Order Status: Sent Specimen: Blood Updated: 07/08/20 1426   HSV PCR [[id number]]    Order Status: Canceled Specimen: Blood    HSV PCR [[id number]]    Order Status: Canceled Specimen: Blood    HSV PCR [[id number]]    Order Status: Canceled Specimen: Blood    Culture, Blood 1 [[id number]] Collected: 07/06/20 1536   Order Status: Completed Specimen: Blood Updated: 07/07/20 1715    Blood Culture, Routine No Growth to date.  Any change in status will be called. Narrative:     ORDER#: [id number]                          ORDERED BY: DANIAL LYNN  SOURCE: Blood                              COLLECTED:  07/06/20 15:36  ANT[facility]OTICS AT [facility] :                      RECEIVED :  07/06/20 15:42  If child <=2 yrs old please draw pediatric bottle. ~Blood Culture #1  Performed at:  [facility]  [address] [address]   Phone [phone]   Culture, Blood 2 [[id number]] Collected: 07/06/20 1602   Order Status: Completed Specimen: Blood Updated: 07/07/20 1715    Culture, Blood 2 No Growth to date.  Any change in status will be called. Narrative:     ORDER#: [id number]                          ORDERED BY: DANIAL LYNN  SOURCE: Blood                              COLLECTED:  07/06/20 16:02  ANTIBIOTICS AT [facility] :                      RECEIVED :  07/06/20 16:09  If child <=2 yrs old please draw pediatric bottle. ~Blood Culture #2  Performed at:  [facility]  [address] [facility] [address]   Phone [phone]         Urine Culture  No results for input(s): Juana Azar in the last 72 hours. Imaging:   XR CHEST PORTABLE   Final Result   Status post placement of right internal jugular central venous catheter,   without gross pneumothorax. Endotracheal tube tip is approximately 1.3 cm proximal to the kelly and   could be retracted approximately 1-2 cm. Low volume study with basilar atelectasis. Findings were called by the radiology call center. XR ABDOMEN (2 VIEWS)   Final Result   Obstructed bowel gas pattern, similar to prior CT         MRI ABDOMEN WO CONTRAST MRCP   Final Result   No biliary ductal obstruction. No findings to explain elevated LFTs. CT ABDOMEN PELVIS WO CONTRAST Additional Contrast? None   Final Result   Mid to distal small bowel obstruction         US GALLBLADDER RUQ   Final Result   Questionable small amount of sludge within the gallbladder. Otherwise   unremarkable right upper quadrant ultrasound.               Operative Findings :  7/8/20      Findings: DIFFUSE PURULENT PERITONITIS SUSPICIOUS FOR RUPTURED TOA. REPAIR SEROSAL TEAR INHERENT TO THE DENSE INFLAMMATION. NECROTIC OMENTUM SENT FOR TISSUE CULTURE     All pertinent images and reports for the current Hospitalization were reviewed by me.     IMPRESSION:    Patient Active Problem List   Diagnosis    EMERY (obstructive sleep apnea)    ARF (acute renal failure) (HCC)    SBO (small bowel obstruction) (HCC)    Leukocytosis    Septicemia (HCC)    Acidosis    KIKI (acute kidney injury) ([address]    Gallbladder disease    Hypertension    Type 2 diabetes mellitus with hyperglycemia (HCC)    Peritonitis (HCC)    Lactic acidosis    Acute pulmonary insufficiency    Metabolic acidosis    Elevated LFTs     Sepsis  Lactic acidosis  WBC elevation   KIKI   Purulent peritonitis per OR description  Admitted with abd pain and SBO  S/p Exp lap , omentectomy, Left salpingectomy and removal of Fibroid and repair of serosal tear on 7/8  Lft elevation  Anemia  Thrombocytosis    She remains very ill in ICU but making some progress since surgery and noted purulent peritonitis and per OP notes no bowel leak but Left Tuboovarian abscess suspected based on the

## 2020-07-09 NOTE — PROGRESS NOTES
Pt extubated to 4L nasal canula at 1047. Tolerated well. Able to cough up secretions. Daughter at the bedside.

## 2020-07-09 NOTE — CARE COORDINATION
INITIAL CASE MANAGEMENT ASSESSMENT    Reviewed chart, met with patient to assess possible discharge needs. Explained Case Management role/services. Therapies working w/ pt. Spoke w/ her daughter Mellisa Justice [phone]    Living Situation: pt in the process of moving to new address at [facility] [address]. But dtr tells me that pt will recover at her home at [address] [facility] [address]    ADLs: independent prior to this procedure     DME: pt has nothing, dtr has shr seat and RW    PT/OT Recs: pending     Active Services: none     Transportation: she was driving, dtr drives     Medications: confirmed BCBS as health coverage, rx are obtained at [place]    PCP: confirmed Dr Piter Allen      HD/PD: n/a    PLAN/COMMENTS: had HD line inserted but need remains pending. Provided dtr my contact # and will monitor    SW/CM provided contact information for patient or family to call with any questions. SW/CM will follow and assist as needed.

## 2020-07-09 NOTE — PLAN OF CARE
Problem: Nutrition  Goal: Optimal nutrition therapy  Outcome: Ongoing     Nutrition Problem: Increased nutrient needs  Intervention: Food and/or Nutrient Delivery: Continue NPO  Nutritional Goals:  Tolerate the most appropriate form of nutrition

## 2020-07-09 NOTE — PROGRESS NOTES
[facility] [phone]                                     Daily Progress Note                                                         Pt Name: Heavenly Caal  Medical Record Number: [id number]  Date of Birth 1970   Today's Date: 7/9/2020  Chief Complaint   Patient presents with    Abdominal Pain     for 8 days was seen at [facility] and told she bowel inflammation  No emesis        ASSESSMENT/PLAN  Diffuse purulent peritonitis, suspicious for ruptured TOA  -7/8 exploratory laparotomy, omentectomy, left salpingectomy with removal of fibroid, repair serosal tear, US guided right IJ vascath. Drain SS.   -Extubated this AM  -continue NG, anticipate slow return of GI function  -Trend LFTs    KIKI  -vas cath in place, nephro following             Discharge Planning: continue ICU care      Tad Labor has slightly improved from yesterday. Pain is well controlled. She has no nausea and no vomiting. She has not passed flatus and has not had a bowel movement. She is tolerating ice chips. OBJECTIVE  VITALS:  height is 4' 11\" (1.499 m) and weight is 246 lb 4.1 oz (111.7 kg). Her axillary temperature is 99.7 °F (37.6 °C). Her blood pressure is 132/70 and her pulse is 106. Her respiration is 30 and oxygen saturation is 96%. INTAKE/OUTPUT:    Intake/Output Summary (Last 24 hours) at 7/9/2020 1258  Last data filed at 7/9/2020 1253  Gross per 24 hour   Intake 4047.75 ml   Output 1150 ml   Net 2897.75 ml     GENERAL: alert, no distress  LUNGS: clear to ausculation, without wheezes, rales or rhonci  HEART: normal rate and regular rhythm  ABDOMEN: soft, appropriately-tender, incision c/d/i. Drain SS. EXTREMITY: no cyanosis, clubbing or edema    I/O last 3 completed shifts:   In: 3762.3 [I.V.:3362.3; NG/GT:50; IV Piggyback:350]  Out: 950 [Urine:360; Emesis/NG output:100; Drains:390; Blood:100]      LABS  Recent Labs     07/07/20  2300

## 2020-07-10 LAB
A/G RATIO: 0.6 (ref 1.1–2.2)
ABO/RH: NORMAL
ALBUMIN SERPL-MCNC: 2.1 G/DL (ref 3.4–5)
ALP BLD-CCNC: 113 U/L (ref 40–129)
ALT SERPL-CCNC: 42 U/L (ref 10–40)
ANION GAP SERPL CALCULATED.3IONS-SCNC: 21 MMOL/L (ref 3–16)
ANISOCYTOSIS: ABNORMAL
ANTIBODY SCREEN: NORMAL
AST SERPL-CCNC: 69 U/L (ref 15–37)
BANDED NEUTROPHILS RELATIVE PERCENT: 4 % (ref 0–7)
BASOPHILS ABSOLUTE: 0 K/UL (ref 0–0.2)
BASOPHILS RELATIVE PERCENT: 0 %
BILIRUB SERPL-MCNC: 3 MG/DL (ref 0–1)
BILIRUBIN DIRECT: 2.8 MG/DL (ref 0–0.3)
BILIRUBIN, INDIRECT: 0.2 MG/DL (ref 0–1)
BLOOD BANK DISPENSE STATUS: NORMAL
BLOOD BANK PRODUCT CODE: NORMAL
BLOOD CULTURE, ROUTINE: NORMAL
BPU ID: NORMAL
BUN BLDV-MCNC: 124 MG/DL (ref 7–20)
C-REACTIVE PROTEIN: 297 MG/L (ref 0–5.1)
C. TRACHOMATIS DNA ,URINE: NEGATIVE
CALCIUM IONIZED: 0.87 MMOL/L (ref 1.12–1.32)
CALCIUM IONIZED: 0.87 MMOL/L (ref 1.12–1.32)
CALCIUM SERPL-MCNC: 6.3 MG/DL (ref 8.3–10.6)
CHLORIDE BLD-SCNC: 100 MMOL/L (ref 99–110)
CO2: 19 MMOL/L (ref 21–32)
CREAT SERPL-MCNC: 4.7 MG/DL (ref 0.6–1.1)
CULTURE, BLOOD 2: NORMAL
CYTOMEGALOVIRUS IGM ANTIBODY: 9.3 AU/ML
DESCRIPTION BLOOD BANK: NORMAL
EOSINOPHILS ABSOLUTE: 0 K/UL (ref 0–0.6)
EOSINOPHILS RELATIVE PERCENT: 0 %
EPSTEIN BARR VIRUS NUCLEAR AB IGG: 487 U/ML (ref 0–21.9)
EPSTEIN-BARR EARLY ANTIGEN ANTIBODY: <5 U/ML (ref 0–10.9)
EPSTEIN-BARR VCA IGG: >750 U/ML (ref 0–21.9)
EPSTEIN-BARR VCA IGM: <10 U/ML (ref 0–43.9)
ESTIMATED AVERAGE GLUCOSE: 137 MG/DL
GFR AFRICAN AMERICAN: 12
GFR NON-AFRICAN AMERICAN: 10
GLOBULIN: 3.6 G/DL
GLUCOSE BLD-MCNC: 111 MG/DL (ref 70–99)
GLUCOSE BLD-MCNC: 116 MG/DL (ref 70–99)
GLUCOSE BLD-MCNC: 118 MG/DL (ref 70–99)
GLUCOSE BLD-MCNC: 84 MG/DL (ref 70–99)
GLUCOSE BLD-MCNC: 89 MG/DL (ref 70–99)
GLUCOSE BLD-MCNC: 96 MG/DL (ref 70–99)
HAV AB SERPL IA-ACNC: NEGATIVE
HAV IGM SER IA-ACNC: NORMAL
HBA1C MFR BLD: 6.4 %
HCT VFR BLD CALC: 20.8 % (ref 36–48)
HCT VFR BLD CALC: 25 % (ref 36–48)
HEMATOLOGY PATH CONSULT: NORMAL
HEMATOLOGY PATH CONSULT: YES
HEMOGLOBIN: 6.5 G/DL (ref 12–16)
HEMOGLOBIN: 7.9 G/DL (ref 12–16)
HEPATITIS B CORE IGM ANTIBODY: NORMAL
HEPATITIS B SURFACE ANTIGEN INTERPRETATION: NORMAL
HEPATITIS C ANTIBODY INTERPRETATION: NORMAL
HIV AG/AB: NORMAL
HIV ANTIGEN: NORMAL
HIV-1 ANTIBODY: NORMAL
HIV-2 AB: NORMAL
INR BLD: 1.31 (ref 0.86–1.14)
LYMPHOCYTES ABSOLUTE: 7.7 K/UL (ref 1–5.1)
LYMPHOCYTES RELATIVE PERCENT: 21 %
MCH RBC QN AUTO: 18.7 PG (ref 26–34)
MCHC RBC AUTO-ENTMCNC: 31.1 G/DL (ref 31–36)
MCV RBC AUTO: 60.3 FL (ref 80–100)
MONOCYTES ABSOLUTE: 0.4 K/UL (ref 0–1.3)
MONOCYTES RELATIVE PERCENT: 1 %
N. GONORRHOEAE DNA, URINE: NEGATIVE
NEUTROPHILS ABSOLUTE: 28.6 K/UL (ref 1.7–7.7)
NEUTROPHILS RELATIVE PERCENT: 74 %
NUCLEATED RED BLOOD CELLS: 1 /100 WBC
NUCLEATED RED BLOOD CELLS: 1 /100 WBC
PDW BLD-RTO: 18.5 % (ref 12.4–15.4)
PERFORMED ON: ABNORMAL
PERFORMED ON: NORMAL
PERFORMED ON: NORMAL
PH VENOUS: 7.33 (ref 7.35–7.45)
PH VENOUS: 7.35 (ref 7.35–7.45)
PHOSPHORUS: 9 MG/DL (ref 2.5–4.9)
PLATELET # BLD: 377 K/UL (ref 135–450)
PMV BLD AUTO: 7.8 FL (ref 5–10.5)
POTASSIUM REFLEX MAGNESIUM: 4.7 MMOL/L (ref 3.5–5.1)
PROTHROMBIN TIME: 15.2 SEC (ref 10–13.2)
RBC # BLD: 3.46 M/UL (ref 4–5.2)
SEDIMENTATION RATE, ERYTHROCYTE: 103 MM/HR (ref 0–20)
SLIDE REVIEW: ABNORMAL
SODIUM BLD-SCNC: 140 MMOL/L (ref 136–145)
TARGET CELLS: ABNORMAL
TOTAL PROTEIN: 5.7 G/DL (ref 6.4–8.2)
WBC # BLD: 36.7 K/UL (ref 4–11)

## 2020-07-10 PROCEDURE — 99232 SBSQ HOSP IP/OBS MODERATE 35: CPT | Performed by: INTERNAL MEDICINE

## 2020-07-10 PROCEDURE — 86923 COMPATIBILITY TEST ELECTRIC: CPT

## 2020-07-10 PROCEDURE — 2500000003 HC RX 250 WO HCPCS: Performed by: INTERNAL MEDICINE

## 2020-07-10 PROCEDURE — 86901 BLOOD TYPING SEROLOGIC RH(D): CPT

## 2020-07-10 PROCEDURE — 85610 PROTHROMBIN TIME: CPT

## 2020-07-10 PROCEDURE — 85025 COMPLETE CBC W/AUTO DIFF WBC: CPT

## 2020-07-10 PROCEDURE — 86900 BLOOD TYPING SEROLOGIC ABO: CPT

## 2020-07-10 PROCEDURE — 84100 ASSAY OF PHOSPHORUS: CPT

## 2020-07-10 PROCEDURE — APPNB30 APP NON BILLABLE TIME 0-30 MINS: Performed by: NURSE PRACTITIONER

## 2020-07-10 PROCEDURE — 6360000002 HC RX W HCPCS: Performed by: INTERNAL MEDICINE

## 2020-07-10 PROCEDURE — 2000000000 HC ICU R&B

## 2020-07-10 PROCEDURE — 36430 TRANSFUSION BLD/BLD COMPNT: CPT

## 2020-07-10 PROCEDURE — 99233 SBSQ HOSP IP/OBS HIGH 50: CPT | Performed by: INTERNAL MEDICINE

## 2020-07-10 PROCEDURE — 97530 THERAPEUTIC ACTIVITIES: CPT | Performed by: PHYSICAL THERAPIST

## 2020-07-10 PROCEDURE — 86702 HIV-2 ANTIBODY: CPT

## 2020-07-10 PROCEDURE — 2700000000 HC OXYGEN THERAPY PER DAY

## 2020-07-10 PROCEDURE — 6360000002 HC RX W HCPCS: Performed by: SURGERY

## 2020-07-10 PROCEDURE — 82330 ASSAY OF CALCIUM: CPT

## 2020-07-10 PROCEDURE — 85652 RBC SED RATE AUTOMATED: CPT

## 2020-07-10 PROCEDURE — 87390 HIV-1 AG IA: CPT

## 2020-07-10 PROCEDURE — 85014 HEMATOCRIT: CPT

## 2020-07-10 PROCEDURE — 97530 THERAPEUTIC ACTIVITIES: CPT

## 2020-07-10 PROCEDURE — 97163 PT EVAL HIGH COMPLEX 45 MIN: CPT | Performed by: PHYSICAL THERAPIST

## 2020-07-10 PROCEDURE — 86850 RBC ANTIBODY SCREEN: CPT

## 2020-07-10 PROCEDURE — 2580000003 HC RX 258: Performed by: SURGERY

## 2020-07-10 PROCEDURE — 85018 HEMOGLOBIN: CPT

## 2020-07-10 PROCEDURE — APPSS15 APP SPLIT SHARED TIME 0-15 MINUTES: Performed by: NURSE PRACTITIONER

## 2020-07-10 PROCEDURE — C9113 INJ PANTOPRAZOLE SODIUM, VIA: HCPCS | Performed by: INTERNAL MEDICINE

## 2020-07-10 PROCEDURE — 2580000003 HC RX 258: Performed by: INTERNAL MEDICINE

## 2020-07-10 PROCEDURE — 36415 COLL VENOUS BLD VENIPUNCTURE: CPT

## 2020-07-10 PROCEDURE — P9016 RBC LEUKOCYTES REDUCED: HCPCS

## 2020-07-10 PROCEDURE — 83036 HEMOGLOBIN GLYCOSYLATED A1C: CPT

## 2020-07-10 PROCEDURE — 86701 HIV-1ANTIBODY: CPT

## 2020-07-10 PROCEDURE — 99024 POSTOP FOLLOW-UP VISIT: CPT | Performed by: SURGERY

## 2020-07-10 PROCEDURE — 94760 N-INVAS EAR/PLS OXIMETRY 1: CPT

## 2020-07-10 PROCEDURE — 80053 COMPREHEN METABOLIC PANEL: CPT

## 2020-07-10 PROCEDURE — 86140 C-REACTIVE PROTEIN: CPT

## 2020-07-10 PROCEDURE — 80074 ACUTE HEPATITIS PANEL: CPT

## 2020-07-10 PROCEDURE — 2580000003 HC RX 258: Performed by: PEDIATRICS

## 2020-07-10 RX ORDER — ACETAMINOPHEN 650 MG/1
650 SUPPOSITORY RECTAL EVERY 6 HOURS PRN
Status: DISCONTINUED | OUTPATIENT
Start: 2020-07-10 | End: 2020-07-24 | Stop reason: HOSPADM

## 2020-07-10 RX ORDER — DEXTROSE MONOHYDRATE 50 MG/ML
INJECTION, SOLUTION INTRAVENOUS CONTINUOUS
Status: DISCONTINUED | OUTPATIENT
Start: 2020-07-10 | End: 2020-07-13

## 2020-07-10 RX ORDER — 0.9 % SODIUM CHLORIDE 0.9 %
20 INTRAVENOUS SOLUTION INTRAVENOUS ONCE
Status: COMPLETED | OUTPATIENT
Start: 2020-07-10 | End: 2020-07-10

## 2020-07-10 RX ORDER — ACETAMINOPHEN 325 MG/1
650 TABLET ORAL EVERY 6 HOURS PRN
Status: DISCONTINUED | OUTPATIENT
Start: 2020-07-10 | End: 2020-07-24 | Stop reason: HOSPADM

## 2020-07-10 RX ADMIN — SODIUM CHLORIDE, PRESERVATIVE FREE 10 ML: 5 INJECTION INTRAVENOUS at 08:04

## 2020-07-10 RX ADMIN — SODIUM CHLORIDE: 9 INJECTION, SOLUTION INTRAVENOUS at 03:04

## 2020-07-10 RX ADMIN — SODIUM CHLORIDE 20 ML: 9 INJECTION, SOLUTION INTRAVENOUS at 11:06

## 2020-07-10 RX ADMIN — PIPERACILLIN AND TAZOBACTAM 2.25 G: 2; .25 INJECTION, POWDER, LYOPHILIZED, FOR SOLUTION INTRAVENOUS at 22:47

## 2020-07-10 RX ADMIN — FLUCONAZOLE 100 MG: 2 INJECTION, SOLUTION INTRAVENOUS at 20:53

## 2020-07-10 RX ADMIN — DOXYCYCLINE 100 MG: 100 INJECTION, POWDER, LYOPHILIZED, FOR SOLUTION INTRAVENOUS at 17:04

## 2020-07-10 RX ADMIN — PANTOPRAZOLE SODIUM 40 MG: 40 INJECTION, POWDER, FOR SOLUTION INTRAVENOUS at 08:04

## 2020-07-10 RX ADMIN — MEROPENEM 500 MG: 500 INJECTION, POWDER, FOR SOLUTION INTRAVENOUS at 08:03

## 2020-07-10 RX ADMIN — DEXTROSE MONOHYDRATE: 50 INJECTION, SOLUTION INTRAVENOUS at 15:55

## 2020-07-10 RX ADMIN — MEROPENEM 500 MG: 500 INJECTION, POWDER, FOR SOLUTION INTRAVENOUS at 02:19

## 2020-07-10 RX ADMIN — SODIUM CHLORIDE, PRESERVATIVE FREE 10 ML: 5 INJECTION INTRAVENOUS at 21:37

## 2020-07-10 RX ADMIN — Medication 10 ML: at 08:04

## 2020-07-10 RX ADMIN — PIPERACILLIN AND TAZOBACTAM 2.25 G: 2; .25 INJECTION, POWDER, LYOPHILIZED, FOR SOLUTION INTRAVENOUS at 15:56

## 2020-07-10 RX ADMIN — VANCOMYCIN HYDROCHLORIDE 500 MG: 500 INJECTION, POWDER, LYOPHILIZED, FOR SOLUTION INTRAVENOUS at 00:22

## 2020-07-10 RX ADMIN — DEXTROSE MONOHYDRATE: 50 INJECTION, SOLUTION INTRAVENOUS at 22:47

## 2020-07-10 ASSESSMENT — PAIN SCALES - GENERAL
PAINLEVEL_OUTOF10: 0

## 2020-07-10 NOTE — PROGRESS NOTES
Hospitalist Progress Note  7/9/2020 10:01 PM  Subjective:   Admit Date: 7/6/2020  PCP: Jenny Mcallister MD  Interval History: pt is alert and awake  S/p exploratory lap  S/p omental resection  salpingectomy  S/p fibroid removal  Feels slightly better  meds are helping  Denies any other complaints    Chart reviewed. Diet: Diet NPO Effective Now  Medications:   Scheduled Meds:   fluconazole  100 mg Intravenous Q24H    cloNIDine  1 patch Transdermal Weekly    pantoprazole  40 mg Intravenous Daily    And    sodium chloride (PF)  10 mL Intravenous Daily    vancomycin (VANCOCIN) intermittent dosing (placeholder)   Other RX Placeholder    meropenem  500 mg Intravenous Q8H    sodium chloride flush  10 mL Intravenous 2 times per day    heparin (porcine)  5,000 Units Subcutaneous BID    insulin lispro  0-6 Units Subcutaneous TID WC    insulin lispro  0-3 Units Subcutaneous Nightly     Continuous Infusions:   sodium chloride 125 mL/hr at 07/09/20 1945    dextrose       CBC:   Recent Labs     07/07/20  0506 07/08/20  1325 07/09/20  0545   WBC 22.7* 33.6* 36.5*   HGB 9.9* 8.9* 7.5*   * 617* 463*     BMP:    Recent Labs     07/07/20  0506 07/08/20  1325 07/09/20  0545   * 134* 137   K 4.8 4.1  4.1 4.6   CL 96* 90* 95*   CO2 14* 22 17*   BUN 71* 105* 114*   CREATININE 4.3* 4.1* 4.5*   GLUCOSE 64* 95 104*     Hepatic:   Recent Labs     07/07/20  0506 07/08/20  1325 07/09/20  0545   * 154*  153* 103*   * 70*  71* 48*   BILITOT 2.9* 3.1*  3.1* 4.1*   ALKPHOS 121 150*  146* 101     Troponin: No results for input(s): TROPONINI in the last 72 hours. BNP: No results for input(s): BNP in the last 72 hours. Lipids: No results for input(s): CHOL, HDL in the last 72 hours.     Invalid input(s): LDLCALCU  INR:   Recent Labs     07/08/20  1344 07/09/20  0545   INR 1.32* 1.29*       Objective:   Vitals: /86   Pulse 101   Temp 98.5 °F (36.9 °C) (Axillary)   Resp 30   Ht 4' 11\" (1.499 m) Wt 246 lb 4.1 oz (111.7 kg)   SpO2 93%   BMI 49.74 kg/m²   General appearance: alert and awake  Has NGT  HEENT: Head: Normal, normocephalic, atraumatic, anicteric, pupils are reactive to light. Dry mucous membrane. Neck: no adenopathy, no carotid bruit, supple, symmetrical, trachea midline and thyroid not enlarged, symmetric, no tenderness/mass/nodules  Lungs: clear to auscultation bilaterally  Heart: regular rate and rhythm, S1, S2 normal, no murmur, click, rub or gallop  Abdomen: soft, -tender; bowel sounds normal; no masses,  no organomegaly, has drain  Dressing intact  Extremities: extremities normal, atraumatic, no cyanosis or edema  Neurologic: Mental status: alert and awake    Assessment and Plan:   1. KIKI= as per dr Diana Scott  2. Uncontrolled HTN-see orders  3. Dm- monitor sugars  Peritonitis- on iv abx  Patient Active Problem List:     EMERY (obstructive sleep apnea)     ARF (acute renal failure) (HCC)     SBO (small bowel obstruction) (HCC)     Leukocytosis     Septicemia (HCC)     Acidosis     KIKI (acute kidney injury) ([facility]     Gallbladder disease     Hypertension     Type 2 diabetes mellitus with hyperglycemia ([facility]    Pt is stable. Discussed with staff  about the plan. See the orders for further plan. Will proceed further acc to pt's progress.   Time spent with management of the pt is-30 minutes      Electronically signed by: Albania Philippe MD, on 7/9/2020, at 10:01 PM

## 2020-07-10 NOTE — PROGRESS NOTES
Infectious Disease Follow up Notes  Admit Date: 2020  Hospital Day: 5    Antibiotics :   IV Zosyn   IV Doxycycline   IV Fluconazole    CHIEF COMPLAINT:     Purulent peritonitis  Sepsis  WBC elevation  Anemia  KIKI  S/P Exp lap   Subjective interval History :  53 y.o. woman with morbid obesity, Dysfunctional uterine bleeding and previous Endometrial Biopsy and is followed by Gynecology and last office visit  and now admitted with acute abdominal pain and not feeling well CT scan with SBO and concern for infection taken to OR on   s/p Exlap ,ometectomy, Left salpingectocmy and removal of Fibroid and repair of serosal tear with HD line placement. She is now extubated in ICU and  Her WBC continue to rise despite IV abx and tissue cx in process. No h/o STDs no HSV or HPV. H/o IUD from  to  and no recent  instrumentation. Labs abnormal with WBC elevation, lactic acidosis, KIKI and Lft elevation on on going we are consulted for recommendations.     Remains sob in ICU on nasal cannula and NG tube in place no flatus making urine and WBC elevation and Hb low s/p PRBC today and creat is worse -     Past Medical History:    Past Medical History:   Diagnosis Date    Hypertension     Obesity        Past Surgical History:    Past Surgical History:   Procedure Laterality Date     SECTION      LAPAROTOMY N/A 2020    EXPLORATORY LAPAROTOMY, OMENTECTOMY, LEFT SALPINGECTOMY WITH REMOVAL OF FIBROID performed by Radha Madison MD at [facility]       Current Medications:    Outpatient Medications Marked as Taking for the 20 encounter [facility] Encounter)   Medication Sig Dispense Refill    ferrous sulfate (FE TABS) 325 (65 Fe) MG EC tablet Take 1 tablet by mouth 2 times daily 60 tablet 3    Multiple Vitamins-Minerals (THERAPEUTIC MULTIVITAMIN-MINERALS) tablet Take 1 tablet by mouth daily 30 tablet 11    furosemide (LASIX) 20 MG tablet Take 1 tablet by mouth 2 times daily 60 tablet 3    metFORMIN (GLUCOPHAGE) 500 MG tablet Take 1 tablet by mouth 2 times daily (with meals) 60 tablet 5    amLODIPine-benazepril (LOTREL) 5-20 MG per capsule Take 1 capsule by mouth daily 30 capsule 0    vitamin D (ERGOCALCIFEROL) 1.25 MG (78755 UT) CAPS capsule Take 1 capsule by mouth once a week 4 capsule 5       Allergies:  Patient has no known allergies. Immunizations : There is no immunization history on file for this patient. Social History:    Social History     Tobacco Use    Smoking status: Never Smoker    Smokeless tobacco: Never Used   Substance Use Topics    Alcohol use: No    Drug use: No     Social History     Tobacco Use   Smoking Status Never Smoker   Smokeless Tobacco Never Used      Family History : Other (Other) Other   ovarian cancer- moteher    Other (Other) Other   sister with \"traces of lupus\"    Ovarian cancer Mother    Diabetes Father    Breast cancer Neg Hx      Copied from Care everywhere note 6/29    REVIEW OF SYSTEMS:    No fever / chills / sweats. No weight loss. No visual change, eye pain, eye discharge. No oral lesion, sore throat, dysphagia. Denies cough / sputum/Sob   Denies chest pain, palpitations/ dizziness  Denies nausea/ vomiting/abdominal pain/diarrhea. Denies dysuria or change in urinary function. Denies joint swelling or pain. No myalgia, arthralgia. No rashes, skin lesions   Denies focal weakness, sensory change or other neurologic symptoms  No lymph node swelling or tenderness.     abd pain, irregular menses     PHYSICAL EXAM:      Vitals:    /76   Pulse 93   Temp 97.9 °F (36.6 °C) (Axillary)   Resp 23   Ht 4' 11\" (1.499 m)   Wt 252 lb 3.3 oz (114.4 kg)   SpO2 100%   BMI 50.94 kg/m²     General Appearance: alert,in some acute distress, +  pallor, no icterus on nasal cannula NG tube clamped   Skin: warm and dry, no rash or erythema  Head: normocephalic and atraumatic  Eyes: pupils equal, round, and reactive to light, conjunctivae normal  ENT: tympanic membrane, external ear and ear canal normal bilaterally, nose without deformity, nasal mucosa and turbinates normal without polyps  Neck: supple and non-tender without mass, no thyromegaly  no cervical lymphadenopathy  Pulmonary/Chest: bi basal crepts + bilaterally- no wheezes, rales or rhonchi, normal air movement, in respiratory distress  Cardiovascular: normal rate, regular rhythm, normal S1 and S2, no murmurs, rubs, clicks, or gallops, no carotid bruits  Abdomen: soft, -tender, distended, normal bowel sounds, no masses or organomegaly midline incision dressing+   Extremities: no cyanosis, clubbing or edema  Musculoskeletal: normal range of motion, no joint swelling, deformity or tenderness  Neurologic: reflexes normal and symmetric, no cranial nerve deficit  Psych:  Orientation, sensorium, mood normal  Lines:  IJ+  Jaime+              Data Review:    Lab Results   Component Value Date    WBC 36.7 (H) 07/10/2020    HGB 6.5 (LL) 07/10/2020    HCT 20.8 (LL) 07/10/2020    MCV 60.3 (L) 07/10/2020     07/10/2020     Lab Results   Component Value Date    CREATININE 4.7 (H) 07/10/2020     (HH) 07/10/2020     07/10/2020    K 4.7 07/10/2020     07/10/2020    CO2 19 (L) 07/10/2020       Hepatic Function Panel:   Lab Results   Component Value Date    ALKPHOS 113 07/10/2020    ALT 42 07/10/2020    AST 69 07/10/2020    PROT 5.7 07/10/2020    BILITOT 3.0 07/10/2020    BILIDIR 2.8 07/10/2020    IBILI 0.2 07/10/2020    LABALBU 2.1 07/10/2020       UA:  Lab Results   Component Value Date    COLORU DK YELLOW 07/07/2020    CLARITYU CLOUDY 07/07/2020    GLUCOSEU 100 07/07/2020    BILIRUBINUR SMALL 07/07/2020    KETUA Negative 07/07/2020    SPECGRAV 1.015 07/07/2020    BLOODU LARGE 07/07/2020    PHUR 6.0 07/07/2020    PROTEINU 100 07/07/2020    UROBILINOGEN 1.0 07/07/2020    NITRU Negative 07/07/2020 LEUKOCYTESUR SMALL 07/07/2020    LABMICR YES 07/07/2020    URINETYPE NotGiven 07/07/2020      Urine Microscopic:   Lab Results   Component Value Date    BACTERIA 1+ 07/07/2020    COMU see below 07/07/2020    HYALCAST 1 07/07/2020    WBCUA 26 07/07/2020    RBCUA  07/07/2020    EPIU 2 07/07/2020     Creat  4.5   Lactic acid  5.8  Down to  1.9      WBC  36.5      hB  11.2  DOWN TO  7.5     MICRO: cultures reviewed and updated by me            Culture, Tissue [[id number]] Collected: 07/08/20 1644   Order Status: Completed Specimen: Tissue Updated: 07/10/20 0904    Gram Stain Result No Epithelial Cells seen   No WBCs or organisms seen     WOUND/ABSCESS No growth to date    Anaerobic Culture --    Anaerobic culture further report to follow   No anaerobes isolated so far, Further report to follow    Narrative:     ORDER#: [id number]                          [facility]  SOURCE: Tissue Omentum                     COLLECTED:  07/08/20 16:44  ANTIBIOTICS AT [facility]. :                      RECEIVED :  07/08/20 17:13  Performed at:  [facility]  [address] [facility] [place], [place] [address]   Phone [phone]   C.trachomatis N.gonorrhoeae DNA, Urine [[id number]] Collected: 07/08/20 1835   Order Status: Sent Specimen: Urine Updated: 07/09/20 0110   HSV PCR [[id number]] Collected: 07/08/20 0000   Order Status: Sent Specimen: Blood Updated: 07/08/20 1426   HSV PCR [[id number]]    Order Status: Canceled Specimen: Blood    HSV PCR [[id number]]    Order Status: Canceled Specimen: Blood    HSV PCR [[id number]]    Order Status: Canceled Specimen: Blood    Culture, Blood 1 [[id number]] Collected: 07/06/20 1536   Order Status: Completed Specimen: Blood Updated: 07/07/20 1715    Blood Culture, Routine No Growth to date.  Any change in status will be called.    Narrative:     ORDER#: [id number]                          ORDERED BY: DANIAL LYNN  SOURCE: Blood                              COLLECTED:  07/06/20 15:36  ANTIBIOTICS AT [facility]. :                      RECEIVED :  07/06/20 15:42  If child <=2 yrs old please draw pediatric bottle. ~Blood Culture #1  Performed at:  [facility]  [address] [place] [address]   Phone [phone]   Culture, Blood 2 [[id number]] Collected: 07/06/20 1602   Order Status: Completed Specimen: Blood Updated: 07/07/20 1715    Culture, Blood 2 No Growth to date.  Any change in status will be called. Narrative:     ORDER#: [id number]                          ORDERED BY: DANIAL LYNN  SOURCE: Blood                              COLLECTED:  07/06/20 16:02  ANTIBIOTICS AT [facility]. :                      RECEIVED :  07/06/20 16:09  If child <=2 yrs old please draw pediatric bottle. ~Blood Culture #2  Performed at:  [facility]  [address] [place] [address] [place] [address]   Phone [phone]       IMAGING:    XR CHEST PORTABLE   Final Result   Status post placement of right internal jugular central venous catheter,   without gross pneumothorax. Endotracheal tube tip is approximately 1.3 cm proximal to the kelly and   could be retracted approximately 1-2 cm. Low volume study with basilar atelectasis. Findings were called by the radiology call center. XR ABDOMEN (2 VIEWS)   Final Result   Obstructed bowel gas pattern, similar to prior CT         MRI ABDOMEN WO CONTRAST MRCP   Final Result   No biliary ductal obstruction. No findings to explain elevated LFTs. CT ABDOMEN PELVIS WO CONTRAST Additional Contrast? None   Final Result   Mid to distal small bowel obstruction         US GALLBLADDER RUQ   Final Result   Questionable small amount of sludge within the gallbladder. Otherwise   unremarkable right upper quadrant ultrasound.                All the pertinent images and reports for the current Hospitalization were reviewed by me     Scheduled Meds:   sodium chloride 20 mL Intravenous Once    fluconazole  100 mg Intravenous Q24H    cloNIDine  1 patch Transdermal Weekly    pantoprazole  40 mg Intravenous Daily    And    sodium chloride (PF)  10 mL Intravenous Daily    vancomycin (VANCOCIN) intermittent dosing (placeholder)   Other RX Placeholder    meropenem  500 mg Intravenous Q8H    sodium chloride flush  10 mL Intravenous 2 times per day    [Held by provider] heparin (porcine)  5,000 Units Subcutaneous BID    insulin lispro  0-6 Units Subcutaneous TID WC    insulin lispro  0-3 Units Subcutaneous Nightly       Continuous Infusions:   sodium chloride 125 mL/hr at 07/10/20 0304    dextrose         PRN Meds:  HYDROmorphone **OR** HYDROmorphone, fentanNYL, glucose, dextrose, glucagon (rDNA), dextrose, sodium chloride flush, acetaminophen **OR** acetaminophen, promethazine **OR** ondansetron, iopamidol      Assessment:     Patient Active Problem List   Diagnosis    EMERY (obstructive sleep apnea)    ARF (acute renal failure) (HCC)    [facility] bowel obstruction) ([facility])    Leukocytosis    Septicemia (HCC)    Acidosis    KIKI (acute kidney injury) ([facility] [address]    Gallbladder disease    Hypertension    Type 2 diabetes mellitus with hyperglycemia (HCC)    Peritonitis (HCC)    Lactic acidosis    Acute pulmonary insufficiency    Metabolic acidosis    Elevated LFTs     Sepsis  Lactic acidosis  WBC elevation   KIKI   Purulent peritonitis per OR description  Admitted with abd pain and SBO  S/p Exp lap , omentectomy, Left salpingectomy and removal of Fibroid and repair of serosal tear on 7/8   Lft elevation  Anemia  Thrombocytosis     She remains very ill in ICU but making some progress since surgery and noted purulent peritonitis and per OP notes no bowel leak but Left Tuboovarian abscess suspected based on the presentation and pathology is pending.    OR cx in progress      Recent Gynecology eval was normal and previous Chlamydia and GC screen including HPV negative    Would suspected mixed infectious process GI and  esth to be in volved in the process      I suspect Lft elevation likely from Sepsis and very rarely Perihepatitis reported as part of Lokesh hug tena syndrome in patient with positive chlamydia        On going WBC elevation and OR cx negative and Path pending will adjust IV abx today and clinically slow progress if WBC not improving after adjusting IV abx will plan repeat CT concern would be for abscess formation    Labs, Microbiology, Radiology and all the pertinent results from current hospitalization and  care every where were reviewed  by me as a part of the evaluation   Plan:   1. D/C IV Meropenem AS no MDRO   2. D/C  IV Vancomycin GIVEN KIKI would be cautious  3. Cont V Fluconazole x 100 mg daily   4. IV Zosyn x 2.25 gm x Q 8 hrs will cover GI and  seth well   5. Add IV Doxycycline x 100 mg x Q 12 WILL cover atypical bacterial and chlamydia   6  HIV -ve and hepatitis screen -ve    7. ESR, CRP   8. GC probe on urine in process -ve          Discussed with patient/Family and Nursing   Risk of Complications/Morbidity: High      · Illness(es)/ Infection present that pose threat to bodily function. · There is potential for severe exacerbation of infection/side effects of treatment. · Therapy requires intensive monitoring for antimicrobial agent toxicity      Discussed with patient/Family and Nursing staff     Thanks for allowing me to participate in your patient's care and please call me with any questions or concerns.     Stan Martínez MD  Infectious Disease  [facility] Physician  Phone: [phone]   Fax : [phone]

## 2020-07-10 NOTE — PROGRESS NOTES
INPATIENT CONSULTATION:    IDENTIFYING DATA/REASON FOR CONSULTATION   PATIENT:  Vermell Dakin  MRN:  [id number]  ADMIT DATE: 2020  TIME OF EVALUATION: 7/10/2020 1:41 PM  HOSPITAL STAY:   LOS: 4 days   CONSULTING PHYSICIAN: Ming Pearl MD   REASON FOR CONSULTATION: elevated LFTs    Subjective:    Patient seen and examined. Extubated yesterday. NG tube remains in place, 550 bilious liquid out overnight. No flatus or BM. No evidence of GI bleeding. Patient denies any abdominal pain. Remains critically ill in the ICU. MEDICATIONS   SCHEDULED:  fluconazole, 100 mg, Q24H  cloNIDine, 1 patch, Weekly  pantoprazole, 40 mg, Daily    And  sodium chloride (PF), 10 mL, Daily  vancomycin (VANCOCIN) intermittent dosing (placeholder), , RX Placeholder  meropenem, 500 mg, Q8H  sodium chloride flush, 10 mL, 2 times per day  [Held by provider] heparin (porcine), 5,000 Units, BID  insulin lispro, 0-6 Units, TID WC  insulin lispro, 0-3 Units, Nightly      FLUIDS/DRIPS:     dextrose       PRNs: acetaminophen, 650 mg, Q6H PRN    Or  acetaminophen, 650 mg, Q6H PRN  glucose, 15 g, PRN  dextrose, 12.5 g, PRN  glucagon (rDNA), 1 mg, PRN  dextrose, 100 mL/hr, PRN  sodium chloride flush, 10 mL, PRN  promethazine, 12.5 mg, Q6H PRN    Or  ondansetron, 4 mg, Q6H PRN  iopamidol, 75 mL, ONCE PRN      ALLERGIES:  No Known Allergies      PHYSICAL EXAM   VITALS:  BP (!) 155/84   Pulse 89   Temp 97.8 °F (36.6 °C)   Resp 30   Ht 4' 11\" (1.499 m)   Wt 252 lb 3.3 oz (114.4 kg)   SpO2 93%   BMI 50.94 kg/m²   TEMPERATURE:  Current - Temp: 97.8 °F (36.6 °C);  Max - Temp  Av.1 °F (36.7 °C)  Min: 97.8 °F (36.6 °C)  Max: 98.5 °F (36.9 °C)    Physical Exam:  General appearance: Alert, fatigued  Eyes: Anicteric  Head: Normocephalic, without obvious abnormality, NG tube in place  Lungs: Clear to auscultation bilaterally, normal WOB  Heart: regular rate and rhythm, normal S1 and S2, no murmurs or rubs  Abdomen: Distended and hypertympanic to percussion. Absent bowel sounds. Non-tender on palpation. Extremities: atraumatic, no cyanosis or edema  Skin: warm and dry, no jaundice  Neuro: Grossly intact    LABS AND IMAGING   Laboratory   Recent Labs     07/08/20  1325 07/09/20  0545 07/10/20  0535   WBC 33.6* 36.5* 36.7*   HGB 8.9* 7.5* 6.5*   HCT 28.5* 24.8* 20.8*   MCV 60.3* 60.3* 60.3*   * 463* 377     Recent Labs     07/08/20  1325 07/09/20  0545 07/09/20  1355 07/10/20  0535   * 137  --  140   K 4.1  4.1 4.6  --  4.7   CL 90* 95*  --  100   CO2 22 17*  --  19*   PHOS  --   --  7.8* 9.0*   * 114*  --  124*   CREATININE 4.1* 4.5*  --  4.7*     Recent Labs     07/08/20  1325 07/09/20  0545 07/10/20  0535   *  153* 103* 69*   ALT 70*  71* 48* 42*   BILIDIR 2.7* 4.0* 2.8*   BILITOT 3.1*  3.1* 4.1* 3.0*   ALKPHOS 150*  146* 101 113     No results for input(s): LIPASE, AMYLASE in the last 72 hours. Recent Labs     07/08/20  1344 07/09/20  0545 07/10/20  0535   PROTIME 15.4* 15.0* 15.2*   INR 1.32* 1.29* 1.31*         Imaging  XR CHEST PORTABLE   Final Result   Status post placement of right internal jugular central venous catheter,   without gross pneumothorax. Endotracheal tube tip is approximately 1.3 cm proximal to the kelly and   could be retracted approximately 1-2 cm. Low volume study with basilar atelectasis. Findings were called by the radiology call center. XR ABDOMEN (2 VIEWS)   Final Result   Obstructed bowel gas pattern, similar to prior CT         MRI ABDOMEN WO CONTRAST MRCP   Final Result   No biliary ductal obstruction. No findings to explain elevated LFTs. CT ABDOMEN PELVIS WO CONTRAST Additional Contrast? None   Final Result   Mid to distal small bowel obstruction         US GALLBLADDER RUQ   Final Result   Questionable small amount of sludge within the gallbladder. Otherwise   unremarkable right upper quadrant ultrasound.              Endoscopy      ASSESSMENT AND RECOMMENDATIONS   Biju Mccracken is a 53 y.o. female with PMH of hypertension and obesity who presented on 7/6/2020 with abdominal pain. CT A/P showed mid to distal small bowel obstruction. NGT placed and Surgery consulted. She was taken to OR for ex lap which noted diffuse purulent peritonitis suspicious for ruptured TOA. Underwent omentectomy, left salpingectomy with removal of fibroid, repair of serosal tear. 61-year-old female with past medical history of HTN, obesity presented [facility] 7/6/2020 with abdominal pain.     1. Ruptured TOA with peritonitis s/p ex-lap 7/8/20: On Vanco and meropenem, right pelvic drain in place. 2. Lactic acidosis 2/2 #1: Normalized  3. SBO 2/2 #1: NG tube in place to wall suction, will plan for daily monitoring of NG output, with removal of NG tube and advancement of diet as bowel function returns. 4. Abnormal LFTs 2/2 cholestasis of sepsis.  The patient has a history of normal liver function tests, including 7/2/2020.  MRCP negative for obstruction. HAV IgM negative, EBV, CMV, HSV pending (ordered prior to etiology of acute illness). 5. KIKI: Presumed pre-renal versus ATN, nephrology consulted and following. May need CRRT versus iHD  6. Acute blood loss anemia, 2/2 ex-lap. No evidence of GI blood loss. Monitor H&H and transfuse to Hgb >7. RECOMMENDATIONS:    1U PRBC ordered today. Monitor H&H q12 and transfuse to Hgb >7 as needed. Post-op care per Surgery and Critical Care team  Will follow along, monitor LFTs, follow up on viral serologies    If you have any questions or need any further information, please feel free to contact us [phone]. Thank you for allowing us to participate in the care of Biju Mccracken. The note was completed using Dragon voice recognition transcription. Every effort was made to ensure accuracy; however, inadvertent transcription errors may be present despite my best efforts to edit errors.     Mitchell Schmidt MD

## 2020-07-10 NOTE — PROGRESS NOTES
Physical Therapy    Facility/Department: [facility] ICU  Initial Assessment    NAME: Analy Mcclain  : 1970  MRN: [id number]    Date of Service: 7/10/2020    Discharge Recommendations:  Patient would benefit from continued therapy after discharge, 3-5 sessions per week   PT Equipment Recommendations  Other: will continue to assess  Analy Mcclain scored a 8 on the AM-PAC short mobility form. Current research shows that an AM-PAC score of 17 or less is typically not associated with a discharge to the patient's home setting. Based on the patient's AM-PAC score and their current functional mobility deficits, it is recommended that the patient have 3-5 sessions per week of Physical Therapy at d/c to increase the patient's independence. Please see assessment section for further patient specific details. If patient discharges prior to next session this note will serve as a discharge summary. Please see below for the latest assessment towards goals. Assessment   Body structures, Functions, Activity limitations: Decreased functional mobility   Assessment: pt is a 52 yo female who was adm to hosp with abdominal pain, S/p exploratory laparotomy, omentectomy, left salpingectomy with removal of fibriod. Pt at baseline is Ind without an AD. Pt currently needing max A of 2 to sit forward in chair and unable to attain standing with walker or stedy despite max A of 2. Feel pt will need slower level rehab progression to address deficits to allow pt to regain her Ind with functional tasks. Pt is a high fall risk and not safe to discharge home at this time  Prognosis: Fair  Decision Making: High Complexity  Clinical Presentation: evolving  PT Education: General Safety;PT Role  Barriers to Learning: lethargy  REQUIRES PT FOLLOW UP: Yes  Activity Tolerance  Activity Tolerance: Patient limited by endurance; Patient limited by fatigue       Patient Diagnosis(es): The primary encounter diagnosis was Septicemia ([facility] [address] main floor if needed)  Home Access: Stairs to enter with rails  Entrance Stairs - Number of Steps: 1  Bathroom Shower/Tub: Tub/Shower unit, Walk-in shower(tub/shower main floor, walk-in shower upstairs)  Bathroom Toilet: Standard  Bathroom Equipment: Shower chair  Home Equipment: Rolling walker  Receives Help From: Family  ADL Assistance: Independent  Homemaking Assistance: Independent  Ambulation Assistance: Independent  Transfer Assistance: Independent  Active : Yes  Occupation: Full time employment(working two jobs)  Additional Comments:  Will be living with daughter, son-in-law, grandchildren and daughter's mother-in-law upon d/c  Cognition        Objective          AROM RLE (degrees)  RLE AROM: WFL  AROM LLE (degrees)  LLE AROM : WFL  Strength RLE  Comment: difficult to assess as pt not following commands for MMT  Strength LLE  Comment: difficult to assess as pt not following commands for MMT     Sensation  Overall Sensation Status: (N/T)  Bed mobility  Comment: pt up in chair at beginning and end of session  Transfers  Sit to Stand: (unable to attain upright standing despite max A x2 with walker and angela stedy lift)  Stand to sit: Unable to assess(max A of 2 to attempt)  Comment: pt will need maxi move or ciro lift due to assist level        Balance  Sitting - Static: Poor  Comments: pt unable to maintain sitting upright in chair; kept leaning back and needing max A of 1-2 to sit forward; unable to attain standing despite max A of 2 with walker or angela stedy        Plan   Plan  Times per week: 3-5  Current Treatment Recommendations: Functional Mobility Training  Safety Devices  Type of devices: Call light within reach, Left in chair, Nurse notified, Gait belt      AM-PAC Score  AM-PAC Inpatient Mobility Raw Score : 8 (07/10/20 1429)  AM-PAC Inpatient T-Scale Score : 28.52 (07/10/20 1429)  Mobility Inpatient CMS 0-100% Score: 86.62 (07/10/20 1429)  Mobility Inpatient CMS G-Code Modifier : CM (07/10/20 1429)          Goals  Short term goals  Time Frame for Short term goals: by discharge  Short term goal 1: bed mob min A  Short term goal 2: transfers mod A of 2  Short term goal 3: assess gait when able  Patient Goals   Patient goals : pt did not state a goal       Therapy Time   Individual Concurrent Group Co-treatment   Time In 1347         Time Out 1429         Minutes 42                 ALON SIMMS, PT

## 2020-07-10 NOTE — PROGRESS NOTES
Pulmonary Progress Note    CC: Sepsis  Peritonitis  Lactic acidosis  Acute pulmonary insufficiency   Acute kidney injury  Metabolic acidosis  Elevated LFTs    24 hr events:  Extubated yesterday. On room air. Had 1L of urine output in the past 24 hours. ROS:  +mild abdominal pain  No nausea  No SOB    PHYSICAL EXAM:  Blood pressure 136/76, pulse 93, temperature 97.9 °F (36.6 °C), temperature source Axillary, resp. rate 21, height 4' 11\" (1.499 m), weight 252 lb 3.3 oz (114.4 kg), SpO2 94 %, not currently breastfeeding. on RA    Intake/Output Summary (Last 24 hours) at 7/10/2020 0730  Last data filed at 7/10/2020 0903  Gross per 24 hour   Intake 3264.5 ml   Output 1550 ml   Net 1714.5 ml       Gen: Well developed; well nourished  Eyes: No scleral icterus. No conjunctival injection. ENT: External appearance of ears and nose normal.  Neck: Trachea midline. No obvious mass. No visible thyroid enlargement    Respiratory: Fine bibasilar crackles, no accessory muscle use  Cardiovascular: Regular rate and rhythm, no appreciable murmurs. No edema. Gastrointestinal: Soft, incision c/d/i. No hernia. MECHE drain with serosanguinous fluid   Skin: Warm and dry. No rashes or ulcers on visible areas. Normal texture and turgor  Musculoskeletal: No cyanosis, clubbing or joint deformity.     Psychiatric: Normal mood and affect; exhibits normal insight and judgement     Medications:  Current Facility-Administered Medications: 0.9 % sodium chloride bolus, 20 mL, Intravenous, Once  HYDROmorphone (DILAUDID) injection 0.25 mg, 0.25 mg, Intravenous, Q3H PRN **OR** HYDROmorphone (DILAUDID) injection 0.5 mg, 0.5 mg, Intravenous, Q3H PRN  fluconazole (DIFLUCAN) in 0.9 % sodium chloride IVPB 100 mg, 100 mg, Intravenous, Q24H  cloNIDine (CATAPRES) 0.3 MG/24HR 1 patch, 1 patch, Transdermal, Weekly  fentaNYL (SUBLIMAZE) injection 25 mcg, 25 mcg, Intravenous, Q1H PRN  pantoprazole (PROTONIX) injection 40 mg, 40 mg, Intravenous, Daily **AND** sodium chloride (PF) 0.9 % injection 10 mL, 10 mL, Intravenous, Daily  vancomycin (VANCOCIN) intermittent dosing (placeholder), , Other, RX Placeholder  0.9 % sodium chloride infusion, , Intravenous, Continuous  meropenem (MERREM) 500 mg IVPB (mini-bag), 500 mg, Intravenous, Q8H  glucose (GLUTOSE) 40 % oral gel 15 g, 15 g, Oral, PRN  dextrose 50 % IV solution, 12.5 g, Intravenous, PRN  glucagon (rDNA) injection 1 mg, 1 mg, Intramuscular, PRN  dextrose 5 % solution, 100 mL/hr, Intravenous, PRN  sodium chloride flush 0.9 % injection 10 mL, 10 mL, Intravenous, 2 times per day  sodium chloride flush 0.9 % injection 10 mL, 10 mL, Intravenous, PRN  acetaminophen (TYLENOL) tablet 650 mg, 650 mg, Oral, Q6H PRN **OR** acetaminophen (TYLENOL) suppository 650 mg, 650 mg, Rectal, Q6H PRN  promethazine (PHENERGAN) tablet 12.5 mg, 12.5 mg, Oral, Q6H PRN **OR** ondansetron (ZOFRAN) injection 4 mg, 4 mg, Intravenous, Q6H PRN  [Held by provider] heparin (porcine) injection 5,000 Units, 5,000 Units, Subcutaneous, BID  insulin lispro (HUMALOG) injection vial 0-6 Units, 0-6 Units, Subcutaneous, TID WC  insulin lispro (HUMALOG) injection vial 0-3 Units, 0-3 Units, Subcutaneous, Nightly  iopamidol (ISOVUE-370) 76 % injection 75 mL, 75 mL, Intravenous, ONCE PRN    Data reviewed:  Labs:  CBC:   Recent Labs     07/08/20  1325 07/09/20  0545 07/10/20  0535   WBC 33.6* 36.5* 36.7*   HGB 8.9* 7.5* 6.5*   HCT 28.5* 24.8* 20.8*   MCV 60.3* 60.3* 60.3*   * 463* 377     BMP:   Recent Labs     07/08/20  1325 07/09/20  0545 07/09/20  1355 07/10/20  0535   * 137  --  140   K 4.1  4.1 4.6  --  4.7   CL 90* 95*  --  100   CO2 22 17*  --  19*   PHOS  --   --  7.8* 9.0*   * 114*  --  124*   CREATININE 4.1* 4.5*  --  4.7*     LIVER PROFILE:   Recent Labs     07/08/20  1325 07/09/20  0545 07/10/20  0535   *  153* 103* 69*   ALT 70*  71* 48* 42*   BILIDIR 2.7* 4.0* 2.8*   BILITOT 3.1*  3.1* 4.1* 3.0*   ALKPHOS 150*  146* 101 113     PT/INR:   Recent Labs     07/08/20  1344 07/09/20  0545 07/10/20  0535   PROTIME 15.4* 15.0* 15.2*   INR 1.32* 1.29* 1.31*     APTT: No results for input(s): APTT in the last 72 hours. Cultures:   Blood culture (7/6): NGTD  Surgical culture (7/8): No WBCs; no organisms       Films:  Chest images and reports were reviewed by me. My interpretation is:  No new images      Assessment:     Sepsis  Peritonitis  Lactic acidosis  Acute pulmonary insufficiency   Acute kidney injury  Metabolic acidosis  Elevated LFTs      Plan:    Sepsis  - Suspected ruptured tubo-ovarian abscess. S/p left salpingectomy on 7/8/20  - Discontinue IV fluids  - Meropenem, vancomycin and fluconazole      Peritonitis  - Suspected ruptured tubo-ovarian abscess. S/p left salpingectomy on 7/8/20  - Meropenem, vancomycin and fluconazole per ID     Lactic acidosis  - Resolved     Acute pulmonary insufficiency   - Extubated yesterday. Now on room air      Acute kidney injury  - Strict I/Os  - Nephrology following     Metabolic acidosis  - Due to kidney injury  - Check labs in a.m.     Elevated LFTs  - May be due to sepsis  - Check daily labs  - GI following        Prophylaxis  DVT- SQ heparin  GI- protonix    Thank you for allowing me to participate in the care of this patient. Will sign off. Please call with any questions or concerns.     MD Emigdio Corcoran Pulmonary, Critical Care and Sleep

## 2020-07-10 NOTE — PROGRESS NOTES
Clinical Pharmacy Note  Vancomycin Consult    Elke Raymond is a 53 y.o. female ordered Vancomycin for intraabdominal coverage; consult received from Dr. Sherley Eckert to manage therapy. Also receiving fluconazole 100mg Q24H and meropenem 500mg Q8H. Patient Active Problem List   Diagnosis    EMERY (obstructive sleep apnea)    [facility]    [facility]    Leukocytosis    Septicemia (HCC)    Acidosis    [facility] [address]    Gallbladder disease    Hypertension    Type 2 diabetes mellitus with hyperglycemia (HCC)    Peritonitis (HCC)    Lactic acidosis    Acute pulmonary insufficiency    Metabolic acidosis    Elevated LFTs       Allergies:  Patient has no known allergies. Temp max:  Temp (24hrs), Av.9 °F (37.2 °C), Min:98.3 °F (36.8 °C), Max:99.7 °F (37.6 °C)      Recent Labs     20  0506 20  1325 20  0545   WBC 22.7* 33.6* 36.5*       Recent Labs     20  0506 20  1325 20  0545   BUN 71* 105* 114*   CREATININE 4.3* 4.1* 4.5*         Intake/Output Summary (Last 24 hours) at 2020 2317  Last data filed at 2020 2256  Gross per 24 hour   Intake 3791.5 ml   Output 985 ml   Net 2806.5 ml       Culture Results:   Tissue - No growth to date   Blood - No growth to date    Ht Readings from Last 1 Encounters:   20 4' 11\" (1.499 m)        Wt Readings from Last 1 Encounters:   20 246 lb 4.1 oz (111.7 kg)         CrCl cannot be calculated (Unknown ideal weight. ). Assessment/Plan:  Vanc random - 13mg/L after 24 hours  Creatinine is slowly rising, UOP still minimal. Will order vancomycin 500 mg IV x 1. Next level ordered for 2200 tomorrow evening. Thank you for the consult.      Arnel Horne, PharmD  2020 11:24 PM

## 2020-07-10 NOTE — PLAN OF CARE
Problem: Pain:  Goal: Pain level will decrease  Description: Pain level will decrease  Outcome: Ongoing  Note: Pt didn't complain of pain throughout the shift. Problem: Falls - Risk of:  Goal: Will remain free from falls  Description: Will remain free from falls  Outcome: Ongoing  Note: Falling star program remains in place. Call light and personal belongings within reach. Frequent visual monitoring continues. Toileting program inplace. Patient assisted in turning/repositioning at least once every 2 hours, and on a prn basis. Problem: Bowel/Gastric:  Goal: Control of bowel function will improve  Description: Control of bowel function will improve  Outcome: Ongoing     Problem: Skin Integrity:  Goal: Will show no infection signs and symptoms  Description: Will show no infection signs and symptoms  Outcome: Ongoing  Note: Monitoring patient skin integrity for skin breakdown, turning and repositioning q2h per protocol. Patient demonstrates turning and repositioning self.         Problem: Nutrition  Goal: Optimal nutrition therapy  Outcome: Ongoing

## 2020-07-10 NOTE — PROGRESS NOTES
Physical Therapy  Belkis Villalpando  [phone]  [id number]  Attempted to see for PT esau however pt's H&H low and she has orders for PRBCs; will attempt again after her transfusion  Meggan Monreal, [address] [facility], [address]

## 2020-07-10 NOTE — PROGRESS NOTES
Nephrology ([facility]) Progress Note    CC: KIKI    Subjective:    HPI:  Breathing unchanged. Renal function slightly worse. Azotemia worse. Non-oliguric. Surgical pain. ROS:  In chair. No fever. [address]:  medications reviewed. Family present. Objective:  Blood pressure 103/63, pulse 96, temperature 97.8 °F (36.6 °C), resp. rate 26, height 4' 11\" (1.499 m), weight 252 lb 3.3 oz (114.4 kg), SpO2 (!) 89 %, not currently breastfeeding. Intake/Output Summary (Last 24 hours) at 7/10/2020 1510  Last data filed at 7/10/2020 1400  Gross per 24 hour   Intake 3423.25 ml   Output 1685 ml   Net 1738.25 ml     General:  NAD, A+Ox3  Chest:  deferred due to pandemic  CVS:  deferred  Abdominal:  deferred  Extremities:  no edema  Skin:  no rash    Labs:  Renal panel:  Lab Results   Component Value Date/Time     07/10/2020 05:35 AM    K 4.7 07/10/2020 05:35 AM    CO2 19 (L) 07/10/2020 05:35 AM     (HH) 07/10/2020 05:35 AM    CREATININE 4.7 (H) 07/10/2020 05:35 AM    CALCIUM 6.3 (L) 07/10/2020 05:35 AM    PHOS 9.0 (H) 07/10/2020 05:35 AM     CBC:  Lab Results   Component Value Date/Time    WBC 36.7 (H) 07/10/2020 05:35 AM    HGB 6.5 (LL) 07/10/2020 05:35 AM    HCT 20.8 (LL) 07/10/2020 05:35 AM     07/10/2020 05:35 AM       Assessment/Plan:  Reviewed old records and labs.     1) KIKI              - baseline 06/03/20 Cr 0.6              - ddx:  ATN vs. pre-renal and benazepril and ibuprofen did not help   - renal function slightly worse   - appreciate dialysis catheter by Dr. Deandra Murdock              - will likely need to start dialysis tomorrow, mostly because of azotemia              - hold benazepril              - counseled against NSAID use   - patient has worsening azotemia, so I will start her on D5W at 150 ml/hr in order to reduce serum osmolality, which will also reduce urea recycling     2) SBO              - per surgery     3) ID              - on empiric zosyn, levaquin     4)

## 2020-07-10 NOTE — PROGRESS NOTES
output:550]      LABS  Recent Labs     07/07/20  2300  07/10/20  0535   WBC  --    < > 36.7*   HGB  --    < > 6.5*   HCT  --    < > 20.8*   PLT  --    < > 377   NA  --    < > 140   K  --    < > 4.7   CL  --    < > 100   CO2  --    < > 19*   BUN  --    < > 124*   CREATININE  --    < > 4.7*   PHOS  --    < > 9.0*   CALCIUM  --    < > 6.3*   INR  --    < > 1.31*   AST  --    < > 69*   ALT  --    < > 42*   BILITOT  --    < > 3.0*   BILIDIR  --    < > 2.8*   NITRU Negative  --   --    COLORU DK YELLOW  --   --    BACTERIA 1+*  --   --     < > = values in this interval not displayed. EDUCATION  Patient educated about Disease Process, Medications, Smoking Cessation, Oxygenation, Incentive Spirometry and Deep Breath and Cough, Diabetes, Hyperlipidemia, Smoking Cessation, Nutrition, Exercise and Hypertension    Electronically signed by FLEX Smims - CNP on 7/10/2020 at 7:25 AM      [facility]   [phone] Office  [phone] Cell       Surgery Staff  I have examined this patient and read and agree with the note by FLEX Weinstein from today. Tachycardia resolved. Abdomen appropriately tender  Drain serosang  WBC stabilized. Creatinine slightly up but UOP improved    Continue NG. Suspect she will need TPN in next day or two  Nephrology following KIKI. Gaetana Pap in place if needed  Continue abx per ID.   Await tissue culture  Electronically signed by Kelly Priest MD on 7/10/2020 at 1:44 PM

## 2020-07-10 NOTE — OP NOTE
[address] Angela Villaf[place]te                                 OPERATIVE REPORT    PATIENT NAME: Aileen Krueger                     :        1970  MED REC NO:   [id number]                          ROOM:       2106  ACCOUNT NO:   [de-identified]                           ADMIT DATE: 2020  PROVIDER:     Shima Clemons MD    DATE OF PROCEDURE:  2020    PREOPERATIVE DIAGNOSIS:  Small bowel obstruction. POSTOPERATIVE DIAGNOSIS:  Purulent peritonitis secondary to possible  ruptured pelvic abscess. OPERATION PERFORMED:  See Dr. Hima Wilks note for his procedure. My  procedure, left salpingectomy and fibroid removal.    SURGEON:  Shima Clemons MD    ANESTHESIA:  General.    FINDINGS:  Purulent pus in the pelvic cavity. Left dilated fallopian  tube. Ovary collapsed with possible area of ruptured cyst.  Uterus with  enlarged fibroid. Normal right ovary and tube although with some slight  swelling; however, no site of infection. EBL:  100 mL per Dr. Deandra Murdock. COMPLICATIONS:  None. DISPOSITION:  The patient was taken to the ICU in critical condition. INDICATIONS:  The patient is a 64-year-old female who presented with  symptoms of small bowel obstruction, elevated white count, was taken to  Surgery, see Dr. Hima Wilks note for this. I was called into Surgery and  visualized the pelvic cavity. The left fallopian tube appeared dilated  and appeared to have purulent material inside. The left ovary, however,  appeared collapsed and appeared to have possibly a cyst where she could  have potentially rupture; however, ovary was small. Color of ovary was  normal white. The right ovary and tube were slightly swollen, however,  there was no evidence of infection here. The uterine fundus had a  fibroid of approximately 3 to 4 cm. Incision was made to remove this  Subserosal fibroid.   So, using the LigaSure Impact, the left mesosalpinx was sealed  and divided until the left fallopian tube was removed. The left ovary  was adherent to the left pelvic sidewall and given its normal color and  appearance of collapsed cyst, it was felt that I could keep it in the  abdomen and pelvis because it appeared the cyst had already possibly  ruptured. The right ovary and tube were slightly swollen. There  was no evidence of infection on this side. There was a calcified  fibroid that looked a little inflamed, so I decided to remove this with  the LigaSure Impact as well. Hemostasis was achieved. Irrigation was  performed. See Dr. Teresa King notes for rest of the procedure.         Deja Chow MD    D: 07/09/2020 23:32:00       T: 07/10/2020 4:29:14     [email or web address]  Job#: [id number]     Doc#: [id number]    CC:

## 2020-07-10 NOTE — PROGRESS NOTES
(114.4 kg)   SpO2 (!) 89%   BMI 50.94 kg/m²   General appearance: alert and awake  Has NGT  HEENT: Head: Normal, normocephalic, atraumatic, anicteric, pupils are reactive to light. Dry mucous membrane. Neck: no adenopathy, no carotid bruit, supple, symmetrical, trachea midline and thyroid not enlarged, symmetric, no tenderness/mass/nodules  Lungs: clear to auscultation bilaterally  Heart: regular rate and rhythm, S1, S2 normal, no murmur, click, rub or gallop  Abdomen: soft, -tender; bowel sounds normal; no masses,  no organomegaly, has drain  Dressing intact  Extremities: extremities mild edemaNeurologic: Mental status: alert and awake    Assessment and Plan:   1. KIKI= as per dr Armen Boas  2. HTN  3. Sepsis-on iv abx  4. Dm- monitor sugars  Peritonitis- on iv abx  Patient Active Problem List:     EMERY (obstructive sleep apnea)     ARF (acute renal failure) (HCC)     SBO (small bowel obstruction) (HCC)     Leukocytosis     Septicemia (HCC)     Acidosis     KIKI (acute kidney injury) ([address]     Gallbladder disease     Hypertension     Type 2 diabetes mellitus with hyperglycemia ([address]    Pt is stable. Discussed with staff  about the plan. See the orders for further plan. Will proceed further acc to pt's progress.   Time spent with management of the pt is-30 minutes      Electronically signed by: Alejandra Conway MD, on 7/10/2020, at 5:24 PM

## 2020-07-10 NOTE — PROGRESS NOTES
Occupational Therapy    Attempted to see for OT tx however pt's H&H low and she has orders for PRBCs; will attempt again after her transfusion.     Electronically signed by Emir Cano OT on 7/10/2020 at 10:55 AM

## 2020-07-10 NOTE — CARE COORDINATION
Met w/ pt and dtr  She still has NG in place  Will transfer out of ICU  Discussed SNF stay at dc and left provider list   They will review   Electronically signed by Carlene Stark RN on 7/10/2020 at 2:53 PM

## 2020-07-10 NOTE — PROGRESS NOTES
Occupational Therapy  Facility/Department: [facility] 6R ICU  Daily Treatment Note  NAME: Carmela Mohs  : 1970  MRN: [id number]    Date of Service: 7/10/2020    Discharge Recommendations:  Patient would benefit from continued therapy after discharge, 3-5 sessions per week  OT Equipment Recommendations  Other: defer to DC facility    Carmela Mohs scored a 12/24 on the AM-PAC ADL Inpatient form. Current research shows that an AM-PAC score of 17 or less is typically not associated with a discharge to the patient's home setting. Based on the patient's AM-PAC score and their current ADL deficits, it is recommended that the patient have 3-5 sessions per week of Occupational Therapy at d/c to increase the patient's independence. Please see assessment section for further patient specific details. If patient discharges prior to next session this note will serve as a discharge summary. Please see below for the latest assessment towards goals. Assessment   Performance deficits / Impairments: Decreased functional mobility ; Decreased ADL status; Decreased endurance;Decreased strength;Decreased balance;Decreased ROM  Assessment: Pt 54 y/o female s/p exploratory laparotomy, omentectomy, left salpingectomy with removal of fibriod (N/A abdomen) on 2020. Pt has NGT, MECHE drain, veras and 3L O2. PTA, pt was independent in all ADL tasks/functional mobility. Today, pt in chair at start of session via maxi ciro with nursing assist. Pt required max/total A to bring trunk away from back of chair and significant difficulty without max A. Attempted to stand to RW and angela stedy but unable to clear buttock from chair with max A x 2. Pt has difficulty with anterior weight shifting/ hip flexion. Anticipate pt will require max/total assist for ADLs based on effort and strength. Pt would benefit from skilled therapy but will continue to assess at pt tolerates activity and mobility.  Daughter present and hopeful pt can return to her home after d/c. Prognosis: Good  OT Education: OT Role;Plan of Care  REQUIRES OT FOLLOW UP: Yes  Activity Tolerance  Activity Tolerance: Patient limited by fatigue  Activity Tolerance: Minimal initiation at times, resisting hip flexion/anterior weight shift forward, eyes closed majority of session  Safety Devices  Safety Devices in place: Yes  Type of devices: Left in chair;Call light within reach;Gait belt;Nurse notified         Patient Diagnosis(es): The primary encounter diagnosis was Septicemia ([facility] [address] Diagnoses of KIKI (acute kidney injury) ([facility] [address] Gallbladder disease, and Acidosis were also pertinent to this visit. has a past medical history of Hypertension and Obesity. has a past surgical history that includes  section and laparotomy (N/A, 2020). Restrictions  Restrictions/Precautions  Restrictions/Precautions: Up as Tolerated, Fall Risk  Position Activity Restriction  Other position/activity restrictions: MECHE drain, NGT, 3L of O2, veras  Subjective   General  Chart Reviewed: Yes  Patient assessed for rehabilitation services?: Yes  Additional Pertinent Hx: Pt is a 53 y.o. female admitted to ED 2020 with abdominal pain. Pt stated that this pain began one week ago, and was seen on 20 at [facility] And was diagnosed with some gastroenteritis. Pt had a CT scan performed that showed a mid to distal small bowel obstruction. S/p exploratory laparotomy, omentectomy, left salpingectomy with removal of fibriod (N/A abdomen) on 2020. Family / Caregiver Present: Yes(daughter)  Referring Practitioner: Yvette Jeong MD  Subjective  Subjective: Pt seen bedside and agreeable to therapy with encouragement. Eyes closed throughout session and minimal initiation  General Comment  Comments: Pt receiving blood but per RN ok for therapy. Objective    ADL  Toileting: Dependent/Total(veras)        Balance  Sitting Balance: (required max/total A 1-2 to bring trunk away from back of chair.  unable to hold self up without max A)  Functional Mobility  Functional Mobility Comments: attempted to stand to RW and angela forresterdy- unsuccessful to clear bottom from chair with max A x 2. Pt has difficulty with anterior weight shift/decreased effort. Bed mobility  Comment: pt in chair at start/end of session. Cognition  Cognition Comment: able to follow commands with repetition, groggy and minimal initiation/effort. Plan   Plan  Times per week: 3-5  Current Treatment Recommendations: Strengthening, ROM, Gait Training, Balance Training, Self-Care / ADL, Functional Mobility Training, Endurance Training, Patient/Caregiver Education & Training    AM-PAC Score  AM-[facility] Inpatient Daily Activity Raw Score: 12 (07/10/20 1429)  AM-PAC Inpatient ADL T-Scale Score : 30.6 (07/10/20 1429)  ADL Inpatient CMS 0-100% Score: 66.57 (07/10/20 1429)  ADL Inpatient CMS G-Code Modifier : CL (07/10/20 1429)    Goals  Short term goals  Time Frame for Short term goals: Prior to d/c: goals ongoing  Short term goal 1: Pt will complete fxl transfer to/from ADL surfaces with mod A x2. Short term goal 2: Pt will tolerate standing >3 minutes for ADL/fxl task with min A x1. Short term goal 3: Pt will tolerate sitting EOB >3 minutes in prep for ADL transfers with min A x1. Short term goal 4: Pt will complete bed mobility with mod A x1. Short term goal 5: Pt will complete function mobility with min A. Long term goals  Time Frame for Long term goals : STGS=LTGS  Patient Goals   Patient goals : to recover and return home with family       Therapy Time   Individual Concurrent Group Co-treatment   Time In 1355         Time Out 1425         Minutes 30         Timed Code Treatment Minutes: 30 Minutes     This note to serve as OT d/c summary if pt is d/c-ed prior to next therapy session.     Gris Weiner, OTR/L

## 2020-07-11 LAB
A/G RATIO: 0.6 (ref 1.1–2.2)
ALBUMIN SERPL-MCNC: 2.2 G/DL (ref 3.4–5)
ALP BLD-CCNC: 110 U/L (ref 40–129)
ALT SERPL-CCNC: 37 U/L (ref 10–40)
ANAEROBIC CULTURE: ABNORMAL
ANAEROBIC CULTURE: ABNORMAL
ANION GAP SERPL CALCULATED.3IONS-SCNC: 21 MMOL/L (ref 3–16)
AST SERPL-CCNC: 49 U/L (ref 15–37)
BILIRUB SERPL-MCNC: 2 MG/DL (ref 0–1)
BILIRUBIN DIRECT: 1.7 MG/DL (ref 0–0.3)
BILIRUBIN, INDIRECT: 0.3 MG/DL (ref 0–1)
BUN BLDV-MCNC: 117 MG/DL (ref 7–20)
CALCIUM IONIZED: 0.9 MMOL/L (ref 1.12–1.32)
CALCIUM SERPL-MCNC: 6.6 MG/DL (ref 8.3–10.6)
CHLORIDE BLD-SCNC: 92 MMOL/L (ref 99–110)
CO2: 18 MMOL/L (ref 21–32)
CREAT SERPL-MCNC: 4.6 MG/DL (ref 0.6–1.1)
GFR AFRICAN AMERICAN: 12
GFR NON-AFRICAN AMERICAN: 10
GLOBULIN: 4 G/DL
GLUCOSE BLD-MCNC: 101 MG/DL (ref 70–99)
GLUCOSE BLD-MCNC: 108 MG/DL (ref 70–99)
GLUCOSE BLD-MCNC: 117 MG/DL (ref 70–99)
GLUCOSE BLD-MCNC: 119 MG/DL (ref 70–99)
GLUCOSE BLD-MCNC: 120 MG/DL (ref 70–99)
GLUCOSE BLD-MCNC: 132 MG/DL (ref 70–99)
GRAM STAIN RESULT: ABNORMAL
HCT VFR BLD CALC: 23.3 % (ref 36–48)
HEMOGLOBIN: 7.4 G/DL (ref 12–16)
INR BLD: 1.28 (ref 0.86–1.14)
ORGANISM: ABNORMAL
PERFORMED ON: ABNORMAL
PH VENOUS: 7.35 (ref 7.35–7.45)
PHOSPHORUS: 8.8 MG/DL (ref 2.5–4.9)
POTASSIUM REFLEX MAGNESIUM: 4.3 MMOL/L (ref 3.5–5.1)
PROTHROMBIN TIME: 14.9 SEC (ref 10–13.2)
SODIUM BLD-SCNC: 131 MMOL/L (ref 136–145)
TOTAL PROTEIN: 6.2 G/DL (ref 6.4–8.2)
WOUND/ABSCESS: ABNORMAL
WOUND/ABSCESS: ABNORMAL

## 2020-07-11 PROCEDURE — 80053 COMPREHEN METABOLIC PANEL: CPT

## 2020-07-11 PROCEDURE — 2580000003 HC RX 258: Performed by: SURGERY

## 2020-07-11 PROCEDURE — 6360000002 HC RX W HCPCS: Performed by: INTERNAL MEDICINE

## 2020-07-11 PROCEDURE — 85018 HEMOGLOBIN: CPT

## 2020-07-11 PROCEDURE — 84100 ASSAY OF PHOSPHORUS: CPT

## 2020-07-11 PROCEDURE — 85610 PROTHROMBIN TIME: CPT

## 2020-07-11 PROCEDURE — 2580000003 HC RX 258: Performed by: INTERNAL MEDICINE

## 2020-07-11 PROCEDURE — 85014 HEMATOCRIT: CPT

## 2020-07-11 PROCEDURE — 2000000000 HC ICU R&B

## 2020-07-11 PROCEDURE — 99024 POSTOP FOLLOW-UP VISIT: CPT | Performed by: SURGERY

## 2020-07-11 PROCEDURE — 85025 COMPLETE CBC W/AUTO DIFF WBC: CPT

## 2020-07-11 PROCEDURE — 94761 N-INVAS EAR/PLS OXIMETRY MLT: CPT

## 2020-07-11 PROCEDURE — C9113 INJ PANTOPRAZOLE SODIUM, VIA: HCPCS | Performed by: INTERNAL MEDICINE

## 2020-07-11 PROCEDURE — 99233 SBSQ HOSP IP/OBS HIGH 50: CPT | Performed by: INTERNAL MEDICINE

## 2020-07-11 PROCEDURE — 2700000000 HC OXYGEN THERAPY PER DAY

## 2020-07-11 PROCEDURE — 2500000003 HC RX 250 WO HCPCS: Performed by: INTERNAL MEDICINE

## 2020-07-11 PROCEDURE — 99291 CRITICAL CARE FIRST HOUR: CPT | Performed by: INTERNAL MEDICINE

## 2020-07-11 PROCEDURE — 82330 ASSAY OF CALCIUM: CPT

## 2020-07-11 RX ADMIN — PIPERACILLIN AND TAZOBACTAM 2.25 G: 2; .25 INJECTION, POWDER, LYOPHILIZED, FOR SOLUTION INTRAVENOUS at 17:06

## 2020-07-11 RX ADMIN — DEXTROSE MONOHYDRATE: 50 INJECTION, SOLUTION INTRAVENOUS at 18:50

## 2020-07-11 RX ADMIN — FLUCONAZOLE 100 MG: 2 INJECTION, SOLUTION INTRAVENOUS at 18:49

## 2020-07-11 RX ADMIN — METRONIDAZOLE 500 MG: 500 INJECTION, SOLUTION INTRAVENOUS at 21:32

## 2020-07-11 RX ADMIN — SODIUM CHLORIDE, PRESERVATIVE FREE 10 ML: 5 INJECTION INTRAVENOUS at 21:04

## 2020-07-11 RX ADMIN — PANTOPRAZOLE SODIUM 40 MG: 40 INJECTION, POWDER, FOR SOLUTION INTRAVENOUS at 09:07

## 2020-07-11 RX ADMIN — DOXYCYCLINE 100 MG: 100 INJECTION, POWDER, LYOPHILIZED, FOR SOLUTION INTRAVENOUS at 04:34

## 2020-07-11 RX ADMIN — DOXYCYCLINE 100 MG: 100 INJECTION, POWDER, LYOPHILIZED, FOR SOLUTION INTRAVENOUS at 17:43

## 2020-07-11 RX ADMIN — SODIUM CHLORIDE, PRESERVATIVE FREE 10 ML: 5 INJECTION INTRAVENOUS at 09:07

## 2020-07-11 RX ADMIN — Medication 10 ML: at 09:07

## 2020-07-11 RX ADMIN — PIPERACILLIN AND TAZOBACTAM 2.25 G: 2; .25 INJECTION, POWDER, LYOPHILIZED, FOR SOLUTION INTRAVENOUS at 09:06

## 2020-07-11 RX ADMIN — DEXTROSE MONOHYDRATE: 50 INJECTION, SOLUTION INTRAVENOUS at 05:26

## 2020-07-11 RX ADMIN — DEXTROSE MONOHYDRATE: 50 INJECTION, SOLUTION INTRAVENOUS at 12:11

## 2020-07-11 ASSESSMENT — PAIN SCALES - GENERAL
PAINLEVEL_OUTOF10: 0

## 2020-07-11 ASSESSMENT — ENCOUNTER SYMPTOMS: NAUSEA: 0

## 2020-07-11 NOTE — PROGRESS NOTES
Infectious Disease Follow up Notes  Admit Date: 2020  Hospital Day: 6    Antibiotics :   IV Zosyn   IV Doxycycline   IV Fluconazole    CHIEF COMPLAINT:     Purulent peritonitis  Sepsis  WBC elevation  Anemia  KIKI  S/P Exp lap   Subjective interval History :  53 y.o. woman with morbid obesity, Dysfunctional uterine bleeding and previous Endometrial Biopsy and is followed by Gynecology and last office visit  and now admitted with acute abdominal pain and not feeling well CT scan with SBO and concern for infection taken to OR on   s/p Exlap ,ometectomy, Left salpingectocmy and removal of Fibroid and repair of serosal tear with HD line placement. She is now extubated in ICU and  Her WBC continue to rise despite IV abx and tissue cx in process. No h/o STDs no HSV or HPV. H/o IUD from  to  and no recent  instrumentation. Labs abnormal with WBC elevation, lactic acidosis, KIKI and Lft elevation on on going we are consulted for recommendations.     Remains sob in ICU on nasal cannula and NG tube in place no flatus making urine and WBC still  elevation and Hb low received PRBC yesterday and MECHE drain purulent and Tissue cx now with Bowel seth noted and Candida +     Creat is worse and BUN slow decline veras in place NG tube to suction+      Past Medical History:    Past Medical History:   Diagnosis Date    Hypertension     Obesity        Past Surgical History:    Past Surgical History:   Procedure Laterality Date     SECTION      LAPAROTOMY N/A 2020    EXPLORATORY LAPAROTOMY, OMENTECTOMY, LEFT SALPINGECTOMY WITH REMOVAL OF FIBROID performed by Pipe Lea MD at [facility]       Current Medications:    Outpatient Medications Marked as Taking for the 20 encounter [facility] HOSPITAL Encounter)   Medication Sig Dispense Refill    ferrous sulfate (FE TABS) 325 (65 Fe) MG EC tablet Take 1 tablet by mouth 2 times daily 60 tablet 3    Multiple Vitamins-Minerals (THERAPEUTIC MULTIVITAMIN-MINERALS) tablet Take 1 tablet by mouth daily 30 tablet 11    furosemide (LASIX) 20 MG tablet Take 1 tablet by mouth 2 times daily 60 tablet 3    metFORMIN (GLUCOPHAGE) 500 MG tablet Take 1 tablet by mouth 2 times daily (with meals) 60 tablet 5    amLODIPine-benazepril (LOTREL) 5-20 MG per capsule Take 1 capsule by mouth daily 30 capsule 0    vitamin D (ERGOCALCIFEROL) 1.25 MG (66593 UT) CAPS capsule Take 1 capsule by mouth once a week 4 capsule 5       Allergies:  Patient has no known allergies. Immunizations : There is no immunization history on file for this patient. Social History:    Social History     Tobacco Use    Smoking status: Never Smoker    Smokeless tobacco: Never Used   Substance Use Topics    Alcohol use: No    Drug use: No     Social History     Tobacco Use   Smoking Status Never Smoker   Smokeless Tobacco Never Used      Family History : Other (Other) Other   ovarian cancer- moteher    Other (Other) Other   sister with \"traces of lupus\"    Ovarian cancer Mother    Diabetes Father    Breast cancer Neg Hx      Copied from Care everywhere note 6/29    REVIEW OF SYSTEMS:    No fever / chills / sweats. No weight loss. No visual change, eye pain, eye discharge. No oral lesion, sore throat, dysphagia. Denies cough / sputum/Sob   Denies chest pain, palpitations/ dizziness  Denies nausea/ vomiting/abdominal pain/diarrhea. Denies dysuria or change in urinary function. Denies joint swelling or pain. No myalgia, arthralgia. No rashes, skin lesions   Denies focal weakness, sensory change or other neurologic symptoms  No lymph node swelling or tenderness.     abd pain, irregular menses     PHYSICAL EXAM:      Vitals:    /82   Pulse 78   Temp 97.8 °F (36.6 °C) (Oral)   Resp 16   Ht 4' 11\" (1.499 m)   Wt 262 lb 5.6 oz (119 kg)   SpO2 95%   BMI 52.99 kg/m²     General Appearance: alert,in some acute distress, +  pallor, + icterus on nasal cannula NG tube obesity +    Skin: warm and dry, no rash or erythema  Head: normocephalic and atraumatic  Eyes: pupils equal, round, and reactive to light, conjunctivae normal  ENT: tympanic membrane, external ear and ear canal normal bilaterally, nose without deformity, nasal mucosa and turbinates normal without polyps  Neck: supple and non-tender without mass, no thyromegaly  no cervical lymphadenopathy  Pulmonary/Chest: bi basal crepts + bilaterally- no wheezes, rales or rhonchi, normal air movement, in respiratory distress  Cardiovascular: normal rate, regular rhythm, normal S1 and S2, no murmurs, rubs, clicks, or gallops, no carotid bruits  Abdomen: soft, -tender, distended,, no bowel sound  masses or organomegaly midline incision dressing+  MECHE drain +   Extremities: no cyanosis, clubbing or +edema  Musculoskeletal: normal range of motion, no joint swelling, deformity or tenderness  Neurologic: reflexes normal and symmetric, no cranial nerve deficit  Psych:  Orientation, sensorium, mood normal  Lines:  IJ+  Jaime+              Data Review:    Lab Results   Component Value Date    WBC 35.2 (H) 07/11/2020    HGB 7.5 (L) 07/11/2020    HCT 23.6 (L) 07/11/2020    MCV 64.7 (L) 07/11/2020     07/11/2020     Lab Results   Component Value Date    CREATININE 4.6 (H) 07/11/2020     (HH) 07/11/2020     (L) 07/11/2020    K 4.3 07/11/2020    CL 92 (L) 07/11/2020    CO2 18 (L) 07/11/2020       Hepatic Function Panel:   Lab Results   Component Value Date    ALKPHOS 110 07/11/2020    ALT 37 07/11/2020    AST 49 07/11/2020    PROT 6.2 07/11/2020    BILITOT 2.0 07/11/2020    BILIDIR 1.7 07/11/2020    IBILI 0.3 07/11/2020    LABALBU 2.2 07/11/2020       UA:  Lab Results   Component Value Date    COLORU DK YELLOW 07/07/2020    CLARITYU CLOUDY 07/07/2020    GLUCOSEU 100 07/07/2020    BILIRUBINUR SMALL 07/07/2020    KETUA Negative 07/07/2020    SPECGRAV 1.015 07/07/2020 called. Narrative:     ORDER#: [id number]                          ORDERED BY: DANIAL LYNN  SOURCE: Blood                              COLLECTED:  07/06/20 15:36  [facility] :                      RECEIVED :  07/06/20 15:42  If child <=2 yrs old please draw pediatric bottle. ~Blood Culture #1  Performed at:  [address] [employer] [address]   Phone [phone]   Culture, Blood 2 [[id number]] Collected: 07/06/20 1602   Order Status: Completed Specimen: Blood Updated: 07/07/20 1715    Culture, Blood 2 No Growth to date.  Any change in status will be called. Narrative:     ORDER#: [id number]                          ORDERED BY: DANIAL LYNN  SOURCE: Blood                              COLLECTED:  07/06/20 16:02  [facility] :                      RECEIVED :  07/06/20 16:09  If child <=2 yrs old please draw pediatric bottle. ~Blood Culture #2  Performed at:  [address] [employer] [address]   Phone [phone]            Culture, Tissue [[id number]]  (Abnormal)  Collected: 07/08/20 1644    Order Status: Completed  Specimen: Tissue  Updated: 07/11/20 1643     Gram Stain Result  No Epithelial Cells seen   No WBCs or organisms seen     Organism  Streptococcus anginosusAbnormal       WOUND/ABSCESS  --     Rare growth   No further workup     Organism  Candida albicansAbnormal       WOUND/ABSCESS  --     Rare growth   No further workup     Organism  Fusobacterium necrophorum ssp necrophorumAbnormal       Anaerobic Culture  --     Light growth   No further workup     Organism  Peptostreptococcus anaerobiusAbnormal       Anaerobic Culture  --     Light growth   No further workup    Narrative:      ORDER#: [id number]                          [facility]   SOURCE: Tissue Omentum                     COLLECTED:  07/08/20 16:44   [facility] :                      RECEIVED :  07/08/20 17:13   Performed at:   [facility]   [address] [facility], [place] [address]   Phone [phone]        IMAGING:    XR CHEST PORTABLE   Final Result   Status post placement of right internal jugular central venous catheter,   without gross pneumothorax. Endotracheal tube tip is approximately 1.3 cm proximal to the kelly and   could be retracted approximately 1-2 cm. Low volume study with basilar atelectasis. Findings were called by the radiology call center. XR ABDOMEN (2 VIEWS)   Final Result   Obstructed bowel gas pattern, similar to prior CT         MRI ABDOMEN WO CONTRAST MRCP   Final Result   No biliary ductal obstruction. No findings to explain elevated LFTs. CT ABDOMEN PELVIS WO CONTRAST Additional Contrast? None   Final Result   Mid to distal small bowel obstruction         US GALLBLADDER RUQ   Final Result   Questionable small amount of sludge within the gallbladder. Otherwise   unremarkable right upper quadrant ultrasound.                All the pertinent images and reports for the current Hospitalization were reviewed by me     Scheduled Meds:   piperacillin-tazobactam  2.25 g Intravenous Q8H    doxycycline (VIBRAMYCIN) IV  100 mg Intravenous Q12H    fluconazole  100 mg Intravenous Q24H    cloNIDine  1 patch Transdermal Weekly    pantoprazole  40 mg Intravenous Daily    And    sodium chloride (PF)  10 mL Intravenous Daily    sodium chloride flush  10 mL Intravenous 2 times per day    [Held by provider] heparin (porcine)  5,000 Units Subcutaneous BID    insulin lispro  0-6 Units Subcutaneous TID WC    insulin lispro  0-3 Units Subcutaneous Nightly       Continuous Infusions:   dextrose 150 mL/hr at 07/11/20 0526    dextrose         PRN Meds:  acetaminophen **OR** acetaminophen, glucose, dextrose, glucagon (rDNA), dextrose, sodium chloride flush, promethazine **OR** ondansetron, atypical bacterial and chlamydia until cx are final   4. Add IV Flagyl x 500 mg Q 8 hrs   5. CT abd/pelvis NON CONTRAST in AM if WBC not trending down   6  HIV -ve and hepatitis screen -ve    7. ESR, CRP elevated will trend tomorrow   8. GC probe on urine in process -ve     Risk for progression high will watch remains critically ill      Discussed with patient/Family and Nursing   Risk of Complications/Morbidity: High      · Illness(es)/ Infection present that pose threat to bodily function. · There is potential for severe exacerbation of infection/side effects of treatment. · Therapy requires intensive monitoring for antimicrobial agent toxicity    CC time : 32 min     Discussed with patient/Family and Nursing staff     Thanks for allowing me to participate in your patient's care and please call me with any questions or concerns.     Phil Torres MD  Infectious Disease  [facility]) Physician  Phone: [phone]   Fax : [phone]

## 2020-07-11 NOTE — PROGRESS NOTES
INPATIENT CONSULTATION:    IDENTIFYING DATA/REASON FOR CONSULTATION   PATIENT:  Néstor Reza  MRN:  [id number]  ADMIT DATE: 2020  TIME OF EVALUATION: 2020 8:56 AM  HOSPITAL STAY:   LOS: 5 days   CONSULTING PHYSICIAN: Anselmo Anderson MD   REASON FOR CONSULTATION: elevated LFTs    Subjective:    Patient seen and examined. Patient denies any complaints. NG tube remains in place, bilious liquid out overnight. No flatus or BM. No evidence of GI bleeding. Patient denies any abdominal pain, but admits to tenderness with palpation. Remains critically ill in the ICU. MEDICATIONS   SCHEDULED:  piperacillin-tazobactam, 2.25 g, Q8H  doxycycline (VIBRAMYCIN) IV, 100 mg, Q12H  fluconazole, 100 mg, Q24H  cloNIDine, 1 patch, Weekly  pantoprazole, 40 mg, Daily    And  sodium chloride (PF), 10 mL, Daily  sodium chloride flush, 10 mL, 2 times per day  [Held by provider] heparin (porcine), 5,000 Units, BID  insulin lispro, 0-6 Units, TID WC  insulin lispro, 0-3 Units, Nightly      FLUIDS/DRIPS:     dextrose 150 mL/hr at 20 0526    dextrose       PRNs: acetaminophen, 650 mg, Q6H PRN    Or  acetaminophen, 650 mg, Q6H PRN  glucose, 15 g, PRN  dextrose, 12.5 g, PRN  glucagon (rDNA), 1 mg, PRN  dextrose, 100 mL/hr, PRN  sodium chloride flush, 10 mL, PRN  promethazine, 12.5 mg, Q6H PRN    Or  ondansetron, 4 mg, Q6H PRN  iopamidol, 75 mL, ONCE PRN      ALLERGIES:  No Known Allergies      PHYSICAL EXAM   VITALS:  /74   Pulse 77   Temp 97.8 °F (36.6 °C) (Oral)   Resp 18   Ht 4' 11\" (1.499 m)   Wt 262 lb 5.6 oz (119 kg)   SpO2 95%   BMI 52.99 kg/m²   TEMPERATURE:  Current - Temp: 97.8 °F (36.6 °C);  Max - Temp  Av °F (36.7 °C)  Min: 97.7 °F (36.5 °C)  Max: 98.4 °F (36.9 °C)    Physical Exam:  General appearance: Alert, fatigued  Eyes: Anicteric  Head: Normocephalic, without obvious abnormality, NG tube in place  Lungs: Clear to auscultation bilaterally, normal WOB  Heart: regular rate and rhythm, normal S1 and S2, no murmurs or rubs  Abdomen: Distended and hypertympanic to percussion. Absent bowel sounds. Tender on palpation. Midline incision with staples intact, and minimal serous drainage from site. Right pelvic drain in place. Extremities: atraumatic, no cyanosis or edema  Skin: warm and dry, no jaundice  Neuro: Grossly intact    LABS AND IMAGING   Laboratory   Recent Labs     07/09/20  0545 07/10/20  0535 07/10/20  1600 07/11/20  0550   WBC 36.5* 36.7*  --  35.2*   HGB 7.5* 6.5* 7.9* 7.5*   HCT 24.8* 20.8* 25.0* 23.6*   MCV 60.3* 60.3*  --  64.7*   * 377  --  358     Recent Labs     07/09/20  0545 07/09/20  1355 07/10/20  0535 07/11/20  0550     --  140 131*   K 4.6  --  4.7 4.3   CL 95*  --  100 92*   CO2 17*  --  19* 18*   PHOS  --  7.8* 9.0* 8.8*   *  --  124* 117*   CREATININE 4.5*  --  4.7* 4.6*     Recent Labs     07/09/20  0545 07/10/20  0535 07/11/20  0550   * 69* 49*   ALT 48* 42* 37   BILIDIR 4.0* 2.8* 1.7*   BILITOT 4.1* 3.0* 2.0*   ALKPHOS 101 113 110     No results for input(s): LIPASE, AMYLASE in the last 72 hours. Recent Labs     07/09/20  0545 07/10/20  0535 07/11/20  0550   PROTIME 15.0* 15.2* 14.9*   INR 1.29* 1.31* 1.28*         Imaging  XR CHEST PORTABLE   Final Result   Status post placement of right internal jugular central venous catheter,   without gross pneumothorax. Endotracheal tube tip is approximately 1.3 cm proximal to the kelly and   could be retracted approximately 1-2 cm. Low volume study with basilar atelectasis. Findings were called by the radiology call center. XR ABDOMEN (2 VIEWS)   Final Result   Obstructed bowel gas pattern, similar to prior CT         MRI ABDOMEN WO CONTRAST MRCP   Final Result   No biliary ductal obstruction. No findings to explain elevated LFTs.          CT ABDOMEN PELVIS WO CONTRAST Additional Contrast? None   Final Result   Mid to distal small bowel obstruction         US GALLBLADDER RUQ   Final Result   Questionable small amount of sludge within the gallbladder. Otherwise   unremarkable right upper quadrant ultrasound. Endoscopy      ASSESSMENT AND RECOMMENDATIONS   Karly Shen is a 53 y.o. female with PMH of hypertension and obesity who presented on 7/6/2020 with abdominal pain. CT A/P showed mid to distal small bowel obstruction. NGT placed and Surgery consulted. She was taken to OR for ex lap which noted diffuse purulent peritonitis suspicious for ruptured TOA. Underwent omentectomy, left salpingectomy with removal of fibroid, repair of serosal tear. 81-year-old female with past medical history of HTN, obesity presented [facility] [place] 7/6/2020 with abdominal pain.     1. Ruptured TOA with peritonitis s/p ex-lap 7/8/20: On Doxycycline, Fluconazole and Zosyn, right pelvic drain in place. 2. Lactic acidosis 2/2 #1: Normalized  3. SBO 2/2 #1: NG tube in place to wall suction, will plan for daily monitoring of NG output, with removal of NG tube and advancement of diet as bowel function returns. 4. Abnormal LFTs 2/2 cholestasis of sepsis, improving.  The patient has a history of normal liver function tests, including 7/2/2020.  MRCP negative for obstruction. HAV IgM negative, EBV, CMV negative for acute infection, HSV pending (ordered prior to etiology of acute illness). 5. KIKI: Presumed pre-renal versus ATN, nephrology consulted and following. May need CRRT versus iHD  6. Acute blood loss anemia, 2/2 ex-lap. No evidence of GI blood loss. Monitor H&H and transfuse to Hgb >7. RECOMMENDATIONS:    Monitor H&H and transfuse to Hgb >7 as needed. Antibiotics per ID  Post-op care per Surgery and Critical Care team  Will follow along, monitor LFTs, follow up on viral serologies    If you have any questions or need any further information, please feel free to contact us [phone]. Thank you for allowing us to participate in the care of Karly Shen.     The note was completed

## 2020-07-11 NOTE — PROGRESS NOTES
140/85, pulse 77, temperature 97.7 °F (36.5 °C), temperature source Oral, resp. rate 19, height 4' 11\" (1.499 m), weight 262 lb 5.6 oz (119 kg), SpO2 100 %, not currently breastfeeding. General appearance: Uncomfortable and in no acute distress. Abdomen: Abdomen is soft. There is distension. Tenderness: There is tenderness in the incisional area.      Assessment & Plan    Purulent peritonitis   Possible ruptured TOA   POD 2 from exploration   Expect slow return of GI function   Continue NG for now   TPN in another few days if no able to start PO    Electronically signed by Pratik Rojas MD on 7/11/2020 at 12:13 PM    Pratik Rojas MD  7/11/2020

## 2020-07-11 NOTE — PLAN OF CARE
Problem: Nausea/Vomiting:  Goal: Able to drink  Description: Able to drink  Outcome: Ongoing  Note: Pt taking ice chips, NGT in place, minimal output noted. GI seeing daily to resume diet. No further orders to advance at this time. Problem: Skin Integrity:  Goal: Will show no infection signs and symptoms  Description: Will show no infection signs and symptoms  Outcome: Ongoing  Note: Pt is at risk for skin breakdown. Full skin assessment completed. Pt turned Q2H to relieve pressure from bony prominences. Heels elevated off bed. Problem: Falls - Risk of: Intervention: Assess risk factors for falls  Note: Pt is at risk for falls. Bed wheels locked and bed in lowest position. Pt reminded to call before getting out of bed. Call light within reach. Falling star system in place.

## 2020-07-11 NOTE — PROGRESS NOTES
0500:Pt sitting on chair, offered if she wants to go bed and pt states she wants to stay in chair and is comfortable in it.  0600: Pt transferred to bed and bathed, complete linen changed.    Electronically signed by Rao Thakur RN on 7/11/2020 at 7:59 AM

## 2020-07-11 NOTE — PROGRESS NOTES
FEN              - hyponatremia                          - monitor              - metabolic acidosis                          - off NaHCO3 gtt   - hypocalcemia    - phos high, so do not supplement unless symptomatic   - renal osteodystrophy    - will start renvela when diet is advanced   - hyponatremia    - expected as it is a result of D5W and attempt to reduce serum osmolality, which is high (serum osmolality is more important that serum sodium), so continue D5W     5) \"hematuria\"    - UA does show moderate blood   - unclear significance    critical care:  30+ min

## 2020-07-12 ENCOUNTER — APPOINTMENT (OUTPATIENT)
Dept: CT IMAGING | Age: 50
DRG: 853 | End: 2020-07-12
Payer: COMMERCIAL

## 2020-07-12 LAB
A/G RATIO: 0.5 (ref 1.1–2.2)
ALBUMIN SERPL-MCNC: 2 G/DL (ref 3.4–5)
ALP BLD-CCNC: 137 U/L (ref 40–129)
ALT SERPL-CCNC: 35 U/L (ref 10–40)
ANION GAP SERPL CALCULATED.3IONS-SCNC: 19 MMOL/L (ref 3–16)
ANISOCYTOSIS: ABNORMAL
AST SERPL-CCNC: 47 U/L (ref 15–37)
BANDED NEUTROPHILS RELATIVE PERCENT: 17 % (ref 0–7)
BASOPHILS ABSOLUTE: 0 K/UL (ref 0–0.2)
BASOPHILS RELATIVE PERCENT: 0 %
BILIRUB SERPL-MCNC: 2.3 MG/DL (ref 0–1)
BILIRUBIN DIRECT: 2 MG/DL (ref 0–0.3)
BILIRUBIN, INDIRECT: 0.3 MG/DL (ref 0–1)
BUN BLDV-MCNC: 102 MG/DL (ref 7–20)
C-REACTIVE PROTEIN: 140.3 MG/L (ref 0–5.1)
CALCIUM IONIZED: 0.94 MMOL/L (ref 1.12–1.32)
CALCIUM SERPL-MCNC: 6.8 MG/DL (ref 8.3–10.6)
CHLORIDE BLD-SCNC: 96 MMOL/L (ref 99–110)
CO2: 17 MMOL/L (ref 21–32)
CREAT SERPL-MCNC: 4.1 MG/DL (ref 0.6–1.1)
EOSINOPHILS ABSOLUTE: 0 K/UL (ref 0–0.6)
EOSINOPHILS RELATIVE PERCENT: 0 %
GFR AFRICAN AMERICAN: 14
GFR NON-AFRICAN AMERICAN: 12
GLOBULIN: 4.1 G/DL
GLUCOSE BLD-MCNC: 107 MG/DL (ref 70–99)
GLUCOSE BLD-MCNC: 122 MG/DL (ref 70–99)
GLUCOSE BLD-MCNC: 123 MG/DL (ref 70–99)
GLUCOSE BLD-MCNC: 123 MG/DL (ref 70–99)
GLUCOSE BLD-MCNC: 124 MG/DL (ref 70–99)
GLUCOSE BLD-MCNC: 182 MG/DL (ref 70–99)
HCT VFR BLD CALC: 23 % (ref 36–48)
HEMATOLOGY PATH CONSULT: NO
HEMOGLOBIN: 7.3 G/DL (ref 12–16)
HYPOCHROMIA: ABNORMAL
INR BLD: 1.26 (ref 0.86–1.14)
LYMPHOCYTES ABSOLUTE: 1.2 K/UL (ref 1–5.1)
LYMPHOCYTES RELATIVE PERCENT: 4 %
MCH RBC QN AUTO: 20.4 PG (ref 26–34)
MCHC RBC AUTO-ENTMCNC: 31.5 G/DL (ref 31–36)
MCV RBC AUTO: 64.9 FL (ref 80–100)
MICROCYTES: ABNORMAL
MONOCYTES ABSOLUTE: 0.3 K/UL (ref 0–1.3)
MONOCYTES RELATIVE PERCENT: 1 %
NEUTROPHILS ABSOLUTE: 27.9 K/UL (ref 1.7–7.7)
NEUTROPHILS RELATIVE PERCENT: 78 %
PDW BLD-RTO: 23 % (ref 12.4–15.4)
PERFORMED ON: ABNORMAL
PH VENOUS: 7.32 (ref 7.35–7.45)
PHOSPHORUS: 8.6 MG/DL (ref 2.5–4.9)
PLATELET # BLD: 333 K/UL (ref 135–450)
PMV BLD AUTO: 7.8 FL (ref 5–10.5)
POIKILOCYTES: ABNORMAL
POTASSIUM REFLEX MAGNESIUM: 4.2 MMOL/L (ref 3.5–5.1)
PROTHROMBIN TIME: 14.7 SEC (ref 10–13.2)
RBC # BLD: 3.55 M/UL (ref 4–5.2)
SEDIMENTATION RATE, ERYTHROCYTE: 105 MM/HR (ref 0–20)
SODIUM BLD-SCNC: 132 MMOL/L (ref 136–145)
TARGET CELLS: ABNORMAL
TOTAL PROTEIN: 6.1 G/DL (ref 6.4–8.2)
WBC # BLD: 29.4 K/UL (ref 4–11)

## 2020-07-12 PROCEDURE — 99024 POSTOP FOLLOW-UP VISIT: CPT | Performed by: SURGERY

## 2020-07-12 PROCEDURE — 86140 C-REACTIVE PROTEIN: CPT

## 2020-07-12 PROCEDURE — 99233 SBSQ HOSP IP/OBS HIGH 50: CPT | Performed by: INTERNAL MEDICINE

## 2020-07-12 PROCEDURE — 6360000002 HC RX W HCPCS: Performed by: INTERNAL MEDICINE

## 2020-07-12 PROCEDURE — 85025 COMPLETE CBC W/AUTO DIFF WBC: CPT

## 2020-07-12 PROCEDURE — 2580000003 HC RX 258: Performed by: SURGERY

## 2020-07-12 PROCEDURE — 82330 ASSAY OF CALCIUM: CPT

## 2020-07-12 PROCEDURE — C9113 INJ PANTOPRAZOLE SODIUM, VIA: HCPCS | Performed by: INTERNAL MEDICINE

## 2020-07-12 PROCEDURE — 85652 RBC SED RATE AUTOMATED: CPT

## 2020-07-12 PROCEDURE — 2700000000 HC OXYGEN THERAPY PER DAY

## 2020-07-12 PROCEDURE — 74176 CT ABD & PELVIS W/O CONTRAST: CPT

## 2020-07-12 PROCEDURE — 2580000003 HC RX 258: Performed by: INTERNAL MEDICINE

## 2020-07-12 PROCEDURE — 6370000000 HC RX 637 (ALT 250 FOR IP): Performed by: SURGERY

## 2020-07-12 PROCEDURE — 84100 ASSAY OF PHOSPHORUS: CPT

## 2020-07-12 PROCEDURE — 2060000000 HC ICU INTERMEDIATE R&B

## 2020-07-12 PROCEDURE — 80053 COMPREHEN METABOLIC PANEL: CPT

## 2020-07-12 PROCEDURE — 94761 N-INVAS EAR/PLS OXIMETRY MLT: CPT

## 2020-07-12 PROCEDURE — 99232 SBSQ HOSP IP/OBS MODERATE 35: CPT | Performed by: INTERNAL MEDICINE

## 2020-07-12 PROCEDURE — 85610 PROTHROMBIN TIME: CPT

## 2020-07-12 PROCEDURE — 2500000003 HC RX 250 WO HCPCS: Performed by: INTERNAL MEDICINE

## 2020-07-12 RX ADMIN — INSULIN LISPRO 1 UNITS: 100 INJECTION, SOLUTION INTRAVENOUS; SUBCUTANEOUS at 20:46

## 2020-07-12 RX ADMIN — DEXTROSE MONOHYDRATE: 50 INJECTION, SOLUTION INTRAVENOUS at 07:00

## 2020-07-12 RX ADMIN — METRONIDAZOLE 500 MG: 500 INJECTION, SOLUTION INTRAVENOUS at 05:06

## 2020-07-12 RX ADMIN — DEXTROSE MONOHYDRATE: 50 INJECTION, SOLUTION INTRAVENOUS at 15:01

## 2020-07-12 RX ADMIN — METRONIDAZOLE 500 MG: 500 INJECTION, SOLUTION INTRAVENOUS at 15:01

## 2020-07-12 RX ADMIN — DOXYCYCLINE 100 MG: 100 INJECTION, POWDER, LYOPHILIZED, FOR SOLUTION INTRAVENOUS at 18:09

## 2020-07-12 RX ADMIN — FLUCONAZOLE 100 MG: 2 INJECTION, SOLUTION INTRAVENOUS at 19:46

## 2020-07-12 RX ADMIN — PIPERACILLIN AND TAZOBACTAM 2.25 G: 2; .25 INJECTION, POWDER, LYOPHILIZED, FOR SOLUTION INTRAVENOUS at 06:06

## 2020-07-12 RX ADMIN — SODIUM CHLORIDE, PRESERVATIVE FREE 10 ML: 5 INJECTION INTRAVENOUS at 08:17

## 2020-07-12 RX ADMIN — PIPERACILLIN AND TAZOBACTAM 2.25 G: 2; .25 INJECTION, POWDER, LYOPHILIZED, FOR SOLUTION INTRAVENOUS at 23:34

## 2020-07-12 RX ADMIN — PANTOPRAZOLE SODIUM 40 MG: 40 INJECTION, POWDER, FOR SOLUTION INTRAVENOUS at 08:16

## 2020-07-12 RX ADMIN — PIPERACILLIN AND TAZOBACTAM 2.25 G: 2; .25 INJECTION, POWDER, LYOPHILIZED, FOR SOLUTION INTRAVENOUS at 16:19

## 2020-07-12 RX ADMIN — Medication 10 ML: at 08:17

## 2020-07-12 RX ADMIN — DOXYCYCLINE 100 MG: 100 INJECTION, POWDER, LYOPHILIZED, FOR SOLUTION INTRAVENOUS at 03:57

## 2020-07-12 RX ADMIN — DEXTROSE MONOHYDRATE: 50 INJECTION, SOLUTION INTRAVENOUS at 22:02

## 2020-07-12 RX ADMIN — PIPERACILLIN AND TAZOBACTAM 2.25 G: 2; .25 INJECTION, POWDER, LYOPHILIZED, FOR SOLUTION INTRAVENOUS at 01:00

## 2020-07-12 RX ADMIN — DEXTROSE MONOHYDRATE: 50 INJECTION, SOLUTION INTRAVENOUS at 01:05

## 2020-07-12 RX ADMIN — METRONIDAZOLE 500 MG: 500 INJECTION, SOLUTION INTRAVENOUS at 21:01

## 2020-07-12 ASSESSMENT — PAIN SCALES - GENERAL
PAINLEVEL_OUTOF10: 0

## 2020-07-12 ASSESSMENT — ENCOUNTER SYMPTOMS
VOMITING: 0
NAUSEA: 0

## 2020-07-12 NOTE — PROGRESS NOTES
Hospitalist Progress Note  7/12/2020 9:17 AM  Subjective:   Admit Date: 7/6/2020  PCP: Todd Spears MD  Interval History: pt is alert and awake  In bed  Feels slightly better  meds are helping otherwise  Denies any other complaints    Chart reviewed. Diet: Diet NPO Effective Now  Medications:   Scheduled Meds:   metroNIDAZOLE  500 mg Intravenous Q8H    piperacillin-tazobactam  2.25 g Intravenous Q8H    doxycycline (VIBRAMYCIN) IV  100 mg Intravenous Q12H    fluconazole  100 mg Intravenous Q24H    cloNIDine  1 patch Transdermal Weekly    pantoprazole  40 mg Intravenous Daily    And    sodium chloride (PF)  10 mL Intravenous Daily    sodium chloride flush  10 mL Intravenous 2 times per day    [Held by provider] heparin (porcine)  5,000 Units Subcutaneous BID    insulin lispro  0-6 Units Subcutaneous TID WC    insulin lispro  0-3 Units Subcutaneous Nightly     Continuous Infusions:   dextrose 150 mL/hr at 07/12/20 0700    dextrose       CBC:   Recent Labs     07/10/20  0535  07/11/20  0550 07/11/20  1751 07/12/20  0545   WBC 36.7*  --  35.2*  --  29.4*   HGB 6.5*   < > 7.5* 7.4* 7.3*     --  358  --  333    < > = values in this interval not displayed. BMP:    Recent Labs     07/10/20  0535 07/11/20  0550 07/12/20  0545    131* 132*   K 4.7 4.3 4.2    92* 96*   CO2 19* 18* 17*   * 117* 102*   CREATININE 4.7* 4.6* 4.1*   GLUCOSE 84 132* 122*     Hepatic:   Recent Labs     07/10/20  0535 07/11/20  0550 07/12/20  0545   AST 69* 49* 47*   ALT 42* 37 35   BILITOT 3.0* 2.0* 2.3*   ALKPHOS 113 110 137*     Troponin: No results for input(s): TROPONINI in the last 72 hours. BNP: No results for input(s): BNP in the last 72 hours. Lipids: No results for input(s): CHOL, HDL in the last 72 hours.     Invalid input(s): LDLCALCU  INR:   Recent Labs     07/10/20  0535 07/11/20  0550 07/12/20  0545   INR 1.31* 1.28* 1.26*       Objective:   Vitals: BP (!) 141/81   Pulse 79   Temp 98.6 °F (37 °C) (Oral)   Resp 18   Ht 4' 11\" (1.499 m)   Wt 247 lb 2.2 oz (112.1 kg)   SpO2 100%   BMI 49.92 kg/m²   General appearance: alert and awake  Has NGT  HEENT: Head: Normal, normocephalic, atraumatic, anicteric, pupils are reactive to light. Dry mucous membrane. Neck: no adenopathy, no carotid bruit, supple, symmetrical, trachea midline and thyroid not enlarged, symmetric, no tenderness/mass/nodules  Lungs: clear to auscultation bilaterally  Heart: regular rate and rhythm, S1, S2 normal, no murmur, click, rub or gallop  Abdomen: soft, -tender; bowel sounds normal; no masses,  no organomegaly, has drain  Dressing intact  Extremities: extremities mild edema  Neurologic: Mental status: alert and awake    Assessment and Plan:   1. KIKI= as per dr Aneesh Beauchamp  2. HTN  3. Sepsis-on iv abx  4. Dm- monitor sugars  5. Anemia= monitor h/h  Peritonitis- on iv abx  Patient Active Problem List:     EMERY (obstructive sleep apnea)     ARF (acute renal failure) (HCC)     SBO (small bowel obstruction) (HCC)     Leukocytosis     Septicemia (HCC)     Acidosis     KIKI (acute kidney injury) ([address]     Gallbladder disease     Hypertension     Type 2 diabetes mellitus with hyperglycemia ([address]    Pt is stable. Discussed with staff  about the plan. See the orders for further plan. Will proceed further acc to pt's progress.   Time spent with management of the pt is-30 minutes      Electronically signed by: Anselmo Anderson MD, on 7/12/2020, at 9:17 AM

## 2020-07-12 NOTE — PROGRESS NOTES
Edel Huitron is a 53 y.o. female patient. 1. Septicemia ([facility]    2. KIKI (acute kidney injury) ([facility] [address] [facility]    3. Gallbladder disease    4. Acidosis      Past Medical History:   Diagnosis Date    Hypertension     Obesity      No past surgical history pertinent negatives on file. Scheduled Meds:   metroNIDAZOLE  500 mg Intravenous Q8H    piperacillin-tazobactam  2.25 g Intravenous Q8H    doxycycline (VIBRAMYCIN) IV  100 mg Intravenous Q12H    fluconazole  100 mg Intravenous Q24H    cloNIDine  1 patch Transdermal Weekly    pantoprazole  40 mg Intravenous Daily    And    sodium chloride (PF)  10 mL Intravenous Daily    sodium chloride flush  10 mL Intravenous 2 times per day    [Held by provider] heparin (porcine)  5,000 Units Subcutaneous BID    insulin lispro  0-6 Units Subcutaneous TID WC    insulin lispro  0-3 Units Subcutaneous Nightly     Continuous Infusions:   dextrose 150 mL/hr at 07/12/20 0700    dextrose       PRN Meds:acetaminophen **OR** acetaminophen, glucose, dextrose, glucagon (rDNA), dextrose, sodium chloride flush, promethazine **OR** ondansetron, iopamidol    No Known Allergies  Active Problems:    ARF (acute renal failure) (HCC)    SBO (small bowel obstruction) (HCC)    Leukocytosis    Septicemia (HCC)    Acidosis    KIKI (acute kidney injury) ([facility] [address]    Gallbladder disease    Hypertension    Type 2 diabetes mellitus with hyperglycemia (HCC)    Peritonitis (HCC)    Lactic acidosis    Acute pulmonary insufficiency    Metabolic acidosis    Elevated LFTs  Resolved Problems:    * No resolved hospital problems. *    Blood pressure (!) 144/108, pulse 73, temperature 98.5 °F (36.9 °C), temperature source Oral, resp. rate 17, height 4' 11\" (1.499 m), weight 247 lb 2.2 oz (112.1 kg), SpO2 100 %, not currently breastfeeding. Subjective:   Diet: NPO. Patient reports no nausea or vomiting. Activity level: Impaired due to weakness. Pain control: Well controlled.       Objective:  Vital signs (most recent): Blood pressure (!) 144/108, pulse 73, temperature 98.5 °F (36.9 °C), temperature source Oral, resp. rate 17, height 4' 11\" (1.499 m), weight 247 lb 2.2 oz (112.1 kg), SpO2 100 %, not currently breastfeeding. General appearance: Comfortable and in no acute distress. Abdomen: Abdomen is soft. Tenderness: There is tenderness in the incisional area.      Assessment & Plan    Postop from exploration and evacuation of abdominal infection   Doing better today   Up to the chair, comfortable, NG in place   Continue NG today   Antibiotics    Electronically signed by Archana Santana MD on 7/12/2020 at 2:15 PM    Archana Santana MD  7/12/2020

## 2020-07-12 NOTE — PLAN OF CARE
End of shift note:    Pt in bed A&OX4. Denies pain. Pt pleasant, cooperative. Assessment completed. Ice chips at bedside. Denies nausea. + gas today. No BM. Jaime draining jerry urine. Pt down for CT abd and back to floor. Up in chair x 8 hours today using lift pad. Needs encouragement with IS. Reaches 500, goal 750 currently. VSS. Off O2, 95% on RA. Midline incision THERESE other than small area in middle of incision draining small amt of serosanguinous fluid. Area covered with 4x4. MECHE drain patent and in place.

## 2020-07-12 NOTE — PROGRESS NOTES
Nephrology ([facility]) Progress Note    CC: KIKI    Subjective:    HPI:  Breathing unchanged. Renal function slightly worse. Azotemia worse. Non-oliguric. Surgical pain. ROS:  In bed. No fever. [address] [place]:  medications reviewed. Family present. Objective:  Blood pressure (!) 144/108, pulse 73, temperature 98.5 °F (36.9 °C), temperature source Oral, resp. rate 17, height 4' 11\" (1.499 m), weight 247 lb 2.2 oz (112.1 kg), SpO2 100 %, not currently breastfeeding. Intake/Output Summary (Last 24 hours) at 7/12/2020 1459  Last data filed at 7/12/2020 0455  Gross per 24 hour   Intake 2482.3 ml   Output 1245 ml   Net 1237.3 ml     General:  NAD, A+Ox3  Chest:  deferred due to pandemic  CVS:  deferred  Abdominal:  deferred  Extremities:  no edema  Skin:  no rash    Labs:  Renal panel:  Lab Results   Component Value Date/Time     (L) 07/12/2020 05:45 AM    K 4.2 07/12/2020 05:45 AM    CO2 17 (L) 07/12/2020 05:45 AM     (HH) 07/12/2020 05:45 AM    CREATININE 4.1 (H) 07/12/2020 05:45 AM    CALCIUM 6.8 (L) 07/12/2020 05:45 AM    PHOS 8.6 (H) 07/12/2020 05:45 AM     CBC:  Lab Results   Component Value Date/Time    WBC 29.4 (H) 07/12/2020 05:45 AM    HGB 7.3 (L) 07/12/2020 05:45 AM    HCT 23.0 (L) 07/12/2020 05:45 AM     07/12/2020 05:45 AM       Assessment/Plan:  Reviewed old records and labs.     1) KIKI              - baseline 06/03/20 Cr 0.6              - ddx:  ATN vs. pre-renal and benazepril and ibuprofen did not help   - renal function slightly worse   - appreciate dialysis catheter by Dr. Andreina Morales, but we have not used it (yet)              - hold benazepril              - counseled against NSAID use   - azotemia improving with D5W at 150 ml/hr in order to reduce serum osmolality, which will also reduce urea recycling   - serum creatinine is actually better today, so hopefully, she will not need dialysis     2) SBO              - per surgery     3) ID              - on empiric zosyn, levaquin     4) FEN              - hyponatremia                          - monitor              - metabolic acidosis                          - off NaHCO3 gtt   - hypocalcemia    - phos high, so do not supplement unless symptomatic   - renal osteodystrophy    - will start renvela when diet is advanced   - hyponatremia    - expected as it is a result of D5W and attempt to reduce serum osmolality, which is high (serum osmolality is more important that serum sodium), so continue D5W     5) \"hematuria\"    - UA does show moderate blood   - unclear significance    critical care:  30+ min

## 2020-07-12 NOTE — PROGRESS NOTES
INPATIENT CONSULTATION:    IDENTIFYING DATA/REASON FOR CONSULTATION   PATIENT:  Rakesh Womack  MRN:  [id number]  ADMIT DATE: 2020  TIME OF EVALUATION: 2020 5:45 AM  HOSPITAL STAY:   LOS: 6 days   CONSULTING PHYSICIAN: Alejandra Conway MD   REASON FOR CONSULTATION: elevated LFTs    Subjective:    Patient seen and examined. Patient denies any complaints. NG tube remains in place, bilious liquid out overnight. No flatus or BM. No evidence of GI bleeding. Patient reports intermittent abdominal pain, which ranges up to 7/10. Remains critically ill in the ICU. MEDICATIONS   SCHEDULED:  metroNIDAZOLE, 500 mg, Q8H  piperacillin-tazobactam, 2.25 g, Q8H  doxycycline (VIBRAMYCIN) IV, 100 mg, Q12H  fluconazole, 100 mg, Q24H  cloNIDine, 1 patch, Weekly  pantoprazole, 40 mg, Daily    And  sodium chloride (PF), 10 mL, Daily  sodium chloride flush, 10 mL, 2 times per day  [Held by provider] heparin (porcine), 5,000 Units, BID  insulin lispro, 0-6 Units, TID WC  insulin lispro, 0-3 Units, Nightly      FLUIDS/DRIPS:     dextrose 150 mL/hr at 20 0105    dextrose       PRNs: acetaminophen, 650 mg, Q6H PRN    Or  acetaminophen, 650 mg, Q6H PRN  glucose, 15 g, PRN  dextrose, 12.5 g, PRN  glucagon (rDNA), 1 mg, PRN  dextrose, 100 mL/hr, PRN  sodium chloride flush, 10 mL, PRN  promethazine, 12.5 mg, Q6H PRN    Or  ondansetron, 4 mg, Q6H PRN  iopamidol, 75 mL, ONCE PRN      ALLERGIES:  No Known Allergies      PHYSICAL EXAM   VITALS:  BP (!) 141/81   Pulse 79   Temp 98.6 °F (37 °C) (Oral)   Resp 27   Ht 4' 11\" (1.499 m)   Wt 247 lb 2.2 oz (112.1 kg)   SpO2 94%   BMI 49.92 kg/m²   TEMPERATURE:  Current - Temp: 98.6 °F (37 °C);  Max - Temp  Av.1 °F (36.7 °C)  Min: 97.7 °F (36.5 °C)  Max: 98.8 °F (37.1 °C)    Physical Exam:  General appearance: Alert, fatigued  Eyes: Anicteric  Head: Normocephalic, without obvious abnormality, NG tube in place  Lungs: Clear to auscultation bilaterally, normal WOB  Heart: regular rate and rhythm, normal S1 and S2, no murmurs or rubs  Abdomen: Distended and hypertympanic to percussion. Absent bowel sounds. Tender on palpation. Midline incision with staples intact, and minimal serous drainage from site. Right pelvic drain in place. Extremities: atraumatic, no cyanosis or edema  Skin: warm and dry, no jaundice  Neuro: Grossly intact    LABS AND IMAGING   Laboratory   Recent Labs     07/10/20  0535 07/10/20  1600 07/11/20  0550 07/11/20  1751   WBC 36.7*  --  35.2*  --    HGB 6.5* 7.9* 7.5* 7.4*   HCT 20.8* 25.0* 23.6* 23.3*   MCV 60.3*  --  64.7*  --      --  358  --      Recent Labs     07/09/20  1355 07/10/20  0535 07/11/20  0550   NA  --  140 131*   K  --  4.7 4.3   CL  --  100 92*   CO2  --  19* 18*   PHOS 7.8* 9.0* 8.8*   BUN  --  124* 117*   CREATININE  --  4.7* 4.6*     Recent Labs     07/10/20  0535 07/11/20  0550   AST 69* 49*   ALT 42* 37   BILIDIR 2.8* 1.7*   BILITOT 3.0* 2.0*   ALKPHOS 113 110     No results for input(s): LIPASE, AMYLASE in the last 72 hours. Recent Labs     07/10/20  0535 07/11/20  0550   PROTIME 15.2* 14.9*   INR 1.31* 1.28*         Imaging  XR CHEST PORTABLE   Final Result   Status post placement of right internal jugular central venous catheter,   without gross pneumothorax. Endotracheal tube tip is approximately 1.3 cm proximal to the kelly and   could be retracted approximately 1-2 cm. Low volume study with basilar atelectasis. Findings were called by the radiology call center. XR ABDOMEN (2 VIEWS)   Final Result   Obstructed bowel gas pattern, similar to prior CT         MRI ABDOMEN WO CONTRAST MRCP   Final Result   No biliary ductal obstruction. No findings to explain elevated LFTs. CT ABDOMEN PELVIS WO CONTRAST Additional Contrast? None   Final Result   Mid to distal small bowel obstruction         US GALLBLADDER RUQ   Final Result   Questionable small amount of sludge within the gallbladder.   Otherwise unremarkable right upper quadrant ultrasound. Endoscopy      ASSESSMENT AND RECOMMENDATIONS   Karly Shen is a 53 y.o. female with PMH of hypertension and obesity who presented on 7/6/2020 with abdominal pain. CT A/P showed mid to distal small bowel obstruction. NGT placed and Surgery consulted. She was taken to OR for ex lap which noted diffuse purulent peritonitis suspicious for ruptured TOA. Underwent omentectomy, left salpingectomy with removal of fibroid, repair of serosal tear. 1. Ruptured TOA with peritonitis s/p ex-lap 7/8/20: On Doxycycline, Fluconazole and Zosyn, right pelvic drain in place. 2. Lactic acidosis 2/2 #1: Normalized  3. SBO 2/2 #1: NG tube in place to wall suction, will plan for daily monitoring of NG output, with removal of NG tube and advancement of diet as bowel function returns. 4. Abnormal LFTs 2/2 cholestasis of sepsis, improving.  The patient has a history of normal liver function tests, including 7/2/2020.  MRCP negative for obstruction. HAV IgM negative, EBV, CMV negative for acute infection, HSV pending (ordered prior to etiology of acute illness). 5. KIKI: Presumed pre-renal versus ATN, nephrology consulted and following. May need CRRT versus iHD  6. Acute blood loss anemia, 2/2 ex-lap. No evidence of GI blood loss. Monitor H&H and transfuse to Hgb >7. RECOMMENDATIONS:    Monitor H&H and transfuse to Hgb >7 as needed. Antibiotics per ID. Consider initiation of TPN as patient has been NPO 6 days. Post-op care per Surgery and Critical Care team    If you have any questions or need any further information, please feel free to contact us [phone]. Thank you for allowing us to participate in the care of Karly Shen. The note was completed using Dragon voice recognition transcription. Every effort was made to ensure accuracy; however, inadvertent transcription errors may be present despite my best efforts to edit errors.     Rere Mejia MD

## 2020-07-12 NOTE — PROGRESS NOTES
Take 1 tablet by mouth daily 30 tablet 11    furosemide (LASIX) 20 MG tablet Take 1 tablet by mouth 2 times daily 60 tablet 3    metFORMIN (GLUCOPHAGE) 500 MG tablet Take 1 tablet by mouth 2 times daily (with meals) 60 tablet 5    amLODIPine-benazepril (LOTREL) 5-20 MG per capsule Take 1 capsule by mouth daily 30 capsule 0    vitamin D (ERGOCALCIFEROL) 1.25 MG (17666 UT) CAPS capsule Take 1 capsule by mouth once a week 4 capsule 5       Allergies:  Patient has no known allergies. Immunizations : There is no immunization history on file for this patient. Social History:    Social History     Tobacco Use    Smoking status: Never Smoker    Smokeless tobacco: Never Used   Substance Use Topics    Alcohol use: No    Drug use: No     Social History     Tobacco Use   Smoking Status Never Smoker   Smokeless Tobacco Never Used      Family History : Other (Other) Other   ovarian cancer- moteher    Other (Other) Other   sister with \"traces of lupus\"    Ovarian cancer Mother    Diabetes Father    Breast cancer Neg Hx      Copied from Care everywhere note 6/29    REVIEW OF SYSTEMS:    No fever / chills / sweats. No weight loss. No visual change, eye pain, eye discharge. No oral lesion, sore throat, dysphagia. Denies cough / sputum/Sob   Denies chest pain, palpitations/ dizziness  Denies nausea/ vomiting/abdominal pain/diarrhea. Denies dysuria or change in urinary function. Denies joint swelling or pain. No myalgia, arthralgia. No rashes, skin lesions   Denies focal weakness, sensory change or other neurologic symptoms  No lymph node swelling or tenderness.     abd pain, irregular menses     PHYSICAL EXAM:      Vitals:    BP (!) 144/108   Pulse 73   Temp 98.5 °F (36.9 °C) (Oral)   Resp 17   Ht 4' 11\" (1.499 m)   Wt 247 lb 2.2 oz (112.1 kg)   SpO2 100%   BMI 49.92 kg/m²     General Appearance: alert,in some acute distress, +  pallor, + icterus on nasal cannula NG tube obesity +    Skin: warm and dry, no rash or erythema  Head: normocephalic and atraumatic  Eyes: pupils equal, round, and reactive to light, conjunctivae normal  ENT: tympanic membrane, external ear and ear canal normal bilaterally, nose without deformity, nasal mucosa and turbinates normal without polyps  Neck: supple and non-tender without mass, no thyromegaly  no cervical lymphadenopathy  Pulmonary/Chest: bi basal crepts + bilaterally- no wheezes, rales or rhonchi, normal air movement, in respiratory distress  Cardiovascular: normal rate, regular rhythm, normal S1 and S2, no murmurs, rubs, clicks, or gallops, no carotid bruits  Abdomen: soft, -tender, distended,, no bowel sound  masses or organomegaly midline incision dressing+  MECHE drain +   Extremities: no cyanosis, clubbing or +edema  Musculoskeletal: normal range of motion, no joint swelling, deformity or tenderness  Neurologic: reflexes normal and symmetric, no cranial nerve deficit  Psych:  Orientation, sensorium, mood normal  Lines:  IJ+  Jaime+              Data Review:    Lab Results   Component Value Date    WBC 29.4 (H) 07/12/2020    HGB 7.3 (L) 07/12/2020    HCT 23.0 (L) 07/12/2020    MCV 64.9 (L) 07/12/2020     07/12/2020     Lab Results   Component Value Date    CREATININE 4.1 (H) 07/12/2020     (HH) 07/12/2020     (L) 07/12/2020    K 4.2 07/12/2020    CL 96 (L) 07/12/2020    CO2 17 (L) 07/12/2020       Hepatic Function Panel:   Lab Results   Component Value Date    ALKPHOS 137 07/12/2020    ALT 35 07/12/2020    AST 47 07/12/2020    PROT 6.1 07/12/2020    BILITOT 2.3 07/12/2020    BILIDIR 2.0 07/12/2020    IBILI 0.3 07/12/2020    LABALBU 2.0 07/12/2020       UA:  Lab Results   Component Value Date    COLORU DK YELLOW 07/07/2020    CLARITYU CLOUDY 07/07/2020    GLUCOSEU 100 07/07/2020    BILIRUBINUR SMALL 07/07/2020    KETUA Negative 07/07/2020    SPECGRAV 1.015 07/07/2020    BLOODU LARGE 07/07/2020    PHUR 6.0 07/07/2020    PROTEINU 100 07/07/2020 UROBILINOGEN 1.0 07/07/2020    NITRU Negative 07/07/2020    LEUKOCYTESUR SMALL 07/07/2020    LABMICR YES 07/07/2020    URINETYPE NotGiven 07/07/2020      Urine Microscopic:   Lab Results   Component Value Date    BACTERIA 1+ 07/07/2020    COMU see below 07/07/2020    HYALCAST 1 07/07/2020    WBCUA 26 07/07/2020    RBCUA  07/07/2020    EPIU 2 07/07/2020     Creat  4.5   Lactic acid  5.8  Down to  1.9      WBC  36.5      hB  11.2  DOWN TO  7.5     MICRO: cultures reviewed and updated by me            Culture, Tissue [[id number]] Collected: 07/08/20 1644   Order Status: Completed Specimen: Tissue Updated: 07/10/20 0904    Gram Stain Result No Epithelial Cells seen   No WBCs or organisms seen     WOUND/ABSCESS No growth to date    Anaerobic Culture --    Anaerobic culture further report to follow   No anaerobes isolated so far, Further report to follow    Narrative:     ORDER#: [id number]                          [facility]  SOURCE: Tissue Omentum                     COLLECTED:  07/08/20 16:44  ANTIB[facility]ICS [facility] :                      RECEIVED :  07/08/20 17:13  Performed at:  [facility]  [address] [facility] [place] [address]   Phone [phone]   C.trachomatis N.gonorrhoeae DNA, Urine [[id number]] Collected: 07/08/20 1835   Order Status: Sent Specimen: Urine Updated: 07/09/20 0110   HSV PCR [[id number]] Collected: 07/08/20 0000   Order Status: Sent Specimen: Blood Updated: 07/08/20 1426   HSV PCR [[id number]]    Order Status: Canceled Specimen: Blood    HSV PCR [[id number]]    Order Status: Canceled Specimen: Blood    HSV PCR [[id number]]    Order Status: Canceled Specimen: Blood    Culture, Blood 1 [[id number]] Collected: 07/06/20 1536   Order Status: Completed Specimen: Blood Updated: 07/07/20 1715    Blood Culture, Routine No Growth to date.  Any change in status will be called.    Narrative:     ORDER#: [id number]                          ORDERED BY: DANIAL LYNN  SOURCE: Blood                              COLLECTED:  07/06/20 15:36  ANTIBIOTICS AT [facility] :                      RECEIVED :  07/06/20 15:42  If child <=2 yrs old please draw pediatric bottle. ~Blood Culture #1  Performed at:  [place] [address] [address] [facility] [employer] [address]   Phone [phone]   Culture, Blood 2 [[id number]] Collected: 07/06/20 1602   Order Status: Completed Specimen: Blood Updated: 07/07/20 1715    Culture, Blood 2 No Growth to date.  Any change in status will be called. Narrative:     ORDER#: [id number]                          ORDERED BY: DANIAL LYNN  SOURCE: Blood                              COLLECTED:  07/06/20 16:02  ANTIBIOTICS AT [facility] :                      RECEIVED :  07/06/20 16:09  If child <=2 yrs old please draw pediatric bottle. ~Blood Culture #2  Performed at:  [address] [employer] [address]   Phone [phone]            Culture, Tissue [[id number]]  (Abnormal)  Collected: 07/08/20 1644    Order Status: Completed  Specimen: Tissue  Updated: 07/11/20 1643     Gram Stain Result  No Epithelial Cells seen   No WBCs or organisms seen     Organism  Streptococcus anginosusAbnormal       WOUND/ABSCESS  --     Rare growth   No further workup     Organism  Candida albicansAbnormal       WOUND/ABSCESS  --     Rare growth   No further workup     Organism  Fusobacterium necrophorum ssp necrophorumAbnormal       Anaerobic Culture  --     Light growth   No further workup     Organism  Peptostreptococcus anaerobiusAbnormal       Anaerobic Culture  --     Light growth   No further workup    Narrative:      ORDER#: [id number]                          [facility]: [facility]   SOURCE: Tissue Omentum                     COLLECTED:  07/08/20 16:44   ANTIBIOTICS AT [facility] :                      RECEIVED :  07/08/20 17:13   Performed at:   [facility] [address] [facility] [address]   Phone [phone]        IMAGING:    XR CHEST PORTABLE   Final Result   Status post placement of right internal jugular central venous catheter,   without gross pneumothorax. Endotracheal tube tip is approximately 1.3 cm proximal to the kelly and   could be retracted approximately 1-2 cm. Low volume study with basilar atelectasis. Findings were called by the radiology call center. XR ABDOMEN (2 VIEWS)   Final Result   Obstructed bowel gas pattern, similar to prior CT         MRI ABDOMEN WO CONTRAST MRCP   Final Result   No biliary ductal obstruction. No findings to explain elevated LFTs. CT ABDOMEN PELVIS WO CONTRAST Additional Contrast? None   Final Result   Mid to distal small bowel obstruction         US GALLBLADDER RUQ   Final Result   Questionable small amount of sludge within the gallbladder. Otherwise   unremarkable right upper quadrant ultrasound.                All the pertinent images and reports for the current Hospitalization were reviewed by me     Scheduled Meds:   metroNIDAZOLE  500 mg Intravenous Q8H    piperacillin-tazobactam  2.25 g Intravenous Q8H    doxycycline (VIBRAMYCIN) IV  100 mg Intravenous Q12H    fluconazole  100 mg Intravenous Q24H    cloNIDine  1 patch Transdermal Weekly    pantoprazole  40 mg Intravenous Daily    And    sodium chloride (PF)  10 mL Intravenous Daily    sodium chloride flush  10 mL Intravenous 2 times per day    [Held by provider] heparin (porcine)  5,000 Units Subcutaneous BID    insulin lispro  0-6 Units Subcutaneous TID WC    insulin lispro  0-3 Units Subcutaneous Nightly       Continuous Infusions:   dextrose 150 mL/hr at 07/12/20 0700    dextrose         PRN Meds:  acetaminophen **OR** acetaminophen, glucose, dextrose, glucagon (rDNA), dextrose, sodium chloride flush, promethazine **OR** ondansetron, iopamidol      Assessment:     Patient Active Problem List will watch remains critically ill      Discussed with patient/Family and Nursing   Risk of Complications/Morbidity: High      · Illness(es)/ Infection present that pose threat to bodily function. · There is potential for severe exacerbation of infection/side effects of treatment. · Therapy requires intensive monitoring for antimicrobial agent toxicity      Discussed with patient/Family and Nursing staff     Thanks for allowing me to participate in your patient's care and please call me with any questions or concerns.     Anthony Lindo MD  Infectious Disease  [facility]) Physician  Phone: [phone]   Fax : [phone]

## 2020-07-13 LAB
A/G RATIO: 0.5 (ref 1.1–2.2)
ALBUMIN SERPL-MCNC: 2 G/DL (ref 3.4–5)
ALP BLD-CCNC: 130 U/L (ref 40–129)
ALT SERPL-CCNC: 27 U/L (ref 10–40)
ANION GAP SERPL CALCULATED.3IONS-SCNC: 16 MMOL/L (ref 3–16)
ANION GAP SERPL CALCULATED.3IONS-SCNC: 18 MMOL/L (ref 3–16)
ANISOCYTOSIS: ABNORMAL
ANISOCYTOSIS: ABNORMAL
AST SERPL-CCNC: 24 U/L (ref 15–37)
BANDED NEUTROPHILS RELATIVE PERCENT: 5 % (ref 0–7)
BANDED NEUTROPHILS RELATIVE PERCENT: 7 % (ref 0–7)
BASOPHILS ABSOLUTE: 0 K/UL (ref 0–0.2)
BASOPHILS ABSOLUTE: 0 K/UL (ref 0–0.2)
BASOPHILS RELATIVE PERCENT: 0 %
BASOPHILS RELATIVE PERCENT: 0 %
BILIRUB SERPL-MCNC: 1.5 MG/DL (ref 0–1)
BILIRUBIN DIRECT: 1.2 MG/DL (ref 0–0.3)
BILIRUBIN, INDIRECT: 0.3 MG/DL (ref 0–1)
BUN BLDV-MCNC: 84 MG/DL (ref 7–20)
BUN BLDV-MCNC: 95 MG/DL (ref 7–20)
CALCIUM IONIZED: 0.94 MMOL/L (ref 1.12–1.32)
CALCIUM SERPL-MCNC: 6.6 MG/DL (ref 8.3–10.6)
CALCIUM SERPL-MCNC: 6.7 MG/DL (ref 8.3–10.6)
CHLORIDE BLD-SCNC: 97 MMOL/L (ref 99–110)
CHLORIDE BLD-SCNC: 98 MMOL/L (ref 99–110)
CO2: 18 MMOL/L (ref 21–32)
CO2: 18 MMOL/L (ref 21–32)
CREAT SERPL-MCNC: 2.9 MG/DL (ref 0.6–1.1)
CREAT SERPL-MCNC: 3.3 MG/DL (ref 0.6–1.1)
EOSINOPHILS ABSOLUTE: 0 K/UL (ref 0–0.6)
EOSINOPHILS ABSOLUTE: 0 K/UL (ref 0–0.6)
EOSINOPHILS RELATIVE PERCENT: 0 %
EOSINOPHILS RELATIVE PERCENT: 0 %
GFR AFRICAN AMERICAN: 18
GFR AFRICAN AMERICAN: 21
GFR NON-AFRICAN AMERICAN: 15
GFR NON-AFRICAN AMERICAN: 17
GLOBULIN: 4.2 G/DL
GLUCOSE BLD-MCNC: 119 MG/DL (ref 70–99)
GLUCOSE BLD-MCNC: 123 MG/DL (ref 70–99)
GLUCOSE BLD-MCNC: 127 MG/DL (ref 70–99)
GLUCOSE BLD-MCNC: 129 MG/DL (ref 70–99)
GLUCOSE BLD-MCNC: 131 MG/DL (ref 70–99)
GLUCOSE BLD-MCNC: 141 MG/DL (ref 70–99)
GLUCOSE BLD-MCNC: 147 MG/DL (ref 70–99)
HCT VFR BLD CALC: 23.6 % (ref 36–48)
HCT VFR BLD CALC: 23.9 % (ref 36–48)
HEMATOLOGY PATH CONSULT: NO
HEMATOLOGY PATH CONSULT: NO
HEMOGLOBIN: 7.5 G/DL (ref 12–16)
HEMOGLOBIN: 7.5 G/DL (ref 12–16)
HYPERCHROMASIA: ABNORMAL
INR BLD: 1.34 (ref 0.86–1.14)
LYMPHOCYTES ABSOLUTE: 0.7 K/UL (ref 1–5.1)
LYMPHOCYTES ABSOLUTE: 1.4 K/UL (ref 1–5.1)
LYMPHOCYTES RELATIVE PERCENT: 2 %
LYMPHOCYTES RELATIVE PERCENT: 4 %
MCH RBC QN AUTO: 20.3 PG (ref 26–34)
MCH RBC QN AUTO: 20.6 PG (ref 26–34)
MCHC RBC AUTO-ENTMCNC: 31.4 G/DL (ref 31–36)
MCHC RBC AUTO-ENTMCNC: 31.8 G/DL (ref 31–36)
MCV RBC AUTO: 64.7 FL (ref 80–100)
MCV RBC AUTO: 64.7 FL (ref 80–100)
METAMYELOCYTES RELATIVE PERCENT: 3 %
METAMYELOCYTES RELATIVE PERCENT: 4 %
MICROCYTES: ABNORMAL
MICROCYTES: ABNORMAL
MONOCYTES ABSOLUTE: 0.3 K/UL (ref 0–1.3)
MONOCYTES ABSOLUTE: 1.8 K/UL (ref 0–1.3)
MONOCYTES RELATIVE PERCENT: 1 %
MONOCYTES RELATIVE PERCENT: 5 %
MYELOCYTE PERCENT: 1 %
NEUTROPHILS ABSOLUTE: 31.7 K/UL (ref 1.7–7.7)
NEUTROPHILS ABSOLUTE: 32 K/UL (ref 1.7–7.7)
NEUTROPHILS RELATIVE PERCENT: 82 %
NEUTROPHILS RELATIVE PERCENT: 86 %
NUCLEATED RED BLOOD CELLS: 1 /100 WBC
OVALOCYTES: ABNORMAL
OVALOCYTES: ABNORMAL
PDW BLD-RTO: 22.6 % (ref 12.4–15.4)
PDW BLD-RTO: 23.1 % (ref 12.4–15.4)
PERFORMED ON: ABNORMAL
PH VENOUS: 7.39 (ref 7.35–7.45)
PHOSPHORUS: 7.6 MG/DL (ref 2.5–4.9)
PLATELET # BLD: 358 K/UL (ref 135–450)
PLATELET # BLD: 379 K/UL (ref 135–450)
PLATELET SLIDE REVIEW: ADEQUATE
PLATELET SLIDE REVIEW: ADEQUATE
PMV BLD AUTO: 7.8 FL (ref 5–10.5)
PMV BLD AUTO: 8 FL (ref 5–10.5)
POIKILOCYTES: ABNORMAL
POIKILOCYTES: ABNORMAL
POTASSIUM REFLEX MAGNESIUM: 3.6 MMOL/L (ref 3.5–5.1)
POTASSIUM SERPL-SCNC: 3.3 MMOL/L (ref 3.5–5.1)
PROTHROMBIN TIME: 15.6 SEC (ref 10–13.2)
RBC # BLD: 3.64 M/UL (ref 4–5.2)
RBC # BLD: 3.7 M/UL (ref 4–5.2)
SLIDE REVIEW: ABNORMAL
SLIDE REVIEW: ABNORMAL
SODIUM BLD-SCNC: 132 MMOL/L (ref 136–145)
SODIUM BLD-SCNC: 133 MMOL/L (ref 136–145)
TARGET CELLS: ABNORMAL
TARGET CELLS: ABNORMAL
TOTAL PROTEIN: 6.2 G/DL (ref 6.4–8.2)
WBC # BLD: 32.7 K/UL (ref 4–11)
WBC # BLD: 35.2 K/UL (ref 4–11)

## 2020-07-13 PROCEDURE — 2580000003 HC RX 258: Performed by: INTERNAL MEDICINE

## 2020-07-13 PROCEDURE — 97530 THERAPEUTIC ACTIVITIES: CPT

## 2020-07-13 PROCEDURE — 6360000002 HC RX W HCPCS: Performed by: INTERNAL MEDICINE

## 2020-07-13 PROCEDURE — APPSS15 APP SPLIT SHARED TIME 0-15 MINUTES: Performed by: PHYSICIAN ASSISTANT

## 2020-07-13 PROCEDURE — 6370000000 HC RX 637 (ALT 250 FOR IP): Performed by: SURGERY

## 2020-07-13 PROCEDURE — 84100 ASSAY OF PHOSPHORUS: CPT

## 2020-07-13 PROCEDURE — 85610 PROTHROMBIN TIME: CPT

## 2020-07-13 PROCEDURE — 80053 COMPREHEN METABOLIC PANEL: CPT

## 2020-07-13 PROCEDURE — 99024 POSTOP FOLLOW-UP VISIT: CPT | Performed by: SURGERY

## 2020-07-13 PROCEDURE — 2500000003 HC RX 250 WO HCPCS: Performed by: INTERNAL MEDICINE

## 2020-07-13 PROCEDURE — 97110 THERAPEUTIC EXERCISES: CPT

## 2020-07-13 PROCEDURE — 99233 SBSQ HOSP IP/OBS HIGH 50: CPT | Performed by: INTERNAL MEDICINE

## 2020-07-13 PROCEDURE — APPNB30 APP NON BILLABLE TIME 0-30 MINS: Performed by: PHYSICIAN ASSISTANT

## 2020-07-13 PROCEDURE — 94760 N-INVAS EAR/PLS OXIMETRY 1: CPT

## 2020-07-13 PROCEDURE — 2580000003 HC RX 258: Performed by: SURGERY

## 2020-07-13 PROCEDURE — 2060000000 HC ICU INTERMEDIATE R&B

## 2020-07-13 PROCEDURE — 99232 SBSQ HOSP IP/OBS MODERATE 35: CPT | Performed by: INTERNAL MEDICINE

## 2020-07-13 PROCEDURE — 85025 COMPLETE CBC W/AUTO DIFF WBC: CPT

## 2020-07-13 PROCEDURE — 82330 ASSAY OF CALCIUM: CPT

## 2020-07-13 PROCEDURE — 97535 SELF CARE MNGMENT TRAINING: CPT

## 2020-07-13 PROCEDURE — C9113 INJ PANTOPRAZOLE SODIUM, VIA: HCPCS | Performed by: INTERNAL MEDICINE

## 2020-07-13 RX ORDER — SODIUM CHLORIDE, SODIUM LACTATE, POTASSIUM CHLORIDE, CALCIUM CHLORIDE 600; 310; 30; 20 MG/100ML; MG/100ML; MG/100ML; MG/100ML
INJECTION, SOLUTION INTRAVENOUS CONTINUOUS
Status: DISCONTINUED | OUTPATIENT
Start: 2020-07-13 | End: 2020-07-18

## 2020-07-13 RX ADMIN — METRONIDAZOLE 500 MG: 500 INJECTION, SOLUTION INTRAVENOUS at 21:47

## 2020-07-13 RX ADMIN — PIPERACILLIN AND TAZOBACTAM 2.25 G: 2; .25 INJECTION, POWDER, LYOPHILIZED, FOR SOLUTION INTRAVENOUS at 23:31

## 2020-07-13 RX ADMIN — Medication 10 ML: at 11:20

## 2020-07-13 RX ADMIN — METRONIDAZOLE 500 MG: 500 INJECTION, SOLUTION INTRAVENOUS at 13:02

## 2020-07-13 RX ADMIN — SODIUM CHLORIDE, POTASSIUM CHLORIDE, SODIUM LACTATE AND CALCIUM CHLORIDE: 600; 310; 30; 20 INJECTION, SOLUTION INTRAVENOUS at 19:50

## 2020-07-13 RX ADMIN — INSULIN LISPRO 1 UNITS: 100 INJECTION, SOLUTION INTRAVENOUS; SUBCUTANEOUS at 08:32

## 2020-07-13 RX ADMIN — PIPERACILLIN AND TAZOBACTAM 2.25 G: 2; .25 INJECTION, POWDER, LYOPHILIZED, FOR SOLUTION INTRAVENOUS at 14:40

## 2020-07-13 RX ADMIN — SODIUM CHLORIDE, POTASSIUM CHLORIDE, SODIUM LACTATE AND CALCIUM CHLORIDE: 600; 310; 30; 20 INJECTION, SOLUTION INTRAVENOUS at 11:16

## 2020-07-13 RX ADMIN — METRONIDAZOLE 500 MG: 500 INJECTION, SOLUTION INTRAVENOUS at 05:02

## 2020-07-13 RX ADMIN — PIPERACILLIN AND TAZOBACTAM 2.25 G: 2; .25 INJECTION, POWDER, LYOPHILIZED, FOR SOLUTION INTRAVENOUS at 07:01

## 2020-07-13 RX ADMIN — PANTOPRAZOLE SODIUM 40 MG: 40 INJECTION, POWDER, FOR SOLUTION INTRAVENOUS at 08:30

## 2020-07-13 RX ADMIN — DEXTROSE MONOHYDRATE: 50 INJECTION, SOLUTION INTRAVENOUS at 04:48

## 2020-07-13 RX ADMIN — FLUCONAZOLE 100 MG: 2 INJECTION, SOLUTION INTRAVENOUS at 17:26

## 2020-07-13 RX ADMIN — SODIUM CHLORIDE, PRESERVATIVE FREE 10 ML: 5 INJECTION INTRAVENOUS at 08:30

## 2020-07-13 ASSESSMENT — PAIN SCALES - GENERAL
PAINLEVEL_OUTOF10: 0

## 2020-07-13 NOTE — PLAN OF CARE
Problem: Nutrition  Goal: Optimal nutrition therapy  7/13/2020 1159 by Kartik Thomas RD, LD  Outcome: Ongoing  7/12/2020 2315 by Delon Jain RN  Outcome: Ongoing   Nutrition Problem #1: Inadequate oral intake  Intervention: Food and/or Nutrient Delivery: Continue Current Diet  Nutritional Goals: tolerate most appropriate form of nutrition per MD

## 2020-07-13 NOTE — PROGRESS NOTES
Pt has been transferred from ICU to 5111. Vital signs were taken and are stable, patient is oriented to room and is instructed to use the call light. Patient family is present at bedside and belongings are within reach. Patient does not express any needs at this time.

## 2020-07-13 NOTE — PROGRESS NOTES
Hospitalist Progress Note  7/13/2020 11:37 AM  Subjective:   Admit Date: 7/6/2020  PCP: Dario Leon MD  Interval History: pt is alert and awake  Looks better  Feels  better  meds are helping otherwise  Denies any other complaints    Chart reviewed. Diet: DIET CLEAR LIQUID;  Medications:   Scheduled Meds:   metroNIDAZOLE  500 mg Intravenous Q8H    piperacillin-tazobactam  2.25 g Intravenous Q8H    fluconazole  100 mg Intravenous Q24H    cloNIDine  1 patch Transdermal Weekly    pantoprazole  40 mg Intravenous Daily    And    sodium chloride (PF)  10 mL Intravenous Daily    sodium chloride flush  10 mL Intravenous 2 times per day    [Held by provider] heparin (porcine)  5,000 Units Subcutaneous BID    insulin lispro  0-6 Units Subcutaneous TID WC    insulin lispro  0-3 Units Subcutaneous Nightly     Continuous Infusions:   lactated ringers 100 mL/hr at 07/13/20 1116    dextrose       CBC:   Recent Labs     07/11/20  0550 07/11/20  1751 07/12/20  0545 07/13/20  0552   WBC 35.2*  --  29.4* 32.7*   HGB 7.5* 7.4* 7.3* 7.5*     --  333 379     BMP:    Recent Labs     07/11/20  0550 07/12/20  0545 07/13/20  0552   * 132* 132*   K 4.3 4.2 3.6   CL 92* 96* 98*   CO2 18* 17* 18*   * 102* 95*   CREATININE 4.6* 4.1* 3.3*   GLUCOSE 132* 122* 147*     Hepatic:   Recent Labs     07/11/20  0550 07/12/20  0545 07/13/20  0552   AST 49* 47* 24   ALT 37 35 27   BILITOT 2.0* 2.3* 1.5*   ALKPHOS 110 137* 130*     Troponin: No results for input(s): TROPONINI in the last 72 hours. BNP: No results for input(s): BNP in the last 72 hours. Lipids: No results for input(s): CHOL, HDL in the last 72 hours.     Invalid input(s): LDLCALCU  INR:   Recent Labs     07/11/20  0550 07/12/20  0545 07/13/20  0552   INR 1.28* 1.26* 1.34*       Objective:   Vitals: BP (!) 153/89   Pulse 78   Temp 98.4 °F (36.9 °C) (Oral)   Resp 18   Ht 4' 11\" (1.499 m)   Wt 248 lb 14.4 oz (112.9 kg)   SpO2 93%   BMI 50.27 kg/m² General appearance: alert and awake  Has NGT  HEENT: Head: Normal, normocephalic, atraumatic, anicteric, pupils are reactive to light. Dry mucous membrane. Neck: no adenopathy, no carotid bruit, supple, symmetrical, trachea midline and thyroid not enlarged, symmetric, no tenderness/mass/nodules  Lungs: clear to auscultation bilaterally  Heart: regular rate and rhythm, S1, S2 normal, no murmur, click, rub or gallop  Abdomen: soft, -tender; bowel sounds normal; no masses,  no organomegaly, has drain  Incision is clean  Extremities: extremities mild edema  Neurologic: Mental status: alert and awake    Assessment and Plan:   1. KIKI= as per dr Jessica Perdomo  2. HTN  3. Sepsis-on iv abx  4. Dm- monitor sugars  5. Anemia= monitor h/h  Peritonitis- on iv abx  Patient Active Problem List:     EMERY (obstructive sleep apnea)     ARF (acute renal failure) (HCC)     SBO (small bowel obstruction) (HCC)     Leukocytosis     Septicemia (HCC)     Acidosis     KIKI (acute kidney injury) ([address]     Gallbladder disease     Hypertension     Type 2 diabetes mellitus with hyperglycemia ([address]    Pt is stable. Discussed with staff  about the plan. See the orders for further plan. Transfer to PCU  Will proceed further acc to pt's progress.   Time spent with management of the pt is-30 minutes      Electronically signed by: Mercedez Hernandez MD, on 7/13/2020, at 11:37 AM

## 2020-07-13 NOTE — PROGRESS NOTES
Comprehensive Nutrition Assessment    Type and Reason for Visit:  Reassess    Nutrition Recommendations/Plan:   Diet progression, currently Clear liquids. Start Ensure Clear BID. Monitor need for nutrition support if unable to advance diet. Nutrition Assessment:  Pt no longer intubated but diet just advanced to clear liquids today. Pt had ileus but resolving. Noted renal fx was declining and HD line placed but no need for RRT at this time. If pt unable to tolerate clear liquids or diet unable to advance, would recommend alternative nutrition (TPN). Will monitor for diet advancement. Malnutrition Assessment:  Malnutrition Status:  Insufficient data      Estimated Daily Nutrient Needs:  Energy (kcal):  7651-6221 kcal (11-15 kcal/kg CBW)  Protein (g):  36-54 gm (0.8-1.2gm/kg CBW)      Fluid (ml/day):  recs per MD    Nutrition Related Findings:  BM 7/12; elevated Glu; NG D/C'd; no edema      Wounds:  Surgical Wound(umbilicus 7/8)       Current Nutrition Therapies:    DIET CLEAR LIQUID;     Anthropometric Measures:  · Height: 4' 11\" (149.9 cm)  · Current Body Weight: 248 lb (112.5 kg)   · Admission Body Weight: 240 lb (108.9 kg)    · Usual Body Weight: 245 lb (111.1 kg)     · Ideal Body Weight: 95 lbs; 261.1 lbs   · BMI: 50.1  · BMI Categories: Obese Class 3 (BMI 40.0 or greater)       Nutrition Diagnosis:   · Inadequate oral intake related to altered GI function as evidenced by intake 0-25%      Nutrition Interventions:   Food and/or Nutrient Delivery:  Continue Current Diet  Nutrition Education/Counseling:  No recommendation at this time   Coordination of Nutrition Care:  Continued Inpatient Monitoring    Goals:  tolerate most appropriate form of nutrition per MD       Nutrition Monitoring and Evaluation:     Food/Nutrient Intake Outcomes:  Diet Advancement/Tolerance  Physical Signs/Symptoms Outcomes:  Biochemical Data, Nausea or Vomiting, Weight, Skin, Nutrition Focused Physical Findings     Discharge Planning:    No discharge needs at this time     Electronically signed by Milton Torres RD, LD on 7/13/20 at 11:51 AM EDT    Contact: [phone]

## 2020-07-13 NOTE — PROGRESS NOTES
Take 1 tablet by mouth 2 times daily 60 tablet 3    metFORMIN (GLUCOPHAGE) 500 MG tablet Take 1 tablet by mouth 2 times daily (with meals) 60 tablet 5    amLODIPine-benazepril (LOTREL) 5-20 MG per capsule Take 1 capsule by mouth daily 30 capsule 0    vitamin D (ERGOCALCIFEROL) 1.25 MG (37672 UT) CAPS capsule Take 1 capsule by mouth once a week 4 capsule 5       Allergies:  Patient has no known allergies. Immunizations : There is no immunization history on file for this patient. Social History:    Social History     Tobacco Use    Smoking status: Never Smoker    Smokeless tobacco: Never Used   Substance Use Topics    Alcohol use: No    Drug use: No     Social History     Tobacco Use   Smoking Status Never Smoker   Smokeless Tobacco Never Used      Family History : Other (Other) Other   ovarian cancer- moteher    Other (Other) Other   sister with \"traces of lupus\"    Ovarian cancer Mother    Diabetes Father    Breast cancer Neg Hx      Copied from Care everywhere note 6/29    REVIEW OF SYSTEMS:    No fever / chills / sweats. No weight loss. No visual change, eye pain, eye discharge. No oral lesion, sore throat, dysphagia. Denies cough / sputum/Sob   Denies chest pain, palpitations/ dizziness  Denies nausea/ vomiting/abdominal pain/diarrhea. Denies dysuria or change in urinary function. Denies joint swelling or pain. No myalgia, arthralgia. No rashes, skin lesions   Denies focal weakness, sensory change or other neurologic symptoms  No lymph node swelling or tenderness.     abd pain, irregular menses     PHYSICAL EXAM:      Vitals:    BP (!) 153/89   Pulse 78   Temp 98.4 °F (36.9 °C) (Oral)   Resp 18   Ht 4' 11\" (1.499 m)   Wt 248 lb 14.4 oz (112.9 kg)   SpO2 93%   BMI 50.27 kg/m²     General Appearance: alert,in some acute distress, +  pallor, + icterus on nasal cannula    Skin: warm and dry, no rash or erythema  Head: normocephalic and atraumatic  Eyes: pupils equal, round, and reactive to light, conjunctivae normal  ENT: tympanic membrane, external ear and ear canal normal bilaterally, nose without deformity, nasal mucosa and turbinates normal without polyps  Neck: supple and non-tender without mass, no thyromegaly  no cervical lymphadenopathy  Pulmonary/Chest: bi basal crepts + bilaterally- no wheezes, rales or rhonchi, normal air movement, in respiratory distress  Cardiovascular: normal rate, regular rhythm, normal S1 and S2, no murmurs, rubs, clicks, or gallops, no carotid bruits  Abdomen: soft, -tender, distended,+  bowel sound  masses or organomegaly midline incision dressing+  MECHE drain +   Extremities: no cyanosis, clubbing or +edema  Musculoskeletal: normal range of motion, no joint swelling, deformity or tenderness  Neurologic: reflexes normal and symmetric, no cranial nerve deficit  Psych:  Orientation, sensorium, mood normal  Lines:  IJ+  Jaime+              Data Review:    Lab Results   Component Value Date    WBC 32.7 (H) 07/13/2020    HGB 7.5 (L) 07/13/2020    HCT 23.9 (L) 07/13/2020    MCV 64.7 (L) 07/13/2020     07/13/2020     Lab Results   Component Value Date    CREATININE 3.3 (H) 07/13/2020    BUN 95 (HH) 07/13/2020     (L) 07/13/2020    K 3.6 07/13/2020    CL 98 (L) 07/13/2020    CO2 18 (L) 07/13/2020       Hepatic Function Panel:   Lab Results   Component Value Date    ALKPHOS 130 07/13/2020    ALT 27 07/13/2020    AST 24 07/13/2020    PROT 6.2 07/13/2020    BILITOT 1.5 07/13/2020    BILIDIR 1.2 07/13/2020    IBILI 0.3 07/13/2020    LABALBU 2.0 07/13/2020       UA:  Lab Results   Component Value Date    COLORU DK YELLOW 07/07/2020    CLARITYU CLOUDY 07/07/2020    GLUCOSEU 100 07/07/2020    BILIRUBINUR SMALL 07/07/2020    KETUA Negative 07/07/2020    SPECGRAV 1.015 07/07/2020    BLOODU LARGE 07/07/2020    PHUR 6.0 07/07/2020    PROTEINU 100 07/07/2020    UROBILINOGEN 1.0 07/07/2020    NITRU Negative 07/07/2020    LEUKOCYTESUR SMALL 07/07/2020    LABMICR YES 07/07/2020    URINETYPE NotGiven 07/07/2020      Urine Microscopic:   Lab Results   Component Value Date    BACTERIA 1+ 07/07/2020    COMU see below 07/07/2020    HYALCAST 1 07/07/2020    WBCUA 26 07/07/2020    RBCUA  07/07/2020    EPIU 2 07/07/2020     Creat  4.5   Lactic acid  5.8  Down to  1.9      WBC  36.5      hB  11.2  DOWN TO  7.5     MICRO: cultures reviewed and updated by me            Culture, Tissue [[id number]] Collected: 07/08/20 1644   Order Status: Completed Specimen: Tissue Updated: 07/10/20 0904    Gram Stain Result No Epithelial Cells seen   No WBCs or organisms seen     WOUND/ABSCESS No growth to date    Anaerobic Culture --    Anaerobic culture further report to follow   No anaerobes isolated so far, Further report to follow    Narrative:     ORDER#: [id number]                          [facility]  SOURCE: Tissue Omentum                     COLLECTED:  07/08/20 16:44  [facility] :                      RECEIVED :  07/08/20 17:13  Performed at:  [place] [address]   Phone [phone]   C.trachomatis N.gonorrhoeae DNA, Urine [[id number]] Collected: 07/08/20 1835   Order Status: Sent Specimen: Urine Updated: 07/09/20 0110   HSV PCR [[id number]] Collected: 07/08/20 0000   Order Status: Sent Specimen: Blood Updated: 07/08/20 1426   HSV PCR [[id number]]    Order Status: Canceled Specimen: Blood    HSV PCR [[id number]]    Order Status: Canceled Specimen: Blood    HSV PCR [[id number]]    Order Status: Canceled Specimen: Blood    Culture, Blood 1 [[id number]] Collected: 07/06/20 1536   Order Status: Completed Specimen: Blood Updated: 07/07/20 1715    Blood Culture, Routine No Growth to date.  Any change in status will be called.    Narrative:     ORDER#: [id number]                          ORDERED BY: DANIAL LYNN  SOURCE: Blood                              COLLECTED:  07/06/20 15:36  [facility]                      RECEIVED :  07/06/20 15:42  If child <=2 yrs old please draw pediatric bottle. ~Blood Culture #1  Performed at:  [facility]  [address] [place] [address]   Phone [phone]   Culture, Blood 2 [[id number]] Collected: 07/06/20 1602   Order Status: Completed Specimen: Blood Updated: 07/07/20 1715    Culture, Blood 2 No Growth to date.  Any change in status will be called. Narrative:     ORDER#: [id number]                          ORDERED BY: DANIAL LYNN  SOURCE: Blood                              COLLECTED:  07/06/20 16:02  [facility] :                      RECEIVED :  07/06/20 16:09  If child <=2 yrs old please draw pediatric bottle. ~Blood Culture #2  Performed at:  [facility]  [address] [place] [employer] [address]   Phone [phone]            Culture, Tissue [[id number]]  (Abnormal)  Collected: 07/08/20 1644    Order Status: Completed  Specimen: Tissue  Updated: 07/11/20 1643     Gram Stain Result  No Epithelial Cells seen   No WBCs or organisms seen     Organism  Streptococcus anginosusAbnormal       WOUND/ABSCESS  --     Rare growth   No further workup     Organism  Candida albicansAbnormal       WOUND/ABSCESS  --     Rare growth   No further workup     Organism  Fusobacterium necrophorum ssp necrophorumAbnormal       Anaerobic Culture  --     Light growth   No further workup     Organism  Peptostreptococcus anaerobiusAbnormal       Anaerobic Culture  --     Light growth   No further workup    Narrative:      ORDER#: [id number]                          [facility]   SOURCE: Tissue Omentum                     COLLECTED:  07/08/20 16:44   [facility] :                      RECEIVED :  07/08/20 17:13   Performed at:   [facility]   [address] [place] [address]   Phone [phone]        IMAGING:    CT ABDOMEN PELVIS WO CONTRAST Additional Contrast? None   Final Result   Evaluation is limited by lack of IV and oral contrast.      Diffuse soft tissue edema, new. Small pleural effusions and patchy   consolidation in the lung bases, atelectasis versus infection. Postsurgical changes which are new. Surgical drain terminates in the pelvis. There is small to moderate volume ill-defined fluid along the anterior   abdomen and pelvis. A few tiny foci of gas noted anteriorly which likely are   related to recent surgery. No large amount of free air. XR CHEST PORTABLE   Final Result   Status post placement of right internal jugular central venous catheter,   without gross pneumothorax. Endotracheal tube tip is approximately 1.3 cm proximal to the kelly and   could be retracted approximately 1-2 cm. Low volume study with basilar atelectasis. Findings were called by the radiology call center. XR ABDOMEN (2 VIEWS)   Final Result   Obstructed bowel gas pattern, similar to prior CT         MRI ABDOMEN WO CONTRAST MRCP   Final Result   No biliary ductal obstruction. No findings to explain elevated LFTs. CT ABDOMEN PELVIS WO CONTRAST Additional Contrast? None   Final Result   Mid to distal small bowel obstruction         US GALLBLADDER RUQ   Final Result   Questionable small amount of sludge within the gallbladder. Otherwise   unremarkable right upper quadrant ultrasound.                All the pertinent images and reports for the current Hospitalization were reviewed by me     Scheduled Meds:   metroNIDAZOLE  500 mg Intravenous Q8H    piperacillin-tazobactam  2.25 g Intravenous Q8H    fluconazole  100 mg Intravenous Q24H    cloNIDine  1 patch Transdermal Weekly    pantoprazole  40 mg Intravenous Daily    And    sodium chloride (PF)  10 mL Intravenous Daily    sodium chloride flush  10 mL Intravenous 2 times per day    [Held by provider] heparin (porcine)  5,000 Units Subcutaneous BID    insulin lispro  0-6 Units Subcutaneous TID WC    insulin lispro  0-3 Units Subcutaneous Nightly       Continuous Infusions:   lactated ringers 100 mL/hr at 07/13/20 1116    dextrose         PRN Meds:  acetaminophen **OR** acetaminophen, glucose, dextrose, glucagon (rDNA), dextrose, sodium chloride flush, promethazine **OR** ondansetron, iopamidol      Assessment:     Patient Active Problem List   Diagnosis    EMERY (obstructive sleep apnea)    ARF (acute renal failure) (HCC)    SBO (small bowel obstruction) (HCC)    Leukocytosis    Septicemia (HCC)    Acidosis    KIKI (acute kidney injury) ([facility] [address]    Gallbladder disease    Hypertension    Type 2 diabetes mellitus with hyperglycemia (HCC)    Peritonitis (HCC)    Lactic acidosis    Acute pulmonary insufficiency    Metabolic acidosis    Elevated LFTs     Severe sepsis   Lactic acidosis  WBC elevation   KIKI   Purulent peritonitis per OR description  Admitted with abd pain and SBO  S/p Exp lap , omentectomy, Left salpingectomy and removal of Fibroid and repair of serosal tear on 7/8   Lft elevation  Anemia  Thrombocytosis     She remains very ill in ICU  noted purulent peritonitis and per OP notes no bowel leak but Left Tuboovarian abscess suspected based on the presentation and pathology is pending.    OR cx now strep anginosus, candida and Fusobacterium  necrophorum and Peptostreptococcus     Recent Gynecology eval was normal and previous Chlamydia and GC screen including HPV negative        Would suspected mixed infectious process GI and  seth to be in volved in the process        On going WBC elevation and slow recovery given the mixed infectious process Risk for Intra abdominal abscess    CT abd/pelvis check follow up no leak no abscess    WBC slow trend down bu GI function slowly improving     Labs, Microbiology, Radiology and all the pertinent results from current hospitalization and  care every where were reviewed  by me as a part of the evaluation   Plan: 1. Cont V Fluconazole x 200 mg daily ADJUSTED TO GFR  2. Cont  IV Zosyn x 2.25 gm x Q 8 hrs will cover GI and  seth well will increase the dose once creat is better   3. Cont IV Flagyl x 500 mg Q 8 hrs   4. CT abd/pelvis NON CONTRAST follow no leak no abscess  5. Anticipate x 21 days of Iv ABX Therapy   6  HIV -ve and hepatitis screen -ve    7. ESR, CRP Now down trend   8. GC probe on urine -ve       Discussed with patient/Family and Nursing   Risk of Complications/Morbidity: High      · Illness(es)/ Infection present that pose threat to bodily function. · There is potential for severe exacerbation of infection/side effects of treatment. · Therapy requires intensive monitoring for antimicrobial agent toxicity      Discussed with patient/Family and Nursing staff     Thanks for allowing me to participate in your patient's care and please call me with any questions or concerns.     Phil Torres MD  Infectious Disease  [facility] ([facility]) Physician  Phone: [phone]   Fax : [phone]

## 2020-07-13 NOTE — PROGRESS NOTES
Occupational Therapy  Facility/Department: [address] ICU  Daily Treatment Note  NAME: Elke Raymond  : 1970  MRN: [id number]    Date of Service: 2020    Discharge Recommendations:  Patient would benefit from continued therapy after discharge, 3-5 sessions per week  OT Equipment Recommendations  Other: defer to PA facility    Elke Raymond scored a 12/24 on the AM-PAC ADL Inpatient form. Current research shows that an AM-PAC score of 17 or less is typically not associated with a discharge to the patient's home setting. Based on the patient's AM-PAC score and their current ADL deficits, it is recommended that the patient have 3-5 sessions per week of Occupational Therapy at d/c to increase the patient's independence. Please see assessment section for further patient specific details.     If patient discharges prior to next session this note will serve as a discharge summary. Please see below for the latest assessment towards goals. Assessment   Performance deficits / Impairments: Decreased functional mobility ; Decreased ADL status; Decreased endurance;Decreased strength;Decreased balance;Decreased ROM  Assessment: Pt 54 y/o female s/p exploratory laparotomy, omentectomy, left salpingectomy with removal of fibriod (N/A abdomen) on 2020. Pt has MECHE drain and veras. PTA, pt was independent in all ADL tasks/functional mobility. Today, pt in bed at start of session HOB elevated. Pt require max A x2 for bed mobility. Once pt's legs were off side of bed, therapist blocked bilateral knees to prevent sliding off bed. Stood prn at [employer] and returned to supine with HOB elevated. Pt has difficulty with anterior weight shifting/ hip flexion and requires a lot of encouragement/cuing for intitiation. Anticipate pt will require max/total assist for ADLs based on effort and strength. Pt would benefit from continued skilled therapy.   Prognosis: Good  OT Education: OT Role;Plan of Care  Activity Tolerance  Activity Tolerance: Patient limited by fatigue  Activity Tolerance: Significant encouragement for initiation anterior weight shift, resisting hip flexion  Safety Devices  Safety Devices in place: Yes  Type of devices: Left in bed;Call light within reach;Nurse notified;Gait belt;Patient at risk for falls         Patient Diagnosis(es): The primary encounter diagnosis was Septicemia ([facility] [address] Diagnoses of KIKI (acute kidney injury) ([facility] [address] Gallbladder disease, and Acidosis were also pertinent to this visit. has a past medical history of Hypertension and Obesity. has a past surgical history that includes  section and laparotomy (N/A, 2020). Restrictions  Restrictions/Precautions  Restrictions/Precautions: Up as Tolerated, Fall Risk  Position Activity Restriction  Other position/activity restrictions: MECHE drain, veras  Subjective   General  Chart Reviewed: Yes  Patient assessed for rehabilitation services?: Yes  Additional Pertinent Hx: Pt is a 53 y.o. female admitted to ED 2020 with abdominal pain. Pt stated that this pain began one week ago, and was seen on 20 at [facility] And was diagnosed with some gastroenteritis. Pt had a CT scan performed that showed a mid to distal small bowel obstruction. S/p exploratory laparotomy, omentectomy, left salpingectomy with removal of fibriod (N/A abdomen) on 2020. Family / Caregiver Present: No  Referring Practitioner: Tori Jacques MD  Subjective  Subjective: Pt seen bedside and agreeable to therapy. Frequent cuing needed to keep eyes open and minimal initiation. General Comment  Comments: Per RN ok for therapy      Orientation  Orientation  Overall Orientation Status: Within Normal Limits    Objective    ADL  Toileting: Dependent/Total(eda, pt had loose BM and required total A x2 for clean up)        Balance  Sitting Balance: (Pt initially max A with cuing to lean forward and flex hips.  Improved to CGA with better positioning.)  Standing Balance  Time: Prn with angela stedy  Functional Mobility  Functional - Mobility Device: (angela stedy)  Functional Mobility Comments: Pt max x2 to stand from elevated bed to angela najera. Used angela stedy to laterally move pt to [facility]. Returned pt to supine. Bed mobility  Rolling to Left: Maximum assistance;2 Person assistance  Rolling to Right: Maximum assistance;2 Person assistance  Supine to Sit: 2 Person assistance;Maximum assistance(HOB elevated)  Sit to Supine: Maximum assistance;2 Person assistance  Scooting: Dependent/Total;2 Person assistance  Comment: Once pt's legs were off side of bed, therapist's blocked bilateral knees when bringing trunk forward to prevent sliding off bed. At start of session, pt had loose BM and rolled R/L to change bed linens and rolled again once supine to straighten bed linens. Transfers  Sit to stand: Maximum assistance;2 Person assistance(to angela njaera from elevated bed)  Stand to sit: Maximum assistance;2 Person assistance      Plan   Plan  Times per week: 3-5  Current Treatment Recommendations: Strengthening, ROM, Gait Training, Balance Training, Self-Care / ADL, Functional Mobility Training, Endurance Training, Patient/Caregiver Education & Training    AM-PAC Score     AM-[facility] Inpatient Daily Activity Raw Score: 12 (07/13/20 1110)  AM-PAC Inpatient ADL T-Scale Score : 30.6 (07/13/20 1110)  ADL Inpatient CMS 0-100% Score: 66.57 (07/13/20 1110)  ADL Inpatient CMS G-Code Modifier : CL (07/13/20 1110)    Goals  Short term goals  Time Frame for Short term goals: Prior to d/c: goals ongoing  Short term goal 1: Pt will complete fxl transfer to/from ADL surfaces with mod A x2. Short term goal 2: Pt will tolerate standing >3 minutes for ADL/fxl task with min A x1. Short term goal 3: Pt will tolerate sitting EOB >3 minutes in prep for ADL transfers with min A x1. Short term goal 4: Pt will complete bed mobility with mod A x1.   Short term goal 5: Pt will complete

## 2020-07-13 NOTE — PROGRESS NOTES
Physical Therapy    Facility/Department: [facility] 5C ICU  Treatment Note    NAME: Luli Denney  : 1970  MRN: [id number]    Date of Service: 2020    Discharge Recommendations:  Continue to assess pending progress   PT Equipment Recommendations  Other: Will monitor for potential equipt needs. Luli Denney scored a 9/ on the AM-PAC short mobility form. Current research shows that an AM-PAC score of 17 or less is typically not associated with a discharge to the patient's home setting. Based on the patient's AM-PAC score and their current functional mobility deficits, it is recommended that the patient have 3-5 sessions per week of Physical Therapy at d/c to increase the patient's independence. Please see assessment section for further patient specific details. If patient discharges prior to next session this note will serve as a discharge summary. Please see below for the latest assessment towards goals. Assessment   Body structures, Functions, Activity limitations: Decreased functional mobility ; Decreased strength;Decreased endurance  Assessment: Pt is a 53 y.o. female admitted to ED 2020 with abdominal pain. Pt stated that this pain began one week ago, and was seen on 20 at [facility] And was diagnosed with some gastroenteritis. Pt had a CT scan performed that showed a mid to distal small bowel obstruction. S/p exploratory laparotomy, omentectomy, left salpingectomy with removal of fibriod (N/A abdomen) on 2020. PTA pt living with family in multi level home;  at this time pt requiring Max assist x 2 for EOB/OOB Transfers via [place]. Anticipate need cont PT Services upon d/c; at this time need mod intensity setting. Will cont to monitor pt's progress. Prognosis: Fair  Decision Making: High Complexity  History: Pt is a 53 y.o. female admitted to ED 2020 with abdominal pain.   Pt stated that this pain began one week ago, and was seen on 20 at [facility] And was diagnosed with some gastroenteritis. Pt had a CT scan performed that showed a mid to distal small bowel obstruction. S/p exploratory laparotomy, omentectomy, left salpingectomy with removal of fibriod (N/A abdomen) on 2020. Patient Education: Role of PT, POC, Need to call for assist, OOB via Stedy. REQUIRES PT FOLLOW UP: Yes  Activity Tolerance  Activity Tolerance: Patient limited by fatigue;Patient limited by endurance       Patient Diagnosis(es): The primary encounter diagnosis was Septicemia ([facility] [address] Diagnoses of KIKI (acute kidney injury) ([facility] [address] Gallbladder disease, and Acidosis were also pertinent to this visit. has a past medical history of Hypertension and Obesity. has a past surgical history that includes  section and laparotomy (N/A, 2020). Restrictions  Restrictions/Precautions  Restrictions/Precautions: Fall Risk  Position Activity Restriction  Other position/activity restrictions: Recent Abdominal Surg. Abdominal MECHE Drain. Diet : Clear Liquid. Vision/Hearing  Vision: Impaired  Vision Exceptions: Wears glasses at all times  Hearing: Within functional limits     Subjective  General  Chart Reviewed: Yes  Patient assessed for rehabilitation services?: Yes  Additional Pertinent Hx: Pt is a 53 y.o. female admitted to ED 2020 with abdominal pain. Pt stated that this pain began one week ago, and was seen on 20 at [facility] And was diagnosed with some gastroenteritis. Pt had a CT scan performed that showed a mid to distal small bowel obstruction. S/p exploratory laparotomy, omentectomy, left salpingectomy with removal of fibriod (N/A abdomen) on 2020. Response To Previous Treatment: Patient with no complaints from previous session. Family / Caregiver Present: No  Referring Practitioner: Dr. Maylin Howell: (Pt cont require constant cues improving pt ability to assist.)  Subjective  Subjective: Pt agreeable to PT Rx. Requiring ongoing cues.   Pain Screening  Patient Currently in Pain: Denies          Orientation     Social/Functional History  Social/Functional History  Lives With: Family(Currently lives with father and two children, but will be recovering at daughter's home- following will be home set up of daughter's home)  Type of Home: House  Home Layout: Multi-level(daughter states pt will be able to recover on main floor if needed)  Home Access: Stairs to enter with rails  Entrance Stairs - Number of Steps: 1  Bathroom Shower/Tub: Tub/Shower unit, Walk-in shower(tub/shower main floor, walk-in shower upstairs)  Bathroom Toilet: Standard  Bathroom Equipment: Shower chair  Home Equipment: Rolling walker  Receives Help From: Family  ADL Assistance: Independent  Homemaking Assistance: Independent  Ambulation Assistance: Independent  Transfer Assistance: Independent  Active : Yes  Occupation: Full time employment(working two jobs)  Additional Comments: Will be living with daughter, son-in-law, grandchildren and daughter's mother-in-law upon d/c  Cognition        Objective             Strength RLE  Comment: Grossly 3 3+/5; functionally very weak. Strength LLE  Comment: Grossly 3 3+/5; functionally very weak. Bed mobility  Rolling to Left: Maximum assistance;2 Person assistance  Rolling to Right: Maximum assistance;2 Person assistance  Supine to Sit: Maximum assistance;2 Person assistance  Comment: Once pt's legs were off side of bed, therapist's blocked bilateral knees when bringing trunk forward to prevent sliding off bed. At start of session, pt had loose BM and rolled R/L to change bed linens and rolled again once supine to straighten bed linens. Transfers  Sit to Stand: Maximum Assistance;2 Person Assistance(Via Stedy.)  Stand to sit: Maximum Assistance;2 Person Assistance(Via Stedy.)  Comment: Deferred OOB to chair via Stedy due to cont issues with generalized dizziness (stable, \"not better/or worse\" as at EOB).   Nursing aware, vitals

## 2020-07-13 NOTE — PROGRESS NOTES
Nephrology Note  [phone]   [email or web address]      CC: KIKI    Subjective:    HPI:  Patient is post op exploration and evacuation of abdominal infection. On multiple antibiotcs per ID. KIKI is a bit better, Cr lower, 3.3 form 4.1. Making urine, has HD line in place but no need for RRT at this point. ROS:  In bed. No dyspnea   PMFSH:  medications reviewed. No family present. Objective:  Blood pressure (!) 153/89, pulse 78, temperature 98.4 °F (36.9 °C), temperature source Oral, resp. rate 18, height 4' 11\" (1.499 m), weight 248 lb 14.4 oz (112.9 kg), SpO2 93 %, not currently breastfeeding. Intake/Output Summary (Last 24 hours) at 7/13/2020 1014  Last data filed at 7/13/2020 0837  Gross per 24 hour   Intake 3554 ml   Output 5065 ml   Net -1511 ml     General:  NAD, A+Ox3  Chest:  Normal respiratory effort   CVS:  No rub no gallop   Abdominal:  deferred  Extremities:  Trace  edema  Skin:  no rash    Labs:  Renal panel:  Lab Results   Component Value Date/Time     (L) 07/13/2020 05:52 AM    K 3.6 07/13/2020 05:52 AM    CO2 18 (L) 07/13/2020 05:52 AM    BUN 95 (HH) 07/13/2020 05:52 AM    CREATININE 3.3 (H) 07/13/2020 05:52 AM    CALCIUM 6.7 (L) 07/13/2020 05:52 AM    PHOS 7.6 (H) 07/13/2020 05:52 AM     CBC:  Lab Results   Component Value Date/Time    WBC 32.7 (H) 07/13/2020 05:52 AM    HGB 7.5 (L) 07/13/2020 05:52 AM    HCT 23.9 (L) 07/13/2020 05:52 AM     07/13/2020 05:52 AM       Assessment/Plan:  Reviewed old records and labs.     1) KIKI              - baseline 06/03/20 Cr 0.6, Cr as high as 4.7 now 3.3 making more urine               -appears to be   ATN/  pre-renal factors with prior use of benazepril and ibuprofen   Note has  dialysis catheter by Dr. Jessica Turpin    No need for RRT       2) Purulent peritenonitis with marked leukocytosis and WBC trending up still               - per surgery and ID       3) ID              - on empiric zosyn, levaquin     4) FEN    adjust IV fluids and will use LR                 - hypocalcemia and Hyperphosphatemia ,would not supplement calcium at this point with                    significant hyperphosphatemia    - hyponatremia monitor as we adjust fluids.

## 2020-07-13 NOTE — CARE COORDINATION
Met w/ pt and daughter  Discussed SNF stay  Reviewed list again  Asked that they review and provide 3-4 SNF options  Electronically signed by Bienvenido Dumont RN on 7/13/2020 at 3:15 PM

## 2020-07-13 NOTE — PROGRESS NOTES
[facility] [phone]                                     Daily Progress Note                                                         Pt Name: Ghanshyam Carrillo  Medical Record Number: [id number]  Date of Birth 1970   Today's Date: 7/13/2020  Chief Complaint   Patient presents with    Abdominal Pain     for 8 days was [place] [facility] [place] told she bowel inflammation  No emesis        ASSESSMENT/PLAN  Diffuse purulent peritonitis, suspicious for ruptured TOA  -7/8 exploratory laparotomy, omentectomy, left salpingectomy with removal of fibroid, repair serosal tear, US guided right IJ vascath. Drain SS.   -+flatulence and BMs. OK to D/C NGT  -Clear liquids as tolerated  -Trend LFTs, improving.   -Anemic: HgB: 7.5     KIKI  -vas cath in place, nephro following  -Cr 3.3  -Continue veras cath to monitor urine output              Discharge Planning: continue ICU care      Steven Abreu has slightly improved from yesterday. Pain is well controlled. She has no nausea and no vomiting. She has not passed flatus and has not had a bowel movement. She is tolerating ice chips. OBJECTIVE  VITALS:  height is 4' 11\" (1.499 m) and weight is 248 lb 14.4 oz (112.9 kg). Her oral temperature is 98.4 °F (36.9 °C). Her blood pressure is 153/89 (abnormal) and her pulse is 78. Her respiration is 18 and oxygen saturation is 93%. INTAKE/OUTPUT:      Intake/Output Summary (Last 24 hours) at 7/13/2020 1101  Last data filed at 7/13/2020 6920  Gross per 24 hour   Intake 3554 ml   Output 5065 ml   Net -1511 ml     GENERAL: alert, no distress  LUNGS: clear to ausculation, without wheezes, rales or rhonci  HEART: normal rate and regular rhythm  ABDOMEN: soft, appropriately-tender, incision c/d/i. Drain SS. EXTREMITY: no cyanosis, clubbing or edema    I/O last 3 completed shifts:   In: 7314 [I.V.:2904; IV Piggyback:650]  Out: 6232 [ZQQNP:6158; Emesis/NG

## 2020-07-14 ENCOUNTER — APPOINTMENT (OUTPATIENT)
Dept: GENERAL RADIOLOGY | Age: 50
DRG: 853 | End: 2020-07-14
Payer: COMMERCIAL

## 2020-07-14 LAB
A/G RATIO: 0.5 (ref 1.1–2.2)
ALBUMIN SERPL-MCNC: 2 G/DL (ref 3.4–5)
ALP BLD-CCNC: 105 U/L (ref 40–129)
ALT SERPL-CCNC: 20 U/L (ref 10–40)
ANION GAP SERPL CALCULATED.3IONS-SCNC: 18 MMOL/L (ref 3–16)
ANISOCYTOSIS: ABNORMAL
AST SERPL-CCNC: 18 U/L (ref 15–37)
BANDED NEUTROPHILS RELATIVE PERCENT: 4 % (ref 0–7)
BASOPHILS ABSOLUTE: 0 K/UL (ref 0–0.2)
BASOPHILS RELATIVE PERCENT: 0 %
BILIRUB SERPL-MCNC: 1 MG/DL (ref 0–1)
BILIRUBIN DIRECT: 0.7 MG/DL (ref 0–0.3)
BILIRUBIN, INDIRECT: 0.3 MG/DL (ref 0–1)
BUN BLDV-MCNC: 63 MG/DL (ref 7–20)
CALCIUM IONIZED: 1.02 MMOL/L (ref 1.12–1.32)
CALCIUM SERPL-MCNC: 6.8 MG/DL (ref 8.3–10.6)
CHLORIDE BLD-SCNC: 104 MMOL/L (ref 99–110)
CO2: 19 MMOL/L (ref 21–32)
CREAT SERPL-MCNC: 2.4 MG/DL (ref 0.6–1.1)
DOHLE BODIES: PRESENT
EOSINOPHILS ABSOLUTE: 0.3 K/UL (ref 0–0.6)
EOSINOPHILS RELATIVE PERCENT: 1 %
GFR AFRICAN AMERICAN: 26
GFR NON-AFRICAN AMERICAN: 21
GLOBULIN: 4.4 G/DL
GLUCOSE BLD-MCNC: 149 MG/DL (ref 70–99)
GLUCOSE BLD-MCNC: 155 MG/DL (ref 70–99)
GLUCOSE BLD-MCNC: 157 MG/DL (ref 70–99)
GLUCOSE BLD-MCNC: 175 MG/DL (ref 70–99)
GLUCOSE BLD-MCNC: 190 MG/DL (ref 70–99)
HCT VFR BLD CALC: 24 % (ref 36–48)
HEMATOLOGY PATH CONSULT: NO
HEMOGLOBIN: 7.5 G/DL (ref 12–16)
HYPOCHROMIA: ABNORMAL
INR BLD: 1.54 (ref 0.86–1.14)
LYMPHOCYTES ABSOLUTE: 0 K/UL (ref 1–5.1)
LYMPHOCYTES RELATIVE PERCENT: 0 %
MAGNESIUM: 1.7 MG/DL (ref 1.8–2.4)
MCH RBC QN AUTO: 20.3 PG (ref 26–34)
MCHC RBC AUTO-ENTMCNC: 31 G/DL (ref 31–36)
MCV RBC AUTO: 65.6 FL (ref 80–100)
METAMYELOCYTES RELATIVE PERCENT: 1 %
MONOCYTES ABSOLUTE: 0.3 K/UL (ref 0–1.3)
MONOCYTES RELATIVE PERCENT: 1 %
NEUTROPHILS ABSOLUTE: 29.3 K/UL (ref 1.7–7.7)
NEUTROPHILS RELATIVE PERCENT: 93 %
NUCLEATED RED BLOOD CELLS: 1 /100 WBC
NUCLEATED RED BLOOD CELLS: 1 /100 WBC
PDW BLD-RTO: 22 % (ref 12.4–15.4)
PERFORMED ON: ABNORMAL
PH VENOUS: 7.44 (ref 7.35–7.45)
PHOSPHORUS: 5.8 MG/DL (ref 2.5–4.9)
PLATELET # BLD: 395 K/UL (ref 135–450)
PLATELET SLIDE REVIEW: ADEQUATE
PMV BLD AUTO: 7.3 FL (ref 5–10.5)
POTASSIUM REFLEX MAGNESIUM: 3.2 MMOL/L (ref 3.5–5.1)
PROTHROMBIN TIME: 17.9 SEC (ref 10–13.2)
RBC # BLD: 3.66 M/UL (ref 4–5.2)
SLIDE REVIEW: ABNORMAL
SODIUM BLD-SCNC: 141 MMOL/L (ref 136–145)
STOMATOCYTES: ABNORMAL
TOTAL PROTEIN: 6.4 G/DL (ref 6.4–8.2)
WBC # BLD: 29.9 K/UL (ref 4–11)

## 2020-07-14 PROCEDURE — 2580000003 HC RX 258: Performed by: SURGERY

## 2020-07-14 PROCEDURE — 2060000000 HC ICU INTERMEDIATE R&B

## 2020-07-14 PROCEDURE — 85025 COMPLETE CBC W/AUTO DIFF WBC: CPT

## 2020-07-14 PROCEDURE — 6360000002 HC RX W HCPCS: Performed by: SURGERY

## 2020-07-14 PROCEDURE — 6370000000 HC RX 637 (ALT 250 FOR IP): Performed by: SURGERY

## 2020-07-14 PROCEDURE — 99233 SBSQ HOSP IP/OBS HIGH 50: CPT | Performed by: INTERNAL MEDICINE

## 2020-07-14 PROCEDURE — 83735 ASSAY OF MAGNESIUM: CPT

## 2020-07-14 PROCEDURE — 6370000000 HC RX 637 (ALT 250 FOR IP): Performed by: INTERNAL MEDICINE

## 2020-07-14 PROCEDURE — 2580000003 HC RX 258: Performed by: INTERNAL MEDICINE

## 2020-07-14 PROCEDURE — 6360000002 HC RX W HCPCS: Performed by: INTERNAL MEDICINE

## 2020-07-14 PROCEDURE — 74018 RADEX ABDOMEN 1 VIEW: CPT

## 2020-07-14 PROCEDURE — 2500000003 HC RX 250 WO HCPCS: Performed by: INTERNAL MEDICINE

## 2020-07-14 PROCEDURE — 85610 PROTHROMBIN TIME: CPT

## 2020-07-14 PROCEDURE — APPSS15 APP SPLIT SHARED TIME 0-15 MINUTES: Performed by: PHYSICIAN ASSISTANT

## 2020-07-14 PROCEDURE — 94760 N-INVAS EAR/PLS OXIMETRY 1: CPT

## 2020-07-14 PROCEDURE — 82330 ASSAY OF CALCIUM: CPT

## 2020-07-14 PROCEDURE — 80053 COMPREHEN METABOLIC PANEL: CPT

## 2020-07-14 PROCEDURE — 36415 COLL VENOUS BLD VENIPUNCTURE: CPT

## 2020-07-14 PROCEDURE — C9113 INJ PANTOPRAZOLE SODIUM, VIA: HCPCS | Performed by: INTERNAL MEDICINE

## 2020-07-14 PROCEDURE — APPNB30 APP NON BILLABLE TIME 0-30 MINS: Performed by: PHYSICIAN ASSISTANT

## 2020-07-14 PROCEDURE — 84100 ASSAY OF PHOSPHORUS: CPT

## 2020-07-14 RX ORDER — FLUCONAZOLE 2 MG/ML
200 INJECTION, SOLUTION INTRAVENOUS EVERY 24 HOURS
Status: DISCONTINUED | OUTPATIENT
Start: 2020-07-14 | End: 2020-07-20

## 2020-07-14 RX ORDER — POTASSIUM CHLORIDE 20 MEQ/1
20 TABLET, EXTENDED RELEASE ORAL ONCE
Status: COMPLETED | OUTPATIENT
Start: 2020-07-14 | End: 2020-07-14

## 2020-07-14 RX ORDER — CLONIDINE HYDROCHLORIDE 0.1 MG/1
0.2 TABLET ORAL 3 TIMES DAILY
Status: DISCONTINUED | OUTPATIENT
Start: 2020-07-14 | End: 2020-07-14

## 2020-07-14 RX ORDER — CLONIDINE HYDROCHLORIDE 0.1 MG/1
0.3 TABLET ORAL 3 TIMES DAILY
Status: DISCONTINUED | OUTPATIENT
Start: 2020-07-14 | End: 2020-07-14

## 2020-07-14 RX ORDER — POTASSIUM CHLORIDE 20 MEQ/1
40 TABLET, EXTENDED RELEASE ORAL ONCE
Status: DISCONTINUED | OUTPATIENT
Start: 2020-07-14 | End: 2020-07-18 | Stop reason: CLARIF

## 2020-07-14 RX ORDER — AMLODIPINE BESYLATE 5 MG/1
5 TABLET ORAL DAILY
Status: DISCONTINUED | OUTPATIENT
Start: 2020-07-14 | End: 2020-07-19

## 2020-07-14 RX ORDER — CLONIDINE HYDROCHLORIDE 0.1 MG/1
0.2 TABLET ORAL 3 TIMES DAILY
Status: DISCONTINUED | OUTPATIENT
Start: 2020-07-14 | End: 2020-07-24 | Stop reason: HOSPADM

## 2020-07-14 RX ADMIN — METRONIDAZOLE 500 MG: 500 INJECTION, SOLUTION INTRAVENOUS at 05:23

## 2020-07-14 RX ADMIN — SODIUM CHLORIDE, POTASSIUM CHLORIDE, SODIUM LACTATE AND CALCIUM CHLORIDE: 600; 310; 30; 20 INJECTION, SOLUTION INTRAVENOUS at 03:45

## 2020-07-14 RX ADMIN — PIPERACILLIN AND TAZOBACTAM 2.25 G: 2; .25 INJECTION, POWDER, LYOPHILIZED, FOR SOLUTION INTRAVENOUS at 06:37

## 2020-07-14 RX ADMIN — POTASSIUM CHLORIDE 20 MEQ: 1500 TABLET, EXTENDED RELEASE ORAL at 17:25

## 2020-07-14 RX ADMIN — CLONIDINE HYDROCHLORIDE 0.2 MG: 0.1 TABLET ORAL at 08:39

## 2020-07-14 RX ADMIN — SODIUM CHLORIDE, POTASSIUM CHLORIDE, SODIUM LACTATE AND CALCIUM CHLORIDE: 600; 310; 30; 20 INJECTION, SOLUTION INTRAVENOUS at 13:12

## 2020-07-14 RX ADMIN — ONDANSETRON 4 MG: 2 INJECTION INTRAMUSCULAR; INTRAVENOUS at 03:45

## 2020-07-14 RX ADMIN — INSULIN LISPRO 1 UNITS: 100 INJECTION, SOLUTION INTRAVENOUS; SUBCUTANEOUS at 12:56

## 2020-07-14 RX ADMIN — CLONIDINE HYDROCHLORIDE 0.2 MG: 0.1 TABLET ORAL at 13:11

## 2020-07-14 RX ADMIN — INSULIN LISPRO 1 UNITS: 100 INJECTION, SOLUTION INTRAVENOUS; SUBCUTANEOUS at 18:03

## 2020-07-14 RX ADMIN — METRONIDAZOLE 500 MG: 500 INJECTION, SOLUTION INTRAVENOUS at 13:08

## 2020-07-14 RX ADMIN — PIPERACILLIN AND TAZOBACTAM 2.25 G: 2; .25 INJECTION, POWDER, LYOPHILIZED, FOR SOLUTION INTRAVENOUS at 15:27

## 2020-07-14 RX ADMIN — INSULIN LISPRO 1 UNITS: 100 INJECTION, SOLUTION INTRAVENOUS; SUBCUTANEOUS at 21:21

## 2020-07-14 RX ADMIN — HEPARIN SODIUM 5000 UNITS: 5000 INJECTION INTRAVENOUS; SUBCUTANEOUS at 21:27

## 2020-07-14 RX ADMIN — CLONIDINE HYDROCHLORIDE 0.2 MG: 0.1 TABLET ORAL at 00:29

## 2020-07-14 RX ADMIN — SODIUM CHLORIDE, POTASSIUM CHLORIDE, SODIUM LACTATE AND CALCIUM CHLORIDE: 600; 310; 30; 20 INJECTION, SOLUTION INTRAVENOUS at 21:22

## 2020-07-14 RX ADMIN — METRONIDAZOLE 500 MG: 500 INJECTION, SOLUTION INTRAVENOUS at 21:24

## 2020-07-14 RX ADMIN — FLUCONAZOLE 200 MG: 200 INJECTION, SOLUTION INTRAVENOUS at 17:33

## 2020-07-14 RX ADMIN — SODIUM CHLORIDE, PRESERVATIVE FREE 10 ML: 5 INJECTION INTRAVENOUS at 09:05

## 2020-07-14 RX ADMIN — INSULIN LISPRO 1 UNITS: 100 INJECTION, SOLUTION INTRAVENOUS; SUBCUTANEOUS at 08:48

## 2020-07-14 RX ADMIN — Medication 10 ML: at 09:05

## 2020-07-14 RX ADMIN — CLONIDINE HYDROCHLORIDE 0.2 MG: 0.1 TABLET ORAL at 21:27

## 2020-07-14 RX ADMIN — PANTOPRAZOLE SODIUM 40 MG: 40 INJECTION, POWDER, FOR SOLUTION INTRAVENOUS at 08:39

## 2020-07-14 RX ADMIN — AMLODIPINE BESYLATE 5 MG: 5 TABLET ORAL at 14:31

## 2020-07-14 ASSESSMENT — PAIN - FUNCTIONAL ASSESSMENT: PAIN_FUNCTIONAL_ASSESSMENT: ACTIVITIES ARE NOT PREVENTED

## 2020-07-14 ASSESSMENT — PAIN DESCRIPTION - PAIN TYPE: TYPE: ACUTE PAIN;SURGICAL PAIN

## 2020-07-14 ASSESSMENT — PAIN DESCRIPTION - PROGRESSION: CLINICAL_PROGRESSION: NOT CHANGED

## 2020-07-14 ASSESSMENT — PAIN SCALES - GENERAL
PAINLEVEL_OUTOF10: 0
PAINLEVEL_OUTOF10: 0
PAINLEVEL_OUTOF10: 4

## 2020-07-14 ASSESSMENT — PAIN DESCRIPTION - LOCATION: LOCATION: ABDOMEN

## 2020-07-14 ASSESSMENT — PAIN DESCRIPTION - FREQUENCY: FREQUENCY: INTERMITTENT

## 2020-07-14 ASSESSMENT — PAIN DESCRIPTION - ONSET: ONSET: ON-GOING

## 2020-07-14 NOTE — PROGRESS NOTES
INPATIENT CONSULTATION:    IDENTIFYING DATA/REASON FOR CONSULTATION   PATIENT:  Taylor Gaspar  MRN:  [id number]  ADMIT DATE: 2020  TIME OF EVALUATION: 2020 11:14 AM  HOSPITAL STAY:   LOS: 8 days   CONSULTING PHYSICIAN: Livier Patiño MD   REASON FOR CONSULTATION: elevated LFTs    Subjective:    Patient resting in bed, no acute distress. NGT removed. She reports she has been dry heaving but no vomiting. She is tolerating clear liquids. She reports she had a few bowel movements yesterday. MEDICATIONS   SCHEDULED:  cloNIDine, 0.2 mg, TID  fluconazole, 200 mg, Q24H  metroNIDAZOLE, 500 mg, Q8H  piperacillin-tazobactam, 2.25 g, Q8H  cloNIDine, 1 patch, Weekly  pantoprazole, 40 mg, Daily    And  sodium chloride (PF), 10 mL, Daily  sodium chloride flush, 10 mL, 2 times per day  [Held by provider] heparin (porcine), 5,000 Units, BID  insulin lispro, 0-6 Units, TID WC  insulin lispro, 0-3 Units, Nightly      FLUIDS/DRIPS:     lactated ringers 100 mL/hr at 20 0345    dextrose       PRNs: acetaminophen, 650 mg, Q6H PRN    Or  acetaminophen, 650 mg, Q6H PRN  glucose, 15 g, PRN  dextrose, 12.5 g, PRN  glucagon (rDNA), 1 mg, PRN  dextrose, 100 mL/hr, PRN  sodium chloride flush, 10 mL, PRN  promethazine, 12.5 mg, Q6H PRN    Or  ondansetron, 4 mg, Q6H PRN  iopamidol, 75 mL, ONCE PRN      ALLERGIES:  No Known Allergies      PHYSICAL EXAM   VITALS:  BP (!) 161/98   Pulse 88   Temp 98.3 °F (36.8 °C) (Oral)   Resp 18   Ht 4' 11\" (1.499 m)   Wt 254 lb 10.1 oz (115.5 kg)   SpO2 92%   BMI 51.43 kg/m²   TEMPERATURE:  Current - Temp: 98.3 °F (36.8 °C);  Max - Temp  Av.4 °F (36.9 °C)  Min: 98.3 °F (36.8 °C)  Max: 98.6 °F (37 °C)    Physical Exam:  General appearance: alert, cooperative  Eyes: Anicteric  Head: Normocephalic, without obvious abnormality  Lungs: clear to auscultation bilaterally, Normal Effort  Heart: regular rate and rhythm, normal S1 and S2, no murmurs or rubs  Abdomen: soft, mildly distended, appropriately tender. Dressing in place  Extremities: atraumatic, no cyanosis or edema  Skin: warm and dry, no jaundice  Neuro: Grossly intact, A&OX3    LABS AND IMAGING   Laboratory   Recent Labs     07/12/20  0545 07/13/20  0552 07/14/20  1020   WBC 29.4* 32.7* 29.9*   HGB 7.3* 7.5* 7.5*   HCT 23.0* 23.9* 24.0*   MCV 64.9* 64.7* 65.6*    379 395     Recent Labs     07/12/20  0545 07/13/20  0552 07/13/20  1545 07/14/20  1020   * 132* 133* 141   K 4.2 3.6 3.3* 3.2*   CL 96* 98* 97* 104   CO2 17* 18* 18* 19*   PHOS 8.6* 7.6*  --  5.8*   * 95* 84* 63*   CREATININE 4.1* 3.3* 2.9* 2.4*     Recent Labs     07/12/20  0545 07/13/20  0552 07/14/20  1020   AST 47* 24 18   ALT 35 27 20   BILIDIR 2.0* 1.2* 0.7*   BILITOT 2.3* 1.5* 1.0   ALKPHOS 137* 130* 105     No results for input(s): LIPASE, AMYLASE in the last 72 hours. Recent Labs     07/12/20 0545 07/13/20  0552 07/14/20  1020   PROTIME 14.7* 15.6* 17.9*   INR 1.26* 1.34* 1.54*         Imaging  CT ABDOMEN PELVIS WO CONTRAST Additional Contrast? None   Final Result   Evaluation is limited by lack of IV and oral contrast.      Diffuse soft tissue edema, new. Small pleural effusions and patchy   consolidation in the lung bases, atelectasis versus infection. Postsurgical changes which are new. Surgical drain terminates in the pelvis. There is small to moderate volume ill-defined fluid along the anterior   abdomen and pelvis. A few tiny foci of gas noted anteriorly which likely are   related to recent surgery. No large amount of free air. XR CHEST PORTABLE   Final Result   Status post placement of right internal jugular central venous catheter,   without gross pneumothorax. Endotracheal tube tip is approximately 1.3 cm proximal to the kelly and   could be retracted approximately 1-2 cm. Low volume study with basilar atelectasis. Findings were called by the radiology call center.          XR ABDOMEN (2 VIEWS) Final Result   Obstructed bowel gas pattern, similar to prior CT         MRI ABDOMEN WO CONTRAST MRCP   Final Result   No biliary ductal obstruction. No findings to explain elevated LFTs. CT ABDOMEN PELVIS WO CONTRAST Additional Contrast? None   Final Result   Mid to distal small bowel obstruction         US GALLBLADDER RUQ   Final Result   Questionable small amount of sludge within the gallbladder. Otherwise   unremarkable right upper quadrant ultrasound. Endoscopy      ASSESSMENT AND RECOMMENDATIONS   Rakesh Womack is a 53 y.o. female with PMH of hypertension and obesity who presented on 7/6/2020 with abdominal pain.  CT A/P showed mid to distal small bowel obstruction. NGT placed and Surgery consulted. She was taken to OR for ex lap which noted diffuse purulent peritonitis suspicious for ruptured TOA. Underwent omentectomy, left salpingectomy with removal of fibroid, repair of serosal tear    1. Ruptured TOA with peritonitis s/p ex-lap 7/8/20: On Flagyl, Fluconazole and Zosyn, right pelvic drain in place. 2. Lactic acidosis 2/2 #1: Normalized  3. SBO 2/2 #1: NG tube removed. Tolerating clear liquids. Having BMs. 4. Abnormal LFTs:  Resolved. Likely 2/2 cholestasis of sepsis.  The patient has a history of normal liver function tests, including 7/2/2020.  MRCP negative for obstruction. HAV IgM negative, EBV neg, CMV negative for acute infection, HSV pending (ordered prior to etiology of acute illness). HIV neg  5. KIKI: Presumed pre-renal versus ATN, nephrology consulted and following, no need for RRT at this time  6. Acute blood loss anemia, 2/2 ex-lap. Stable. No evidence of GI blood loss. Monitor H&H and transfuse to Hgb >7. RECOMMENDATIONS:    Diet advancement per surgery  Antibiotics per ID  LFTs now normal.      If you have any questions or need any further information, please feel free to contact us [phone].   Thank you for allowing us to participate in the care of Lily Astudillo. The note was completed using Dragon voice recognition transcription. Every effort was made to ensure accuracy; however, inadvertent transcription errors may be present despite my best efforts to edit errors. Sadiq MONTANA    Attending physician addendum:    I have personally seen and examined the patient, reviewed the patient's medical record and pertinent labs and clinical imaging. I have personally staffed the case with Sadiq MONTANA. I agree with her consultation note, exam findings, assessment and plans  as written above. I have made appropriate modifications and edited her assessment and plan where needed to reflect my impression and plans for this patient. Deepa Valera a 53 y.o. female with PMH of hypertension and obesity who presented on 7/6/2020 with abdominal pain.  CT A/P showed mid to distal small bowel obstruction. NGT placed and Surgery consulted. Ruby Berumen was taken to OR for ex lap which noted diffuse purulent peritonitis suspicious for ruptured TOA.   Underwent omentectomy, left salpingectomy with removal of fibroid, repair of serosal tear. Abnormal LFT suspected to be secondary cholestasis of sepsis. LFTs have normalized. She is tolerating advancing diet with some cramping. She is having bowel movements and having return of bowel function. Advance per surgery. Will sign off. Please call with questions. Thank you for allowing me to participate in this patient's care. If there are any questions or concerns regarding this patient, or the plan we have set in place, please feel free to contact me at [phone].      Susan Ballesteros MD

## 2020-07-14 NOTE — PROGRESS NOTES
Nephrology Note  [phone]  [phone]   [email or web address]      CC: KIKI    Subjective:    HPI:  Patient is post op exploration and evacuation of abdominal infection. On multiple antibiotcs per ID. KIKI is a bit better, Cr lower, 3.3 form 4.1. Making urine, has HD line in place but no need for RRT at this point. No Labs done yet. ROS:  In bed. No dyspnea   PMFSH:  medications reviewed. No family present. Objective:  Blood pressure (!) 161/98, pulse 88, temperature 98.3 °F (36.8 °C), temperature source Oral, resp. rate 18, height 4' 11\" (1.499 m), weight 254 lb 10.1 oz (115.5 kg), SpO2 92 %, not currently breastfeeding. Intake/Output Summary (Last 24 hours) at 7/14/2020 1016  Last data filed at 7/14/2020 0644  Gross per 24 hour   Intake 3220 ml   Output 3080 ml   Net 140 ml     General:  NAD, A+Ox3  Chest:  Normal respiratory effort   CVS:  No rub no gallop   Abdominal:  deferred  Extremities:  Trace  edema  Skin:  no rash    Labs:  Renal panel:  Lab Results   Component Value Date/Time     (L) 07/13/2020 03:45 PM    K 3.3 (L) 07/13/2020 03:45 PM    K 3.6 07/13/2020 05:52 AM    CO2 18 (L) 07/13/2020 03:45 PM    BUN 84 (HH) 07/13/2020 03:45 PM    CREATININE 2.9 (H) 07/13/2020 03:45 PM    CALCIUM 6.6 (L) 07/13/2020 03:45 PM    PHOS 7.6 (H) 07/13/2020 05:52 AM     CBC:  Lab Results   Component Value Date/Time    WBC 32.7 (H) 07/13/2020 05:52 AM    HGB 7.5 (L) 07/13/2020 05:52 AM    HCT 23.9 (L) 07/13/2020 05:52 AM     07/13/2020 05:52 AM       Assessment/Plan:  Reviewed old records and labs. 1) KIKI              - baseline 06/03/20 Cr 0.6, Cr as high as 4.7 now 3.3 making more urine               -appears to be   ATN/  pre-renal factors with prior use of benazepril and ibuprofen   Note has  dialysis catheter by Dr. Kathleen Mahoney ,await today's labs if improved would remove the catheter   Obtain today's labs discussed with RN .       2) Purulent peritenonitis with marked leukocytosis and WBC trending up

## 2020-07-14 NOTE — PROGRESS NOTES
(!) 164/105   Pulse 85   Temp 97.4 °F (36.3 °C) (Oral)   Resp 18   Ht 4' 11\" (1.499 m)   Wt 254 lb 10.1 oz (115.5 kg)   SpO2 95%   BMI 51.43 kg/m²   General appearance: alert and awake  Has NGT  HEENT: Head: Normal, normocephalic, atraumatic, anicteric, pupils are reactive to light. Dry mucous membrane. Neck: no adenopathy, no carotid bruit, supple, symmetrical, trachea midline and thyroid not enlarged, symmetric, no tenderness/mass/nodules  Lungs: clear to auscultation bilaterally  Heart: regular rate and rhythm, S1, S2 normal, no murmur, click, rub or gallop  Abdomen: soft, -tender; bowel sounds normal; no masses,  no organomegaly, has drain  Incision is clean  Extremities: extremities mild edema  Neurologic: Mental status: alert and awake    Assessment and Plan:   1. KIKI= as per dr Jessica Perdomo  2. HTN  3. Sepsis-on iv abx  4. Dm- monitor sugars  5. Anemia= monitor h/h  Peritonitis- on iv abx  Patient Active Problem List:     EMERY (obstructive sleep apnea)     ARF (acute renal failure) (HCC)     SBO (small bowel obstruction) (HCC)     Leukocytosis     Septicemia (HCC)     Acidosis     KIKI (acute kidney injury) ([address]     Gallbladder disease     Hypertension     Type 2 diabetes mellitus with hyperglycemia ([address]    Pt is stable. Discussed with staff  about the plan. See the orders for further plan. Xray showed ileus  Instructed the nurse to call surgery to see if ngt is warranted  Will proceed further acc to pt's progress.   Time spent with management of the pt is-30 minutes      Electronically signed by: Mercedez Hernandez MD, on 7/14/2020, at 2:01 PM

## 2020-07-14 NOTE — PROGRESS NOTES
Occupational Therapy  Failed Attempt: transport in pt's room preparing to take pt DAVID for XR. Will hold therapy at this time and follow up another date as pt is appropriate.      Isiah Berrios, OTR/L [id number]

## 2020-07-14 NOTE — PROGRESS NOTES
[facility] [phone]                                     Daily Progress Note                                                         Pt Name: Hanna Bronson  Medical Record Number: [id number]  Date of Birth 1970   Today's Date: 7/14/2020  Chief Complaint   Patient presents with    Abdominal Pain     [place] 8 [place] [facility] [place]  No emesis        ASSESSMENT/PLAN  Diffuse purulent peritonitis, suspicious for ruptured TOA  -7/8 exploratory laparotomy, omentectomy, left salpingectomy with removal of fibroid, repair serosal tear, US guided right IJ vascath. Drain SS.   -+flatulence and BMs.    -Tolerating clear liquid diet, will advance to full liquids as tolerated   -Trend LFTs, improving.   -Anemic: HgB: 7.5 (yesterday)    KIKI  -vas cath in place, nephro following  -Cr 3.3-->2.9  -Continue veras cath to monitor urine output     SUBJECTIVE  Nurys has  improved from yesterday. Pain is well controlled. She has no nausea and no vomiting. She has passed flatus and has had a bowel movement. She is tolerating clear liquids     OBJECTIVE  VITALS:  height is 4' 11\" (1.499 m) and weight is 254 lb 10.1 oz (115.5 kg). Her oral temperature is 98.3 °F (36.8 °C). Her blood pressure is 161/98 (abnormal) and her pulse is 88. Her respiration is 18 and oxygen saturation is 92%. INTAKE/OUTPUT:      Intake/Output Summary (Last 24 hours) at 7/14/2020 1003  Last data filed at 7/14/2020 0644  Gross per 24 hour   Intake 3220 ml   Output 3080 ml   Net 140 ml     GENERAL: alert, no distress  LUNGS: clear to ausculation, without wheezes, rales or rhonci  HEART: normal rate and regular rhythm  ABDOMEN: soft, appropriately-tender, incision c/d/i. Drain SS. EXTREMITY: no cyanosis, clubbing or edema    I/O last 3 completed shifts:   In: 7717 [P.O.:360; I.V.:2710; IV Piggyback:150]  Out: 6472 [Urine:3450; Drains:80]      LABS  Recent Labs     07/13/20  0552 07/13/20  1545   WBC 32.7*  --    HGB 7.5*  --    HCT 23.9*  --      --    * 133*   K 3.6 3.3*   CL 98* 97*   CO2 18* 18*   BUN 95* 84*   CREATININE 3.3* 2.9*   PHOS 7.6*  --    CALCIUM 6.7* 6.6*   INR 1.34*  --    AST 24  --    ALT 27  --    BILITOT 1.5*  --    BILIDIR 1.2*  --        EDUCATION  Patient educated about Disease Process, Medications, Smoking Cessation, Oxygenation, Incentive Spirometry and Deep Breath and Cough, Diabetes, Hyperlipidemia, Smoking Cessation, Nutrition, Exercise and Hypertension    Electronically signed by RUBÉN Benz on 7/14/2020 at 10:03 AM      [facility]   [phone] Office  [phone] Cell

## 2020-07-14 NOTE — PROGRESS NOTES
Infectious Disease Follow up Notes  Admit Date: 2020  Hospital Day: 9    Antibiotics :   IV Zosyn   IV Fluconazole  IV Flagyl      CHIEF COMPLAINT:     Purulent peritonitis  Sepsis  WBC elevation  Anemia  KIKI  S/P Exp lap   Subjective interval History :  53 y.o. woman with morbid obesity, Dysfunctional uterine bleeding and previous Endometrial Biopsy and is followed by Gynecology and last office visit  and now admitted with acute abdominal pain and not feeling well CT scan with SBO and concern for infection taken to OR on   s/p Exlap ,ometectomy, Left salpingectocmy and removal of Fibroid and repair of serosal tear with HD line placement. She is now extubated in ICU and  Her WBC continue to rise despite IV abx and tissue cx in process. No h/o STDs no HSV or HPV. H/o IUD from  to  and no recent  instrumentation. Labs abnormal with WBC elevation, lactic acidosis, KIKI and Lft elevation on on going we are consulted for recommendations. Lying in the bed. Complaining of some abdominal pain and fullness. She just came back from abdominal x-ray. Tolerating liquid diet no nausea no vomiting.         Past Medical History:    Past Medical History:   Diagnosis Date    Hypertension     Obesity        Past Surgical History:    Past Surgical History:   Procedure Laterality Date     SECTION      LAPAROTOMY N/A 2020    EXPLORATORY LAPAROTOMY, OMENTECTOMY, LEFT SALPINGECTOMY WITH REMOVAL OF FIBROID performed by Kamron Aggarwal MD at [place] [address]       Current Medications:    Outpatient Medications Marked as Taking for the 20 encounter [facility]OS[facility] Encounter)   Medication Sig Dispense Refill    ferrous sulfate (FE TABS) 325 (65 Fe) MG EC tablet Take 1 tablet by mouth 2 times daily 60 tablet 3    Multiple Vitamins-Minerals (THERAPEUTIC MULTIVITAMIN-MINERALS) tablet Take 1 tablet by mouth daily 30 tablet 11    equal, round, and reactive to light, conjunctivae normal  ENT: tympanic membrane, external ear and ear canal normal bilaterally, nose without deformity, nasal mucosa and turbinates normal without polyps  Neck: supple and non-tender without mass, no thyromegaly  no cervical lymphadenopathy  Pulmonary/Chest: bi basal crepts + bilaterally- no wheezes, rales or rhonchi, normal air movement, in respiratory distress  Cardiovascular: normal rate, regular rhythm, normal S1 and S2, no murmurs, rubs, clicks, or gallops, no carotid bruits  Abdomen: soft, -tender, distended,+  bowel sound  masses or organomegaly midline incision dressing+  MECHE drain +   Extremities: no cyanosis, clubbing or +edema  Musculoskeletal: normal range of motion, no joint swelling, deformity or tenderness  Neurologic: reflexes normal and symmetric, no cranial nerve deficit  Psych:  Orientation, sensorium, mood normal  Lines:  IJ+  Jaime+              Data Review:    Lab Results   Component Value Date    WBC 29.9 (H) 07/14/2020    HGB 7.5 (L) 07/14/2020    HCT 24.0 (L) 07/14/2020    MCV 65.6 (L) 07/14/2020     07/14/2020     Lab Results   Component Value Date    CREATININE 2.4 (H) 07/14/2020    BUN 63 (H) 07/14/2020     07/14/2020    K 3.2 (L) 07/14/2020     07/14/2020    CO2 19 (L) 07/14/2020       Hepatic Function Panel:   Lab Results   Component Value Date    ALKPHOS 105 07/14/2020    ALT 20 07/14/2020    AST 18 07/14/2020    PROT 6.4 07/14/2020    BILITOT 1.0 07/14/2020    BILIDIR 0.7 07/14/2020    IBILI 0.3 07/14/2020    LABALBU 2.0 07/14/2020       UA:  Lab Results   Component Value Date    COLORU DK YELLOW 07/07/2020    CLARITYU CLOUDY 07/07/2020    GLUCOSEU 100 07/07/2020    BILIRUBINUR SMALL 07/07/2020    KETUA Negative 07/07/2020    SPECGRAV 1.015 07/07/2020    BLOODU LARGE 07/07/2020    PHUR 6.0 07/07/2020    PROTEINU 100 07/07/2020    UROBILINOGEN 1.0 07/07/2020    NITRU Negative 07/07/2020    LEUKOCYTESUR SMALL 07/07/2020 LABMICR YES 07/07/2020    URINETYPE NotGiven 07/07/2020      Urine Microscopic:   Lab Results   Component Value Date    BACTERIA 1+ 07/07/2020    COMU see below 07/07/2020    HYALCAST 1 07/07/2020    WBCUA 26 07/07/2020    RBCUA  07/07/2020    EPIU 2 07/07/2020     Creat  4.5   Lactic acid  5.8  Down to  1.9      WBC  36.5      hB  11.2  DOWN TO  7.5     MICRO: cultures reviewed and updated by me            Culture, Tissue [[id number]] Collected: 07/08/20 1644   Order Status: Completed Specimen: Tissue Updated: 07/10/20 0904    Gram Stain Result No Epithelial Cells seen   No WBCs or organisms seen     WOUND/ABSCESS No growth to date    Anaerobic Culture --    Anaerobic culture further report to follow   No anaerobes isolated so far, Further report to follow    Narrative:     ORDER#: [id number]                          [facility]  SOURCE: Tissue Omentum                     COLLECTED:  07/08/20 16:44  [facility] [place] :                      RECEIVED :  07/08/20 17:13  Performed at:  [facility]  [address] [place] [facility] [place] [facility] [address]   Phone [phone]   C.trachomatis N.gonorrhoeae DNA, Urine [[id number]] Collected: 07/08/20 1835   Order Status: Sent Specimen: Urine Updated: 07/09/20 0110   HSV PCR [[id number]] Collected: 07/08/20 0000   Order Status: Sent Specimen: Blood Updated: 07/08/20 1426   HSV PCR [[id number]]    Order Status: Canceled Specimen: Blood    HSV PCR [[id number]]    Order Status: Canceled Specimen: Blood    HSV PCR [[id number]]    Order Status: Canceled Specimen: Blood    Culture, Blood 1 [[id number]] Collected: 07/06/20 1536   Order Status: Completed Specimen: Blood Updated: 07/07/20 1715    Blood Culture, Routine No Growth to date.  Any change in status will be called.    Narrative:     ORDER#: [id number]                          ORDERED BY: DANIAL LYNN  SOURCE: Blood                              COLLECTED:  07/06/20 15:36  [facility]ICS [facility] [place] :                      RECEIVED :  07/06/20 15:42  If child <=2 yrs old please draw pediatric bottle. ~Blood Culture #1  Performed at:  [facility]  [address] [address], [facility] [address]   Phone [phone]   Culture, Blood 2 [[id number]] Collected: 07/06/20 1602   Order Status: Completed Specimen: Blood Updated: 07/07/20 1715    Culture, Blood 2 No Growth to date.  Any change in status will be called. Narrative:     ORDER#: [id number]                          ORDERED BY: DANIAL LYNN  SOURCE: Blood                              COLLECTED:  07/06/20 16:02  [facility]ICS [facility] [place] :                      RECEIVED :  07/06/20 16:09  If child <=2 yrs old please draw pediatric bottle. ~Blood Culture #2  Performed at:  [facility]  [address], [employer] [address]   Phone [phone]            Culture, Tissue [[id number]]  (Abnormal)  Collected: 07/08/20 1644    Order Status: Completed  Specimen: Tissue  Updated: 07/11/20 1643     Gram Stain Result  No Epithelial Cells seen   No WBCs or organisms seen     Organism  Streptococcus anginosusAbnormal       WOUND/ABSCESS  --     Rare growth   No further workup     Organism  Candida albicansAbnormal       WOUND/ABSCESS  --     Rare growth   No further workup     Organism  Fusobacterium necrophorum ssp necrophorumAbnormal       Anaerobic Culture  --     Light growth   No further workup     Organism  Peptostreptococcus anaerobiusAbnormal       Anaerobic Culture  --     Light growth   No further workup    Narrative:      ORDER#: [id number]                          [facility]: [facility]   SOURCE: Tissue Omentum                     COLLECTED:  07/08/20 16:44   ANTIB[facility]ICS [facility] [place] :                      RECEIVED :  07/08/20 17:13   Performed at:   [facility]   [address] [address], [facility] [address]   Phone [phone] IMAGING:    CT ABDOMEN PELVIS WO CONTRAST Additional Contrast? None   Final Result   Evaluation is limited by lack of IV and oral contrast.      Diffuse soft tissue edema, new. Small pleural effusions and patchy   consolidation in the lung bases, atelectasis versus infection. Postsurgical changes which are new. Surgical drain terminates in the pelvis. There is small to moderate volume ill-defined fluid along the anterior   abdomen and pelvis. A few tiny foci of gas noted anteriorly which likely are   related to recent surgery. No large amount of free air. XR CHEST PORTABLE   Final Result   Status post placement of right internal jugular central venous catheter,   without gross pneumothorax. Endotracheal tube tip is approximately 1.3 cm proximal to the kelly and   could be retracted approximately 1-2 cm. Low volume study with basilar atelectasis. Findings were called by the radiology call center. XR ABDOMEN (2 VIEWS)   Final Result   Obstructed bowel gas pattern, similar to prior CT         MRI ABDOMEN WO CONTRAST MRCP   Final Result   No biliary ductal obstruction. No findings to explain elevated LFTs. CT ABDOMEN PELVIS WO CONTRAST Additional Contrast? None   Final Result   Mid to distal small bowel obstruction         US GALLBLADDER RUQ   Final Result   Questionable small amount of sludge within the gallbladder. Otherwise   unremarkable right upper quadrant ultrasound.                All the pertinent images and reports for the current Hospitalization were reviewed by me     Scheduled Meds:   cloNIDine  0.2 mg Oral TID    fluconazole  200 mg Intravenous Q24H    metroNIDAZOLE  500 mg Intravenous Q8H    piperacillin-tazobactam  2.25 g Intravenous Q8H    cloNIDine  1 patch Transdermal Weekly    pantoprazole  40 mg Intravenous Daily    And    sodium chloride (PF)  10 mL Intravenous Daily    sodium chloride flush  10 mL Intravenous 2 times per day    [Held by provider] heparin (porcine)  5,000 Units Subcutaneous BID    insulin lispro  0-6 Units Subcutaneous TID WC    insulin lispro  0-3 Units Subcutaneous Nightly       Continuous Infusions:   lactated ringers 100 mL/hr at 07/14/20 0345    dextrose         PRN Meds:  acetaminophen **OR** acetaminophen, glucose, dextrose, glucagon (rDNA), dextrose, sodium chloride flush, promethazine **OR** ondansetron, iopamidol      Assessment:     Patient Active Problem List   Diagnosis    EMERY (obstructive sleep apnea)    [facility]acute [facility] failure) ([facility]    SBO [facility]small bowel obstruction) [facility]    Leukocytosis    Septicemia (HCC)    Acidosis    [facility]) [facility]    Gallbladder disease    Hypertension    Type 2 diabetes mellitus with hyperglycemia (HCC)    Peritonitis (HCC)    Lactic acidosis    Acute pulmonary insufficiency    Metabolic acidosis    Elevated LFTs     Severe sepsis   Lactic acidosis  WBC elevation   KIKI   Purulent peritonitis per OR description  Admitted with abd pain and SBO  S/p Exp lap , omentectomy, Left salpingectomy and removal of Fibroid and repair of serosal tear on 7/8   Lft elevation  Anemia  Thrombocytosis     She remains very ill in ICU  noted purulent peritonitis and per OP notes no bowel leak but Left Tuboovarian abscess suspected based on the presentation and pathology is pending.    OR cx now strep anginosus, candida and Fusobacterium  necrophorum and Peptostreptococcus     Recent Gynecology eval was normal and previous Chlamydia and GC screen including HPV negative        Would suspected mixed infectious process GI and  seth to be in volved in the process        On going WBC elevation and slow recovery given the mixed infectious process Risk for Intra abdominal abscess    CT abd/pelvis check follow up no leak no abscess    WBC slow trend down bu GI function slowly improving     Labs, Microbiology, Radiology and all the pertinent results from current

## 2020-07-15 ENCOUNTER — APPOINTMENT (OUTPATIENT)
Dept: GENERAL RADIOLOGY | Age: 50
DRG: 853 | End: 2020-07-15
Payer: COMMERCIAL

## 2020-07-15 LAB
A/G RATIO: 0.4 (ref 1.1–2.2)
ALBUMIN SERPL-MCNC: 1.8 G/DL (ref 3.4–5)
ALP BLD-CCNC: 85 U/L (ref 40–129)
ALT SERPL-CCNC: 15 U/L (ref 10–40)
ANION GAP SERPL CALCULATED.3IONS-SCNC: 11 MMOL/L (ref 3–16)
AST SERPL-CCNC: 13 U/L (ref 15–37)
BASOPHILS ABSOLUTE: 0.2 K/UL (ref 0–0.2)
BASOPHILS RELATIVE PERCENT: 0.8 %
BILIRUB SERPL-MCNC: 0.8 MG/DL (ref 0–1)
BILIRUBIN DIRECT: 0.5 MG/DL (ref 0–0.3)
BILIRUBIN, INDIRECT: 0.3 MG/DL (ref 0–1)
BUN BLDV-MCNC: 43 MG/DL (ref 7–20)
CALCIUM SERPL-MCNC: 6.8 MG/DL (ref 8.3–10.6)
CHLORIDE BLD-SCNC: 108 MMOL/L (ref 99–110)
CO2: 19 MMOL/L (ref 21–32)
CREAT SERPL-MCNC: 1.6 MG/DL (ref 0.6–1.1)
EOSINOPHILS ABSOLUTE: 0.1 K/UL (ref 0–0.6)
EOSINOPHILS RELATIVE PERCENT: 0.3 %
GFR AFRICAN AMERICAN: 41
GFR NON-AFRICAN AMERICAN: 34
GLOBULIN: 4.3 G/DL
GLUCOSE BLD-MCNC: 142 MG/DL (ref 70–99)
GLUCOSE BLD-MCNC: 157 MG/DL (ref 70–99)
GLUCOSE BLD-MCNC: 174 MG/DL (ref 70–99)
GLUCOSE BLD-MCNC: 183 MG/DL (ref 70–99)
GLUCOSE BLD-MCNC: 198 MG/DL (ref 70–99)
HCT VFR BLD CALC: 25.1 % (ref 36–48)
HEMATOLOGY PATH CONSULT: NO
HEMOGLOBIN: 7.5 G/DL (ref 12–16)
INR BLD: 1.53 (ref 0.86–1.14)
LYMPHOCYTES ABSOLUTE: 1.1 K/UL (ref 1–5.1)
LYMPHOCYTES RELATIVE PERCENT: 4.2 %
MAGNESIUM: 1.4 MG/DL (ref 1.8–2.4)
MCH RBC QN AUTO: 20.4 PG (ref 26–34)
MCHC RBC AUTO-ENTMCNC: 30.1 G/DL (ref 31–36)
MCV RBC AUTO: 67.9 FL (ref 80–100)
MONOCYTES ABSOLUTE: 1.1 K/UL (ref 0–1.3)
MONOCYTES RELATIVE PERCENT: 4 %
NEUTROPHILS ABSOLUTE: 24.9 K/UL (ref 1.7–7.7)
NEUTROPHILS RELATIVE PERCENT: 90.7 %
PDW BLD-RTO: 23.4 % (ref 12.4–15.4)
PERFORMED ON: ABNORMAL
PHOSPHORUS: 4.3 MG/DL (ref 2.5–4.9)
PLATELET # BLD: 419 K/UL (ref 135–450)
PMV BLD AUTO: 8 FL (ref 5–10.5)
POTASSIUM REFLEX MAGNESIUM: 3.1 MMOL/L (ref 3.5–5.1)
PROTHROMBIN TIME: 17.8 SEC (ref 10–13.2)
RBC # BLD: 3.7 M/UL (ref 4–5.2)
SODIUM BLD-SCNC: 138 MMOL/L (ref 136–145)
TOTAL PROTEIN: 6.1 G/DL (ref 6.4–8.2)
WBC # BLD: 27.4 K/UL (ref 4–11)

## 2020-07-15 PROCEDURE — C9113 INJ PANTOPRAZOLE SODIUM, VIA: HCPCS | Performed by: INTERNAL MEDICINE

## 2020-07-15 PROCEDURE — 6370000000 HC RX 637 (ALT 250 FOR IP): Performed by: INTERNAL MEDICINE

## 2020-07-15 PROCEDURE — 6360000002 HC RX W HCPCS: Performed by: INTERNAL MEDICINE

## 2020-07-15 PROCEDURE — 94760 N-INVAS EAR/PLS OXIMETRY 1: CPT

## 2020-07-15 PROCEDURE — 84100 ASSAY OF PHOSPHORUS: CPT

## 2020-07-15 PROCEDURE — 2060000000 HC ICU INTERMEDIATE R&B

## 2020-07-15 PROCEDURE — 2580000003 HC RX 258: Performed by: INTERNAL MEDICINE

## 2020-07-15 PROCEDURE — 99233 SBSQ HOSP IP/OBS HIGH 50: CPT | Performed by: INTERNAL MEDICINE

## 2020-07-15 PROCEDURE — 85610 PROTHROMBIN TIME: CPT

## 2020-07-15 PROCEDURE — 99024 POSTOP FOLLOW-UP VISIT: CPT | Performed by: SURGERY

## 2020-07-15 PROCEDURE — 85025 COMPLETE CBC W/AUTO DIFF WBC: CPT

## 2020-07-15 PROCEDURE — 97530 THERAPEUTIC ACTIVITIES: CPT

## 2020-07-15 PROCEDURE — 2500000003 HC RX 250 WO HCPCS: Performed by: INTERNAL MEDICINE

## 2020-07-15 PROCEDURE — 83735 ASSAY OF MAGNESIUM: CPT

## 2020-07-15 PROCEDURE — 74018 RADEX ABDOMEN 1 VIEW: CPT

## 2020-07-15 PROCEDURE — 80053 COMPREHEN METABOLIC PANEL: CPT

## 2020-07-15 PROCEDURE — 97535 SELF CARE MNGMENT TRAINING: CPT

## 2020-07-15 PROCEDURE — 36415 COLL VENOUS BLD VENIPUNCTURE: CPT

## 2020-07-15 PROCEDURE — 6370000000 HC RX 637 (ALT 250 FOR IP): Performed by: SURGERY

## 2020-07-15 PROCEDURE — 6360000002 HC RX W HCPCS: Performed by: SURGERY

## 2020-07-15 RX ORDER — MAGNESIUM SULFATE 1 G/100ML
1 INJECTION INTRAVENOUS ONCE
Status: COMPLETED | OUTPATIENT
Start: 2020-07-15 | End: 2020-07-15

## 2020-07-15 RX ORDER — MAGNESIUM SULFATE HEPTAHYDRATE 500 MG/ML
1 INJECTION, SOLUTION INTRAMUSCULAR; INTRAVENOUS ONCE
Status: DISCONTINUED | OUTPATIENT
Start: 2020-07-15 | End: 2020-07-15

## 2020-07-15 RX ORDER — MAGNESIUM SULFATE 1 G/100ML
1 INJECTION INTRAVENOUS PRN
Status: DISCONTINUED | OUTPATIENT
Start: 2020-07-15 | End: 2020-07-24 | Stop reason: HOSPADM

## 2020-07-15 RX ORDER — POTASSIUM CHLORIDE 7.45 MG/ML
10 INJECTION INTRAVENOUS PRN
Status: DISCONTINUED | OUTPATIENT
Start: 2020-07-15 | End: 2020-07-24 | Stop reason: HOSPADM

## 2020-07-15 RX ADMIN — CLONIDINE HYDROCHLORIDE 0.2 MG: 0.1 TABLET ORAL at 21:43

## 2020-07-15 RX ADMIN — POTASSIUM CHLORIDE 10 MEQ: 7.46 INJECTION, SOLUTION INTRAVENOUS at 17:38

## 2020-07-15 RX ADMIN — FLUCONAZOLE 200 MG: 200 INJECTION, SOLUTION INTRAVENOUS at 18:44

## 2020-07-15 RX ADMIN — HEPARIN SODIUM 5000 UNITS: 5000 INJECTION INTRAVENOUS; SUBCUTANEOUS at 21:43

## 2020-07-15 RX ADMIN — METRONIDAZOLE 500 MG: 500 INJECTION, SOLUTION INTRAVENOUS at 05:14

## 2020-07-15 RX ADMIN — CLONIDINE HYDROCHLORIDE 0.2 MG: 0.1 TABLET ORAL at 13:51

## 2020-07-15 RX ADMIN — PIPERACILLIN AND TAZOBACTAM 2.25 G: 2; .25 INJECTION, POWDER, LYOPHILIZED, FOR SOLUTION INTRAVENOUS at 00:00

## 2020-07-15 RX ADMIN — HEPARIN SODIUM 5000 UNITS: 5000 INJECTION INTRAVENOUS; SUBCUTANEOUS at 09:54

## 2020-07-15 RX ADMIN — SODIUM CHLORIDE, POTASSIUM CHLORIDE, SODIUM LACTATE AND CALCIUM CHLORIDE: 600; 310; 30; 20 INJECTION, SOLUTION INTRAVENOUS at 16:47

## 2020-07-15 RX ADMIN — POTASSIUM CHLORIDE 10 MEQ: 7.46 INJECTION, SOLUTION INTRAVENOUS at 16:37

## 2020-07-15 RX ADMIN — METRONIDAZOLE 500 MG: 500 INJECTION, SOLUTION INTRAVENOUS at 21:42

## 2020-07-15 RX ADMIN — PANTOPRAZOLE SODIUM 40 MG: 40 INJECTION, POWDER, FOR SOLUTION INTRAVENOUS at 09:51

## 2020-07-15 RX ADMIN — PIPERACILLIN AND TAZOBACTAM 2.25 G: 2; .25 INJECTION, POWDER, LYOPHILIZED, FOR SOLUTION INTRAVENOUS at 06:22

## 2020-07-15 RX ADMIN — INSULIN LISPRO 1 UNITS: 100 INJECTION, SOLUTION INTRAVENOUS; SUBCUTANEOUS at 12:36

## 2020-07-15 RX ADMIN — POTASSIUM CHLORIDE 10 MEQ: 7.46 INJECTION, SOLUTION INTRAVENOUS at 22:44

## 2020-07-15 RX ADMIN — CLONIDINE HYDROCHLORIDE 0.2 MG: 0.1 TABLET ORAL at 09:51

## 2020-07-15 RX ADMIN — POTASSIUM CHLORIDE 10 MEQ: 7.46 INJECTION, SOLUTION INTRAVENOUS at 23:53

## 2020-07-15 RX ADMIN — AMLODIPINE BESYLATE 5 MG: 5 TABLET ORAL at 09:51

## 2020-07-15 RX ADMIN — Medication 10 ML: at 09:57

## 2020-07-15 RX ADMIN — MAGNESIUM SULFATE HEPTAHYDRATE 1 G: 1 INJECTION, SOLUTION INTRAVENOUS at 14:00

## 2020-07-15 RX ADMIN — INSULIN LISPRO 1 UNITS: 100 INJECTION, SOLUTION INTRAVENOUS; SUBCUTANEOUS at 21:43

## 2020-07-15 RX ADMIN — METRONIDAZOLE 500 MG: 500 INJECTION, SOLUTION INTRAVENOUS at 12:36

## 2020-07-15 RX ADMIN — INSULIN LISPRO 1 UNITS: 100 INJECTION, SOLUTION INTRAVENOUS; SUBCUTANEOUS at 17:37

## 2020-07-15 RX ADMIN — PIPERACILLIN AND TAZOBACTAM 3.38 G: 3; .375 INJECTION, POWDER, LYOPHILIZED, FOR SOLUTION INTRAVENOUS at 15:30

## 2020-07-15 RX ADMIN — INSULIN LISPRO 1 UNITS: 100 INJECTION, SOLUTION INTRAVENOUS; SUBCUTANEOUS at 09:50

## 2020-07-15 ASSESSMENT — PAIN DESCRIPTION - PROGRESSION
CLINICAL_PROGRESSION: NOT CHANGED
CLINICAL_PROGRESSION: NOT CHANGED

## 2020-07-15 ASSESSMENT — PAIN DESCRIPTION - PAIN TYPE
TYPE: ACUTE PAIN;SURGICAL PAIN
TYPE: ACUTE PAIN;SURGICAL PAIN

## 2020-07-15 ASSESSMENT — PAIN DESCRIPTION - FREQUENCY: FREQUENCY: INTERMITTENT

## 2020-07-15 ASSESSMENT — PAIN DESCRIPTION - DESCRIPTORS
DESCRIPTORS: CRAMPING
DESCRIPTORS: OTHER (COMMENT)

## 2020-07-15 ASSESSMENT — PAIN DESCRIPTION - LOCATION
LOCATION: ANKLE
LOCATION: ABDOMEN

## 2020-07-15 ASSESSMENT — PAIN DESCRIPTION - ONSET
ONSET: ON-GOING
ONSET: ON-GOING

## 2020-07-15 ASSESSMENT — PAIN SCALES - GENERAL
PAINLEVEL_OUTOF10: 4
PAINLEVEL_OUTOF10: 2

## 2020-07-15 ASSESSMENT — PAIN - FUNCTIONAL ASSESSMENT
PAIN_FUNCTIONAL_ASSESSMENT: ACTIVITIES ARE NOT PREVENTED
PAIN_FUNCTIONAL_ASSESSMENT: ACTIVITIES ARE NOT PREVENTED

## 2020-07-15 NOTE — PROGRESS NOTES
Nephrology Note  [phone]  [phone]   [email or web address]      CC: KIKI    Subjective:    HPI:  Patient is post op exploration and evacuation of abdominal infection. On multiple antibiotcs per ID. KIKI is improving Cr down to 2.4 mg today UOP 2300 ml/24 hours   ROS:  In bed. No dyspnea Taking liquids PO  PMFSH:  medications reviewed. No family present. Objective:  Blood pressure (!) 157/92, pulse 92, temperature 97.3 °F (36.3 °C), temperature source Oral, resp. rate 16, height 4' 11\" (1.499 m), weight 255 lb 8.2 oz (115.9 kg), SpO2 96 %, not currently breastfeeding. Intake/Output Summary (Last 24 hours) at 7/15/2020 1235  Last data filed at 7/15/2020 1108  Gross per 24 hour   Intake 480 ml   Output 2320 ml   Net -1840 ml     General:  NAD, A+Ox3  Chest:  Normal respiratory effort   CVS:  No rub no gallop   Abdominal:  Staples in place Drain in place  Extremities:  Trace  edema  Skin:  no rash  Jaime in place    Labs:  Renal panel:  Lab Results   Component Value Date/Time     07/15/2020 09:08 AM    K 3.1 (L) 07/15/2020 09:08 AM    CO2 19 (L) 07/15/2020 09:08 AM    BUN 43 (H) 07/15/2020 09:08 AM    CREATININE 1.6 (H) 07/15/2020 09:08 AM    CALCIUM 6.8 (L) 07/15/2020 09:08 AM    PHOS 4.3 07/15/2020 09:08 AM    MG 1.40 (L) 07/15/2020 09:08 AM     CBC:  Lab Results   Component Value Date/Time    WBC 27.4 (H) 07/15/2020 09:08 AM    HGB 7.5 (L) 07/15/2020 09:08 AM    HCT 25.1 (L) 07/15/2020 09:08 AM     07/15/2020 09:08 AM       Assessment/Plan:  Reviewed old records and labs.     1) KIKI              - baseline 06/03/20 Cr 0.6, Cr as high as 4.7 now 3.3 making more urine               -appears to be   ATN/  pre-renal factors with prior use of benazepril and ibuprofen   Note has  dialysis catheter by Dr. Temple Phoenix  KIKI improving Cr down to 2.4 mg today       2) Purulent peritenonitis with marked leukocytosis and WBC trending up still               - per surgery and ID       3) ID              - on empiric zosyn, Diflucan Flagyl     4) FEN    continue LR until PO intake improves                - hypocalcemia check ionized Calcium Albumin low at 1.8 mg

## 2020-07-15 NOTE — DISCHARGE INSTR - COC
Continuity of Care Form    Patient Name: Néstor Reza   :  1970  MRN:  [id number]    Admit date:  2020  Discharge date:  2020    Code Status Order: Full Code   Advance Directives:   [facility] Documentation     Date/Time Healthcare Directive Type of Healthcare Directive Copy in [facility] [address] Agent's Name Healthcare Agent's Phone Number    20 1157  No, patient does not have an advance directive for healthcare treatment -- -- -- -- --    20 2226  No, patient does not have an advance directive for healthcare treatment -- -- -- -- --          Admitting Physician:  Anselmo Anderson MD  PCP: Anselmo Anderson MD    Discharging Nurse: [address] [place] [address] Unit/Room#: [address] Unit Phone Number: [phone]     Emergency Contact:   Extended Emergency Contact Information  Primary Emergency Contact: Triductor Phone: [phone]  Mobile Phone: [phone]  Relation: Parent  Secondary Emergency Contact: Merlyn Wagner  [place] Phone: [phone]  Relation: Brother/Sister    Past Surgical History:  Past Surgical History:   Procedure Laterality Date     SECTION      LAPAROTOMY N/A 2020    EXPLORATORY LAPAROTOMY, OMENTECTOMY, LEFT SALPINGECTOMY WITH REMOVAL OF FIBROID performed by Cas Angelo MD at [place] [address]       Immunization History: There is no immunization history on file for this patient.     Active Problems:  Patient Active Problem List   Diagnosis Code    EMERY (obstructive sleep apnea) G47.33    ARF (acute renal failure) ([facility]) N17.9    SBO (small bowel obstruction) ([facility]) K56.609    Leukocytosis D72.829    Septicemia ([facility]) A41.9    Acidosis E87.2    KIKI (acute kidney injury) ([facility] [address] N17.9    Gallbladder disease K82.9    Hypertension I10    Type 2 diabetes mellitus with hyperglycemia ([facility]) E11.65    Peritonitis ([facility]) K65.9    Lactic acidosis E87.2    Acute pulmonary insufficiency Therapies: Physical Therapy and Occupational Therapy  Weight Bearing Status/Restrictions: No weight bearing restirctions  Other Medical Equipment (for information only, NOT a DME order):  hospital bed  Other Treatments:     Patient's personal belongings (please select all that are sent with patient):  None    RN SIGNATURE:  Electronically signed by Adolph Beach RN on 7/24/20 at 4:28 PM EDT    CASE MANAGEMENT/SOCIAL WORK SECTION    Inpatient Status Date:  7/6/2020    Readmission Risk Assessment Score:  Readmission Risk              Risk of Unplanned Readmission:        21           Discharging to Facility/ Agency   · Name: Timmy   · Address:  [facility] [place], [address]  · Phone: [phone]  · Fax:  [phone]     Dialysis Facility (if applicable)   · Name:  · Address:  · Dialysis Schedule:  · Phone:  · Fax:    / signature: Electronically signed by Re Khan on 7/20/2020 at 3:01 PM      PHYSICIAN SECTION    Prognosis: Good    Condition at Discharge: Stable    Rehab Potential (if transferring to Rehab): Good    Recommended Labs or Other Treatments After Discharge:  Wound care: change midline dressing daily. Wet to dry. Change outer dry dressing daily and as needed if soiled. Surgical drain removal site right abdomen: keep covered with dry gauze until scab forms, then OK to leave open to air. Dr. Alisson Santa -call office with questions [phone]      IV Zosyn x 3.375 mg Q x 8 hrs stop date x 8/7  Oral Fluconazole x 400 mg x daily x stop date x 8/7  CBC with diff, CMP, ESR, CRP weekly  Fax results to [phone]     [address] [facility] Physician  Phone: [phone]   Fax : [phone]        Physician Certification: I certify the above information and transfer of Rickey Rubio  is necessary for the continuing treatment of the diagnosis listed and that she requires Marquis Gavino for greater 30 days.      Update Admission H&P: No change in H&P    PHYSICIAN SIGNATURE:  Electronically signed by Anselmo Anderson MD on 7/24/20 at 3:38 PM EDT

## 2020-07-15 NOTE — PROGRESS NOTES
[facility] [phone]                                     Daily Progress Note                                                         Pt Name: Biju Mccracken  Medical Record Number: [id number]  Date of Birth 1970   Today's Date: 7/15/2020  Chief Complaint   Patient presents with    Abdominal Pain     for 8 days was seen at [facility] and told she bowel inflammation  No emesis        ASSESSMENT/PLAN  Diffuse purulent peritonitis, suspicious for ruptured TOA  -7/8 exploratory laparotomy, omentectomy, left salpingectomy with removal of fibroid, repair serosal tear, US guided right IJ vascath. Drain SS.   -+flatulence and BMs but reports nausea and pain overnight. Controlled with meds  -WBC trending down. Drain serous. Vitals stable. Continue abx and liquids for now. Consider repeat CT with PO contrast if acutely worsens    KIKI  -vas cath in place, nephro following  -Cr continues to improve    SUBJECTIVE  Nurys has unchanged from yesterday. Pain is well controlled. She has nausea but no vomiting. She has passed flatus and has had a bowel movement. She is tolerating liquids     OBJECTIVE  VITALS:  height is 4' 11\" (1.499 m) and weight is 255 lb 8.2 oz (115.9 kg). Her oral temperature is 97.3 °F (36.3 °C). Her blood pressure is 157/92 (abnormal) and her pulse is 92. Her respiration is 16 and oxygen saturation is 96%. INTAKE/OUTPUT:      Intake/Output Summary (Last 24 hours) at 7/15/2020 1312  Last data filed at 7/15/2020 1108  Gross per 24 hour   Intake 480 ml   Output 2320 ml   Net -1840 ml     GENERAL: alert, no distress  LUNGS: clear to ausculation, without wheezes, rales or rhonci  HEART: normal rate and regular rhythm  ABDOMEN: soft, diffuse tenderness, incision c/d/i. Drain SS.    EXTREMITY: no cyanosis, clubbing or edema    I/O last 3 completed shifts:  In: -   Out: 2320 [Urine:2300; Drains:20]      LABS  Recent Labs 07/15/20  0908   WBC 27.4*   HGB 7.5*   HCT 25.1*         K 3.1*      CO2 19*   BUN 43*   CREATININE 1.6*   MG 1.40*   PHOS 4.3   CALCIUM 6.8*   INR 1.53*   AST 13*   ALT 15   BILITOT 0.8   BILIDIR 0.5*     Electronically signed by Dorothy Lopez MD on 7/15/2020 at 1:12 PM

## 2020-07-15 NOTE — PROGRESS NOTES
Data- discharge order received, pt verbalized agreement to discharge, disposition to previous residence, no needs for HHC/DME. Action- discharge instructions prepared and given to pt, pt verbalized understanding. Medication information packet given r/t NEW and/or CHANGED prescriptions emphasizing name/purpose/side effects, pt verbalized understanding. Discharge instruction summary: Primary Care Physician as follows: [place] Philippe [phone] f/u appointment to be made within a week. Response- Pt belongings gathered, IV removed. Disposition is home (no HHC/DME needs), transported by family. Awaiting PCA to transport to [facility].

## 2020-07-15 NOTE — PROGRESS NOTES
Infectious Disease Follow up Notes  Admit Date: 2020  Hospital Day: 10    Antibiotics :   IV Zosyn   IV Fluconazole  IV Flagyl      CHIEF COMPLAINT:     Purulent peritonitis  Sepsis  WBC elevation  Anemia  KIKI  S/P Exp lap   Subjective interval History :  53 y.o. woman with morbid obesity, Dysfunctional uterine bleeding and previous Endometrial Biopsy and is followed by Gynecology and last office visit  and now admitted with acute abdominal pain and not feeling well CT scan with SBO and concern for infection taken to OR on   s/p Exlap ,ometectomy, Left salpingectocmy and removal of Fibroid and repair of serosal tear with HD line placement. She is now extubated in ICU and  Her WBC continue to rise despite IV abx and tissue cx in process. No h/o STDs no HSV or HPV. H/o IUD from  to  and no recent  instrumentation. Labs abnormal with WBC elevation, lactic acidosis, KIKI and Lft elevation on on going we are consulted for recommendations.     Sitting  Up in the bed and c/o abd pain and some bloating BM+ flatus+ tolerating IV abx ok and creat better will adjust dosing and dw RN        Past Medical History:    Past Medical History:   Diagnosis Date    Hypertension     Obesity        Past Surgical History:    Past Surgical History:   Procedure Laterality Date     SECTION      LAPAROTOMY N/A 2020    EXPLORATORY LAPAROTOMY, OMENTECTOMY, LEFT SALPINGECTOMY WITH REMOVAL OF FIBROID performed by Marychuy Nunez MD at [place] [address]       Current Medications:    Outpatient Medications Marked as Taking for the 20 encounter [facility]OS[facility] Encounter)   Medication Sig Dispense Refill    ferrous sulfate (FE TABS) 325 (65 Fe) MG EC tablet Take 1 tablet by mouth 2 times daily 60 tablet 3    Multiple Vitamins-Minerals (THERAPEUTIC MULTIVITAMIN-MINERALS) tablet Take 1 tablet by mouth daily 30 tablet 11    furosemide (LASIX) 20 MG tablet Take 1 tablet by mouth 2 times daily 60 tablet 3    metFORMIN (GLUCOPHAGE) 500 MG tablet Take 1 tablet by mouth 2 times daily (with meals) 60 tablet 5    amLODIPine-benazepril (LOTREL) 5-20 MG per capsule Take 1 capsule by mouth daily 30 capsule 0    vitamin D (ERGOCALCIFEROL) 1.25 MG (50368 UT) CAPS capsule Take 1 capsule by mouth once a week 4 capsule 5       Allergies:  Patient has no known allergies. Immunizations : There is no immunization history on file for this patient. Social History:    Social History     Tobacco Use    Smoking status: Never Smoker    Smokeless tobacco: Never Used   Substance Use Topics    Alcohol use: No    Drug use: No     Social History     Tobacco Use   Smoking Status Never Smoker   Smokeless Tobacco Never Used      Family History : Other (Other) Other   ovarian cancer- moteher    Other (Other) Other   sister with \"traces of lupus\"    Ovarian cancer Mother    Diabetes Father    Breast cancer Neg Hx      Copied from Care everywhere note 6/29    REVIEW OF SYSTEMS:    No fever / chills / sweats. No weight loss. No visual change, eye pain, eye discharge. No oral lesion, sore throat, dysphagia. Denies cough / sputum/Sob   Denies chest pain, palpitations/ dizziness  Denies nausea/ vomiting/abdominal pain/diarrhea. Denies dysuria or change in urinary function. Denies joint swelling or pain. No myalgia, arthralgia. No rashes, skin lesions   Denies focal weakness, sensory change or other neurologic symptoms  No lymph node swelling or tenderness.     abd pain, bloating, nausea+ cramps     PHYSICAL EXAM:      Vitals:    BP (!) 154/95   Pulse 92   Temp 97.3 °F (36.3 °C) (Oral)   Resp 16   Ht 4' 11\" (1.499 m)   Wt 255 lb 8.2 oz (115.9 kg)   SpO2 96%   BMI 51.61 kg/m²     General Appearance: alert,in some acute distress, +  pallor, + icterus     Skin: warm and dry, no rash or erythema  Head: normocephalic and atraumatic  Eyes: pupils equal, round, and reactive to light, conjunctivae normal  ENT: tympanic membrane, external ear and ear canal normal bilaterally, nose without deformity, nasal mucosa and turbinates normal without polyps  Neck: supple and non-tender without mass, no thyromegaly  no cervical lymphadenopathy  Pulmonary/Chest: bi basal crepts + bilaterally- no wheezes, rales or rhonchi, normal air movement, in respiratory distress  Cardiovascular: normal rate, regular rhythm, normal S1 and S2, no murmurs, rubs, clicks, or gallops, no carotid bruits  Abdomen: soft, -tender, distended,+  bowel sound  masses or organomegaly midline incision dressing+  MECHE drain +   Extremities: no cyanosis, clubbing or +edema  Musculoskeletal: normal range of motion, no joint swelling, deformity or tenderness  Neurologic: reflexes normal and symmetric, no cranial nerve deficit  Psych:  Orientation, sensorium, mood normal  Lines:  IJ+  Jaime+              Data Review:    Lab Results   Component Value Date    WBC 27.4 (H) 07/15/2020    HGB 7.5 (L) 07/15/2020    HCT 25.1 (L) 07/15/2020    MCV 67.9 (L) 07/15/2020     07/15/2020     Lab Results   Component Value Date    CREATININE 1.6 (H) 07/15/2020    BUN 43 (H) 07/15/2020     07/15/2020    K 3.1 (L) 07/15/2020     07/15/2020    CO2 19 (L) 07/15/2020       Hepatic Function Panel:   Lab Results   Component Value Date    ALKPHOS 85 07/15/2020    ALT 15 07/15/2020    AST 13 07/15/2020    PROT 6.1 07/15/2020    BILITOT 0.8 07/15/2020    BILIDIR 0.5 07/15/2020    IBILI 0.3 07/15/2020    LABALBU 1.8 07/15/2020       UA:  Lab Results   Component Value Date    COLORU DK YELLOW 07/07/2020    CLARITYU CLOUDY 07/07/2020    GLUCOSEU 100 07/07/2020    BILIRUBINUR SMALL 07/07/2020    KETUA Negative 07/07/2020    SPECGRAV 1.015 07/07/2020    BLOODU LARGE 07/07/2020    PHUR 6.0 07/07/2020    PROTEINU 100 07/07/2020    UROBILINOGEN 1.0 07/07/2020    NITRU Negative 07/07/2020    LEUKOCYTESUR SMALL 07/07/2020    LABMICR YES 07/07/2020    URINETYPE NotGiven 07/07/2020      Urine Microscopic:   Lab Results   Component Value Date    BACTERIA 1+ 07/07/2020    COMU see below 07/07/2020    HYALCAST 1 07/07/2020    WBCUA 26 07/07/2020    RBCUA  07/07/2020    EPIU 2 07/07/2020     Creat  4.5   Lactic acid  5.8  Down to  1.9      WBC  36.5      hB  11.2  DOWN TO  7.5     MICRO: cultures reviewed and updated by me            Culture, Tissue [[id number]] Collected: 07/08/20 1644   Order Status: Completed Specimen: Tissue Updated: 07/10/20 0904    Gram Stain Result No Epithelial Cells seen   No WBCs or organisms seen     WOUND/ABSCESS No growth to date    Anaerobic Culture --    Anaerobic culture further report to follow   No anaerobes isolated so far, Further report to follow    Narrative:     ORDER#: [id number]                          [facility]  SOURCE: Tissue Omentum                     COLLECTED:  07/08/20 16:44  ANT[facility]ICS AT [facility]. :                      RECEIVED :  07/08/20 17:13  Performed at:  [address] [address]   Phone [phone]   C.trachomatis N.gonorrhoeae DNA, Urine [[id number]] Collected: 07/08/20 1835   Order Status: Sent Specimen: Urine Updated: 07/09/20 0110   HSV PCR [[id number]] Collected: 07/08/20 0000   Order Status: Sent Specimen: Blood Updated: 07/08/20 1426   HSV PCR [[id number]]    Order Status: Canceled Specimen: Blood    HSV PCR [[id number]]    Order Status: Canceled Specimen: Blood    HSV PCR [[id number]]    Order Status: Canceled Specimen: Blood    Culture, Blood 1 [[id number]] Collected: 07/06/20 1536   Order Status: Completed Specimen: Blood Updated: 07/07/20 1715    Blood Culture, Routine No Growth to date.  Any change in status will be called.    Narrative:     ORDER#: [id number]                          ORDERED BY: DANIAL LYNN  SOURCE: Blood                              COLLECTED:  07/06/20 15:36  ANTIBIOTICS AT [facility]                      RECEIVED :  07/06/20 15:42  If child <=2 yrs old please draw pediatric bottle. ~Blood Culture #1  Performed at:  [facility]  [address] [place], [place] [address]   Phone [phone]   Culture, Blood 2 [[id number]] Collected: 07/06/20 1602   Order Status: Completed Specimen: Blood Updated: 07/07/20 1715    Culture, Blood 2 No Growth to date.  Any change in status will be called. Narrative:     ORDER#: [id number]                          ORDERED BY: DANIAL LYNN  SOURCE: Blood                              COLLECTED:  07/06/20 16:02  ANTIBIOTICS AT [facility] :                      RECEIVED :  07/06/20 16:09  If child <=2 yrs old please draw pediatric bottle. ~Blood Culture #2  Performed at:  [facility]  [address] [facility] [place] [place] [address]   Phone [phone]            Culture, Tissue [[id number]]  (Abnormal)  Collected: 07/08/20 1644    Order Status: Completed  Specimen: Tissue  Updated: 07/11/20 1643     Gram Stain Result  No Epithelial Cells seen   No WBCs or organisms seen     Organism  Streptococcus anginosusAbnormal       WOUND/ABSCESS  --     Rare growth   No further workup     Organism  Candida albicansAbnormal       WOUND/ABSCESS  --     Rare growth   No further workup     Organism  Fusobacterium necrophorum ssp necrophorumAbnormal       Anaerobic Culture  --     Light growth   No further workup     Organism  Peptostreptococcus anaerobiusAbnormal       Anaerobic Culture  --     Light growth   No further workup    Narrative:      ORDER#: [id number]                          [facility]   SOURCE: Tissue Omentum                     COLLECTED:  07/08/20 16:44   ANTIBIOTICS AT [facility] :                      RECEIVED :  07/08/20 17:13   Performed at:   [facility]   [address] [place], [place] [address]   Phone [phone]        IMAGING:    XR ABDOMEN (KUB) (SINGLE AP VIEW)   Final Result   1. Improved with residual dilated loops of small bowel either due to an ileus   or partial small bowel obstruction. XR ABDOMEN (KUB) (SINGLE AP VIEW)   Final Result   Increased air-filled small bowel loops concerning for developing distal small   truck shin although this may be due to ileus. CT ABDOMEN PELVIS WO CONTRAST Additional Contrast? None   Final Result   Evaluation is limited by lack of IV and oral contrast.      Diffuse soft tissue edema, new. Small pleural effusions and patchy   consolidation in the lung bases, atelectasis versus infection. Postsurgical changes which are new. Surgical drain terminates in the pelvis. There is small to moderate volume ill-defined fluid along the anterior   abdomen and pelvis. A few tiny foci of gas noted anteriorly which likely are   related to recent surgery. No large amount of free air. XR CHEST PORTABLE   Final Result   Status post placement of right internal jugular central venous catheter,   without gross pneumothorax. Endotracheal tube tip is approximately 1.3 cm proximal to the kelly and   could be retracted approximately 1-2 cm. Low volume study with basilar atelectasis. Findings were called by the radiology call center. XR ABDOMEN (2 VIEWS)   Final Result   Obstructed bowel gas pattern, similar to prior CT         MRI ABDOMEN WO CONTRAST MRCP   Final Result   No biliary ductal obstruction. No findings to explain elevated LFTs. CT ABDOMEN PELVIS WO CONTRAST Additional Contrast? None   Final Result   Mid to distal small bowel obstruction         US GALLBLADDER RUQ   Final Result   Questionable small amount of sludge within the gallbladder. Otherwise   unremarkable right upper quadrant ultrasound.                All the pertinent images and reports for the current Hospitalization were reviewed by me     Scheduled Meds:   magnesium sulfate  1 g Intravenous Once  fluconazole  200 mg Intravenous Q24H    cloNIDine  0.2 mg Oral TID    amLODIPine  5 mg Oral Daily    potassium chloride  40 mEq Oral Once    metroNIDAZOLE  500 mg Intravenous Q8H    piperacillin-tazobactam  2.25 g Intravenous Q8H    pantoprazole  40 mg Intravenous Daily    And    sodium chloride (PF)  10 mL Intravenous Daily    sodium chloride flush  10 mL Intravenous 2 times per day    heparin (porcine)  5,000 Units Subcutaneous BID    insulin lispro  0-6 Units Subcutaneous TID WC    insulin lispro  0-3 Units Subcutaneous Nightly       Continuous Infusions:   lactated ringers 100 mL/hr at 07/14/20 2122    dextrose         PRN Meds:  potassium chloride, magnesium sulfate, acetaminophen **OR** acetaminophen, glucose, dextrose, glucagon (rDNA), dextrose, sodium chloride flush, promethazine **OR** ondansetron, iopamidol      Assessment:     Patient Active Problem List   Diagnosis    EMERY (obstructive sleep apnea)    ARF (acute renal failure) (HCC)    SBO (small bowel obstruction) (HCC)    Leukocytosis    Septicemia (HCC)    Acidosis    K[facility]I [facility]acute [facility]) ([facility] [address]    Gallbladder disease    Hypertension    Type 2 diabetes mellitus with hyperglycemia (HCC)    Peritonitis (HCC)    Lactic acidosis    Acute pulmonary insufficiency    Metabolic acidosis    Elevated LFTs     Severe sepsis   Lactic acidosis  WBC elevation   KIKI   Purulent peritonitis per OR description  Admitted with abd pain and SBO  S/p Exp lap , omentectomy, Left salpingectomy and removal of Fibroid and repair of serosal tear on 7/8   Lft elevation  Anemia  Thrombocytosis     She remains very ill in ICU  noted purulent peritonitis and per OP notes no bowel leak but Left Tuboovarian abscess suspected based on the presentation and pathology is pending.    OR cx now strep anginosus, candida and Fusobacterium  necrophorum and Peptostreptococcus     Recent Gynecology eval was normal and previous Chlamydia and GC screen

## 2020-07-15 NOTE — ADT AUTH CERT
Utilization Reviews         Renal Failure, Acute - Care Day 7 (7/12/2020) by Mere Sharif RN         Review Status  Review Entered    Completed  7/15/2020 09:26        Criteria Review       Care Day: 7 Care Date: 7/12/2020 Level of Care: Intermediate Care    Guideline Day 3    Clinical Status    (X) * Mental status at baseline or improved    (X) * Respiratory status at baseline or improved    Routes    ( ) * Oral hydration, medications, and diet    7/15/2020 9:26 AM EDT by Tacos Mcdaniels      NPO  dextrose 5 % solution    Rate: 150 mL/hr    Interventions    (X) Possible dialysis    (X) Establish long-term treatment plan [H]    (X) Monitor electrolytes, renal function tests, acid-base, and volume status    (X) Diet instruction    Medications    (X) Possible medical therapies    * Milestone    Additional Notes    7/12       Vitals: BP (!) 141/81   Pulse 79   Temp 98.6 °F (37 °C) (Oral)   Resp 18   Ht 4' 11\" (1.499 m)   Wt 247 lb 2.2 oz (112.1 kg)   SpO2 100%   BMI 49.92 kg/m²       Labs       Sodium: 132 (L)    Potassium: 4.2    Chloride: 96 (L)    CO2: 17 (L)    BUN: 102 (HH)    Creatinine: 4.1 (H)    Anion Gap: 19 (H)    Calcium, Ion: 0.94 (L)    GFR Non-: 12 (A)    GFR : 14 (A)    Glucose: 122 (H)    Calcium: 6.8 (L)    Phosphorus: 8.6 (H)    Total Protein: 6.1 (L)    CRP: 140.3 (H)    Albumin: 2.0 (L)    Globulin: 4.1    Albumin/Globulin Ratio: 0.5 (L)    Alk Phos: 137 (H)    ALT: 35    AST: 47 (H)    Bilirubin: 2.3 (H)    Bilirubin, Direct: 2.0 (H)    Bilirubin, Indirect: 0.3    WBC: 29.4 (H)    RBC: 3.55 (L)    Hemoglobin Quant: 7.3 (L)    Hematocrit: 23.0 (L)       Orders    doxycycline (VIBRAMYCIN) 100 mg in dextrose 5 % 100 mL IVPB  q12h    fluconazole (DIFLUCAN) in 0.9 % sodium chloride IVPB 100 mg  q24h    metronidazole (FLAGYL) 500 mg in NaCl 100 mL IVPB q8h    piperacillin-tazobactam (ZOSYN) 2.25 g in dextrose 5 % 50 mL IVPB (mini-bag)  q8h    Protonix IV daily    dextrose 5 % solution      Rate: 150 mL/hr       Per ID    Plan: 1. Cont V Fluconazole x 100 mg daily ADJUSTED TO GFR    2.  Cont  IV Zosyn x 2.25 gm x Q 8 hrs will cover GI and  seth well    3. D/C  IV Doxycycline    4. Cont IV Flagyl x 500 mg Q 8 hrs    5. CT abd/pelvis NON CONTRAST AS  WBC not trending down    6  HIV -ve and hepatitis screen -ve      7.  ESR, CRP Now down trend     8. GC probe on urine -ve          Risk for progression high will watch remains critically ill       Per surgery    General appearance: Comfortable and in no acute distress.    Abdomen: Abdomen is soft.    Tenderness: There is tenderness in the incisional area.          Assessment & Plan         Postop from exploration and evacuation of abdominal infection                Doing better today                Up to the chair, comfortable, NG in place                Continue NG today                Antibiotics       Per GI          Per IM    Interval History: pt is alert and awake    In bed    Feels slightly better    meds are helping otherwise    Denies any other complaints         Chart reviewed. Diet: Diet NPO Effective Now       1. KIKI= as per dr Vincent Carson    2. HTN    3. Sepsis-on iv abx    4. Dm- monitor sugars    5. Anemia= monitor h/h    Peritonitis- on iv abx    Patient Active Problem List:       EMERY (obstructive sleep apnea)       ARF (acute renal failure) (HCC)       SBO (small bowel obstruction) ([address]       Leukocytosis       Septicemia ([address]       Acidosis       KIKI (acute kidney injury) ([address]       Gallbladder disease       Hypertension       Type 2 diabetes mellitus with hyperglycemia ([address]         Pt is stable. Discussed with staff  about the plan. See the orders for further plan.     Will proceed further acc to pt's progress.        Renal Failure, Acute - Care Day 5 (7/10/2020) by Mere Sharif RN         Review Status  Review Entered    Completed  7/15/2020 09:16        Criteria Review       Care Day: 5 tests, including 7/2/2020.  MRCP negative for obstruction. HAV IgM negative, EBV, CMV, HSV pending (ordered prior to etiology of acute illness). 5. KIKI: Presumed pre-renal versus ATN, nephrology consulted and following. May need CRRT versus iHD    6. Acute blood loss anemia, 2/2 ex-lap. No evidence of GI blood loss. Monitor H&H and transfuse to Hgb >7.         RECOMMENDATIONS:      1U PRBC ordered today. Monitor H&H q12 and transfuse to Hgb >7 as needed. Post-op care per Surgery and Critical Care team    Will follow along, monitor LFTs, follow up on viral serologies         Per ID    CHIEF COMPLAINT:       Purulent peritonitis    Sepsis    WBC elevation    Anemia    KIKI    S/P Exp lap 7/8    Subjective interval History :  50 y.o. woman with morbid obesity, Dysfunctional uterine bleeding and previous Endometrial Biopsy and is followed by Gynecology and last office visit 6/29 and now admitted with acute abdominal pain and not feeling well CT scan with SBO and concern for infection taken to OR on  7/8 s/p Exlap ,ometectomy, Left salpingectocmy and removal of Fibroid and repair of serosal tear with HD line placement. She is now extubated in ICU and  Her WBC continue to rise despite IV abx and tissue cx in process. No h/o STDs no HSV or HPV. H/o IUD from 2010 to 2018 and no recent  instrumentation.  Labs abnormal with WBC elevation, lactic acidosis, KIKI and Lft elevation on on going we are consulted for recommendations.         Remains sob in ICU on nasal cannula and NG tube in place no flatus making urine and WBC elevation and Hb low s/p PRBC today and creat is worse -

## 2020-07-15 NOTE — PROGRESS NOTES
97.3 °F (36.3 °C) (Oral)   Resp 16   Ht 4' 11\" (1.499 m)   Wt 255 lb 8.2 oz (115.9 kg)   SpO2 96%   BMI 51.61 kg/m²   General appearance: alert and awake  Has NGT  HEENT: Head: Normal, normocephalic, atraumatic, anicteric, pupils are reactive to light. Dry mucous membrane. Neck: no adenopathy, no carotid bruit, supple, symmetrical, trachea midline and thyroid not enlarged, symmetric, no tenderness/mass/nodules  Lungs: clear to auscultation bilaterally  Heart: regular rate and rhythm, S1, S2 normal, no murmur, click, rub or gallop  Abdomen: soft, -tender; bowel sounds normal; no masses,  no organomegaly, has drain  Incision is clean  Extremities: extremities mild edema  Neurologic: Mental status: alert and awake    Assessment and Plan:   1. KIKI= as per dr Aldo Orozco  2. HTN- monitor  3. Hypokalemia-replace  4. Sepsis-on iv abx  5. Dm- monitor sugars  6. Anemia= monitor h/h  Peritonitis- on iv abx  Patient Active Problem List:     EMERY (obstructive sleep apnea)     ARF (acute renal failure) (HCC)     SBO (small bowel obstruction) (HCC)     Leukocytosis     Septicemia (HCC)     Acidosis     KIKI (acute kidney injury) ([address]     Gallbladder disease     Hypertension     Type 2 diabetes mellitus with hyperglycemia ([address]    Pt is stable. Discussed with staff  about the plan. See the orders for further plan. Pt will benefit from LTAC  Will proceed further acc to pt's progress.   Time spent with management of the pt is-30 minutes      Electronically signed by: Dc Nelson MD, on 7/15/2020, at 1:16 PM

## 2020-07-15 NOTE — CARE COORDINATION
Case Management:  [facility] can accept patient and they tells cm she will have a $30.00 a [place] co-pay as insurance only covers 90%, they do have COVID + patients in their building but they are in a separate part of the building. [facility] has questions but they are interested in patient. They want to know iv antibiotics she may need and if she is going to need dialysis. Will follow up with [facility], and [facility] tomorrow and discuss options with patient .   Electronically signed by Carlito Love on 7/15/2020 at 5:33 PM

## 2020-07-15 NOTE — PROGRESS NOTES
Dr Ross Means notified on increased bloating and gas pain and Xray results. Denied need for NGT at this time. Requesting call back if patient begins vomiting. Will continue to monitor.

## 2020-07-15 NOTE — PROGRESS NOTES
Occupational Therapy  Facility/Department: [facility] 5W PROGRESSIVE CARE  Daily Treatment Note  NAME: Nayely Faust  : 1970  MRN: [id number]    Date of Service: 7/15/2020    Discharge Recommendations:  Patient would benefit from continued therapy after discharge, 3-5 sessions per week  OT Equipment Recommendations  Other: defer to AR facility  Nayely Faust scored a 10/24 on the AM-PAC ADL Inpatient form. Current research shows that an AM-PAC score of 17 or less is typically not associated with a discharge to the patient's home setting. Based on the patient's AM-PAC score and their current ADL deficits, it is recommended that the patient have 3-5 sessions per week of Occupational Therapy at d/c to increase the patient's independence. Please see assessment section for further patient specific details. If patient discharges prior to next session this note will serve as a discharge summary. Please see below for the latest assessment towards goals. Assessment   Performance deficits / Impairments: Decreased functional mobility ; Decreased ADL status; Decreased endurance;Decreased strength;Decreased balance;Decreased ROM  Assessment: Pt required assist of three for bed mobility and toileting s/p incontinent BM. Pt demonstrated  activity tolerance during reaching/ sitting tasks. Pt with max difficulty reaching forward to sit upright in bedside chair, requiring max A x 2. Goals were updated to reflect pts current status. Pt would benefit from continued OT while in acute care, continue OT POC. Treatment Diagnosis: decreased ADLs and transfers  OT Education: OT Role;Plan of Care;Home Exercise Program  Patient Education: verb understanding.  Educated pt on UE HEP to increase activity tolerance for ADLs- verb understanding  REQUIRES OT FOLLOW UP: Yes  Activity Tolerance  Activity Tolerance: Patient limited by fatigue;Patient limited by pain  Activity Tolerance: Pt required frequent rest breaks during reaching/ sitting balance sitting forward in bedside chair. Pt reported increased abdominal/ incisional pain with sitting forward. Noted small amount of drainage from staples, RN aware. Safety Devices  Safety Devices in place: Yes  Type of devices: Call light within reach;Nurse notified;Gait belt;Patient at risk for falls; Left in chair;Chair alarm in place         Patient Diagnosis(es): The primary encounter diagnosis was Septicemia ([facility] Diagnoses of KIKI (acute kidney injury) ([facility] Gallbladder disease, and Acidosis were also pertinent to this visit. has a past medical history of Hypertension and Obesity. has a past surgical history that includes  section and laparotomy (N/A, 2020). Restrictions  Restrictions/Precautions  Restrictions/Precautions: Fall Risk  Position Activity Restriction  Other position/activity restrictions: Recent Abdominal Surg. Abdominal MECHE Drain. Diet : Clear Liquid. Subjective   General  Chart Reviewed: Yes  Patient assessed for rehabilitation services?: Yes  Additional Pertinent Hx: Pt is a 53 y.o. female admitted to ED 2020 with abdominal pain. Pt stated that this pain began one week ago, and was seen on 20 at [facility] And was diagnosed with some gastroenteritis. Pt had a CT scan performed that showed a mid to distal small bowel obstruction. S/p exploratory laparotomy, omentectomy, left salpingectomy with removal of fibriod (N/A abdomen) on 2020. Family / Caregiver Present: No  Referring Practitioner: Linnette Krueger MD  Subjective  Subjective: Pt semi supine in bed upon arrival, agreeable to OT session for OOB. General Comment  Comments: Per RN ok for therapy      Orientation  Orientation  Overall Orientation Status: Within Normal Limits  Objective    ADL  Feeding: Setup; Beverage management  Grooming: Setup(wiping face seated in bedside chair)  UE Bathing: Maximum assistance  LE Bathing: Maximum assistance  UE Dressing: Maximum assistance(to change gown, assist to lift LEs when semi supine in bed)  Toileting: Dependent/Total(assist of three for meatal care s/p incontinent BM)  Additional Comments: pt noted with BM incontinence. assist of two to roll in bed, assist of other to wipe meatal care. Pt with decreased UE strength against gravity to don/ doff gown. Balance  Sitting Balance: Dependent/Total(max a x 2 to sit forward in bedside chair, pt with max difficulty coming forward 2/2 report of abdominal pain)  Standing Balance: Unable to assess(comment)(unable to complete 2/2 pain and difficulty reaching forward/ sitting upright)  Standing Balance  Time: unable to complete     Transfers  Transfer Comments: pt required maxi move from bed to chair, required max A x 2 to sit upright/ reach forward to stedy bars                       Cognition  Overall Cognitive Status: WNL                  Plan   Plan  Times per week: 3-5  Current Treatment Recommendations: Strengthening, ROM, Gait Training, Balance Training, Self-Care / ADL, Functional Mobility Training, Endurance Training, Patient/Caregiver Education & Training    Goals  Short term goals  Time Frame for Short term goals: Prior to d/c: goals ongoing, updated  Short term goal 1: Pt will complete fxl transfer to/from ADL surfaces with mod A x2. Short term goal 2: Pt will complete B UE HEP x 15 reps to increase activity tolerance for ADLs  Short term goal 3: Pt will tolerate sitting EOB >3 minutes in prep for ADL transfers with mod A x 1  Short term goal 4: Pt will complete bed mobility with mod A x2  Short term goal 5: Pt will complete UB dressing with min A  Long term goals  Time Frame for Long term goals : STGS=LTGS  Patient Goals   Patient goals : to recover and return home with family       Therapy Time   Individual Concurrent Group Co-treatment   Time In 46         Time Out 1115         Minutes 80         Timed Code Treatment Minutes: Clem Marshalla.  [facility], OTR/L [id number]

## 2020-07-16 LAB
A/G RATIO: 0.5 (ref 1.1–2.2)
ALBUMIN SERPL-MCNC: 2 G/DL (ref 3.4–5)
ALP BLD-CCNC: 80 U/L (ref 40–129)
ALT SERPL-CCNC: 11 U/L (ref 10–40)
ANION GAP SERPL CALCULATED.3IONS-SCNC: 15 MMOL/L (ref 3–16)
AST SERPL-CCNC: 16 U/L (ref 15–37)
BASOPHILS ABSOLUTE: 0.1 K/UL (ref 0–0.2)
BASOPHILS RELATIVE PERCENT: 0.4 %
BILIRUB SERPL-MCNC: 0.8 MG/DL (ref 0–1)
BILIRUBIN DIRECT: 0.5 MG/DL (ref 0–0.3)
BILIRUBIN, INDIRECT: 0.3 MG/DL (ref 0–1)
BUN BLDV-MCNC: 27 MG/DL (ref 7–20)
CALCIUM IONIZED: 0.99 MMOL/L (ref 1.12–1.32)
CALCIUM SERPL-MCNC: 6.9 MG/DL (ref 8.3–10.6)
CHLORIDE BLD-SCNC: 104 MMOL/L (ref 99–110)
CO2: 20 MMOL/L (ref 21–32)
CREAT SERPL-MCNC: 1.3 MG/DL (ref 0.6–1.1)
EOSINOPHILS ABSOLUTE: 0.1 K/UL (ref 0–0.6)
EOSINOPHILS RELATIVE PERCENT: 0.2 %
GFR AFRICAN AMERICAN: 53
GFR NON-AFRICAN AMERICAN: 44
GLOBULIN: 4.2 G/DL
GLUCOSE BLD-MCNC: 164 MG/DL (ref 70–99)
GLUCOSE BLD-MCNC: 169 MG/DL (ref 70–99)
GLUCOSE BLD-MCNC: 174 MG/DL (ref 70–99)
GLUCOSE BLD-MCNC: 183 MG/DL (ref 70–99)
GLUCOSE BLD-MCNC: 184 MG/DL (ref 70–99)
HCT VFR BLD CALC: 23.4 % (ref 36–48)
HEMATOLOGY PATH CONSULT: NO
HEMOGLOBIN: 7.2 G/DL (ref 12–16)
INR BLD: 1.74 (ref 0.86–1.14)
LYMPHOCYTES ABSOLUTE: 1.2 K/UL (ref 1–5.1)
LYMPHOCYTES RELATIVE PERCENT: 4.1 %
MAGNESIUM: 1.3 MG/DL (ref 1.8–2.4)
MCH RBC QN AUTO: 20.8 PG (ref 26–34)
MCHC RBC AUTO-ENTMCNC: 30.8 G/DL (ref 31–36)
MCV RBC AUTO: 67.8 FL (ref 80–100)
MONOCYTES ABSOLUTE: 1.4 K/UL (ref 0–1.3)
MONOCYTES RELATIVE PERCENT: 4.8 %
NEUTROPHILS ABSOLUTE: 26.5 K/UL (ref 1.7–7.7)
NEUTROPHILS RELATIVE PERCENT: 90.5 %
PDW BLD-RTO: 22.8 % (ref 12.4–15.4)
PERFORMED ON: ABNORMAL
PH VENOUS: 7.41 (ref 7.35–7.45)
PHOSPHORUS: 3.3 MG/DL (ref 2.5–4.9)
PLATELET # BLD: 532 K/UL (ref 135–450)
PMV BLD AUTO: 7.7 FL (ref 5–10.5)
POTASSIUM REFLEX MAGNESIUM: 3.2 MMOL/L (ref 3.5–5.1)
POTASSIUM SERPL-SCNC: 3.7 MMOL/L (ref 3.5–5.1)
PROTHROMBIN TIME: 20.3 SEC (ref 10–13.2)
RBC # BLD: 3.46 M/UL (ref 4–5.2)
SODIUM BLD-SCNC: 139 MMOL/L (ref 136–145)
TOTAL PROTEIN: 6.2 G/DL (ref 6.4–8.2)
WBC # BLD: 29.3 K/UL (ref 4–11)

## 2020-07-16 PROCEDURE — 2500000003 HC RX 250 WO HCPCS: Performed by: INTERNAL MEDICINE

## 2020-07-16 PROCEDURE — 36415 COLL VENOUS BLD VENIPUNCTURE: CPT

## 2020-07-16 PROCEDURE — 6360000002 HC RX W HCPCS: Performed by: SURGERY

## 2020-07-16 PROCEDURE — 6360000002 HC RX W HCPCS: Performed by: INTERNAL MEDICINE

## 2020-07-16 PROCEDURE — 99232 SBSQ HOSP IP/OBS MODERATE 35: CPT | Performed by: INTERNAL MEDICINE

## 2020-07-16 PROCEDURE — 85025 COMPLETE CBC W/AUTO DIFF WBC: CPT

## 2020-07-16 PROCEDURE — 6370000000 HC RX 637 (ALT 250 FOR IP): Performed by: INTERNAL MEDICINE

## 2020-07-16 PROCEDURE — 2580000003 HC RX 258: Performed by: SURGERY

## 2020-07-16 PROCEDURE — 2580000003 HC RX 258: Performed by: INTERNAL MEDICINE

## 2020-07-16 PROCEDURE — 94760 N-INVAS EAR/PLS OXIMETRY 1: CPT

## 2020-07-16 PROCEDURE — 85610 PROTHROMBIN TIME: CPT

## 2020-07-16 PROCEDURE — 83735 ASSAY OF MAGNESIUM: CPT

## 2020-07-16 PROCEDURE — 99233 SBSQ HOSP IP/OBS HIGH 50: CPT | Performed by: INTERNAL MEDICINE

## 2020-07-16 PROCEDURE — 6370000000 HC RX 637 (ALT 250 FOR IP): Performed by: SURGERY

## 2020-07-16 PROCEDURE — 84132 ASSAY OF SERUM POTASSIUM: CPT

## 2020-07-16 PROCEDURE — C9113 INJ PANTOPRAZOLE SODIUM, VIA: HCPCS | Performed by: INTERNAL MEDICINE

## 2020-07-16 PROCEDURE — 99024 POSTOP FOLLOW-UP VISIT: CPT | Performed by: SURGERY

## 2020-07-16 PROCEDURE — 84100 ASSAY OF PHOSPHORUS: CPT

## 2020-07-16 PROCEDURE — 82330 ASSAY OF CALCIUM: CPT

## 2020-07-16 PROCEDURE — 80053 COMPREHEN METABOLIC PANEL: CPT

## 2020-07-16 PROCEDURE — 2060000000 HC ICU INTERMEDIATE R&B

## 2020-07-16 RX ADMIN — CALCIUM GLUCONATE 2 G: 98 INJECTION, SOLUTION INTRAVENOUS at 15:27

## 2020-07-16 RX ADMIN — SODIUM CHLORIDE, PRESERVATIVE FREE 10 ML: 5 INJECTION INTRAVENOUS at 21:15

## 2020-07-16 RX ADMIN — PIPERACILLIN AND TAZOBACTAM 3.38 G: 3; .375 INJECTION, POWDER, LYOPHILIZED, FOR SOLUTION INTRAVENOUS at 17:58

## 2020-07-16 RX ADMIN — POTASSIUM CHLORIDE 10 MEQ: 7.46 INJECTION, SOLUTION INTRAVENOUS at 14:05

## 2020-07-16 RX ADMIN — HEPARIN SODIUM 5000 UNITS: 5000 INJECTION INTRAVENOUS; SUBCUTANEOUS at 08:58

## 2020-07-16 RX ADMIN — INSULIN LISPRO 1 UNITS: 100 INJECTION, SOLUTION INTRAVENOUS; SUBCUTANEOUS at 12:20

## 2020-07-16 RX ADMIN — MAGNESIUM SULFATE HEPTAHYDRATE 1 G: 1 INJECTION, SOLUTION INTRAVENOUS at 10:23

## 2020-07-16 RX ADMIN — CLONIDINE HYDROCHLORIDE 0.2 MG: 0.1 TABLET ORAL at 08:59

## 2020-07-16 RX ADMIN — MAGNESIUM SULFATE HEPTAHYDRATE 1 G: 1 INJECTION, SOLUTION INTRAVENOUS at 08:35

## 2020-07-16 RX ADMIN — PANTOPRAZOLE SODIUM 40 MG: 40 INJECTION, POWDER, FOR SOLUTION INTRAVENOUS at 08:39

## 2020-07-16 RX ADMIN — INSULIN LISPRO 1 UNITS: 100 INJECTION, SOLUTION INTRAVENOUS; SUBCUTANEOUS at 18:07

## 2020-07-16 RX ADMIN — POTASSIUM CHLORIDE 10 MEQ: 7.46 INJECTION, SOLUTION INTRAVENOUS at 08:54

## 2020-07-16 RX ADMIN — SODIUM CHLORIDE, POTASSIUM CHLORIDE, SODIUM LACTATE AND CALCIUM CHLORIDE: 600; 310; 30; 20 INJECTION, SOLUTION INTRAVENOUS at 17:59

## 2020-07-16 RX ADMIN — HEPARIN SODIUM 5000 UNITS: 5000 INJECTION INTRAVENOUS; SUBCUTANEOUS at 21:17

## 2020-07-16 RX ADMIN — CLONIDINE HYDROCHLORIDE 0.2 MG: 0.1 TABLET ORAL at 15:36

## 2020-07-16 RX ADMIN — METRONIDAZOLE 500 MG: 500 INJECTION, SOLUTION INTRAVENOUS at 15:17

## 2020-07-16 RX ADMIN — METRONIDAZOLE 500 MG: 500 INJECTION, SOLUTION INTRAVENOUS at 05:59

## 2020-07-16 RX ADMIN — SODIUM CHLORIDE, POTASSIUM CHLORIDE, SODIUM LACTATE AND CALCIUM CHLORIDE: 600; 310; 30; 20 INJECTION, SOLUTION INTRAVENOUS at 01:43

## 2020-07-16 RX ADMIN — FLUCONAZOLE 200 MG: 200 INJECTION, SOLUTION INTRAVENOUS at 18:07

## 2020-07-16 RX ADMIN — MAGNESIUM SULFATE HEPTAHYDRATE 1 G: 1 INJECTION, SOLUTION INTRAVENOUS at 12:21

## 2020-07-16 RX ADMIN — INSULIN LISPRO 1 UNITS: 100 INJECTION, SOLUTION INTRAVENOUS; SUBCUTANEOUS at 08:34

## 2020-07-16 RX ADMIN — CLONIDINE HYDROCHLORIDE 0.2 MG: 0.1 TABLET ORAL at 21:15

## 2020-07-16 RX ADMIN — MAGNESIUM SULFATE HEPTAHYDRATE 1 G: 1 INJECTION, SOLUTION INTRAVENOUS at 14:05

## 2020-07-16 RX ADMIN — POTASSIUM CHLORIDE 10 MEQ: 7.46 INJECTION, SOLUTION INTRAVENOUS at 10:23

## 2020-07-16 RX ADMIN — PIPERACILLIN AND TAZOBACTAM 3.38 G: 3; .375 INJECTION, POWDER, LYOPHILIZED, FOR SOLUTION INTRAVENOUS at 01:42

## 2020-07-16 RX ADMIN — AMLODIPINE BESYLATE 5 MG: 5 TABLET ORAL at 08:58

## 2020-07-16 RX ADMIN — POTASSIUM CHLORIDE 10 MEQ: 7.46 INJECTION, SOLUTION INTRAVENOUS at 12:16

## 2020-07-16 RX ADMIN — PIPERACILLIN AND TAZOBACTAM 3.38 G: 3; .375 INJECTION, POWDER, LYOPHILIZED, FOR SOLUTION INTRAVENOUS at 10:23

## 2020-07-16 RX ADMIN — METRONIDAZOLE 500 MG: 500 INJECTION, SOLUTION INTRAVENOUS at 21:14

## 2020-07-16 RX ADMIN — SODIUM CHLORIDE, PRESERVATIVE FREE 10 ML: 5 INJECTION INTRAVENOUS at 09:00

## 2020-07-16 RX ADMIN — Medication 10 ML: at 08:41

## 2020-07-16 RX ADMIN — INSULIN LISPRO 1 UNITS: 100 INJECTION, SOLUTION INTRAVENOUS; SUBCUTANEOUS at 21:17

## 2020-07-16 NOTE — PROGRESS NOTES
Two phlebotomists have attempted to draw patients labs this AM, including repeat potassium, unsuccessful with multiple sticks.

## 2020-07-16 NOTE — PROGRESS NOTES
Occupational Therapy    Attempted to see pt for OT Tx. Pt reporting she is having a BM and needs another hour. Will follow up as pt condition permit.     Gonzalo Garcia, OTR/L

## 2020-07-16 NOTE — PROGRESS NOTES
Infectious Disease Follow up Notes  Admit Date: 2020  Hospital Day: 11    Antibiotics :   IV Zosyn   IV Fluconazole  IV Flagyl      CHIEF COMPLAINT:     Purulent peritonitis  Sepsis  WBC elevation  Anemia  KIKI  S/P Exp lap   Subjective interval History :  53 y.o. woman with morbid obesity, Dysfunctional uterine bleeding and previous Endometrial Biopsy and is followed by Gynecology and last office visit  and now admitted with acute abdominal pain and not feeling well CT scan with SBO and concern for infection taken to OR on   s/p Exlap ,ometectomy, Left salpingectocmy and removal of Fibroid and repair of serosal tear with HD line placement. She is now extubated in ICU and  Her WBC continue to rise despite IV abx and tissue cx in process. No h/o STDs no HSV or HPV. H/o IUD from  to  and no recent  instrumentation. Labs abnormal with WBC elevation, lactic acidosis, KIKI and Lft elevation on on going we are consulted for recommendations.     Lying in bed and c/o abd fullness BM+ flatus+ tolerating LiQUID diet Creat improving but WBC still elevated is concerning and noted plans for CT tomorrow and agreed with this pt aware     Past Medical History:    Past Medical History:   Diagnosis Date    Hypertension     Obesity        Past Surgical History:    Past Surgical History:   Procedure Laterality Date     SECTION      LAPAROTOMY N/A 2020    EXPLORATORY LAPAROTOMY, OMENTECTOMY, LEFT SALPINGECTOMY WITH REMOVAL OF FIBROID performed by Erwin Sierra MD at [facility]       Current Medications:    Outpatient Medications Marked as Taking for the 20 encounter [facility] Encounter)   Medication Sig Dispense Refill    ferrous sulfate (FE TABS) 325 (65 Fe) MG EC tablet Take 1 tablet by mouth 2 times daily 60 tablet 3    Multiple Vitamins-Minerals (THERAPEUTIC MULTIVITAMIN-MINERALS) tablet Take 1 tablet by mouth daily 30 tablet 11    furosemide (LASIX) 20 MG tablet Take 1 tablet by mouth 2 times daily 60 tablet 3    metFORMIN (GLUCOPHAGE) 500 MG tablet Take 1 tablet by mouth 2 times daily (with meals) 60 tablet 5    amLODIPine-benazepril (LOTREL) 5-20 MG per capsule Take 1 capsule by mouth daily 30 capsule 0    vitamin D (ERGOCALCIFEROL) 1.25 MG (38484 UT) CAPS capsule Take 1 capsule by mouth once a week 4 capsule 5       Allergies:  Patient has no known allergies. Immunizations : There is no immunization history on file for this patient. Social History:    Social History     Tobacco Use    Smoking status: Never Smoker    Smokeless tobacco: Never Used   Substance Use Topics    Alcohol use: No    Drug use: No     Social History     Tobacco Use   Smoking Status Never Smoker   Smokeless Tobacco Never Used      Family History : Other (Other) Other   ovarian cancer- moteher    Other (Other) Other   sister with \"traces of lupus\"    Ovarian cancer Mother    Diabetes Father    Breast cancer Neg Hx      Copied from Care everywhere note 6/29    REVIEW OF SYSTEMS:    No fever / chills / sweats. No weight loss. No visual change, eye pain, eye discharge. No oral lesion, sore throat, dysphagia. Denies cough / sputum/Sob   Denies chest pain, palpitations/ dizziness  Denies nausea/ vomiting/abdominal pain/diarrhea. Denies dysuria or change in urinary function. Denies joint swelling or pain. No myalgia, arthralgia. No rashes, skin lesions   Denies focal weakness, sensory change or other neurologic symptoms  No lymph node swelling or tenderness.     abd pain, bloating, nausea+ cramps     PHYSICAL EXAM:      Vitals:    BP (!) 157/100   Pulse 93   Temp 98.2 °F (36.8 °C) (Oral)   Resp 22   Ht 4' 11\" (1.499 m)   Wt 255 lb 6.5 oz (115.9 kg)   SpO2 95%   BMI 51.59 kg/m²     General Appearance: alert,in some acute distress, +  pallor, + icterus     Skin: warm and dry, no rash or erythema  Head: normocephalic and atraumatic  Eyes: pupils equal, round, and reactive to light, conjunctivae normal  ENT: tympanic membrane, external ear and ear canal normal bilaterally, nose without deformity, nasal mucosa and turbinates normal without polyps  Neck: supple and non-tender without mass, no thyromegaly  no cervical lymphadenopathy  Pulmonary/Chest: bi basal crepts + bilaterally- no wheezes, rales or rhonchi, normal air movement, in respiratory distress  Cardiovascular: normal rate, regular rhythm, normal S1 and S2, no murmurs, rubs, clicks, or gallops, no carotid bruits  Abdomen: soft, -tender, distended,+  bowel sound  masses or organomegaly midline incision dressing+  MECHE drain +   Extremities: no cyanosis, clubbing or +edema  Musculoskeletal: normal range of motion, no joint swelling, deformity or tenderness  Neurologic: reflexes normal and symmetric, no cranial nerve deficit  Psych:  Orientation, sensorium, mood normal  Lines:  IJ+  Jaime+              Data Review:    Lab Results   Component Value Date    WBC 29.3 (H) 07/16/2020    HGB 7.2 (L) 07/16/2020    HCT 23.4 (L) 07/16/2020    MCV 67.8 (L) 07/16/2020     (H) 07/16/2020     Lab Results   Component Value Date    CREATININE 1.3 (H) 07/16/2020    BUN 27 (H) 07/16/2020     07/16/2020    K 3.2 (L) 07/16/2020     07/16/2020    CO2 20 (L) 07/16/2020       Hepatic Function Panel:   Lab Results   Component Value Date    ALKPHOS 80 07/16/2020    ALT 11 07/16/2020    AST 16 07/16/2020    PROT 6.2 07/16/2020    BILITOT 0.8 07/16/2020    BILIDIR 0.5 07/16/2020    IBILI 0.3 07/16/2020    LABALBU 2.0 07/16/2020       UA:  Lab Results   Component Value Date    COLORU DK YELLOW 07/07/2020    CLARITYU CLOUDY 07/07/2020    GLUCOSEU 100 07/07/2020    BILIRUBINUR SMALL 07/07/2020    KETUA Negative 07/07/2020    SPECGRAV 1.015 07/07/2020    BLOODU LARGE 07/07/2020    PHUR 6.0 07/07/2020    PROTEINU 100 07/07/2020    UROBILINOGEN 1.0 07/07/2020    NITRU Negative 07/07/2020 LEUKOCYTESUR SMALL 07/07/2020    LABMICR YES 07/07/2020    URINETYPE NotGiven 07/07/2020      Urine Microscopic:   Lab Results   Component Value Date    BACTERIA 1+ 07/07/2020    COMU see below 07/07/2020    HYALCAST 1 07/07/2020    WBCUA 26 07/07/2020    RBCUA  07/07/2020    EPIU 2 07/07/2020     Creat  4.5   Lactic acid  5.8  Down to  1.9      WBC  36.5      hB  11.2  DOWN TO  7.5     MICRO: cultures reviewed and updated by me            Culture, Tissue [[id number]] Collected: 07/08/20 1644   Order Status: Completed Specimen: Tissue Updated: 07/10/20 0904    Gram Stain Result No Epithelial Cells seen   No WBCs or organisms seen     WOUND/ABSCESS No growth to date    Anaerobic Culture --    Anaerobic culture further report to follow   No anaerobes isolated so far, Further report to follow    Narrative:     ORDER#: [id number]                          [facility]  SOURCE: Tissue Omentum                     COLLECTED:  07/08/20 16:44  ANTIBIOTICS AT [place]. :                      RECEIVED :  07/08/20 17:13  Performed at:  [facility]  [address] [facility] [address]   Phone [phone]   C.trachomatis N.gonorrhoeae DNA, Urine [[id number]] Collected: 07/08/20 1835   Order Status: Sent Specimen: Urine Updated: 07/09/20 0110   HSV PCR [[id number]] Collected: 07/08/20 0000   Order Status: Sent Specimen: Blood Updated: 07/08/20 1426   HSV PCR [[id number]]    Order Status: Canceled Specimen: Blood    HSV PCR [[id number]]    Order Status: Canceled Specimen: Blood    HSV PCR [[id number]]    Order Status: Canceled Specimen: Blood    Culture, Blood 1 [[id number]] Collected: 07/06/20 1536   Order Status: Completed Specimen: Blood Updated: 07/07/20 1715    Blood Culture, Routine No Growth to date.  Any change in status will be called.    Narrative:     ORDER#: [id number]                          ORDERED BY: DANIAL LYNN  SOURCE: Blood                             piperacillin-tazobactam  3.375 g Intravenous Q8H    fluconazole  200 mg Intravenous Q24H    cloNIDine  0.2 mg Oral TID    amLODIPine  5 mg Oral Daily    potassium chloride  40 mEq Oral Once    metroNIDAZOLE  500 mg Intravenous Q8H    pantoprazole  40 mg Intravenous Daily    And    sodium chloride (PF)  10 mL Intravenous Daily    sodium chloride flush  10 mL Intravenous 2 times per day    heparin (porcine)  5,000 Units Subcutaneous BID    insulin lispro  0-6 Units Subcutaneous TID WC    insulin lispro  0-3 Units Subcutaneous Nightly       Continuous Infusions:   lactated ringers Stopped (07/16/20 1024)    dextrose         PRN Meds:  potassium chloride, magnesium sulfate, acetaminophen **OR** acetaminophen, glucose, dextrose, glucagon (rDNA), dextrose, sodium chloride flush, promethazine **OR** ondansetron, iopamidol      Assessment:     Patient Active Problem List   Diagnosis    EMERY (obstructive sleep apnea)    ARF (acute renal failure) (HCC)    SBO (small bowel obstruction) (HCC)    Leukocytosis    Septicemia (HCC)    Acidosis    KIKI ([facility] injury) ([facility] [address]    Gallbladder disease    Hypertension    Type 2 diabetes mellitus with hyperglycemia (HCC)    Peritonitis (HCC)    Lactic acidosis    Acute pulmonary insufficiency    Metabolic acidosis    Elevated LFTs     Severe sepsis   Lactic acidosis  WBC elevation   KIKI   Purulent peritonitis per OR description  Admitted with abd pain and SBO  S/p Exp lap , omentectomy, Left salpingectomy and removal of Fibroid and repair of serosal tear on 7/8   Lft elevation  Anemia  Thrombocytosis     She remains very ill in ICU  noted purulent peritonitis and per OP notes no bowel leak but Left Tuboovarian abscess suspected based on the presentation and pathology is pending.    OR cx now strep anginosus, candida and Fusobacterium  necrophorum and Peptostreptococcus     Recent Gynecology eval was normal and previous Chlamydia and GC screen including HPV negative        Would suspected mixed infectious process GI and  seth to be in volved in the process        On going WBC elevation and slow recovery given the mixed infectious process Risk for Intra abdominal abscess    CT abd/pelvis check follow up no leak no abscess    WBC slow trend down bu GI function slowly improving     X ray with ileus noted and clinically improving slowly and WBC trend down a bit but still elevation agree with CT scan tomorrow with oral contrast pt understands      Labs, Microbiology, Radiology and all the pertinent results from current hospitalization and  care every where were reviewed  by me as a part of the evaluation   Plan: 1. Cont V Fluconazole x 200 mg daily ADJUSTED TO GFR  2. Cont  IV Zosyn x 3.375 gm  x Q 8 hrs will cover GI and  seth well will increase the dose once creat is better   3. Cont IV Flagyl x 500 mg Q 8 hrs for now    4. CT abd/pelvis with oral contrast to be repeated tomorrow due to worsening WBC  5. Anticipate x 21 days of Iv ABX Therapy   6  HIV -ve and hepatitis screen -ve    7. ESR, CRP Now down trend         Discussed with patient/Family and Nursing   Risk of Complications/Morbidity: High      · Illness(es)/ Infection present that pose threat to bodily function. · There is potential for severe exacerbation of infection/side effects of treatment. · Therapy requires intensive monitoring for antimicrobial agent toxicity      Discussed with patient/Family and Nursing staff     Thanks for allowing me to participate in your patient's care and please call me with any questions or concerns.     Maricel Burrell MD  Infectious Disease  [address]  Physician  Phone: [phone]   Fax : [phone]

## 2020-07-16 NOTE — PROGRESS NOTES
Hospitalist Progress Note  7/16/2020 11:38 AM  Subjective:   Admit Date: 7/6/2020  PCP: Kit Hoyt MD  Interval History: pt is alert and awake  Looks better  Able to take sips  meds are helping otherwise  Denies any other complaints    Chart reviewed. Diet: DIET FULL LIQUID;  Dietary Nutrition Supplements: Clear Liquid Oral Supplement, Renal Oral Supplement  Medications:   Scheduled Meds:   piperacillin-tazobactam  3.375 g Intravenous Q8H    fluconazole  200 mg Intravenous Q24H    cloNIDine  0.2 mg Oral TID    amLODIPine  5 mg Oral Daily    potassium chloride  40 mEq Oral Once    metroNIDAZOLE  500 mg Intravenous Q8H    pantoprazole  40 mg Intravenous Daily    And    sodium chloride (PF)  10 mL Intravenous Daily    sodium chloride flush  10 mL Intravenous 2 times per day    heparin (porcine)  5,000 Units Subcutaneous BID    insulin lispro  0-6 Units Subcutaneous TID WC    insulin lispro  0-3 Units Subcutaneous Nightly     Continuous Infusions:   lactated ringers Stopped (07/16/20 1024)    dextrose       CBC:   Recent Labs     07/14/20  1020 07/15/20  0908 07/16/20  0525   WBC 29.9* 27.4* 29.3*   HGB 7.5* 7.5* 7.2*    419 532*     BMP:    Recent Labs     07/14/20  1020 07/15/20  0908 07/16/20  0525    138 139   K 3.2* 3.1* 3.2*    108 104   CO2 19* 19* 20*   BUN 63* 43* 27*   CREATININE 2.4* 1.6* 1.3*   GLUCOSE 155* 157* 164*     Hepatic:   Recent Labs     07/14/20  1020 07/15/20  0908 07/16/20  0525   AST 18 13* 16   ALT 20 15 11   BILITOT 1.0 0.8 0.8   ALKPHOS 105 85 80     Troponin: No results for input(s): TROPONINI in the last 72 hours. BNP: No results for input(s): BNP in the last 72 hours. Lipids: No results for input(s): CHOL, HDL in the last 72 hours.     Invalid input(s): LDLCALCU  INR:   Recent Labs     07/14/20  1020 07/15/20  0908 07/16/20  0525   INR 1.54* 1.53* 1.74*       Objective:   Vitals: BP (!) 144/96   Pulse 98   Temp 98.7 °F (37.1 °C) (Oral)   Resp 20   Ht 4' 11\" (1.499 m)   Wt 255 lb 6.5 oz (115.9 kg)   SpO2 97%   BMI 51.59 kg/m²   General appearance: alert and awake  Has NGT  HEENT: Head: Normal, normocephalic, atraumatic, anicteric, pupils are reactive to light. Dry mucous membrane. Neck: no adenopathy, no carotid bruit, supple, symmetrical, trachea midline and thyroid not enlarged, symmetric, no tenderness/mass/nodules  Lungs: clear to auscultation bilaterally  Heart: regular rate and rhythm, S1, S2 normal, no murmur, click, rub or gallop  Abdomen: soft, -tender; bowel sounds normal; no masses,  no organomegaly, has drain  Incision is clean  Extremities: extremities mild edema  Neurologic: Mental status: alert and awake    Assessment and Plan:   1. KIKI= better  2. HTN- stable  3. Sepsis-on iv abx  4. Dm- monitor sugars  5. Anemia= monitor h/h  Peritonitis- on iv abx  Patient Active Problem List:     EMERY (obstructive sleep apnea)     ARF (acute renal failure) (HCC)     SBO (small bowel obstruction) (HCC)     Leukocytosis     Septicemia (HCC)     Acidosis     KIKI (acute kidney injury) ([address]     Gallbladder disease     Hypertension     Type 2 diabetes mellitus with hyperglycemia ([address]    Pt is stable. Discussed with staff  about the plan. See the orders for further plan. Will proceed further acc to pt's progress.   Time spent with management of the pt is-30 minutes      Electronically signed by: Amadeo Flores MD, on 7/16/2020, at 11:38 AM

## 2020-07-16 NOTE — PLAN OF CARE
Problem: Pain:  Goal: Pain level will decrease  Description: Pain level will decrease  7/16/2020 0203 by Courtney Davey RN  Outcome: Ongoing  7/15/2020 1428 by Danna Mendez RN  Outcome: Ongoing  Goal: Control of acute pain  Description: Control of acute pain  7/16/2020 0203 by Courtney Davey RN  Outcome: Ongoing  7/15/2020 1428 by Danna Mendez RN  Outcome: Ongoing  Goal: Control of chronic pain  Description: Control of chronic pain  7/16/2020 0203 by Courtney Davey RN  Outcome: Ongoing  7/15/2020 1428 by Danna Mendez RN  Outcome: Ongoing     Problem: Falls - Risk of:  Goal: Will remain free from falls  Description: Will remain free from falls  7/16/2020 0203 by Courtney Davey RN  Outcome: Ongoing  7/15/2020 1428 by Danna Mendez RN  Outcome: Ongoing  Goal: Absence of physical injury  Description: Absence of physical injury  7/16/2020 0203 by Courtney Davey RN  Outcome: Ongoing  7/15/2020 1428 by Danna Mendez RN  Outcome: Ongoing     Problem:  Bowel/Gastric:  Goal: Control of bowel function will improve  Description: Control of bowel function will improve  7/16/2020 0203 by Courtney Davey RN  Outcome: Ongoing  7/15/2020 1428 by Danna Mendez RN  Outcome: Ongoing  Goal: Ability to achieve a regular elimination pattern will improve  Description: Ability to achieve a regular elimination pattern will improve  7/16/2020 0203 by Courtney Davey RN  Outcome: Ongoing  7/15/2020 1428 by Danna Mendez RN  Outcome: Ongoing     Problem: Nausea/Vomiting:  Goal: Absence of nausea/vomiting  Description: Absence of nausea/vomiting  7/16/2020 0203 by Courtney Davey RN  Outcome: Ongoing  7/15/2020 1428 by Danna Mendez RN  Outcome: Ongoing  Goal: Able to drink  Description: Able to drink  7/16/2020 0203 by Courtney Davey RN  Outcome: Ongoing  7/15/2020 1428 by Danna Mendez RN  Outcome: Ongoing  Goal: Able to eat  Description: Able to eat  7/16/2020 0203 by Courtney Davey RN  Outcome: Ongoing  7/15/2020 1428 by Richard Duran Elvia Hunter RN  Outcome: Ongoing  Goal: Ability to achieve adequate nutritional intake will improve  Description: Ability to achieve adequate nutritional intake will improve  7/16/2020 0203 by Marianna Flannery RN  Outcome: Ongoing  7/15/2020 1428 by Cesar Zepeda RN  Outcome: Ongoing     Problem: Skin Integrity:  Goal: Will show no infection signs and symptoms  Description: Will show no infection signs and symptoms  7/16/2020 0203 by Marianna Flannery RN  Outcome: Ongoing  7/15/2020 1428 by Cesar Zepeda RN  Outcome: Ongoing  Goal: Absence of new skin breakdown  Description: Absence of new skin breakdown  7/16/2020 0203 by Marianna Flannery RN  Outcome: Ongoing  7/15/2020 1428 by Cesar Zepeda RN  Outcome: Ongoing     Problem: Nutrition  Goal: Optimal nutrition therapy  7/16/2020 0203 by Marianna Flannery RN  Outcome: Ongoing  7/15/2020 1428 by Cesar Zepeda RN  Outcome: Ongoing

## 2020-07-16 NOTE — PROGRESS NOTES
[facility] [phone]                                     Daily Progress Note                                                         Pt Name: Carmela Mohs  Medical Record Number: [id number]  Date of Birth 1970   Today's Date: 7/16/2020  Chief Complaint   Patient presents with    Abdominal Pain     for 8 days was [place] [facility] [place] told she bowel inflammation  No emesis        ASSESSMENT/PLAN  Diffuse purulent peritonitis, suspicious for ruptured TOA  -7/8 exploratory laparotomy, omentectomy, left salpingectomy with removal of fibroid, repair serosal tear, US guided right IJ vascath. Drain SS.   -+flatulence and BMs. Reports improvement in abdominal pain but still with poor PO intake secondary to feeling full  -WBC trending mildly up. .  Drain serous. Vitals stable. Continue abx and liquids for now. Repeat CT tomorrow with PO contrast if no significant change in WBC    KIKI  -vas cath in place, nephro following  -Cr continues to improve    SUBJECTIVE  Nurys has unchanged from yesterday. Pain is well controlled. She has nausea but no vomiting. She has passed flatus and has had a bowel movement. She is tolerating liquids     OBJECTIVE  VITALS:  height is 4' 11\" (1.499 m) and weight is 255 lb 6.5 oz (115.9 kg). Her oral temperature is 98.7 °F (37.1 °C). Her blood pressure is 144/96 (abnormal) and her pulse is 98. Her respiration is 20 and oxygen saturation is 97%. INTAKE/OUTPUT:      Intake/Output Summary (Last 24 hours) at 7/16/2020 1206  Last data filed at 7/16/2020 9866  Gross per 24 hour   Intake --   Output 2860 ml   Net -2860 ml     GENERAL: alert, no distress  LUNGS: clear to ausculation, without wheezes, rales or rhonci  HEART: normal rate and regular rhythm  ABDOMEN: soft, diffuse tenderness, incision c/d/i. Drain SS. EXTREMITY: no cyanosis, clubbing or edema    I/O last 3 completed shifts:   In: 480 [P.O.:480]  Out: 2860 [Urine:2850; Drains:10]      LABS  Recent Labs     07/16/20  0525   WBC 29.3*   HGB 7.2*   HCT 23.4*   *      K 3.2*      CO2 20*   BUN 27*   CREATININE 1.3*   MG 1.30*   PHOS 3.3   CALCIUM 6.9*   INR 1.74*   AST 16   ALT 11   BILITOT 0.8   BILIDIR 0.5*     Electronically signed by Ildefonso Shen MD on 7/16/2020 at 12:06 PM

## 2020-07-17 ENCOUNTER — APPOINTMENT (OUTPATIENT)
Dept: CT IMAGING | Age: 50
DRG: 853 | End: 2020-07-17
Payer: COMMERCIAL

## 2020-07-17 LAB
A/G RATIO: 0.4 (ref 1.1–2.2)
ALBUMIN SERPL-MCNC: 1.9 G/DL (ref 3.4–5)
ALP BLD-CCNC: 70 U/L (ref 40–129)
ALT SERPL-CCNC: 11 U/L (ref 10–40)
ANION GAP SERPL CALCULATED.3IONS-SCNC: 13 MMOL/L (ref 3–16)
AST SERPL-CCNC: 13 U/L (ref 15–37)
BASOPHILS ABSOLUTE: 0.1 K/UL (ref 0–0.2)
BASOPHILS RELATIVE PERCENT: 0.4 %
BILIRUB SERPL-MCNC: 0.7 MG/DL (ref 0–1)
BILIRUBIN DIRECT: 0.4 MG/DL (ref 0–0.3)
BILIRUBIN, INDIRECT: 0.3 MG/DL (ref 0–1)
BUN BLDV-MCNC: 15 MG/DL (ref 7–20)
CALCIUM IONIZED: 1.06 MMOL/L (ref 1.12–1.32)
CALCIUM SERPL-MCNC: 7.2 MG/DL (ref 8.3–10.6)
CHLORIDE BLD-SCNC: 107 MMOL/L (ref 99–110)
CO2: 21 MMOL/L (ref 21–32)
CREAT SERPL-MCNC: 1 MG/DL (ref 0.6–1.1)
EOSINOPHILS ABSOLUTE: 0 K/UL (ref 0–0.6)
EOSINOPHILS RELATIVE PERCENT: 0.1 %
FERRITIN: 147.7 NG/ML (ref 15–150)
GFR AFRICAN AMERICAN: >60
GFR NON-AFRICAN AMERICAN: 59
GLOBULIN: 4.3 G/DL
GLUCOSE BLD-MCNC: 108 MG/DL (ref 70–99)
GLUCOSE BLD-MCNC: 137 MG/DL (ref 70–99)
GLUCOSE BLD-MCNC: 152 MG/DL (ref 70–99)
GLUCOSE BLD-MCNC: 156 MG/DL (ref 70–99)
GLUCOSE BLD-MCNC: 193 MG/DL (ref 70–99)
HCT VFR BLD CALC: 24.1 % (ref 36–48)
HEMATOLOGY PATH CONSULT: NO
HEMOGLOBIN: 7.3 G/DL (ref 12–16)
INR BLD: 1.71 (ref 0.86–1.14)
IRON SATURATION: 12 % (ref 15–50)
IRON: 23 UG/DL (ref 37–145)
LYMPHOCYTES ABSOLUTE: 1.2 K/UL (ref 1–5.1)
LYMPHOCYTES RELATIVE PERCENT: 4.3 %
MAGNESIUM: 1.4 MG/DL (ref 1.8–2.4)
MCH RBC QN AUTO: 20.6 PG (ref 26–34)
MCHC RBC AUTO-ENTMCNC: 30.2 G/DL (ref 31–36)
MCV RBC AUTO: 68.2 FL (ref 80–100)
MONOCYTES ABSOLUTE: 1.1 K/UL (ref 0–1.3)
MONOCYTES RELATIVE PERCENT: 4.2 %
NEUTROPHILS ABSOLUTE: 24.9 K/UL (ref 1.7–7.7)
NEUTROPHILS RELATIVE PERCENT: 91 %
PDW BLD-RTO: 23.6 % (ref 12.4–15.4)
PERFORMED ON: ABNORMAL
PH VENOUS: 7.41 (ref 7.35–7.45)
PHOSPHORUS: 3.2 MG/DL (ref 2.5–4.9)
PLATELET # BLD: 623 K/UL (ref 135–450)
PMV BLD AUTO: 7.4 FL (ref 5–10.5)
POTASSIUM REFLEX MAGNESIUM: 3.3 MMOL/L (ref 3.5–5.1)
POTASSIUM SERPL-SCNC: 3.5 MMOL/L (ref 3.5–5.1)
PROTHROMBIN TIME: 20 SEC (ref 10–13.2)
RBC # BLD: 3.54 M/UL (ref 4–5.2)
SODIUM BLD-SCNC: 141 MMOL/L (ref 136–145)
TOTAL IRON BINDING CAPACITY: 190 UG/DL (ref 260–445)
TOTAL PROTEIN: 6.2 G/DL (ref 6.4–8.2)
WBC # BLD: 27.4 K/UL (ref 4–11)

## 2020-07-17 PROCEDURE — 99024 POSTOP FOLLOW-UP VISIT: CPT | Performed by: SURGERY

## 2020-07-17 PROCEDURE — 87205 SMEAR GRAM STAIN: CPT

## 2020-07-17 PROCEDURE — 87075 CULTR BACTERIA EXCEPT BLOOD: CPT

## 2020-07-17 PROCEDURE — 74176 CT ABD & PELVIS W/O CONTRAST: CPT

## 2020-07-17 PROCEDURE — 85610 PROTHROMBIN TIME: CPT

## 2020-07-17 PROCEDURE — 2580000003 HC RX 258: Performed by: SURGERY

## 2020-07-17 PROCEDURE — 84100 ASSAY OF PHOSPHORUS: CPT

## 2020-07-17 PROCEDURE — 97530 THERAPEUTIC ACTIVITIES: CPT

## 2020-07-17 PROCEDURE — 99233 SBSQ HOSP IP/OBS HIGH 50: CPT | Performed by: INTERNAL MEDICINE

## 2020-07-17 PROCEDURE — 2060000000 HC ICU INTERMEDIATE R&B

## 2020-07-17 PROCEDURE — 2500000003 HC RX 250 WO HCPCS: Performed by: INTERNAL MEDICINE

## 2020-07-17 PROCEDURE — C9113 INJ PANTOPRAZOLE SODIUM, VIA: HCPCS | Performed by: INTERNAL MEDICINE

## 2020-07-17 PROCEDURE — 6360000002 HC RX W HCPCS: Performed by: INTERNAL MEDICINE

## 2020-07-17 PROCEDURE — 85025 COMPLETE CBC W/AUTO DIFF WBC: CPT

## 2020-07-17 PROCEDURE — 97535 SELF CARE MNGMENT TRAINING: CPT

## 2020-07-17 PROCEDURE — 82330 ASSAY OF CALCIUM: CPT

## 2020-07-17 PROCEDURE — 80053 COMPREHEN METABOLIC PANEL: CPT

## 2020-07-17 PROCEDURE — 6360000002 HC RX W HCPCS: Performed by: SURGERY

## 2020-07-17 PROCEDURE — 6370000000 HC RX 637 (ALT 250 FOR IP): Performed by: INTERNAL MEDICINE

## 2020-07-17 PROCEDURE — 83540 ASSAY OF IRON: CPT

## 2020-07-17 PROCEDURE — 83735 ASSAY OF MAGNESIUM: CPT

## 2020-07-17 PROCEDURE — 2580000003 HC RX 258: Performed by: INTERNAL MEDICINE

## 2020-07-17 PROCEDURE — 83550 IRON BINDING TEST: CPT

## 2020-07-17 PROCEDURE — 82728 ASSAY OF FERRITIN: CPT

## 2020-07-17 PROCEDURE — 6360000004 HC RX CONTRAST MEDICATION: Performed by: SURGERY

## 2020-07-17 PROCEDURE — 84132 ASSAY OF SERUM POTASSIUM: CPT

## 2020-07-17 PROCEDURE — 6370000000 HC RX 637 (ALT 250 FOR IP): Performed by: SURGERY

## 2020-07-17 PROCEDURE — 87070 CULTURE OTHR SPECIMN AEROBIC: CPT

## 2020-07-17 RX ORDER — FUROSEMIDE 10 MG/ML
40 INJECTION INTRAMUSCULAR; INTRAVENOUS ONCE
Status: COMPLETED | OUTPATIENT
Start: 2020-07-17 | End: 2020-07-17

## 2020-07-17 RX ADMIN — PIPERACILLIN AND TAZOBACTAM 3.38 G: 3; .375 INJECTION, POWDER, LYOPHILIZED, FOR SOLUTION INTRAVENOUS at 07:58

## 2020-07-17 RX ADMIN — PIPERACILLIN AND TAZOBACTAM 3.38 G: 3; .375 INJECTION, POWDER, LYOPHILIZED, FOR SOLUTION INTRAVENOUS at 16:04

## 2020-07-17 RX ADMIN — CLONIDINE HYDROCHLORIDE 0.2 MG: 0.1 TABLET ORAL at 21:13

## 2020-07-17 RX ADMIN — INSULIN LISPRO 1 UNITS: 100 INJECTION, SOLUTION INTRAVENOUS; SUBCUTANEOUS at 17:33

## 2020-07-17 RX ADMIN — POTASSIUM CHLORIDE 10 MEQ: 7.46 INJECTION, SOLUTION INTRAVENOUS at 13:36

## 2020-07-17 RX ADMIN — POTASSIUM CHLORIDE 10 MEQ: 7.46 INJECTION, SOLUTION INTRAVENOUS at 10:50

## 2020-07-17 RX ADMIN — HEPARIN SODIUM 5000 UNITS: 5000 INJECTION INTRAVENOUS; SUBCUTANEOUS at 21:16

## 2020-07-17 RX ADMIN — METRONIDAZOLE 500 MG: 500 INJECTION, SOLUTION INTRAVENOUS at 13:39

## 2020-07-17 RX ADMIN — SODIUM CHLORIDE, POTASSIUM CHLORIDE, SODIUM LACTATE AND CALCIUM CHLORIDE: 600; 310; 30; 20 INJECTION, SOLUTION INTRAVENOUS at 21:13

## 2020-07-17 RX ADMIN — CLONIDINE HYDROCHLORIDE 0.2 MG: 0.1 TABLET ORAL at 13:42

## 2020-07-17 RX ADMIN — PANTOPRAZOLE SODIUM 40 MG: 40 INJECTION, POWDER, FOR SOLUTION INTRAVENOUS at 07:56

## 2020-07-17 RX ADMIN — INSULIN LISPRO 1 UNITS: 100 INJECTION, SOLUTION INTRAVENOUS; SUBCUTANEOUS at 12:22

## 2020-07-17 RX ADMIN — CLONIDINE HYDROCHLORIDE 0.2 MG: 0.1 TABLET ORAL at 07:54

## 2020-07-17 RX ADMIN — FLUCONAZOLE 200 MG: 200 INJECTION, SOLUTION INTRAVENOUS at 17:34

## 2020-07-17 RX ADMIN — MAGNESIUM SULFATE HEPTAHYDRATE 1 G: 1 INJECTION, SOLUTION INTRAVENOUS at 09:48

## 2020-07-17 RX ADMIN — Medication 10 ML: at 07:58

## 2020-07-17 RX ADMIN — IOHEXOL 50 ML: 240 INJECTION, SOLUTION INTRATHECAL; INTRAVASCULAR; INTRAVENOUS; ORAL at 18:08

## 2020-07-17 RX ADMIN — SODIUM CHLORIDE, PRESERVATIVE FREE 10 ML: 5 INJECTION INTRAVENOUS at 08:10

## 2020-07-17 RX ADMIN — POTASSIUM CHLORIDE 10 MEQ: 7.46 INJECTION, SOLUTION INTRAVENOUS at 09:49

## 2020-07-17 RX ADMIN — METRONIDAZOLE 500 MG: 500 INJECTION, SOLUTION INTRAVENOUS at 04:12

## 2020-07-17 RX ADMIN — INSULIN LISPRO 1 UNITS: 100 INJECTION, SOLUTION INTRAVENOUS; SUBCUTANEOUS at 08:04

## 2020-07-17 RX ADMIN — MAGNESIUM SULFATE HEPTAHYDRATE 1 G: 1 INJECTION, SOLUTION INTRAVENOUS at 12:25

## 2020-07-17 RX ADMIN — SODIUM CHLORIDE, PRESERVATIVE FREE 10 ML: 5 INJECTION INTRAVENOUS at 21:14

## 2020-07-17 RX ADMIN — POTASSIUM CHLORIDE 10 MEQ: 7.46 INJECTION, SOLUTION INTRAVENOUS at 12:22

## 2020-07-17 RX ADMIN — AMLODIPINE BESYLATE 5 MG: 5 TABLET ORAL at 07:55

## 2020-07-17 RX ADMIN — FUROSEMIDE 40 MG: 10 INJECTION, SOLUTION INTRAMUSCULAR; INTRAVENOUS at 13:41

## 2020-07-17 RX ADMIN — PIPERACILLIN AND TAZOBACTAM 3.38 G: 3; .375 INJECTION, POWDER, LYOPHILIZED, FOR SOLUTION INTRAVENOUS at 00:00

## 2020-07-17 RX ADMIN — IRON SUCROSE 100 MG: 20 INJECTION, SOLUTION INTRAVENOUS at 13:38

## 2020-07-17 RX ADMIN — HEPARIN SODIUM 5000 UNITS: 5000 INJECTION INTRAVENOUS; SUBCUTANEOUS at 07:55

## 2020-07-17 RX ADMIN — METRONIDAZOLE 500 MG: 500 INJECTION, SOLUTION INTRAVENOUS at 21:13

## 2020-07-17 NOTE — PROGRESS NOTES
[facility] [phone]                                     Daily Progress Note                                                         Pt Name: Roro Madrid  Medical Record Number: [id number]  Date of Birth 1970   Today's Date: 7/17/2020  Chief Complaint   Patient presents with    Abdominal Pain     for 8 days was [place] [facility] [place] told [place] bowel inflammation  No emesis        ASSESSMENT/PLAN  Diffuse purulent peritonitis, suspicious for ruptured TOA  -7/8 exploratory laparotomy, omentectomy, left salpingectomy with removal of fibroid, repair serosal tear, US guided right IJ vascath. Drain SS.   -+flatulence and BMs. Reports improvement in abdominal pain. Tolerating liquids  -WBC slightly improved. Large wound infection noted, staples removed and culture obtained  -D/W ID. Wound infection certainly a source of WBC but Will plan CT with PO contrast to eval for peritoneal abscess    KIKI  -vas cath in place, nephro following  -Cr continues to improve    SUBJECTIVE  Nurys has unchanged from yesterday. Pain is well controlled. She has nausea but no vomiting. She has passed flatus and has had a bowel movement. She is tolerating liquids     OBJECTIVE  VITALS:  height is 4' 11\" (1.499 m) and weight is 260 lb 5.8 oz (118.1 kg). Her oral temperature is 98 °F (36.7 °C). Her blood pressure is 126/84 and her pulse is 94. Her respiration is 18 and oxygen saturation is 95%. INTAKE/OUTPUT:      Intake/Output Summary (Last 24 hours) at 7/17/2020 1344  Last data filed at 7/17/2020 1084  Gross per 24 hour   Intake 1540 ml   Output 2280 ml   Net -740 ml     GENERAL: alert, no distress  LUNGS: clear to ausculation, without wheezes, rales or rhonci  HEART: normal rate and regular rhythm  ABDOMEN: soft, diffuse tenderness, incision c/d/i. Drain SS. EXTREMITY: no cyanosis, clubbing or edema    I/O last 3 completed shifts:   In: 0252

## 2020-07-17 NOTE — PROGRESS NOTES
Physical Therapy  Facility/Department: [facility] 5W PROGRESSIVE CARE  Daily Treatment Note - COTX  NAME: Néstor Reza  : 1970  MRN: [id number]    Date of Service: 2020    Discharge Recommendations:  3-5 sessions per week, Patient would benefit from continued therapy after discharge   PT Equipment Recommendations  Other: Will monitor for potential equipt needs. Assessment   Body structures, Functions, Activity limitations: Decreased functional mobility ; Decreased strength;Decreased endurance  Assessment: Pt limited in therapy d/t multiple episodes of incontinence of loose BM. Pt very fatigued and SOB sitting EOB for 10 min., unable to attempt stand d/t fatigue. She continues to require use of Maxi-Move for transfer to/from bedside chair. Continue to recommend low-moderate frequency therapy upon D/C. Néstor Notice scored a  on the AM-PAC short mobility form. Current research shows that an AM-PAC score of 17 or less is typically not associated with a discharge to the patient's home setting. Based on the patient's AM-PAC score and their current functional mobility deficits, it is recommended that the patient have 3-5 sessions per week of Physical Therapy at d/c to increase the patient's independence. Please see assessment section for further patient specific details. If patient discharges prior to next session this note will serve as a discharge summary. Please see below for the latest assessment towards goals. PT Education: General Safety;PT Role  Patient Education: Role of PT, POC, Need to call for assist, OOB via Stedy. Activity Tolerance  Activity Tolerance: Treatment limited secondary to medical complications (free text); Patient limited by fatigue;Patient limited by pain; Patient limited by endurance     Patient Diagnosis(es): The primary encounter diagnosis was Septicemia ([address]  Diagnoses of KIKI (acute kidney injury) ([facility] [address] Gallbladder disease, and Acidosis were also pertinent to this visit.     has a past medical history of Hypertension and Obesity. has a past surgical history that includes  section and laparotomy (N/A, 2020). Restrictions  Restrictions/Precautions  Restrictions/Precautions: Fall Risk  Position Activity Restriction  Other position/activity restrictions: Recent Abdominal Surg. Abdominal MECHE Drain. Diet : Clear Liquid. Jaime     Subjective   Subjective  Subjective: Pt resting in bed. Requesting to be cleaned up. Agreeable to attempting stand to stedy after. Orientation  Orientation  Overall Orientation Status: Within Functional Limits    Objective      Bed mobility  Rolling to Left: Maximum assistance;2 Person assistance  Rolling to Right: Maximum assistance;2 Person assistance  Supine to Sit: Maximum assistance;2 Person assistance  Sit to Supine: 2 Person assistance;Maximum assistance  Scooting: Dependent/Total;2 Person assistance     Transfers  Sit to Stand: Unable to assess  Stand to sit: Unable to assess  Bed to Chair: Dependent/Total(Using maxi move)     Balance  Comments: Pt initially required total assist to maintain sitting balance at EOB after moving supine > sit. She was significantly SOB, and c/o severe fatigue. After scooting closer to EOB, pt able to maintain sitting balance with (B) UE support, CGA, but soon required transition to supine d/t fatigue. Total sitting time = 10 min. Other Activities: Other (see comment)  Comment: Pt initially required Max A x 2 to roll R/L, and total assist for fadia-care (incontinent of loose BM). After transferring to sitting position at EOB, pt was again incontinent of loose BM. Upon returning to supine, pt again required Max A x 2 to roll, and total assist for fadia-care. She was then transferred to bedside chair using maxi-move, total assist x 2. Once in chair, pt required Max A x 3 for positioning in upright sitting. Call light in reach, alarm in place, RN notified.     AM-PAC Score  AM-PAC Inpatient Mobility Raw Score : 8 (07/17/20 1432)  AM-PAC Inpatient T-Scale Score : 28.52 (07/17/20 1432)  Mobility Inpatient CMS 0-100% Score: 86.62 (07/17/20 1432)  Mobility Inpatient CMS G-Code Modifier : CM (07/17/20 1432)          Goals  Short term goals  Time Frame for Short term goals: Upon d/c acute care setting.-- goals updated and ongoing 7/15/20  Short term goal 1: Bed Mob Mod assist x 2  Short term goal 2: Transfers with/without assist device Min assist x 2. Short term goal 3: Amb with/without assist device 10-15' Min assist x 2. Long term goals  Time Frame for Long term goals : tbd at next level of care. Patient Goals   Patient goals : Be able to go home. Plan    Plan  Times per week: 3-5x week while in acute care setting. Current Treatment Recommendations: Strengthening, Functional Mobility Training, Transfer Training, Gait Training, Safety Education & Training, Patient/Caregiver Education & Training  Safety Devices  Type of devices:  All fall risk precautions in place, Left in chair, Call light within reach, Chair alarm in place, Nurse notified  Restraints  Initially in place: No     Therapy Time   Individual Concurrent Group Co-treatment   Time In       1330   Time Out       1430   Minutes       60   Timed Code Treatment Minutes: [address], PT    Electronically signed by Landon Sherwood, FILIPPO [id number] on 7/17/2020 at 2:35 PM

## 2020-07-17 NOTE — PROGRESS NOTES
Hospitalist Progress Note  7/17/2020 12:56 PM  Subjective:   Admit Date: 7/6/2020  PCP: Alejandra Conway MD  Interval History: pt is alert and awake  Looks better  Able to take sips  Tolerating  food better  meds are helping otherwise  Denies any other complaints    Chart reviewed. Diet: DIET FULL LIQUID;  Dietary Nutrition Supplements: Clear Liquid Oral Supplement, Renal Oral Supplement  Medications:   Scheduled Meds:   furosemide  40 mg Intravenous Once    iron sucrose  100 mg Intravenous Daily    piperacillin-tazobactam  3.375 g Intravenous Q8H    fluconazole  200 mg Intravenous Q24H    cloNIDine  0.2 mg Oral TID    amLODIPine  5 mg Oral Daily    potassium chloride  40 mEq Oral Once    metroNIDAZOLE  500 mg Intravenous Q8H    pantoprazole  40 mg Intravenous Daily    And    sodium chloride (PF)  10 mL Intravenous Daily    sodium chloride flush  10 mL Intravenous 2 times per day    heparin (porcine)  5,000 Units Subcutaneous BID    insulin lispro  0-6 Units Subcutaneous TID WC    insulin lispro  0-3 Units Subcutaneous Nightly     Continuous Infusions:   lactated ringers 20 mL/hr at 07/17/20 1224    dextrose       CBC:   Recent Labs     07/15/20  0908 07/16/20  0525 07/17/20  0630   WBC 27.4* 29.3* 27.4*   HGB 7.5* 7.2* 7.3*    532* 623*     BMP:    Recent Labs     07/15/20  0908 07/16/20  0525 07/16/20  1645 07/17/20  0630    139  --  141   K 3.1* 3.2* 3.7 3.3*    104  --  107   CO2 19* 20*  --  21   BUN 43* 27*  --  15   CREATININE 1.6* 1.3*  --  1.0   GLUCOSE 157* 164*  --  137*     Hepatic:   Recent Labs     07/15/20  0908 07/16/20  0525 07/17/20  0630   AST 13* 16 13*   ALT 15 11 11   BILITOT 0.8 0.8 0.7   ALKPHOS 85 80 70     Troponin: No results for input(s): TROPONINI in the last 72 hours. BNP: No results for input(s): BNP in the last 72 hours. Lipids: No results for input(s): CHOL, HDL in the last 72 hours.     Invalid input(s): LDLCALCU  INR:   Recent Labs 07/15/20  0908 07/16/20  0525 07/17/20  0630   INR 1.53* 1.74* 1.71*       Objective:   Vitals: /84   Pulse 94   Temp 98 °F (36.7 °C) (Oral)   Resp 18   Ht 4' 11\" (1.499 m)   Wt 260 lb 5.8 oz (118.1 kg)   SpO2 95%   BMI 52.59 kg/m²   General appearance: alert and awake  Has NGT  HEENT: Head: Normal, normocephalic, atraumatic, anicteric, pupils are reactive to light. Dry mucous membrane. Neck: no adenopathy, no carotid bruit, supple, symmetrical, trachea midline and thyroid not enlarged, symmetric, no tenderness/mass/nodules  Lungs: clear to auscultation bilaterally  Heart: regular rate and rhythm, S1, S2 normal, no murmur, click, rub or gallop  Abdomen: soft, -tender; bowel sounds normal; no masses,  no organomegaly, has drain  Incision is clean  Extremities: extremities mild edema  Neurologic: Mental status: alert and awake    Assessment and Plan:   1. KIKI= better  2. HTN- stable  3. Sepsis-on iv abx. 4. Elevated WBC-CT abd and pelvis is being planned by surgery  5. Dm- monitor sugars  6. Anemia= monitor h/h  7. Iron deff-will start IV iron  Peritonitis- on iv abx  Patient Active Problem List:     EMERY (obstructive sleep apnea)     ARF (acute renal failure) (HCC)     SBO (small bowel obstruction) (HCC)     Leukocytosis     Septicemia (HCC)     Acidosis     KIKI (acute kidney injury) ([facility] [address]     Gallbladder disease     Hypertension     Type 2 diabetes mellitus with hyperglycemia ([facility] [address]    Pt is stable. Discussed with staff  about the plan. See the orders for further plan. Will proceed further acc to pt's progress.   Time spent with management of the pt is-30 minutes      Electronically signed by: Kit Hoyt MD, on 7/17/2020, at 12:56 PM

## 2020-07-17 NOTE — PLAN OF CARE
Problem: Nutrition  Goal: Optimal nutrition therapy  7/17/2020 1139 by Clint Cruz RD, LD  Outcome: Ongoing  7/17/2020 0102 by Tim Negron RN  Outcome: Ongoing     Nutrition Problem #1: Inadequate oral intake  Intervention: Food and/or Nutrient Delivery: Continue Current Diet, Continue Oral Nutrition Supplement  Nutritional Goals: tolerate most appropriate form of nutrition per MD

## 2020-07-17 NOTE — PROGRESS NOTES
Infectious Disease Follow up Notes  Admit Date: 2020  Hospital Day: 12    Antibiotics :   IV Zosyn   IV Fluconazole  IV Flagyl      CHIEF COMPLAINT:     Purulent peritonitis  Sepsis  WBC elevation  Anemia  KIKI  S/P Exp lap   Subjective interval History :  53 y.o. woman with morbid obesity, Dysfunctional uterine bleeding and previous Endometrial Biopsy and is followed by Gynecology and last office visit  and now admitted with acute abdominal pain and not feeling well CT scan with SBO and concern for infection taken to OR on   s/p Exlap ,ometectomy, Left salpingectocmy and removal of Fibroid and repair of serosal tear with HD line placement. She is now extubated in ICU and  Her WBC continue to rise despite IV abx and tissue cx in process. No h/o STDs no HSV or HPV. H/o IUD from  to  and no recent  instrumentation. Labs abnormal with WBC elevation, lactic acidosis, KIKI and Lft elevation on on going we are consulted for recommendations.     Lying in bed and c/o abd fullness BM+ flatus+ tolerating LiQUID diet lower leg edema and abd wall edema veras in place  S/p bed side incision opened by  noted purulence drainage     Past Medical History:    Past Medical History:   Diagnosis Date    Hypertension     Obesity        Past Surgical History:    Past Surgical History:   Procedure Laterality Date     SECTION      LAPAROTOMY N/A 2020    EXPLORATORY LAPAROTOMY, OMENTECTOMY, LEFT SALPINGECTOMY WITH REMOVAL OF FIBROID performed by Cas Angelo MD at [place] [address]       Current Medications:    Outpatient Medications Marked as Taking for the 20 encounter [facility] HOSPITAL Encounter)   Medication Sig Dispense Refill    ferrous sulfate (FE TABS) 325 (65 Fe) MG EC tablet Take 1 tablet by mouth 2 times daily 60 tablet 3    Multiple Vitamins-Minerals (THERAPEUTIC MULTIVITAMIN-MINERALS) tablet Take 1 tablet by mouth daily 30 tablet 11    furosemide (LASIX) 20 MG tablet Take 1 tablet by mouth 2 times daily 60 tablet 3    metFORMIN (GLUCOPHAGE) 500 MG tablet Take 1 tablet by mouth 2 times daily (with meals) 60 tablet 5    amLODIPine-benazepril (LOTREL) 5-20 MG per capsule Take 1 capsule by mouth daily 30 capsule 0    vitamin D (ERGOCALCIFEROL) 1.25 MG (06093 UT) CAPS capsule Take 1 capsule by mouth once a week 4 capsule 5       Allergies:  Patient has no known allergies. Immunizations : There is no immunization history on file for this patient. Social History:    Social History     Tobacco Use    Smoking status: Never Smoker    Smokeless tobacco: Never Used   Substance Use Topics    Alcohol use: No    Drug use: No     Social History     Tobacco Use   Smoking Status Never Smoker   Smokeless Tobacco Never Used      Family History : Other (Other) Other   ovarian cancer- moteher    Other (Other) Other   sister with \"traces of lupus\"    Ovarian cancer Mother    Diabetes Father    Breast cancer Neg Hx      Copied from Care everywhere note 6/29    REVIEW OF SYSTEMS:    No fever / chills / sweats. No weight loss. No visual change, eye pain, eye discharge. No oral lesion, sore throat, dysphagia. Denies cough / sputum/Sob   Denies chest pain, palpitations/ dizziness  Denies nausea/ vomiting/abdominal pain/diarrhea. Denies dysuria or change in urinary function. Denies joint swelling or pain. No myalgia, arthralgia. No rashes, skin lesions   Denies focal weakness, sensory change or other neurologic symptoms  No lymph node swelling or tenderness.     abd pain, bloating, nausea+ cramps     PHYSICAL EXAM:      Vitals:    /84   Pulse 94   Temp 98 °F (36.7 °C) (Oral)   Resp 18   Ht 4' 11\" (1.499 m)   Wt 260 lb 5.8 oz (118.1 kg)   SpO2 95%   BMI 52.59 kg/m²     General Appearance: alert,in some acute distress, +  pallor, + icterus     Skin: warm and dry, no rash or erythema  Head: normocephalic and atraumatic  Eyes: pupils equal, round, and reactive to light, conjunctivae normal  ENT: tympanic membrane, external ear and ear canal normal bilaterally, nose without deformity, nasal mucosa and turbinates normal without polyps  Neck: supple and non-tender without mass, no thyromegaly  no cervical lymphadenopathy  Pulmonary/Chest: bi basal crepts + bilaterally- no wheezes, rales or rhonchi, normal air movement, in respiratory distress  Cardiovascular: normal rate, regular rhythm, normal S1 and S2, no murmurs, rubs, clicks, or gallops, no carotid bruits  Abdomen: soft, -tender, distended,+  bowel sound  masses or organomegaly midline incision dressing+  MECHE drain +   Extremities: no cyanosis, clubbing or +edema  Musculoskeletal: normal range of motion, no joint swelling, deformity or tenderness  Neurologic: reflexes normal and symmetric, no cranial nerve deficit  Psych:  Orientation, sensorium, mood normal  Lines:  IJ+  Jiame+         Lower abd incision staple line opened packing+          Data Review:    Lab Results   Component Value Date    WBC 27.4 (H) 07/17/2020    HGB 7.3 (L) 07/17/2020    HCT 24.1 (L) 07/17/2020    MCV 68.2 (L) 07/17/2020     (H) 07/17/2020     Lab Results   Component Value Date    CREATININE 1.0 07/17/2020    BUN 15 07/17/2020     07/17/2020    K 3.3 (L) 07/17/2020     07/17/2020    CO2 21 07/17/2020       Hepatic Function Panel:   Lab Results   Component Value Date    ALKPHOS 70 07/17/2020    ALT 11 07/17/2020    AST 13 07/17/2020    PROT 6.2 07/17/2020    BILITOT 0.7 07/17/2020    BILIDIR 0.4 07/17/2020    IBILI 0.3 07/17/2020    LABALBU 1.9 07/17/2020       UA:  Lab Results   Component Value Date    COLORU DK YELLOW 07/07/2020    CLARITYU CLOUDY 07/07/2020    GLUCOSEU 100 07/07/2020    BILIRUBINUR SMALL 07/07/2020    KETUA Negative 07/07/2020    SPECGRAV 1.015 07/07/2020    BLOODU LARGE 07/07/2020    PHUR 6.0 07/07/2020    PROTEINU 100 07/07/2020    UROBILINOGEN 1.0 07/07/2020    NITRU Negative 07/07/2020    LEUKOCYTESUR SMALL 07/07/2020    LABMICR YES 07/07/2020    URINETYPE NotGiven 07/07/2020      Urine Microscopic:   Lab Results   Component Value Date    BACTERIA 1+ 07/07/2020    COMU see below 07/07/2020    HYALCAST 1 07/07/2020    WBCUA 26 07/07/2020    RBCUA  07/07/2020    EPIU 2 07/07/2020     Creat  4.5   Lactic acid  5.8  Down to  1.9      WBC  36.5      hB  11.2  DOWN TO  7.5     MICRO: cultures reviewed and updated by me            Culture, Tissue [[id number]] Collected: 07/08/20 1644   Order Status: Completed Specimen: Tissue Updated: 07/10/20 0904    Gram Stain Result No Epithelial Cells seen   No WBCs or organisms seen     WOUND/ABSCESS No growth to date    Anaerobic Culture --    Anaerobic culture further report to follow   No anaerobes isolated so far, Further report to follow    Narrative:     ORDER#: [id number]                          [facility]  SOURCE: Tissue Omentum                     COLLECTED:  07/08/20 16:44  [facility] :                      RECEIVED :  07/08/20 17:13  Performed at:  [address] [place] [address]   Phone [phone]   C.trachomatis N.gonorrhoeae DNA, Urine [[id number]] Collected: 07/08/20 1835   Order Status: Sent Specimen: Urine Updated: 07/09/20 0110   HSV PCR [[id number]] Collected: 07/08/20 0000   Order Status: Sent Specimen: Blood Updated: 07/08/20 1426   HSV PCR [[id number]]    Order Status: Canceled Specimen: Blood    HSV PCR [[id number]]    Order Status: Canceled Specimen: Blood    HSV PCR [[id number]]    Order Status: Canceled Specimen: Blood    Culture, Blood 1 [[id number]] Collected: 07/06/20 1536   Order Status: Completed Specimen: Blood Updated: 07/07/20 1715    Blood Culture, Routine No Growth to date.  Any change in status will be called.    Narrative:     ORDER#: [id number]                          ORDERED BY: LEAH DANIAL  SOURCE: Blood                              COLLECTED:  07/06/20 15:36  [facility] :                      RECEIVED :  07/06/20 15:42  If child <=2 yrs old please draw pediatric bottle. ~Blood Culture #1  Performed at:  [address] [employer]    Phone [phone]   Culture, Blood 2 [[id number]] Collected: 07/06/20 1602   Order Status: Completed Specimen: Blood Updated: 07/07/20 1715    Culture, Blood 2 No Growth to date.  Any change in status will be called. Narrative:     ORDER#: [id number]                          ORDERED BY: DANIAL LYNN  SOURCE: Blood                              COLLECTED:  07/06/20 16:02  [facility] :                      RECEIVED :  07/06/20 16:09  If child <=2 yrs old please draw pediatric bottle. ~Blood Culture #2  Performed at:  [address] [facility] [employer]    Phone [phone]            Culture, Tissue [[id number]]  (Abnormal)  Collected: 07/08/20 1644    Order Status: Completed  Specimen: Tissue  Updated: 07/11/20 1643     Gram Stain Result  No Epithelial Cells seen   No WBCs or organisms seen     Organism  Streptococcus anginosusAbnormal       WOUND/ABSCESS  --     Rare growth   No further workup     Organism  Candida albicansAbnormal       WOUND/ABSCESS  --     Rare growth   No further workup     Organism  Fusobacterium necrophorum ssp necrophorumAbnormal       Anaerobic Culture  --     Light growth   No further workup     Organism  Peptostreptococcus anaerobiusAbnormal       Anaerobic Culture  --     Light growth   No further workup    Narrative:      ORDER#: [id number]                          [facility]   SOURCE: Tissue Omentum                     COLLECTED:  07/08/20 16:44   [facility] :                      RECEIVED :  07/08/20 17:13   Performed at:   [facility]   [address] [place]   Phone [phone]        IMAGING:    XR ABDOMEN (KUB) (SINGLE AP VIEW)   Final Result   1. Improved with residual dilated loops of small bowel either due to an ileus   or partial small bowel obstruction. XR ABDOMEN (KUB) (SINGLE AP VIEW)   Final Result   Increased air-filled small bowel loops concerning for developing distal small   truck shin although this may be due to ileus. CT ABDOMEN PELVIS WO CONTRAST Additional Contrast? None   Final Result   Evaluation is limited by lack of IV and oral contrast.      Diffuse soft tissue edema, new. Small pleural effusions and patchy   consolidation in the lung bases, atelectasis versus infection. Postsurgical changes which are new. Surgical drain terminates in the pelvis. There is small to moderate volume ill-defined fluid along the anterior   abdomen and pelvis. A few tiny foci of gas noted anteriorly which likely are   related to recent surgery. No large amount of free air. XR CHEST PORTABLE   Final Result   Status post placement of right internal jugular central venous catheter,   without gross pneumothorax. Endotracheal tube tip is approximately 1.3 cm proximal to the kelly and   could be retracted approximately 1-2 cm. Low volume study with basilar atelectasis. Findings were called by the radiology call center. XR ABDOMEN (2 VIEWS)   Final Result   Obstructed bowel gas pattern, similar to prior CT         MRI ABDOMEN WO CONTRAST MRCP   Final Result   No biliary ductal obstruction. No findings to explain elevated LFTs. CT ABDOMEN PELVIS WO CONTRAST Additional Contrast? None   Final Result   Mid to distal small bowel obstruction         US GALLBLADDER RUQ   Final Result   Questionable small amount of sludge within the gallbladder. Otherwise   unremarkable right upper quadrant ultrasound.                All the pertinent images and reports for the current Hospitalization were reviewed by me     Scheduled Meds:   furosemide  40 mg Intravenous Once    iron sucrose  100 mg Intravenous Daily    piperacillin-tazobactam  3.375 g Intravenous Q8H    fluconazole  200 mg Intravenous Q24H    cloNIDine  0.2 mg Oral TID    amLODIPine  5 mg Oral Daily    potassium chloride  40 mEq Oral Once    metroNIDAZOLE  500 mg Intravenous Q8H    pantoprazole  40 mg Intravenous Daily    And    sodium chloride (PF)  10 mL Intravenous Daily    sodium chloride flush  10 mL Intravenous 2 times per day    heparin (porcine)  5,000 Units Subcutaneous BID    insulin lispro  0-6 Units Subcutaneous TID WC    insulin lispro  0-3 Units Subcutaneous Nightly       Continuous Infusions:   lactated ringers 20 mL/hr at 07/17/20 1224    dextrose         PRN Meds:  potassium chloride, magnesium sulfate, acetaminophen **OR** acetaminophen, glucose, dextrose, glucagon (rDNA), dextrose, sodium chloride flush, promethazine **OR** ondansetron, iopamidol      Assessment:     Patient Active Problem List   Diagnosis    EMERY (obstructive sleep apnea)    ARF (acute renal failure) (HCC)    [facility]    Leukocytosis    Septicemia (HCC)    Acidosis    KIKI [facility]) ([facility] [address]    Gallbladder disease    Hypertension    Type 2 diabetes mellitus with hyperglycemia (HCC)    Peritonitis (HCC)    Lactic acidosis    Acute pulmonary insufficiency    Metabolic acidosis    Elevated LFTs     Severe sepsis   Lactic acidosis  WBC elevation   KIIK   Purulent peritonitis per OR description  Admitted with abd pain and SBO  S/p Exp lap , omentectomy, Left salpingectomy and removal of Fibroid and repair of serosal tear on 7/8   Lft elevation  Anemia  Thrombocytosis     She was  very ill in ICU  noted purulent peritonitis and per OP notes no bowel leak but Left Tuboovarian abscess suspected based on the presentation    OR cx now strep anginosus, candida and Fusobacterium  necrophorum and Peptostreptococcus     Recent Gynecology eval was normal and previous Chlamydia and GC screen including HPV negative        Would suspected mixed infectious process GI and  seth to be in volved in the process        On going WBC elevation and slow recovery given the mixed infectious process Risk for Intra abdominal abscess    CT abd/pelvis check follow up no leak no abscess    WBC slow trend down bu GI function slowly improving     X ray with ileus noted and clinically improving slowly and WBC trend down a bit but still elevation  S/p Bed side lower incision opened and purulence drained out and packing and this may explain elevated wbc    Spoke to Renal team suggest oral Contrast for CT only no IV contrast risk for KIKI as she is just recovering from this      Labs, Microbiology, Radiology and all the pertinent results from current hospitalization and  care every where were reviewed  by me as a part of the evaluation   Plan: 1. Cont V Fluconazole x 200 mg daily ADJUSTED TO GFR  2. Cont  IV Zosyn x 3.375 gm  x Q 8 hrs will cover GI and  seth well will increase the dose once creat is better   3. Cont IV Flagyl x 500 mg Q 8 hrs for now    4. CT abd/pelvis with oral contrast to be repeated spoke to Renal they did not want IV contrast risk for Kiki   5. Anticipate x 21 days of Iv ABX Therapy   6  HIV -ve and hepatitis screen -ve    7. ESR, CRP Now down trend   8. New abscess/draiange cx sent by Raman Arrington         Discussed with patient/Family and Nursing   Risk of Complications/Morbidity: High      · Illness(es)/ Infection present that pose threat to bodily function. · There is potential for severe exacerbation of infection/side effects of treatment.   · Therapy requires intensive monitoring for antimicrobial agent toxicity      Discussed with patient/Family and Nursing staff     Thanks for allowing me to participate in your patient's care and please call me with any questions or

## 2020-07-17 NOTE — PROGRESS NOTES
Occupational Therapy  Facility/Department: [facility] 5W PROGRESSIVE CARE  Daily Treatment Note  NAME: Anny Powell  : 1970  MRN: [id number]    Date of Service: 2020    Discharge Recommendations:  Patient would benefit from continued therapy after discharge, 3-5 sessions per week  OT Equipment Recommendations  Other: defer to DC facility  Anny Powell scored a 10/24 on the AM-PAC ADL Inpatient form. Current research shows that an AM-PAC score of 17 or less is typically not associated with a discharge to the patient's home setting. Based on the patient's AM-PAC score and their current ADL deficits, it is recommended that the patient have 3-5 sessions per week of Occupational Therapy at d/c to increase the patient's independence. Please see assessment section for further patient specific details. If patient discharges prior to next session this note will serve as a discharge summary. Please see below for the latest assessment towards goals. Assessment   Performance deficits / Impairments: Decreased functional mobility ; Decreased ADL status; Decreased endurance;Decreased strength;Decreased balance;Decreased ROM  Assessment: Pt tolerated session fairly well. Pt limited by the above deficits and requiring up to Max A x2 for bed mob, and dependent for transfers, bed to chair via [address]. Pt dependent for ADL's. Pt will benefit from cont OT at d/c prior to returning home. Cont poc. Treatment Diagnosis: decreased ADLs and transfers  Prognosis: Good  OT Education: OT Role;Plan of Care;Transfer Training  REQUIRES OT FOLLOW UP: Yes  Activity Tolerance  Activity Tolerance: Patient limited by fatigue;Treatment limited secondary to medical complications (free text)  Activity Tolerance: C/o dizziness when seated at EOB. HR, 02 sats WNL  Safety Devices  Safety Devices in place: Adiel pulido)  Type of devices: Call light within reach;Nurse notified;Gait belt;Patient at risk for falls; Left in chair;Chair alarm in place         Patient Diagnosis(es): The primary encounter diagnosis was Septicemia ([facility] [address] Diagnoses of KIKI (acute kidney injury) ([facility] [address] Gallbladder disease, and Acidosis were also pertinent to this visit. has a past medical history of Hypertension and Obesity. has a past surgical history that includes  section and laparotomy (N/A, 2020). Restrictions  Restrictions/Precautions  Restrictions/Precautions: Fall Risk  Position Activity Restriction  Other position/activity restrictions: Recent Abdominal Surg. Abdominal MECHE Drain. Diet : Clear Liquid. Addison  Subjective   General  Chart Reviewed: Yes  Patient assessed for rehabilitation services?: Yes  Additional Pertinent Hx: Pt is a 53 y.o. female admitted to ED 2020 with abdominal pain. Pt stated that this pain began one week ago, and was seen on [facility] at [facility] And was diagnosed with some gastroenteritis. Pt had a CT scan performed that showed a mid to distal small bowel obstruction. S/p exploratory laparotomy, omentectomy, left salpingectomy with removal of fibriod (N/A abdomen) on 2020. Family / Caregiver Present: No  Referring Practitioner: Kylie Stevenson MD  Subjective  Subjective: Pt met BS, in bed. Pt agreeable to OT/PT for bed mob, OOB to chair. \"I need to be cleaned\". Pt noted to be incontinent of BM. General Comment  Comments: Per RN ok for therapy      Orientation  Orientation  Overall Orientation Status: Within Normal Limits  Objective    ADL  Toileting: Dependent/Total(assist x1-2 for hygiene needs, depends changed, after incontinent 2x)        Balance  Sitting Balance: Maximum assistance(initial max A x1 at EOB. Progressed to [address] when bed deflated.  Pt c/o dizziness)  Bed mobility  Rolling to Left: Maximum assistance;2 Person assistance  Rolling to Right: Maximum assistance;2 Person assistance  Supine to Sit: Maximum assistance;2 Person assistance  Sit to Supine: 2 Person assistance;Maximum assistance  Scooting: Dependent/Total;2 Person assistance  Transfers  Transfer Comments: Unable to attempt, sit>stand off edge of deflated bed, onto angela najera. maxi move used for dependent bed to chair. Cognition  Overall Cognitive Status: WNL             Plan   Plan  Times per week: 3-5  Current Treatment Recommendations: Strengthening, ROM, Gait Training, Balance Training, Self-Care / ADL, Functional Mobility Training, Endurance Training, Patient/Caregiver Education & Training    AM-PAC Score        AM-PAC Inpatient Daily Activity Raw Score: 10 (07/17/20 1436)  AM-PAC Inpatient ADL T-Scale Score : 27.31 (07/17/20 1436)  ADL Inpatient CMS 0-100% Score: 74.7 (07/17/20 1436)  ADL Inpatient CMS G-Code Modifier : CL (07/17/20 1436)    Goals  Short term goals  Time Frame for Short term goals: Prior to d/c: goals ongoing  Short term goal 1: Pt will complete fxl transfer to/from ADL surfaces with mod A x2.   Short term goal 2: Pt will complete B UE HEP x 15 reps to increase activity tolerance for ADLs  Short term goal 3: Pt will tolerate sitting EOB >3 minutes in prep for ADL transfers with mod A x 1  Short term goal 4: Pt will complete bed mobility with mod A x2  Short term goal 5: Pt will complete UB dressing with min A  Long term goals  Time Frame for Long term goals : STGS=LTGS  Patient Goals   Patient goals : to recover and return home with family       Therapy Time   Individual Concurrent Group Co-treatment   Time In 1330         Time Out 1430         Minutes [address] [facility] [address]/L,[id number]

## 2020-07-17 NOTE — PROGRESS NOTES
Comprehensive Nutrition Assessment    Type and Reason for Visit:  Reassess    Nutrition Recommendations/Plan:     Continue full liquid diet with advancement when medically indicated  Continue Ensure Clear and Nepro ONS  Recommend alternative nutrition if unable to advance diet soon and if intakes remain poor    Nutrition Assessment:  Pt currently on full liquid diet and receiving Ensure Clear and Nepro ONS. In notes and I/O flowsheet, noted that pt has poor intakes. If unable to tolerate diet advancement soon or if intakes do not improve, would recommend alternative nutrition. Will monitor for nutritional status and intakes as stay progresses.     Malnutrition Assessment:  Malnutrition Status:  Insufficient data      Estimated Daily Nutrient Needs:  Energy (kcal):  4759-8157 kcal (11-15 kcal/kg CBW)  Protein (g):  36-54 gm (0.8-1.2gm/kg CBW)  Fluid (ml/day):  recs per MD     Nutrition Related Findings:  BM 7/16; labs reviewed: Mg low at 1.40, K low at 3.3, glucose elevated at 137 (7/17); +2 non-pitting edema RUE, LUE, +1 non-pitting edema RLE, LLE      Wounds:  Surgical Wound(umbilicus 7/8)       Current Nutrition Therapies:    DIET FULL LIQUID;  Dietary Nutrition Supplements: Clear Liquid Oral Supplement, Renal Oral Supplement    Anthropometric Measures:  · Height: 4' 11\" (149.9 cm)  · Current Body Weight: 260 lb (117.9 kg)   · Admission Body Weight: 240 lb (108.9 kg)    · Usual Body Weight: 245 lb (111.1 kg)     · Ideal Body Weight: 95 lbs; 273.7 lbs   · BMI: 52.5  · Adjusted Body Weight:  ; No Adjustment   · BMI Categories: Obese Class 3 (BMI 40.0 or greater)       Nutrition Diagnosis:   · Inadequate oral intake related to altered GI function as evidenced by intake 0-25%      Nutrition Interventions:   Food and/or Nutrient Delivery:  Continue Current Diet, Continue Oral Nutrition Supplement  Nutrition Education/Counseling:  No recommendation at this time   Coordination of Nutrition Care:  Continued Inpatient Monitoring    Goals:  tolerate most appropriate form of nutrition per MD       Nutrition Monitoring and Evaluation:   Food/Nutrient Intake Outcomes:  Diet Advancement/Tolerance, Food and Nutrient Intake, Supplement Intake  Physical Signs/Symptoms Outcomes:  Biochemical Data, Nausea or Vomiting, Weight, Skin, Nutrition Focused Physical Findings     Discharge Planning:    No discharge needs at this time     Electronically signed by Sandra Lucio RD, LD on 7/17/20 at 11:33 AM EDT    Contact: [phone]

## 2020-07-17 NOTE — PROGRESS NOTES
Nephrology Note  [phone]   [email or web address]      CC: KIKI    Subjective:    HPI:  Patient is post op exploration and evacuation of abdominal infection. On multiple antibiotcs per ID. KIKI is improving Cr down to 1.0 mg today UOP 2200 ml/24 hours but weight up 20 lbs since admit  ROS:  In bed. No dyspnea Taking liquids PO  PMFSH:  medications reviewed. family present. Objective:  Blood pressure 126/84, pulse 94, temperature 98 °F (36.7 °C), temperature source Oral, resp. rate 18, height 4' 11\" (1.499 m), weight 260 lb 5.8 oz (118.1 kg), SpO2 95 %, not currently breastfeeding. Intake/Output Summary (Last 24 hours) at 7/17/2020 1223  Last data filed at 7/17/2020 5719  Gross per 24 hour   Intake 1540 ml   Output 2280 ml   Net -740 ml     General:  NAD, A+Ox3  Chest:  Normal respiratory effort   CVS:  No rub no gallop   Abdominal:  Staples in place Drain in place  Extremities:  ++  edema  Skin:  no rash  Jaime in place  R IJ Vasc cath in place    Labs:  Renal panel:  Lab Results   Component Value Date/Time     07/17/2020 06:30 AM    K 3.3 (L) 07/17/2020 06:30 AM    CO2 21 07/17/2020 06:30 AM    BUN 15 07/17/2020 06:30 AM    CREATININE 1.0 07/17/2020 06:30 AM    CALCIUM 7.2 (L) 07/17/2020 06:30 AM    PHOS 3.2 07/17/2020 06:30 AM    MG 1.40 (L) 07/17/2020 06:30 AM     CBC:  Lab Results   Component Value Date/Time    WBC 27.4 (H) 07/17/2020 06:30 AM    HGB 7.3 (L) 07/17/2020 06:30 AM    HCT 24.1 (L) 07/17/2020 06:30 AM     (H) 07/17/2020 06:30 AM       Assessment/Plan:  Reviewed old records and labs.     1) KIKI              - baseline 06/03/20 Cr 0.6, Cr as high as 4.7 mg               -appears to be   ATN/  pre-renal factors with prior use of benazepril and ibuprofen    KIKI improving Cr down to 1.0 mg today Will decrease IVF's rate to Gigi Cano and give Lasix 40 mg IV due to increased edema Wt up 20 lbs since admit OK to discontinue Vasc cath from renal SP       2) Purulent peritenonitis with marked leukocytosis and WBC trending up still Surgery note reviewed              - ABX per  ID       3) ID              - on empiric zosyn, Diflucan Flagyl     4) FEN    Hypokalemia replace                 Hypomagnesemia replace                - hypocalcemia better Monitor

## 2020-07-17 NOTE — PLAN OF CARE
Problem: Pain:  Goal: Pain level will decrease  Description: Pain level will decrease  Outcome: Ongoing  Goal: Control of acute pain  Description: Control of acute pain  Outcome: Ongoing  Goal: Control of chronic pain  Description: Control of chronic pain  Outcome: Ongoing     Problem: Falls - Risk of:  Goal: Will remain free from falls  Description: Will remain free from falls  Outcome: Ongoing  Goal: Absence of physical injury  Description: Absence of physical injury  Outcome: Ongoing     Problem:  Bowel/Gastric:  Goal: Control of bowel function will improve  Description: Control of bowel function will improve  Outcome: Ongoing  Goal: Ability to achieve a regular elimination pattern will improve  Description: Ability to achieve a regular elimination pattern will improve  Outcome: Ongoing     Problem: Nausea/Vomiting:  Goal: Absence of nausea/vomiting  Description: Absence of nausea/vomiting  Outcome: Ongoing  Goal: Able to drink  Description: Able to drink  Outcome: Ongoing  Goal: Able to eat  Description: Able to eat  Outcome: Ongoing  Goal: Ability to achieve adequate nutritional intake will improve  Description: Ability to achieve adequate nutritional intake will improve  Outcome: Ongoing     Problem: Skin Integrity:  Goal: Will show no infection signs and symptoms  Description: Will show no infection signs and symptoms  Outcome: Ongoing  Goal: Absence of new skin breakdown  Description: Absence of new skin breakdown  Outcome: Ongoing     Problem: Nutrition  Goal: Optimal nutrition therapy  Outcome: Ongoing

## 2020-07-17 NOTE — ADT AUTH CERT
Utilization Reviews         Renal Failure, Acute - Care Day 11 (7/16/2020) by Giuliano Valladares RN         Review Status  Review Entered    Completed  7/17/2020 08:36        Criteria Review       Care Day: 11 Care Date: 7/16/2020 Level of Care: Intermediate Care    Guideline Day 3    Clinical Status    (X) * Mental status at baseline or improved    (X) * Respiratory status at baseline or improved    Routes    ( ) * Oral hydration, medications, and diet    7/17/2020 8:36 AM EDT by Galilea Lopez She is tolerating liquids  IV antibiotics and LR 50 ml/h continued    Interventions    (X) Possible dialysis    (X) Establish long-term treatment plan [H]    (X) Monitor electrolytes, renal function tests, acid-base, and volume status    7/17/2020 8:36 AM EDT by Isatu Aggarwal      Sodium: 139  Potassium: 3.2 (L)  Chloride: 104  CO2: 20 (L)  BUN: 27 (H)  Creatinine: 1.3 (H)  Anion Gap: 15  Calcium, Ion: 0.99 (L)  GFR Non-: 44 (A)  GFR Afr    (X) Diet instruction    7/17/2020 8:36 AM EDT by Isatu Aggarwal      liquid diet    Medications    (X) Possible medical therapies    * Milestone    Additional Notes    7/16    Blood pressure (!) 144/96, pulse 98, temperature 98.7 °F (37.1 °C), temperature source Oral, resp.  rate 20, height 4' 11\" (1.499 m), weight 255 lb 6.5 oz (115.9 kg), SpO2 97 %          Labs       Sodium: 139    Potassium: 3.2 (L)    Chloride: 104    CO2: 20 (L)    BUN: 27 (H)    Creatinine: 1.3 (H)    Anion Gap: 15    Calcium, Ion: 0.99 (L)    GFR Non-: 44 (A)    GFR : 53 (A)    Magnesium: 1.30 (L)    Glucose: 164 (H)    Calcium: 6.9 (L)    Phosphorus: 3.3    Total Protein: 6.2 (L)    Albumin: 2.0 (L)    Globulin: 4.2    Albumin/Globulin Ratio: 0.5 (L)    Alk Phos: 80    ALT: 11    AST: 16    Bilirubin: 0.8    Bilirubin, Direct: 0.5 (H)    Bilirubin, Indirect: 0.3    WBC: 29.3 (H)    RBC: 3.46 (L)    Hemoglobin Quant: 7.2 (L)    Hematocrit: 23.4 (L) Orders    calcium gluconate 2 g in dextrose 5 % 100 mL IVPB  once    fluconazole (DIFLUCAN) in 0.9 % sodium chloride IVPB 200 mg  q24h    metronidazole (FLAGYL) 500 mg in NaCl 100 mL IVPB  q8h    Protonix 40 mg IV daily    piperacillin-tazobactam (ZOSYN) 3.375 g in dextrose 5 % 100 mL IVPB q8h    IV LR 50 ml/h    magnesium sulfate 1 g in dextrose 5% 100 mL IVPB  x4    potassium chloride 10 mEq/100 mL IVPB x4       Per Nephrology       HPI: Bryon Hickey is post op exploration and evacuation of abdominal infection. On multiple antibiotcs per ID. KIKI is improving Cr down to 1.3 mg today UOP 2800 ml/24 hours     ROS:  In bed. No dyspnea Taking liquids PO    PMFSH:  medications reviewed.  family present.         Assessment/Plan:    Reviewed old records and labs.      1) KIKI                - baseline 06/03/20 Cr 0.6, Cr as high as 4.7 now 3.3 making more urine                -appears to be   ATN/  pre-renal factors with prior use of benazepril and ibuprofen                 KIKI improving Cr down to 1.3 mg today Will decrease IVF's rate Discontinue Vasc cath if not needed for IV access              2) Purulent peritenonitis with marked leukocytosis and WBC trending up still Surgery note reviewed                - ABX per  ID           3) ID                - on empiric zosyn, Diflucan Flagyl         4) FEN    Hypokalemia replaced                   Hypomagnesemia replaced                  - hypocalcemia  ionized Calcium 0.9 will replace IV       Per surgery    ASSESSMENT/PLAN    Diffuse purulent peritonitis, suspicious for ruptured TOA    -7/8 exploratory laparotomy, omentectomy, left salpingectomy with removal of fibroid, repair serosal tear, US guided right IJ vascath. Drain SS.    -+flatulence and BMs.  Reports improvement in abdominal pain but still with poor PO intake secondary to feeling full    -WBC trending mildly up. Chriss [place] serous.  Vitals stable.  Continue abx and liquids for now.  Repeat CT tomorrow with PO contrast if no significant change in WBC         KIKI    -vas cath in place, nephro following  -Cr continues to improve         SUBJECTIVE    Nurys has unchanged from yesterday. Pain is well controlled.     She has nausea but no vomiting.     She has passed flatus and has had a bowel movement. She is tolerating liquids         Per ID          Lying in bed and c/o abd fullness BM+ flatus+ tolerating LiQUID diet Creat improving but WBC still elevated is concerning and noted plans for CT tomorrow and agreed with this pt aware        abd pain, bloating, nausea+ cramps       Abdomen: soft, -tender, distended,+  bowel sound  masses or organomegaly midline incision dressing+  MECHE drain +       Plan: 1. Cont V Fluconazole x 200 mg daily ADJUSTED TO GFR    2.  Cont  IV Zosyn x 3.375 gm  x Q 8 hrs will cover GI and  seth well will increase the dose once creat is better    3.  Cont IV Flagyl x 500 mg Q 8 hrs for now      4. CT abd/pelvis with oral contrast to be repeated tomorrow due to worsening WBC    5.  Anticipate x 21 days of Iv ABX Therapy    6  HIV -ve and hepatitis screen -ve      7.  ESR, CRP Now down trend             Renal Failure, Acute - Care Day 10 (7/15/2020) by Mohini Jiménez RN         Review Status  Review Entered    Completed  7/16/2020 11:10        Criteria Review       Care Day: 10 Care Date: 7/15/2020 Level of Care: Intermediate Care    Guideline Day 3    Clinical Status    (X) * Mental status at baseline or improved    (X) * Respiratory status at baseline or improved    Routes    ( ) * Oral hydration, medications, and diet    7/16/2020 11:09 AM EDT by José Rinaldi      IV antibiotics  continued  lactated ringers infusion    Rate: 100 mL/hr    Interventions    (X) Possible dialysis    (X) Establish long-term treatment plan [H]    (X) Monitor electrolytes, renal function tests, acid-base, and volume status    7/16/2020 11:09 AM EDT by José Rinaldi      Sodium: 138  Potassium: 3.1 (L)  Chloride: 108  CO2: 19 (L)  BUN: 43 (H)  Creatinine: 1.6 (H)  Anion Gap: 11  GFR Non-: 34 (A)  GFR : 41 (A)  M    (X) Diet instruction    Medications    (X) Possible medical therapies    * Milestone    Additional Notes    7/15/20       Blood pressure (!) 157/92, pulse 92, temperature 97.3 °F (36.3 °C), temperature source Oral, resp. rate 16, height 4' 11\" (1.499 m), weight 255 lb 8.2 oz (115.9 kg), SpO2 96 %       Labs       Sodium: 138    Potassium: 3.1 (L)    Chloride: 108    CO2: 19 (L)    BUN: 43 (H)    Creatinine: 1.6 (H)    Anion Gap: 11    GFR Non-: 34 (A)    GFR : 41 (A)    Magnesium: 1.40 (L)    Glucose: 157 (H)    Calcium: 6.8 (L)    Phosphorus: 4.3    Total Protein: 6.1 (L)    Albumin: 1.8 (L)    Globulin: 4.3    Albumin/Globulin Ratio: 0.4 (L)    Alk Phos: 85    ALT: 15    AST: 13 (L)    Bilirubin: 0.8    Bilirubin, Direct: 0.5 (H)    Bilirubin, Indirect: 0.3    WBC: 27.4 (H)    RBC: 3.70 (L)    Hemoglobin Quant: 7.5 (L)    Hematocrit: 25.1 (L)       Orders    Full liquid diet    Daily labs    fluconazole (DIFLUCAN) in 0.9 % sodium chloride IVPB 200 mg  q24h    Flagyl 500 mg IV q8h    Protonix IV daily    Zosyn 3.375  g IV q8h- dose increased today    IV  ml/h    magnesium sulfate 1 g in dextrose 5% 100 mL IVPB  once    potassium chloride 10 mEq/100 mL IVPB x4          Per Nephrology    Subjective:         HPI:  Patient is post op exploration and evacuation of abdominal infection. On multiple antibiotcs per ID. KIKI is improving Cr down to 2.4 mg today UOP 2300 ml/24 hours     ROS:  In bed. No dyspnea Taking liquids PO    PMFSH:  medications reviewed. Assessment/Plan:    Reviewed old records and labs.          1) KIKI                - baseline 06/03/20 Cr 0.6, Cr as high as 4.7 now 3.3 making more urine                -appears to be   ATN/  pre-renal factors with prior use of benazepril and ibuprofen                Note has Anisha Vigil RN         Review Status  Review Entered    Completed  7/15/2020 11:35        Criteria Review       Care Day: 9 Care Date: 7/14/2020 Level of Care: Intermediate Care    Guideline Day 3    Clinical Status    (X) * Mental status at baseline or improved    (X) * Respiratory status at baseline or improved    Routes    ( ) * Oral hydration, medications, and diet    7/15/2020 11:35 AM EDT by Lynn CHERRY diet  IV /h  IV antibiotics continued    Interventions    (X) Possible dialysis    (X) Establish long-term treatment plan [H]    (X) Monitor electrolytes, renal function tests, acid-base, and volume status    (X) Diet instruction    Medications    (X) Possible medical therapies    * Milestone    Additional Notes    7/14       Vitals: BP (!) 164/105   Pulse 85   Temp 97.4 °F (36.3 °C) (Oral)   Resp 18   Ht 4' 11\" (1.499 m)   Wt 254 lb 10.1 oz (115.5 kg)   SpO2 95%   BMI 51.43 kg/m²       Labs       Sodium: 141    Potassium: 3.2 (L)    Chloride: 104    CO2: 19 (L)    BUN: 63 (H)    Creatinine: 2.4 (H)    Anion Gap: 18 (H)    Calcium, Ion: 1.02 (L)    GFR Non-: 21 (A)    GFR : 26 (A)    Magnesium: 1.70 (L)    Glucose: 155 (H)    Calcium: 6.8 (L)    Phosphorus: 5.8 (H)    Total Protein: 6.4    Albumin: 2.0 (L)    Globulin: 4.4    Albumin/Globulin Ratio: 0.5 (L)    Alk Phos: 105    ALT: 20    AST: 18    Bilirubin: 1.0    Bilirubin, Direct: 0.7 (H)    Bilirubin, Indirect: 0.3    WBC: 29.9 (H)    RBC: 3.66 (L)    Hemoglobin Quant: 7.5 (L)    Hematocrit: 24.0 (L)       KUB  Increased air-filled small bowel loops concerning for developing distal small    truck shin although this may be due to ileus.        Orders    full liquid diet    Daily labs    fluconazole (DIFLUCAN) in 0.9 % sodium chloride IVPB 200 mg  q24h    Flagyl 500 mg IV q8h    Protonix IV daily    Zosyn 2.25 g IV q8h    potassium chloride (KLOR-CON M) extended release tablet 40 mEq  po once IV  ml/h    Zofran 4 mg IV prn given x1       Per IM       Assessment and Plan:    1. KIKI= as per dr Diana Scott    2. HTN    3. Sepsis-on iv abx    4. Dm- monitor sugars    5. Anemia= monitor h/h    Peritonitis- on iv abx    Patient Active Problem List:       EMERY (obstructive sleep apnea)       ARF (acute renal failure) (HCC)       SBO (small bowel obstruction) ([facility] [place] [address]       Leukocytosis       Septicemia ([facility] [place] [address]       Acidosis       KIKI (acute kidney injury) ([address]       Gallbladder disease       Hypertension       Type 2 diabetes mellitus with hyperglycemia ([facility] [place] [address]         Pt is stable. Discussed with staff  about the plan. See the orders for further plan. Xray showed ileus    Instructed the nurse to call surgery to see if ngt is warranted    Will proceed further acc to pt's progress. Per GI    Subjective:    Patient resting in bed, no acute distress.  NGT removed.  She reports she has been dry heaving but no vomiting.  She is tolerating clear liquids.  She reports she had a few bowel movements yesterday. ASSESSMENT AND RECOMMENDATIONS    Yumiko Villalobos a 53 y.o. female with PMH of hypertension and obesity who presented on 7/6/2020 with abdominal pain.  CT A/P showed mid to distal small bowel obstruction. NGT placed and Surgery consulted. Hebert Vigil was taken to OR for ex lap which noted diffuse purulent peritonitis suspicious for ruptured TOA.  Underwent omentectomy, left salpingectomy with removal of fibroid, repair of serosal tear         1. Ruptured TOA with peritonitis s/p ex-lap 7/8/20: On Flagyl, Fluconazole and Zosyn, right pelvic drain in place.      2. Lactic acidosis 2/2 #1: Normalized    3. SBO 2/2 #1: NG tube removed. Tolerating clear liquids.  Having BMs.      4. Abnormal LFTs: [place] cholestasis of sepsis.  The patient has a history of normal liver function tests, including 7/2/2020.  MRCP negative for obstruction.  HAV IgM negative, EBV neg, CMV negative for acute Assessment/Plan:    Reviewed old records and labs.          1) KIKI                - baseline 06/03/20 Cr 0.6, Cr as high as 4.7 now 3.3 making more urine                -appears to be   ATN/  pre-renal factors with prior use of benazepril and ibuprofen                Note has  dialysis catheter by Dr. Mikie Sam ,await today's labs if improved would remove the catheter    Obtain today's labs discussed with RN .              2) Purulent peritenonitis with marked leukocytosis and WBC trending up still                - per surgery and ID           3) ID                - on empiric zosyn, levaquin         4) FEN    adjusted  IV fluids and                            will use LR                  - hypocalcemia and Hyperphosphatemia await labs                     - hyponatremia monitor

## 2020-07-18 LAB
A/G RATIO: 0.4 (ref 1.1–2.2)
ALBUMIN SERPL-MCNC: 1.9 G/DL (ref 3.4–5)
ALP BLD-CCNC: 70 U/L (ref 40–129)
ALT SERPL-CCNC: 9 U/L (ref 10–40)
ANION GAP SERPL CALCULATED.3IONS-SCNC: 12 MMOL/L (ref 3–16)
AST SERPL-CCNC: 13 U/L (ref 15–37)
BASOPHILS ABSOLUTE: 0 K/UL (ref 0–0.2)
BASOPHILS RELATIVE PERCENT: 0.1 %
BILIRUB SERPL-MCNC: 0.7 MG/DL (ref 0–1)
BILIRUBIN DIRECT: 0.4 MG/DL (ref 0–0.3)
BILIRUBIN, INDIRECT: 0.3 MG/DL (ref 0–1)
BUN BLDV-MCNC: 11 MG/DL (ref 7–20)
CALCIUM IONIZED: 1.04 MMOL/L (ref 1.12–1.32)
CALCIUM SERPL-MCNC: 7.4 MG/DL (ref 8.3–10.6)
CHLORIDE BLD-SCNC: 106 MMOL/L (ref 99–110)
CO2: 22 MMOL/L (ref 21–32)
CREAT SERPL-MCNC: 0.9 MG/DL (ref 0.6–1.1)
EOSINOPHILS ABSOLUTE: 0.1 K/UL (ref 0–0.6)
EOSINOPHILS RELATIVE PERCENT: 0.5 %
GFR AFRICAN AMERICAN: >60
GFR NON-AFRICAN AMERICAN: >60
GLOBULIN: 4.3 G/DL
GLUCOSE BLD-MCNC: 101 MG/DL (ref 70–99)
GLUCOSE BLD-MCNC: 109 MG/DL (ref 70–99)
GLUCOSE BLD-MCNC: 147 MG/DL (ref 70–99)
GLUCOSE BLD-MCNC: 235 MG/DL (ref 70–99)
GLUCOSE BLD-MCNC: 257 MG/DL (ref 70–99)
HCT VFR BLD CALC: 23.3 % (ref 36–48)
HEMATOLOGY PATH CONSULT: NO
HEMOGLOBIN: 7.2 G/DL (ref 12–16)
INR BLD: 1.77 (ref 0.86–1.14)
LYMPHOCYTES ABSOLUTE: 0.7 K/UL (ref 1–5.1)
LYMPHOCYTES RELATIVE PERCENT: 3.3 %
MAGNESIUM: 1.4 MG/DL (ref 1.8–2.4)
MCH RBC QN AUTO: 20.9 PG (ref 26–34)
MCHC RBC AUTO-ENTMCNC: 30.8 G/DL (ref 31–36)
MCV RBC AUTO: 67.9 FL (ref 80–100)
MONOCYTES ABSOLUTE: 1 K/UL (ref 0–1.3)
MONOCYTES RELATIVE PERCENT: 5 %
NEUTROPHILS ABSOLUTE: 18.7 K/UL (ref 1.7–7.7)
NEUTROPHILS RELATIVE PERCENT: 91.1 %
PDW BLD-RTO: 28 % (ref 12.4–15.4)
PERFORMED ON: ABNORMAL
PH VENOUS: 7.44 (ref 7.35–7.45)
PHOSPHORUS: 3.6 MG/DL (ref 2.5–4.9)
PLATELET # BLD: 780 K/UL (ref 135–450)
PMV BLD AUTO: 7.4 FL (ref 5–10.5)
POTASSIUM REFLEX MAGNESIUM: 3.4 MMOL/L (ref 3.5–5.1)
PROTHROMBIN TIME: 20.7 SEC (ref 10–13.2)
RBC # BLD: 3.43 M/UL (ref 4–5.2)
SODIUM BLD-SCNC: 140 MMOL/L (ref 136–145)
TOTAL PROTEIN: 6.2 G/DL (ref 6.4–8.2)
WBC # BLD: 20.6 K/UL (ref 4–11)

## 2020-07-18 PROCEDURE — APPSS15 APP SPLIT SHARED TIME 0-15 MINUTES: Performed by: PHYSICIAN ASSISTANT

## 2020-07-18 PROCEDURE — 84100 ASSAY OF PHOSPHORUS: CPT

## 2020-07-18 PROCEDURE — 99232 SBSQ HOSP IP/OBS MODERATE 35: CPT | Performed by: INTERNAL MEDICINE

## 2020-07-18 PROCEDURE — 2060000000 HC ICU INTERMEDIATE R&B

## 2020-07-18 PROCEDURE — 85025 COMPLETE CBC W/AUTO DIFF WBC: CPT

## 2020-07-18 PROCEDURE — 6360000002 HC RX W HCPCS: Performed by: INTERNAL MEDICINE

## 2020-07-18 PROCEDURE — 6360000002 HC RX W HCPCS: Performed by: SURGERY

## 2020-07-18 PROCEDURE — 82330 ASSAY OF CALCIUM: CPT

## 2020-07-18 PROCEDURE — 85610 PROTHROMBIN TIME: CPT

## 2020-07-18 PROCEDURE — 6370000000 HC RX 637 (ALT 250 FOR IP): Performed by: INTERNAL MEDICINE

## 2020-07-18 PROCEDURE — 83735 ASSAY OF MAGNESIUM: CPT

## 2020-07-18 PROCEDURE — 6370000000 HC RX 637 (ALT 250 FOR IP): Performed by: SURGERY

## 2020-07-18 PROCEDURE — 99024 POSTOP FOLLOW-UP VISIT: CPT | Performed by: SURGERY

## 2020-07-18 PROCEDURE — APPNB30 APP NON BILLABLE TIME 0-30 MINS: Performed by: PHYSICIAN ASSISTANT

## 2020-07-18 PROCEDURE — C9113 INJ PANTOPRAZOLE SODIUM, VIA: HCPCS | Performed by: INTERNAL MEDICINE

## 2020-07-18 PROCEDURE — 2580000003 HC RX 258: Performed by: INTERNAL MEDICINE

## 2020-07-18 PROCEDURE — 2500000003 HC RX 250 WO HCPCS: Performed by: INTERNAL MEDICINE

## 2020-07-18 PROCEDURE — 2580000003 HC RX 258: Performed by: SURGERY

## 2020-07-18 PROCEDURE — 80053 COMPREHEN METABOLIC PANEL: CPT

## 2020-07-18 RX ORDER — CALCIUM CARBONATE 200(500)MG
500 TABLET,CHEWABLE ORAL 2 TIMES DAILY
Status: DISCONTINUED | OUTPATIENT
Start: 2020-07-18 | End: 2020-07-24 | Stop reason: HOSPADM

## 2020-07-18 RX ORDER — CALCIUM CARBONATE 500(1250)
500 TABLET ORAL 2 TIMES DAILY
Status: DISCONTINUED | OUTPATIENT
Start: 2020-07-18 | End: 2020-07-18 | Stop reason: CLARIF

## 2020-07-18 RX ORDER — FUROSEMIDE 40 MG/1
40 TABLET ORAL ONCE
Status: COMPLETED | OUTPATIENT
Start: 2020-07-18 | End: 2020-07-18

## 2020-07-18 RX ORDER — LANOLIN ALCOHOL/MO/W.PET/CERES
400 CREAM (GRAM) TOPICAL 2 TIMES DAILY
Status: DISCONTINUED | OUTPATIENT
Start: 2020-07-18 | End: 2020-07-24 | Stop reason: HOSPADM

## 2020-07-18 RX ORDER — ERGOCALCIFEROL 1.25 MG/1
50000 CAPSULE ORAL WEEKLY
Status: DISCONTINUED | OUTPATIENT
Start: 2020-07-18 | End: 2020-07-24 | Stop reason: HOSPADM

## 2020-07-18 RX ORDER — POTASSIUM CHLORIDE 20 MEQ/1
40 TABLET, EXTENDED RELEASE ORAL
Status: DISCONTINUED | OUTPATIENT
Start: 2020-07-18 | End: 2020-07-18 | Stop reason: CLARIF

## 2020-07-18 RX ADMIN — PIPERACILLIN AND TAZOBACTAM 3.38 G: 3; .375 INJECTION, POWDER, LYOPHILIZED, FOR SOLUTION INTRAVENOUS at 16:10

## 2020-07-18 RX ADMIN — Medication 400 MG: at 20:43

## 2020-07-18 RX ADMIN — SODIUM CHLORIDE, PRESERVATIVE FREE 10 ML: 5 INJECTION INTRAVENOUS at 20:42

## 2020-07-18 RX ADMIN — HEPARIN SODIUM 5000 UNITS: 5000 INJECTION INTRAVENOUS; SUBCUTANEOUS at 20:43

## 2020-07-18 RX ADMIN — POTASSIUM CHLORIDE 10 MEQ: 7.46 INJECTION, SOLUTION INTRAVENOUS at 09:46

## 2020-07-18 RX ADMIN — METRONIDAZOLE 500 MG: 500 INJECTION, SOLUTION INTRAVENOUS at 04:53

## 2020-07-18 RX ADMIN — INSULIN LISPRO 1 UNITS: 100 INJECTION, SOLUTION INTRAVENOUS; SUBCUTANEOUS at 20:43

## 2020-07-18 RX ADMIN — POTASSIUM BICARBONATE 40 MEQ: 782 TABLET, EFFERVESCENT ORAL at 16:10

## 2020-07-18 RX ADMIN — MAGNESIUM SULFATE HEPTAHYDRATE 1 G: 1 INJECTION, SOLUTION INTRAVENOUS at 09:54

## 2020-07-18 RX ADMIN — PANTOPRAZOLE SODIUM 40 MG: 40 INJECTION, POWDER, FOR SOLUTION INTRAVENOUS at 09:42

## 2020-07-18 RX ADMIN — CLONIDINE HYDROCHLORIDE 0.2 MG: 0.1 TABLET ORAL at 09:40

## 2020-07-18 RX ADMIN — FLUCONAZOLE 200 MG: 200 INJECTION, SOLUTION INTRAVENOUS at 17:46

## 2020-07-18 RX ADMIN — PIPERACILLIN AND TAZOBACTAM 3.38 G: 3; .375 INJECTION, POWDER, LYOPHILIZED, FOR SOLUTION INTRAVENOUS at 08:09

## 2020-07-18 RX ADMIN — METRONIDAZOLE 500 MG: 500 INJECTION, SOLUTION INTRAVENOUS at 13:10

## 2020-07-18 RX ADMIN — POTASSIUM CHLORIDE 10 MEQ: 7.46 INJECTION, SOLUTION INTRAVENOUS at 11:44

## 2020-07-18 RX ADMIN — INSULIN LISPRO 3 UNITS: 100 INJECTION, SOLUTION INTRAVENOUS; SUBCUTANEOUS at 18:49

## 2020-07-18 RX ADMIN — AMLODIPINE BESYLATE 5 MG: 5 TABLET ORAL at 09:40

## 2020-07-18 RX ADMIN — FUROSEMIDE 40 MG: 40 TABLET ORAL at 11:45

## 2020-07-18 RX ADMIN — Medication 500 MG: at 20:43

## 2020-07-18 RX ADMIN — ERGOCALCIFEROL 50000 UNITS: 1.25 CAPSULE ORAL at 13:10

## 2020-07-18 RX ADMIN — POTASSIUM CHLORIDE 10 MEQ: 7.46 INJECTION, SOLUTION INTRAVENOUS at 10:36

## 2020-07-18 RX ADMIN — METRONIDAZOLE 500 MG: 500 INJECTION, SOLUTION INTRAVENOUS at 20:43

## 2020-07-18 RX ADMIN — HEPARIN SODIUM 5000 UNITS: 5000 INJECTION INTRAVENOUS; SUBCUTANEOUS at 09:43

## 2020-07-18 RX ADMIN — CLONIDINE HYDROCHLORIDE 0.2 MG: 0.1 TABLET ORAL at 20:43

## 2020-07-18 RX ADMIN — POTASSIUM CHLORIDE 10 MEQ: 7.46 INJECTION, SOLUTION INTRAVENOUS at 08:09

## 2020-07-18 RX ADMIN — PIPERACILLIN AND TAZOBACTAM 3.38 G: 3; .375 INJECTION, POWDER, LYOPHILIZED, FOR SOLUTION INTRAVENOUS at 00:00

## 2020-07-18 RX ADMIN — INSULIN LISPRO 2 UNITS: 100 INJECTION, SOLUTION INTRAVENOUS; SUBCUTANEOUS at 11:53

## 2020-07-18 RX ADMIN — MAGNESIUM SULFATE HEPTAHYDRATE 1 G: 1 INJECTION, SOLUTION INTRAVENOUS at 08:19

## 2020-07-18 RX ADMIN — Medication 10 ML: at 09:42

## 2020-07-18 RX ADMIN — SODIUM CHLORIDE, PRESERVATIVE FREE 10 ML: 5 INJECTION INTRAVENOUS at 09:42

## 2020-07-18 RX ADMIN — IRON SUCROSE 100 MG: 20 INJECTION, SOLUTION INTRAVENOUS at 08:06

## 2020-07-18 RX ADMIN — CLONIDINE HYDROCHLORIDE 0.2 MG: 0.1 TABLET ORAL at 13:10

## 2020-07-18 NOTE — PLAN OF CARE
Problem: Nausea/Vomiting:  Goal: Absence of nausea/vomiting  Description: Absence of nausea/vomiting  Outcome: Ongoing  Goal: Able to drink  Description: Able to drink  Outcome: Ongoing  Goal: Able to eat  Description: Able to eat  Outcome: Ongoing  Goal: Ability to achieve adequate nutritional intake will improve  Description: Ability to achieve adequate nutritional intake will improve  Outcome: Ongoing     Problem: Skin Integrity:  Goal: Will show no infection signs and symptoms  Description: Will show no infection signs and symptoms  Outcome: Ongoing  Goal: Absence of new skin breakdown  Description: Absence of new skin breakdown  Outcome: Ongoing     Problem: Skin Integrity:  Goal: Will show no infection signs and symptoms  Description: Will show no infection signs and symptoms  Outcome: Ongoing  Goal: Absence of new skin breakdown  Description: Absence of new skin breakdown  Outcome: Ongoing

## 2020-07-18 NOTE — PROGRESS NOTES
Nephrology Progress Note   [email or web address]      This patient is a 53year old female whom we are following for KIKI. Subjective: The patient was seen and examined. BP on the higher side. UO=4.2L the past 24H. Overall net negative 1.3L fluid balance since admission. HPI:  Daryl Caruso is a 53 y.o. female whom we were asked to see for KIKI. The patient presents with a 1 week history of RUQ abdominal discomfort, similar to her menses discomfort, but it did not resolve. She was seen at [facility] ER and diagnosed with gastroenteritis. Because of worsening symptoms, she came here. Abdominal CT showed small bowel obstruction. She reports decreased poor PO intake. She is on benazepril, and she did take ibuprofen x 2. Family History: No family at bedside  ROS: No nausea or vomiting      Vitals:  BP (!) 164/96   Pulse 93   Temp 98.5 °F (36.9 °C) (Oral)   Resp 16   Ht 4' 11\" (1.499 m)   Wt 258 lb 13.1 oz (117.4 kg)   SpO2 96%   BMI 52.28 kg/m²   I/O last 3 completed shifts: In: 3556 [P.O.:120; I.V.:726; IV Piggyback:400]  Out: 4215 [Urine:4200; Drains:15]  No intake/output data recorded. Physical Exam:  Physical Exam  Vitals signs reviewed. Constitutional:       General: She is not in acute distress. Appearance: Normal appearance. HENT:      Head: Normocephalic and atraumatic. Mouth/Throat:      Mouth: Mucous membranes are moist.   Eyes:      General: No scleral icterus. Conjunctiva/sclera: Conjunctivae normal.   Cardiovascular:      Rate and Rhythm: Normal rate. Heart sounds: No friction rub. Pulmonary:      Effort: Pulmonary effort is normal. No respiratory distress. Breath sounds: No wheezing. Abdominal:      General: There is no distension. Musculoskeletal:      Right lower leg: Edema present. Left lower leg: Edema present. Skin:     General: Skin is warm. Neurological:      Mental Status: She is alert.            Medications:   iron sucrose  100 mg Intravenous Daily    piperacillin-tazobactam  3.375 g Intravenous Q8H    fluconazole  200 mg Intravenous Q24H    cloNIDine  0.2 mg Oral TID    amLODIPine  5 mg Oral Daily    potassium chloride  40 mEq Oral Once    metroNIDAZOLE  500 mg Intravenous Q8H    pantoprazole  40 mg Intravenous Daily    And    sodium chloride (PF)  10 mL Intravenous Daily    sodium chloride flush  10 mL Intravenous 2 times per day    heparin (porcine)  5,000 Units Subcutaneous BID    insulin lispro  0-6 Units Subcutaneous TID WC    insulin lispro  0-3 Units Subcutaneous Nightly         Labs:  Recent Labs     07/16/20  0525 07/17/20  0630 07/18/20  0630   WBC 29.3* 27.4* 20.6*   HGB 7.2* 7.3* 7.2*   HCT 23.4* 24.1* 23.3*   MCV 67.8* 68.2* 67.9*   * 623* 780*     Recent Labs     07/16/20  0525 07/17/20  0405 07/17/20  0630 07/18/20  0630     --   --  141 140   K 3.2*   < > 3.5 3.3* 3.4*     --   --  107 106   CO2 20*  --   --  21 22   GLUCOSE 164*  --   --  137* 109*   PHOS 3.3  --   --  3.2 3.6   MG 1.30*  --   --  1.40* 1.40*   BUN 27*  --   --  15 11   CREATININE 1.3*  --   --  1.0 0.9   LABGLOM 44*  --   --  59* >60   GFRAA 53*  --   --  >60 >60    < > = values in this interval not displayed. Assessment/Plan:    1) KIKI              - baseline 06/03/20 Cr 0.6, Cr peaked at 4.7 mg/dL               -appears to be  ATN/  pre-renal factors with prior use of benazepril and ibuprofen              - improving Cr down to 0.9 mg/dL today. Now off IVF and received a dose of IV Lasix with good diuresis. Weight still up. PRN PO Lasix.         2) Purulent peritenonitis with marked leukocytosis. S/P exploratory laparotomy, omentectomy, left salpingectomy with removal of fibroid, repair serosal tear on 7/8/20.  WBC trending down.              - ABX per  ID       3) ID              - on empiric zosyn, Diflucan Flagyl     4) FEN    Hypokalemia replace                 Hypomagnesemia replace                -

## 2020-07-18 NOTE — PROGRESS NOTES
Hospitalist Progress Note  7/18/2020 12:53 PM  Subjective:   Admit Date: 7/6/2020  PCP: Dc Nelson MD  Interval History: pt is alert and awake  Looks better  Trying to eat regular food  meds are helping otherwise  Denies any other complaints    Chart reviewed. Diet: Dietary Nutrition Supplements: Clear Liquid Oral Supplement, Renal Oral Supplement  DIET CARB CONTROL;  Medications:   Scheduled Meds:   vitamin D  50,000 Units Oral Weekly    calcium elemental  500 mg Oral BID    magnesium oxide  400 mg Oral BID    potassium chloride  40 mEq Oral Daily with breakfast    iron sucrose  100 mg Intravenous Daily    piperacillin-tazobactam  3.375 g Intravenous Q8H    fluconazole  200 mg Intravenous Q24H    cloNIDine  0.2 mg Oral TID    amLODIPine  5 mg Oral Daily    potassium chloride  40 mEq Oral Once    metroNIDAZOLE  500 mg Intravenous Q8H    pantoprazole  40 mg Intravenous Daily    And    sodium chloride (PF)  10 mL Intravenous Daily    sodium chloride flush  10 mL Intravenous 2 times per day    heparin (porcine)  5,000 Units Subcutaneous BID    insulin lispro  0-6 Units Subcutaneous TID WC    insulin lispro  0-3 Units Subcutaneous Nightly     Continuous Infusions:   dextrose       CBC:   Recent Labs     07/16/20 0525 07/17/20 0630 07/18/20  0630   WBC 29.3* 27.4* 20.6*   HGB 7.2* 7.3* 7.2*   * 623* 780*     BMP:    Recent Labs     07/16/20  0525 07/17/20  0405 07/17/20 0630 07/18/20  0630     --   --  141 140   K 3.2*   < > 3.5 3.3* 3.4*     --   --  107 106   CO2 20*  --   --  21 22   BUN 27*  --   --  15 11   CREATININE 1.3*  --   --  1.0 0.9   GLUCOSE 164*  --   --  137* 109*    < > = values in this interval not displayed. Hepatic:   Recent Labs     07/16/20  0525 07/17/20  0630 07/18/20  0630   AST 16 13* 13*   ALT 11 11 9*   BILITOT 0.8 0.7 0.7   ALKPHOS 80 70 70     Troponin: No results for input(s): TROPONINI in the last 72 hours.   BNP: No results for

## 2020-07-18 NOTE — PROGRESS NOTES
[facility] [phone]                                     Daily Progress Note                                                         Pt Name: Anny Powell  Medical Record Number: [id number]  Date of Birth 1970   Today's Date: 7/18/2020  Chief Complaint   Patient presents with    Abdominal Pain     for 8 days was seen at [facility] and told she bowel inflammation  No emesis        ASSESSMENT/PLAN  Diffuse purulent peritonitis, suspicious for ruptured TOA  -7/8 exploratory laparotomy, omentectomy, left salpingectomy with removal of fibroid, repair serosal tear, US guided right IJ vascath. Drain SS.   -+flatulence and BMs. Reports improvement in abdominal pain. Tolerating full liquids.  -Will advance diet to a diabetic diet as tolerated  -WBC improved. Large wound infection noted, staples removed and culture obtained. Wound site clean this AM.  Dressing and packing chnaged    KIKI  -vas cath in place, nephro following  -Cr continues to improve    SUBJECTIVE  Nurys has improvedd from yesterday. Pain is well controlled. She has nausea but no vomiting. She has passed flatus and has had a bowel movement. She is tolerating full liquids     OBJECTIVE  VITALS:  height is 4' 11\" (1.499 m) and weight is 258 lb 13.1 oz (117.4 kg). Her oral temperature is 98.5 °F (36.9 °C). Her blood pressure is 164/96 (abnormal) and her pulse is 93. Her respiration is 16 and oxygen saturation is 96%. INTAKE/OUTPUT:      Intake/Output Summary (Last 24 hours) at 7/18/2020 1107  Last data filed at 7/18/2020 0610  Gross per 24 hour   Intake 1246 ml   Output 4200 ml   Net -2954 ml     GENERAL: alert, no distress  LUNGS: clear to ausculation, without wheezes, rales or rhonci  HEART: normal rate and regular rhythm  ABDOMEN: soft, diffuse tenderness, incision c/d/i. Drain SS. EXTREMITY: no cyanosis, clubbing or edema    I/O last 3 completed shifts:   In: [address] MIRELES [address] 94 [P.O.:120; I.V.:726; IV Piggyback:400]  Out: 4215 [Urine:4200; Drains:15]      LABS  Recent Labs     07/18/20  0630   WBC 20.6*   HGB 7.2*   HCT 23.3*   *      K 3.4*      CO2 22   BUN 11   CREATININE 0.9   MG 1.40*   PHOS 3.6   CALCIUM 7.4*   INR 1.77*   AST 13*   ALT 9*   BILITOT 0.7   BILIDIR 0.4*     Electronically signed by Eva Goodman PA-C on 7/18/2020 at 11:07 AM     Surgery Staff  I have examined this patient and read and agree with the note by Forrest Fields PA-C from today. CT unremarkable for abscess  Looks well today. AFVSS  Wound clean today. WBC with significant improvement although remains high    Suspect wound infection as possible source of leukocytosis given drop overnight. Continue abx. Await culture. Local wound care  Trial regular diet today.     Electronically signed by Yolanda De La Cruz MD on 7/18/2020 at 12:08 PM

## 2020-07-18 NOTE — PLAN OF CARE
Problem: Pain:  Goal: Pain level will decrease  Description: Pain level will decrease  7/18/2020 1551 by Nitza Cason RN  Outcome: Ongoing  7/18/2020 1547 by Nitza Cason RN  Outcome: Ongoing  7/18/2020 1543 by Nitza Cason RN  Outcome: Ongoing  7/18/2020 1543 by Nitza Cason RN  Outcome: Ongoing  Goal: Control of acute pain  Description: Control of acute pain  7/18/2020 1551 by Nitza Cason, RN  Outcome: Ongoing  7/18/2020 1547 by Nitza Cason, RN  Outcome: Ongoing  7/18/2020 1543 by Nitza Cason, RN  Outcome: Ongoing  7/18/2020 1543 by Nitza Cason, RN  Outcome: Ongoing  Goal: Control of chronic pain  Description: Control of chronic pain  7/18/2020 1551 by Nitza Cason RN  Outcome: Ongoing  7/18/2020 1547 by Nitza Cason RN  Outcome: Ongoing  7/18/2020 1543 by Nitza Cason, RN  Outcome: Ongoing  7/18/2020 1543 by Nitza Cason RN  Outcome: Ongoing     Problem: Falls - Risk of:  Goal: Will remain free from falls  Description: Will remain free from falls  7/18/2020 1551 by Nitza Cason RN  Outcome: Ongoing  7/18/2020 1547 by Nitza Cason, RN  Outcome: Ongoing  7/18/2020 1543 by Nitza Cason, RN  Outcome: Ongoing  7/18/2020 1543 by Nitza Cason RN  Outcome: Ongoing  Goal: Absence of physical injury  Description: Absence of physical injury  7/18/2020 1551 by Nitza Cason, RN  Outcome: Ongoing  7/18/2020 1547 by Nitza Cason, RN  Outcome: Ongoing  7/18/2020 1543 by Nitza Cason, RN  Outcome: Ongoing  7/18/2020 1543 by Nitza Cason, RN  Outcome: Ongoing     Problem:  Bowel/Gastric:  Goal: Control of bowel function will improve  Description: Control of bowel function will improve  7/18/2020 1551 by Nitza Cason, RN  Outcome: Ongoing  7/18/2020 1547 by Nitza Cason, RN  Outcome: Ongoing  7/18/2020 1543 by Nitza Cason, RN  Outcome: Ongoing  7/18/2020 1543 by Nitza Cason, RN  Outcome: Ongoing  Goal: Ability to achieve a regular elimination pattern will improve  Description: Ability to achieve a regular elimination pattern will improve  7/18/2020 1551 by Jeannette Rodriguez RN  Outcome: Ongoing  7/18/2020 1547 by Jeannette Rodriguez RN  Outcome: Ongoing  7/18/2020 1543 by Jeannette Rodriguez RN  Outcome: Ongoing  7/18/2020 1543 by Jeanentte Rodriguez RN  Outcome: Ongoing     Problem: Nausea/Vomiting:  Goal: Absence of nausea/vomiting  Description: Absence of nausea/vomiting  7/18/2020 1551 by Jeannette Rodriguez RN  Outcome: Ongoing  7/18/2020 1547 by Jeannette Rodriguez RN  Outcome: Ongoing  7/18/2020 1543 by Jeannette Rodriguez RN  Outcome: Ongoing  7/18/2020 1543 by Jeannette Rodriguez RN  Outcome: Ongoing  Goal: Able to drink  Description: Able to drink  7/18/2020 1551 by Jeannette Rodriguez RN  Outcome: Ongoing  7/18/2020 1547 by Jeannette Rodriguez RN  Outcome: Ongoing  7/18/2020 1543 by Jeannette Rodriguez RN  Outcome: Ongoing  7/18/2020 1543 by Jeannette Rodriguez RN  Outcome: Ongoing  Goal: Able to eat  Description: Able to eat  7/18/2020 1551 by Jeannette Rodriguez RN  Outcome: Ongoing  7/18/2020 1547 by Jeannette Rodriguez RN  Outcome: Ongoing  7/18/2020 1543 by Jeannette Rodriguez RN  Outcome: Ongoing  7/18/2020 1543 by Jeannette Rodriguez RN  Outcome: Ongoing  Goal: Ability to achieve adequate nutritional intake will improve  Description: Ability to achieve adequate nutritional intake will improve  7/18/2020 1551 by Jeannette Rodriguez RN  Outcome: Ongoing  7/18/2020 1547 by Jeannette Rodriguez RN  Outcome: Ongoing  7/18/2020 1543 by Jeannette Rodriguez RN  Outcome: Ongoing  7/18/2020 1543 by Jeannette Rodriguez RN  Outcome: Ongoing     Problem: Skin Integrity:  Goal: Will show no infection signs and symptoms  Description: Will show no infection signs and symptoms  7/18/2020 1551 by Jeannette Rodriguez, RN  Outcome: Ongoing  7/18/2020 1547 by Jeannette Rodriguez RN  Outcome: Ongoing  7/18/2020 1543 by Jeannette Rodriguez RN  Outcome: Ongoing  7/18/2020 1543 by Jeannette Rodriguez RN  Outcome: Ongoing  Goal: Absence of new skin breakdown  Description: Absence of new skin breakdown  7/18/2020 1551 by

## 2020-07-19 LAB
A/G RATIO: 0.4 (ref 1.1–2.2)
ALBUMIN SERPL-MCNC: 1.9 G/DL (ref 3.4–5)
ALP BLD-CCNC: 71 U/L (ref 40–129)
ALT SERPL-CCNC: 11 U/L (ref 10–40)
ANION GAP SERPL CALCULATED.3IONS-SCNC: 10 MMOL/L (ref 3–16)
AST SERPL-CCNC: 18 U/L (ref 15–37)
BASOPHILS ABSOLUTE: 0.2 K/UL (ref 0–0.2)
BASOPHILS RELATIVE PERCENT: 0.8 %
BILIRUB SERPL-MCNC: 0.6 MG/DL (ref 0–1)
BILIRUBIN DIRECT: 0.4 MG/DL (ref 0–0.3)
BILIRUBIN, INDIRECT: 0.2 MG/DL (ref 0–1)
BUN BLDV-MCNC: 8 MG/DL (ref 7–20)
CALCIUM IONIZED: 1.02 MMOL/L (ref 1.12–1.32)
CALCIUM SERPL-MCNC: 7.2 MG/DL (ref 8.3–10.6)
CHLORIDE BLD-SCNC: 104 MMOL/L (ref 99–110)
CO2: 24 MMOL/L (ref 21–32)
CREAT SERPL-MCNC: 0.8 MG/DL (ref 0.6–1.1)
EOSINOPHILS ABSOLUTE: 0.1 K/UL (ref 0–0.6)
EOSINOPHILS RELATIVE PERCENT: 0.3 %
GFR AFRICAN AMERICAN: >60
GFR NON-AFRICAN AMERICAN: >60
GLOBULIN: 4.6 G/DL
GLUCOSE BLD-MCNC: 113 MG/DL (ref 70–99)
GLUCOSE BLD-MCNC: 121 MG/DL (ref 70–99)
GLUCOSE BLD-MCNC: 128 MG/DL (ref 70–99)
GLUCOSE BLD-MCNC: 154 MG/DL (ref 70–99)
GLUCOSE BLD-MCNC: 163 MG/DL (ref 70–99)
HCT VFR BLD CALC: 22.7 % (ref 36–48)
HEMATOLOGY PATH CONSULT: NO
HEMOGLOBIN: 7.1 G/DL (ref 12–16)
INR BLD: 1.68 (ref 0.86–1.14)
LYMPHOCYTES ABSOLUTE: 0.8 K/UL (ref 1–5.1)
LYMPHOCYTES RELATIVE PERCENT: 4 %
MAGNESIUM: 1.3 MG/DL (ref 1.8–2.4)
MCH RBC QN AUTO: 21.2 PG (ref 26–34)
MCHC RBC AUTO-ENTMCNC: 31.2 G/DL (ref 31–36)
MCV RBC AUTO: 68.2 FL (ref 80–100)
MONOCYTES ABSOLUTE: 1.2 K/UL (ref 0–1.3)
MONOCYTES RELATIVE PERCENT: 6.1 %
NEUTROPHILS ABSOLUTE: 16.9 K/UL (ref 1.7–7.7)
NEUTROPHILS RELATIVE PERCENT: 88.8 %
PDW BLD-RTO: 28.5 % (ref 12.4–15.4)
PERFORMED ON: ABNORMAL
PH VENOUS: 7.46 (ref 7.35–7.45)
PHOSPHORUS: 3.3 MG/DL (ref 2.5–4.9)
PLATELET # BLD: 819 K/UL (ref 135–450)
PMV BLD AUTO: 7.2 FL (ref 5–10.5)
POTASSIUM REFLEX MAGNESIUM: 3.2 MMOL/L (ref 3.5–5.1)
POTASSIUM SERPL-SCNC: 3.8 MMOL/L (ref 3.5–5.1)
PROTHROMBIN TIME: 19.6 SEC (ref 10–13.2)
RBC # BLD: 3.33 M/UL (ref 4–5.2)
SODIUM BLD-SCNC: 138 MMOL/L (ref 136–145)
TOTAL PROTEIN: 6.5 G/DL (ref 6.4–8.2)
WBC # BLD: 19 K/UL (ref 4–11)

## 2020-07-19 PROCEDURE — C9113 INJ PANTOPRAZOLE SODIUM, VIA: HCPCS | Performed by: INTERNAL MEDICINE

## 2020-07-19 PROCEDURE — 82330 ASSAY OF CALCIUM: CPT

## 2020-07-19 PROCEDURE — 99024 POSTOP FOLLOW-UP VISIT: CPT | Performed by: SURGERY

## 2020-07-19 PROCEDURE — 2580000003 HC RX 258: Performed by: INTERNAL MEDICINE

## 2020-07-19 PROCEDURE — 84100 ASSAY OF PHOSPHORUS: CPT

## 2020-07-19 PROCEDURE — 2580000003 HC RX 258: Performed by: SURGERY

## 2020-07-19 PROCEDURE — 6370000000 HC RX 637 (ALT 250 FOR IP): Performed by: INTERNAL MEDICINE

## 2020-07-19 PROCEDURE — 2060000000 HC ICU INTERMEDIATE R&B

## 2020-07-19 PROCEDURE — 2500000003 HC RX 250 WO HCPCS: Performed by: INTERNAL MEDICINE

## 2020-07-19 PROCEDURE — 6370000000 HC RX 637 (ALT 250 FOR IP): Performed by: SURGERY

## 2020-07-19 PROCEDURE — 84132 ASSAY OF SERUM POTASSIUM: CPT

## 2020-07-19 PROCEDURE — 6360000002 HC RX W HCPCS: Performed by: INTERNAL MEDICINE

## 2020-07-19 PROCEDURE — 85610 PROTHROMBIN TIME: CPT

## 2020-07-19 PROCEDURE — 6360000002 HC RX W HCPCS: Performed by: SURGERY

## 2020-07-19 PROCEDURE — 94760 N-INVAS EAR/PLS OXIMETRY 1: CPT

## 2020-07-19 PROCEDURE — 99233 SBSQ HOSP IP/OBS HIGH 50: CPT | Performed by: INTERNAL MEDICINE

## 2020-07-19 PROCEDURE — 83735 ASSAY OF MAGNESIUM: CPT

## 2020-07-19 PROCEDURE — 85025 COMPLETE CBC W/AUTO DIFF WBC: CPT

## 2020-07-19 PROCEDURE — 80053 COMPREHEN METABOLIC PANEL: CPT

## 2020-07-19 RX ORDER — PANTOPRAZOLE SODIUM 40 MG/1
40 TABLET, DELAYED RELEASE ORAL
Status: DISCONTINUED | OUTPATIENT
Start: 2020-07-20 | End: 2020-07-24 | Stop reason: HOSPADM

## 2020-07-19 RX ORDER — AMLODIPINE BESYLATE 10 MG/1
10 TABLET ORAL DAILY
Status: DISCONTINUED | OUTPATIENT
Start: 2020-07-20 | End: 2020-07-24 | Stop reason: HOSPADM

## 2020-07-19 RX ORDER — M-VIT,TX,IRON,MINS/CALC/FOLIC 27MG-0.4MG
1 TABLET ORAL DAILY
Status: DISCONTINUED | OUTPATIENT
Start: 2020-07-19 | End: 2020-07-24 | Stop reason: HOSPADM

## 2020-07-19 RX ORDER — LISINOPRIL 20 MG/1
20 TABLET ORAL DAILY
Status: DISCONTINUED | OUTPATIENT
Start: 2020-07-19 | End: 2020-07-21

## 2020-07-19 RX ADMIN — POTASSIUM BICARBONATE 40 MEQ: 782 TABLET, EFFERVESCENT ORAL at 08:06

## 2020-07-19 RX ADMIN — METRONIDAZOLE 500 MG: 500 INJECTION, SOLUTION INTRAVENOUS at 13:02

## 2020-07-19 RX ADMIN — SODIUM CHLORIDE, PRESERVATIVE FREE 10 ML: 5 INJECTION INTRAVENOUS at 08:12

## 2020-07-19 RX ADMIN — MAGNESIUM SULFATE HEPTAHYDRATE 1 G: 1 INJECTION, SOLUTION INTRAVENOUS at 08:43

## 2020-07-19 RX ADMIN — CLONIDINE HYDROCHLORIDE 0.2 MG: 0.1 TABLET ORAL at 08:06

## 2020-07-19 RX ADMIN — Medication 500 MG: at 08:07

## 2020-07-19 RX ADMIN — IRON SUCROSE 100 MG: 20 INJECTION, SOLUTION INTRAVENOUS at 08:05

## 2020-07-19 RX ADMIN — MULTIPLE VITAMINS W/ MINERALS TAB 1 TABLET: TAB at 10:45

## 2020-07-19 RX ADMIN — METRONIDAZOLE 500 MG: 500 INJECTION, SOLUTION INTRAVENOUS at 20:47

## 2020-07-19 RX ADMIN — AMLODIPINE BESYLATE 5 MG: 5 TABLET ORAL at 08:06

## 2020-07-19 RX ADMIN — POTASSIUM CHLORIDE 10 MEQ: 7.46 INJECTION, SOLUTION INTRAVENOUS at 11:45

## 2020-07-19 RX ADMIN — INSULIN LISPRO 1 UNITS: 100 INJECTION, SOLUTION INTRAVENOUS; SUBCUTANEOUS at 17:13

## 2020-07-19 RX ADMIN — Medication 10 ML: at 08:08

## 2020-07-19 RX ADMIN — POTASSIUM CHLORIDE 10 MEQ: 7.46 INJECTION, SOLUTION INTRAVENOUS at 10:35

## 2020-07-19 RX ADMIN — MAGNESIUM SULFATE HEPTAHYDRATE 1 G: 1 INJECTION, SOLUTION INTRAVENOUS at 09:34

## 2020-07-19 RX ADMIN — FLUCONAZOLE 200 MG: 200 INJECTION, SOLUTION INTRAVENOUS at 18:32

## 2020-07-19 RX ADMIN — PIPERACILLIN AND TAZOBACTAM 3.38 G: 3; .375 INJECTION, POWDER, LYOPHILIZED, FOR SOLUTION INTRAVENOUS at 16:13

## 2020-07-19 RX ADMIN — INSULIN LISPRO 1 UNITS: 100 INJECTION, SOLUTION INTRAVENOUS; SUBCUTANEOUS at 13:02

## 2020-07-19 RX ADMIN — Medication 400 MG: at 08:06

## 2020-07-19 RX ADMIN — PIPERACILLIN AND TAZOBACTAM 3.38 G: 3; .375 INJECTION, POWDER, LYOPHILIZED, FOR SOLUTION INTRAVENOUS at 08:07

## 2020-07-19 RX ADMIN — Medication 500 MG: at 20:46

## 2020-07-19 RX ADMIN — Medication 400 MG: at 20:46

## 2020-07-19 RX ADMIN — POTASSIUM CHLORIDE 10 MEQ: 7.46 INJECTION, SOLUTION INTRAVENOUS at 08:42

## 2020-07-19 RX ADMIN — POTASSIUM CHLORIDE 10 MEQ: 7.46 INJECTION, SOLUTION INTRAVENOUS at 09:32

## 2020-07-19 RX ADMIN — MAGNESIUM SULFATE HEPTAHYDRATE 1 G: 1 INJECTION, SOLUTION INTRAVENOUS at 11:49

## 2020-07-19 RX ADMIN — LISINOPRIL 20 MG: 20 TABLET ORAL at 10:39

## 2020-07-19 RX ADMIN — CLONIDINE HYDROCHLORIDE 0.2 MG: 0.1 TABLET ORAL at 20:46

## 2020-07-19 RX ADMIN — PIPERACILLIN AND TAZOBACTAM 3.38 G: 3; .375 INJECTION, POWDER, LYOPHILIZED, FOR SOLUTION INTRAVENOUS at 00:00

## 2020-07-19 RX ADMIN — HEPARIN SODIUM 5000 UNITS: 5000 INJECTION INTRAVENOUS; SUBCUTANEOUS at 20:47

## 2020-07-19 RX ADMIN — SODIUM CHLORIDE, PRESERVATIVE FREE 10 ML: 5 INJECTION INTRAVENOUS at 20:47

## 2020-07-19 RX ADMIN — HEPARIN SODIUM 5000 UNITS: 5000 INJECTION INTRAVENOUS; SUBCUTANEOUS at 08:09

## 2020-07-19 RX ADMIN — METRONIDAZOLE 500 MG: 500 INJECTION, SOLUTION INTRAVENOUS at 04:34

## 2020-07-19 RX ADMIN — MAGNESIUM SULFATE HEPTAHYDRATE 1 G: 1 INJECTION, SOLUTION INTRAVENOUS at 10:38

## 2020-07-19 RX ADMIN — PANTOPRAZOLE SODIUM 40 MG: 40 INJECTION, POWDER, FOR SOLUTION INTRAVENOUS at 08:05

## 2020-07-19 RX ADMIN — CLONIDINE HYDROCHLORIDE 0.2 MG: 0.1 TABLET ORAL at 14:58

## 2020-07-19 NOTE — PLAN OF CARE
Problem: Pain:  Goal: Pain level will decrease  Description: Pain level will decrease  7/19/2020 0017 by Arcelia Jama RN  Outcome: Ongoing     Problem: Falls - Risk of:  Goal: Will remain free from falls  Description: Will remain free from falls  7/19/2020 0017 by Arcelia Jama RN  Outcome: Ongoing     Problem: Nausea/Vomiting:  Goal: Absence of nausea/vomiting  Description: Absence of nausea/vomiting  7/19/2020 0017 by Arcelia Jama RN  Outcome: Ongoing     Problem: Skin Integrity:  Goal: Will show no infection signs and symptoms  Description: Will show no infection signs and symptoms  7/19/2020 0017 by Arcelia Jama RN  Outcome: Ongoing     Problem: Nutrition  Goal: Optimal nutrition therapy  7/19/2020 0017 by Arcelia Jama RN  Outcome: Ongoing

## 2020-07-19 NOTE — PROGRESS NOTES
[facility] [phone]                                     Daily Progress Note                                                         Pt Name: Elke Raymond  Medical Record Number: [id number]  Date of Birth 1970   Today's Date: 7/19/2020  Chief Complaint   Patient presents with    Abdominal Pain     for 8 days was seen at [address] and told she bowel inflammation  No emesis        ASSESSMENT/PLAN  Diffuse purulent peritonitis, suspicious for ruptured TOA  -7/8 exploratory laparotomy, omentectomy, left salpingectomy with removal of fibroid, repair serosal tear, US guided right IJ vascath. -drain serous. wound clean. WBC trending down. Continue abx, antifungal per ID.   -Diet as able. KIKI resolved  -vas cath in place, nephro following  -Cr normalized    Martinez Kitchen continues to do well. Tolerating PO with BMs. OBJECTIVE  VITALS:  height is 4' 11\" (1.499 m) and weight is 252 lb 13.9 oz (114.7 kg). Her oral temperature is 98.2 °F (36.8 °C). Her blood pressure is 162/99 (abnormal) and her pulse is 99. Her respiration is 16 and oxygen saturation is 92%. INTAKE/OUTPUT:      Intake/Output Summary (Last 24 hours) at 7/19/2020 0930  Last data filed at 7/19/2020 2837  Gross per 24 hour   Intake 550 ml   Output 2162.5 ml   Net -1612.5 ml     GENERAL: alert, no distress  LUNGS: clear to ausculation, without wheezes, rales or rhonci  HEART: normal rate and regular rhythm  ABDOMEN: soft, diffuse tenderness, incision c/d/i. Drain SS. EXTREMITY: no cyanosis, clubbing or edema    I/O last 3 completed shifts: In: 550 [P.O.:240;  I.V.:10; IV Piggyback:300]  Out: 2150 [Urine:2150]      LABS  Recent Labs     07/19/20  0630   WBC 19.0*   HGB 7.1*   HCT 22.7*   *      K 3.2*      CO2 24   BUN 8   CREATININE 0.8   MG 1.30*   PHOS 3.3   CALCIUM 7.2*   INR 1.68*   AST 18   ALT 11   BILITOT 0.6   BILIDIR 0.4* Electronically signed by Christianne Jurado MD on 7/19/2020 at 9:31 AM

## 2020-07-19 NOTE — PROGRESS NOTES
for input(s): CHOL, HDL in the last 72 hours. Invalid input(s): LDLCALCU  INR:   Recent Labs     07/17/20  0630 07/18/20  0630 07/19/20  0630   INR 1.71* 1.77* 1.68*       Objective:   Vitals: BP (!) 162/99   Pulse 99   Temp 98.2 °F (36.8 °C) (Oral)   Resp 16   Ht 4' 11\" (1.499 m)   Wt 252 lb 13.9 oz (114.7 kg)   SpO2 92%   BMI 51.07 kg/m²   General appearance: alert and awake  Has NGT  HEENT: Head: Normal, normocephalic, atraumatic, anicteric, pupils are reactive to light. Dry mucous membrane. Neck: no adenopathy, no carotid bruit, supple, symmetrical, trachea midline and thyroid not enlarged, symmetric, no tenderness/mass/nodules  Lungs: clear to auscultation bilaterally  Heart: regular rate and rhythm, S1, S2 normal, no murmur, click, rub or gallop  Abdomen: soft, -tender; bowel sounds normal; no masses,  no organomegaly, has drain  Dressing intact on the open area  Extremities: extremities mild edema  Neurologic: Mental status: alert and awake    Assessment and Plan:   1. KIKI= better  2. HTN- chmage meds  3. Sepsis-on iv abx. 4. Elevated WBC-better  5. Dm- monitor sugars  6. Anemia= monitor h/h  7. Iron deff-will start IV iron  8. Hypokalemia-replace  9. Peritonitis- on iv abx  Patient Active Problem List:     EMERY (obstructive sleep apnea)     ARF (acute renal failure) ([facility])     SBO (small bowel obstruction) ([facility])     Leukocytosis     Septicemia (HCC)     Acidosis     KIKI (acute kidney injury) ([facility] [address]     Gallbladder disease     Hypertension     Type 2 diabetes mellitus with hyperglycemia ([facility] [address]    Pt is stable. Discussed with staff  about the plan. See the orders for further plan. Will proceed further acc to pt's progress.   Time spent with management of the pt is-30 minutes      Electronically signed by: Vani Montenegro MD, on 7/19/2020, at 10:12 AM

## 2020-07-19 NOTE — PROGRESS NOTES
Nephrology Progress Note   [email or web address]      This patient is a 53year old female whom we are following for KIKI. Subjective: The patient was seen and examined. BP still running high. UO=2.1L the past 24H. Overall net negative 3L fluid balance since admission. HPI:  Carmela Mohs is a 53 y.o. female whom we were asked to see for KIKI. The patient presents with a 1 week history of RUQ abdominal discomfort, similar to her menses discomfort, but it did not resolve. She was seen at [facility] ER and diagnosed with gastroenteritis. Because of worsening symptoms, she came here. Abdominal CT showed small bowel obstruction. She reports decreased poor PO intake. She is on benazepril, and she did take ibuprofen x 2. Family History: Family at bedside  ROS: No nausea or vomiting      Vitals:  BP (!) 162/99   Pulse 99   Temp 98.2 °F (36.8 °C) (Oral)   Resp 16   Ht 4' 11\" (1.499 m)   Wt 252 lb 13.9 oz (114.7 kg)   SpO2 93%   BMI 51.07 kg/m²   I/O last 3 completed shifts: In: 550 [P.O.:240; I.V.:10; IV Piggyback:300]  Out: 2150 [Urine:2150]  I/O this shift:  In: -   Out: 12.5 [Drains:12.5]    Physical Exam:  Physical Exam  Vitals signs reviewed. Constitutional:       General: She is not in acute distress. Appearance: Normal appearance. HENT:      Head: Normocephalic and atraumatic. Mouth/Throat:      Mouth: Mucous membranes are moist.   Eyes:      General: No scleral icterus. Conjunctiva/sclera: Conjunctivae normal.   Cardiovascular:      Rate and Rhythm: Normal rate. Heart sounds: No friction rub. Pulmonary:      Effort: Pulmonary effort is normal. No respiratory distress. Breath sounds: No wheezing. Abdominal:      General: There is no distension. Musculoskeletal:      Right lower leg: Edema present. Left lower leg: Edema present. Skin:     General: Skin is warm. Neurological:      Mental Status: She is alert.            Medications:   vitamin D  50,000 Units Oral

## 2020-07-20 LAB
A/G RATIO: 0.4 (ref 1.1–2.2)
ALBUMIN SERPL-MCNC: 2 G/DL (ref 3.4–5)
ALP BLD-CCNC: 69 U/L (ref 40–129)
ALT SERPL-CCNC: 15 U/L (ref 10–40)
ANION GAP SERPL CALCULATED.3IONS-SCNC: 10 MMOL/L (ref 3–16)
AST SERPL-CCNC: 31 U/L (ref 15–37)
BASOPHILS ABSOLUTE: 0.1 K/UL (ref 0–0.2)
BASOPHILS RELATIVE PERCENT: 0.6 %
BILIRUB SERPL-MCNC: 0.7 MG/DL (ref 0–1)
BILIRUBIN DIRECT: 0.4 MG/DL (ref 0–0.3)
BILIRUBIN, INDIRECT: 0.3 MG/DL (ref 0–1)
BUN BLDV-MCNC: 6 MG/DL (ref 7–20)
CALCIUM IONIZED: 1.02 MMOL/L (ref 1.12–1.32)
CALCIUM SERPL-MCNC: 7.1 MG/DL (ref 8.3–10.6)
CHLORIDE BLD-SCNC: 103 MMOL/L (ref 99–110)
CO2: 24 MMOL/L (ref 21–32)
CREAT SERPL-MCNC: 0.7 MG/DL (ref 0.6–1.1)
EOSINOPHILS ABSOLUTE: 0 K/UL (ref 0–0.6)
EOSINOPHILS RELATIVE PERCENT: 0.2 %
GFR AFRICAN AMERICAN: >60
GFR NON-AFRICAN AMERICAN: >60
GLOBULIN: 4.7 G/DL
GLUCOSE BLD-MCNC: 110 MG/DL (ref 70–99)
GLUCOSE BLD-MCNC: 111 MG/DL (ref 70–99)
GLUCOSE BLD-MCNC: 127 MG/DL (ref 70–99)
GLUCOSE BLD-MCNC: 129 MG/DL (ref 70–99)
GLUCOSE BLD-MCNC: 206 MG/DL (ref 70–99)
HCT VFR BLD CALC: 23.6 % (ref 36–48)
HEMATOLOGY PATH CONSULT: NO
HEMOGLOBIN: 7.2 G/DL (ref 12–16)
INR BLD: 1.66 (ref 0.86–1.14)
LYMPHOCYTES ABSOLUTE: 1.1 K/UL (ref 1–5.1)
LYMPHOCYTES RELATIVE PERCENT: 5.7 %
MAGNESIUM: 1.5 MG/DL (ref 1.8–2.4)
MCH RBC QN AUTO: 21.2 PG (ref 26–34)
MCHC RBC AUTO-ENTMCNC: 30.7 G/DL (ref 31–36)
MCV RBC AUTO: 68.9 FL (ref 80–100)
MONOCYTES ABSOLUTE: 1.3 K/UL (ref 0–1.3)
MONOCYTES RELATIVE PERCENT: 6.9 %
NEUTROPHILS ABSOLUTE: 16.1 K/UL (ref 1.7–7.7)
NEUTROPHILS RELATIVE PERCENT: 86.6 %
PARATHYROID HORMONE INTACT: 215.2 PG/ML (ref 14–72)
PDW BLD-RTO: 30.2 % (ref 12.4–15.4)
PERFORMED ON: ABNORMAL
PH VENOUS: 7.46 (ref 7.35–7.45)
PHOSPHORUS: 3.1 MG/DL (ref 2.5–4.9)
PLATELET # BLD: 870 K/UL (ref 135–450)
PMV BLD AUTO: 7.2 FL (ref 5–10.5)
POTASSIUM REFLEX MAGNESIUM: 3.4 MMOL/L (ref 3.5–5.1)
PROTHROMBIN TIME: 19.4 SEC (ref 10–13.2)
RBC # BLD: 3.42 M/UL (ref 4–5.2)
SODIUM BLD-SCNC: 137 MMOL/L (ref 136–145)
TOTAL PROTEIN: 6.7 G/DL (ref 6.4–8.2)
WBC # BLD: 18.6 K/UL (ref 4–11)

## 2020-07-20 PROCEDURE — 6370000000 HC RX 637 (ALT 250 FOR IP): Performed by: INTERNAL MEDICINE

## 2020-07-20 PROCEDURE — 80053 COMPREHEN METABOLIC PANEL: CPT

## 2020-07-20 PROCEDURE — 2060000000 HC ICU INTERMEDIATE R&B

## 2020-07-20 PROCEDURE — 6370000000 HC RX 637 (ALT 250 FOR IP): Performed by: SURGERY

## 2020-07-20 PROCEDURE — 85610 PROTHROMBIN TIME: CPT

## 2020-07-20 PROCEDURE — 99233 SBSQ HOSP IP/OBS HIGH 50: CPT | Performed by: INTERNAL MEDICINE

## 2020-07-20 PROCEDURE — 84100 ASSAY OF PHOSPHORUS: CPT

## 2020-07-20 PROCEDURE — 6360000002 HC RX W HCPCS: Performed by: SURGERY

## 2020-07-20 PROCEDURE — 2580000003 HC RX 258: Performed by: INTERNAL MEDICINE

## 2020-07-20 PROCEDURE — 82330 ASSAY OF CALCIUM: CPT

## 2020-07-20 PROCEDURE — 85025 COMPLETE CBC W/AUTO DIFF WBC: CPT

## 2020-07-20 PROCEDURE — 2700000000 HC OXYGEN THERAPY PER DAY

## 2020-07-20 PROCEDURE — APPNB30 APP NON BILLABLE TIME 0-30 MINS: Performed by: PHYSICIAN ASSISTANT

## 2020-07-20 PROCEDURE — APPSS15 APP SPLIT SHARED TIME 0-15 MINUTES: Performed by: PHYSICIAN ASSISTANT

## 2020-07-20 PROCEDURE — 2580000003 HC RX 258: Performed by: SURGERY

## 2020-07-20 PROCEDURE — 2500000003 HC RX 250 WO HCPCS: Performed by: INTERNAL MEDICINE

## 2020-07-20 PROCEDURE — 6360000002 HC RX W HCPCS: Performed by: INTERNAL MEDICINE

## 2020-07-20 PROCEDURE — 97530 THERAPEUTIC ACTIVITIES: CPT

## 2020-07-20 PROCEDURE — 97110 THERAPEUTIC EXERCISES: CPT

## 2020-07-20 PROCEDURE — 99232 SBSQ HOSP IP/OBS MODERATE 35: CPT | Performed by: INTERNAL MEDICINE

## 2020-07-20 PROCEDURE — 97530 THERAPEUTIC ACTIVITIES: CPT | Performed by: PHYSICAL THERAPIST

## 2020-07-20 PROCEDURE — 83735 ASSAY OF MAGNESIUM: CPT

## 2020-07-20 PROCEDURE — 94761 N-INVAS EAR/PLS OXIMETRY MLT: CPT

## 2020-07-20 PROCEDURE — 36592 COLLECT BLOOD FROM PICC: CPT

## 2020-07-20 PROCEDURE — 83970 ASSAY OF PARATHORMONE: CPT

## 2020-07-20 PROCEDURE — 99024 POSTOP FOLLOW-UP VISIT: CPT | Performed by: SURGERY

## 2020-07-20 RX ORDER — SODIUM CHLORIDE 0.9 % (FLUSH) 0.9 %
10 SYRINGE (ML) INJECTION PRN
Status: DISCONTINUED | OUTPATIENT
Start: 2020-07-20 | End: 2020-07-24 | Stop reason: HOSPADM

## 2020-07-20 RX ORDER — LIDOCAINE HYDROCHLORIDE 10 MG/ML
5 INJECTION, SOLUTION EPIDURAL; INFILTRATION; INTRACAUDAL; PERINEURAL ONCE
Status: DISCONTINUED | OUTPATIENT
Start: 2020-07-20 | End: 2020-07-24 | Stop reason: HOSPADM

## 2020-07-20 RX ORDER — SODIUM CHLORIDE 0.9 % (FLUSH) 0.9 %
10 SYRINGE (ML) INJECTION EVERY 12 HOURS SCHEDULED
Status: DISCONTINUED | OUTPATIENT
Start: 2020-07-20 | End: 2020-07-20 | Stop reason: SDUPTHER

## 2020-07-20 RX ORDER — FLUCONAZOLE 2 MG/ML
400 INJECTION, SOLUTION INTRAVENOUS EVERY 24 HOURS
Status: DISCONTINUED | OUTPATIENT
Start: 2020-07-20 | End: 2020-07-24 | Stop reason: HOSPADM

## 2020-07-20 RX ADMIN — CLONIDINE HYDROCHLORIDE 0.2 MG: 0.1 TABLET ORAL at 21:29

## 2020-07-20 RX ADMIN — CLONIDINE HYDROCHLORIDE 0.2 MG: 0.1 TABLET ORAL at 13:55

## 2020-07-20 RX ADMIN — METRONIDAZOLE 500 MG: 500 INJECTION, SOLUTION INTRAVENOUS at 21:29

## 2020-07-20 RX ADMIN — MAGNESIUM SULFATE HEPTAHYDRATE 1 G: 1 INJECTION, SOLUTION INTRAVENOUS at 14:58

## 2020-07-20 RX ADMIN — Medication 400 MG: at 21:29

## 2020-07-20 RX ADMIN — SODIUM CHLORIDE, PRESERVATIVE FREE 10 ML: 5 INJECTION INTRAVENOUS at 08:34

## 2020-07-20 RX ADMIN — PANTOPRAZOLE SODIUM 40 MG: 40 TABLET, DELAYED RELEASE ORAL at 06:27

## 2020-07-20 RX ADMIN — HEPARIN SODIUM 5000 UNITS: 5000 INJECTION INTRAVENOUS; SUBCUTANEOUS at 08:44

## 2020-07-20 RX ADMIN — FLUCONAZOLE 400 MG: 400 INJECTION, SOLUTION INTRAVENOUS at 22:22

## 2020-07-20 RX ADMIN — MAGNESIUM SULFATE HEPTAHYDRATE 1 G: 1 INJECTION, SOLUTION INTRAVENOUS at 15:53

## 2020-07-20 RX ADMIN — Medication 400 MG: at 08:33

## 2020-07-20 RX ADMIN — PIPERACILLIN AND TAZOBACTAM 3.38 G: 3; .375 INJECTION, POWDER, LYOPHILIZED, FOR SOLUTION INTRAVENOUS at 08:33

## 2020-07-20 RX ADMIN — INSULIN LISPRO 2 UNITS: 100 INJECTION, SOLUTION INTRAVENOUS; SUBCUTANEOUS at 18:08

## 2020-07-20 RX ADMIN — Medication 500 MG: at 21:29

## 2020-07-20 RX ADMIN — SODIUM CHLORIDE, PRESERVATIVE FREE 10 ML: 5 INJECTION INTRAVENOUS at 21:00

## 2020-07-20 RX ADMIN — CLONIDINE HYDROCHLORIDE 0.2 MG: 0.1 TABLET ORAL at 08:33

## 2020-07-20 RX ADMIN — LISINOPRIL 20 MG: 20 TABLET ORAL at 08:33

## 2020-07-20 RX ADMIN — IRON SUCROSE 100 MG: 20 INJECTION, SOLUTION INTRAVENOUS at 08:33

## 2020-07-20 RX ADMIN — PIPERACILLIN AND TAZOBACTAM 3.38 G: 3; .375 INJECTION, POWDER, LYOPHILIZED, FOR SOLUTION INTRAVENOUS at 15:48

## 2020-07-20 RX ADMIN — AMLODIPINE BESYLATE 10 MG: 10 TABLET ORAL at 08:33

## 2020-07-20 RX ADMIN — POTASSIUM BICARBONATE 40 MEQ: 782 TABLET, EFFERVESCENT ORAL at 08:33

## 2020-07-20 RX ADMIN — METRONIDAZOLE 500 MG: 500 INJECTION, SOLUTION INTRAVENOUS at 04:19

## 2020-07-20 RX ADMIN — PIPERACILLIN AND TAZOBACTAM 3.38 G: 3; .375 INJECTION, POWDER, LYOPHILIZED, FOR SOLUTION INTRAVENOUS at 00:00

## 2020-07-20 RX ADMIN — Medication 500 MG: at 08:33

## 2020-07-20 RX ADMIN — MULTIPLE VITAMINS W/ MINERALS TAB 1 TABLET: TAB at 08:33

## 2020-07-20 RX ADMIN — METRONIDAZOLE 500 MG: 500 INJECTION, SOLUTION INTRAVENOUS at 13:19

## 2020-07-20 RX ADMIN — HEPARIN SODIUM 5000 UNITS: 5000 INJECTION INTRAVENOUS; SUBCUTANEOUS at 21:29

## 2020-07-20 ASSESSMENT — PAIN SCALES - GENERAL
PAINLEVEL_OUTOF10: 0

## 2020-07-20 NOTE — PROGRESS NOTES
[facility] [phone]                                     Daily Progress Note                                                         Pt Name: Lolita Perez  Medical Record Number: [id number]  Date of Birth 1970   Today's Date: 7/20/2020  Chief Complaint   Patient presents with    Abdominal Pain     for 8 days was seen at [facility] and told she bowel inflammation  No emesis        ASSESSMENT/PLAN  Diffuse purulent peritonitis, suspicious for ruptured TOA  -7/8 exploratory laparotomy, omentectomy, left salpingectomy with removal of fibroid, repair serosal tear, US guided right IJ vascath. -drain serous. wound clean. WBC trending down. Continue abx, antifungal per ID.   -Diet as able. -OOB and ambulate, PT/OPT     KIKI resolved  -vas cath in place, nephro following  -Cr normalized    Clance Pedro continues to do well. Tolerating PO with BMs. OBJECTIVE  VITALS:  height is 4' 11\" (1.499 m) and weight is 251 lb 1.7 oz (113.9 kg). Her oral temperature is 99.1 °F (37.3 °C). Her blood pressure is 158/102 (abnormal) and her pulse is 103. Her respiration is 18 and oxygen saturation is 94%. INTAKE/OUTPUT:      Intake/Output Summary (Last 24 hours) at 7/20/2020 8455  Last data filed at 7/20/2020 0516  Gross per 24 hour   Intake 890 ml   Output 2450 ml   Net -1560 ml     GENERAL: alert, no distress  LUNGS: clear to ausculation, without wheezes, rales or rhonci  HEART: normal rate and regular rhythm  ABDOMEN: soft, diffuse tenderness, incision c/d/i. Drain SS. EXTREMITY: no cyanosis, clubbing or edema    I/O last 3 completed shifts:   In: 56 [P.O.:480; I.V.:10; IV Piggyback:400]  Out: 2462.5 [Urine:2450; Drains:12.5]      LABS  Recent Labs     07/19/20  0630  07/20/20  0630   WBC 19.0*  --  18.6*   HGB 7.1*  --  7.2*   HCT 22.7*  --  23.6*   *  --  870*     --  137   K 3.2*   < > 3.4*     --  103   CO2 24 --  24   BUN 8  --  6*   CREATININE 0.8  --  0.7   MG 1.30*  --   --    PHOS 3.3  --  3.1   CALCIUM 7.2*  --  7.1*   INR 1.68*  --  1.66*   AST 18  --  31   ALT 11  --  15   BILITOT 0.6  --  0.7   BILIDIR 0.4*  --  0.4*    < > = values in this interval not displayed. Electronically signed by Anne Degroot PA-C on 7/20/2020 at 9:22 AM      Surgery Staff  I have examined this patient and read and agree with the note by Chris Layne PA-C from today. Continues to improve  Wound clean  WBC trending down    Needs to be OOB. Consider D/C veras  Abx per ID. ?  Transitioning vascath to tunneled PICC if longterm abx needed  Electronically signed by Pattie Manning MD on 7/20/2020 at 1:07 PM

## 2020-07-20 NOTE — PROGRESS NOTES
0.8 mg/dL today. Weight is trending down. PRN PO Lasix for volume control.               - patient can have PICC   - dialysis catheter still needs to be removed. and d/w nurse     2) Purulent peritenonitis with marked leukocytosis. S/P exploratory laparotomy, omentectomy, left salpingectomy with removal of fibroid, repair serosal tear on 7/8/20. WBC trending down.              - ABX per  ID       3) ID              - on empiric zosyn, Diflucan Flagyl     4) FEN    Hypokalemia replace                 [id number] replace                - hypocalcemia replace. Low Vitamin D in 06/2020, continue Ergocalciferol. iPTH elevated, which would be appropriate                 Changed diet to diabetic. Does not need to be on renal diet anymore.     5) Hypertension - Continue Amlodipine and Clonidine. Restart ACEI. BMP in am.    Will sign off. Thank you for this consult.

## 2020-07-20 NOTE — PROGRESS NOTES
results for input(s): CHOL, HDL in the last 72 hours. Invalid input(s): LDLCALCU  INR:   Recent Labs     07/18/20  0630 07/19/20  0630 07/20/20  0630   INR 1.77* 1.68* 1.66*       Objective:   Vitals: BP (!) 158/102   Pulse 103   Temp 99.1 °F (37.3 °C) (Oral)   Resp 18   Ht 4' 11\" (1.499 m)   Wt 251 lb 1.7 oz (113.9 kg)   SpO2 94%   BMI 50.72 kg/m²   General appearance: alert and awake  Has NGT  HEENT: Head: Normal, normocephalic, atraumatic, anicteric, pupils are reactive to light. Dry mucous membrane. Neck: no adenopathy, no carotid bruit, supple, symmetrical, trachea midline and thyroid not enlarged, symmetric, no tenderness/mass/nodules  Lungs: clear to auscultation bilaterally  Heart: regular rate and rhythm, S1, S2 normal, no murmur, click, rub or gallop  Abdomen: soft, -tender; bowel sounds normal; no masses,  no organomegaly, has drain  Dressing intact on the open area  Extremities: extremities mild edema  Neurologic: Mental status: alert and awake    Assessment and Plan:   1. KIKI= better  2. HTN- change meds  3. Sepsis-on iv abx. 4. Elevated WBC-better  5. Dm- monitor sugars  6. Anemia= monitor h/h  7. Iron deff-on IV iron  8. Hypokalemia-replace  9. Peritonitis- on iv abx  Patient Active Problem List:     EMERY (obstructive sleep apnea)     ARF (acute renal failure) (HCC)     SBO (small bowel obstruction) (HCC)     Leukocytosis     Septicemia (HCC)     Acidosis     KIKI (acute kidney injury) ([facility] [address]     Gallbladder disease     Hypertension     Type 2 diabetes mellitus with hyperglycemia ([facility] [address]    Pt is stable. Discussed with staff  about the plan. See the orders for further plan. Will proceed further acc to pt's progress.   Time spent with management of the pt is-30 minutes      Electronically signed by: Bull Florez MD, on 7/20/2020, at 11:42 AM

## 2020-07-20 NOTE — PROGRESS NOTES
tablet Take 1 tablet by mouth 2 times daily 60 tablet 3    metFORMIN (GLUCOPHAGE) 500 MG tablet Take 1 tablet by mouth 2 times daily (with meals) 60 tablet 5    amLODIPine-benazepril (LOTREL) 5-20 MG per capsule Take 1 capsule by mouth daily 30 capsule 0    vitamin D (ERGOCALCIFEROL) 1.25 MG (97604 UT) CAPS capsule Take 1 capsule by mouth once a week 4 capsule 5       Allergies:  Patient has no known allergies. Immunizations : There is no immunization history on file for this patient. Social History:    Social History     Tobacco Use    Smoking status: Never Smoker    Smokeless tobacco: Never Used   Substance Use Topics    Alcohol use: No    Drug use: No     Social History     Tobacco Use   Smoking Status Never Smoker   Smokeless Tobacco Never Used      Family History : Other (Other) Other   ovarian cancer- moteher    Other (Other) Other   sister with \"traces of lupus\"    Ovarian cancer Mother    Diabetes Father    Breast cancer Neg Hx      Copied from Care everywhere note 6/29    REVIEW OF SYSTEMS:    No fever / chills / sweats. No weight loss. No visual change, eye pain, eye discharge. No oral lesion, sore throat, dysphagia. Denies cough / sputum/Sob   Denies chest pain, palpitations/ dizziness  Denies nausea/ vomiting/abdominal pain/diarrhea. Denies dysuria or change in urinary function. Denies joint swelling or pain. No myalgia, arthralgia. No rashes, skin lesions   Denies focal weakness, sensory change or other neurologic symptoms  No lymph node swelling or tenderness.     abd pain, bloating, nausea+ cramps     PHYSICAL EXAM:      Vitals:    BP (!) 158/102   Pulse 103   Temp 99.1 °F (37.3 °C) (Oral)   Resp 18   Ht 4' 11\" (1.499 m)   Wt 251 lb 1.7 oz (113.9 kg)   SpO2 94%   BMI 50.72 kg/m²     General Appearance: alert,in some acute distress, +  pallor, + icterus     Skin: warm and dry, no rash or erythema  Head: normocephalic and atraumatic  Eyes: pupils equal, round, and reactive to light, conjunctivae normal  ENT: tympanic membrane, external ear and ear canal normal bilaterally, nose without deformity, nasal mucosa and turbinates normal without polyps  Neck: supple and non-tender without mass, no thyromegaly  no cervical lymphadenopathy  Pulmonary/Chest: bi basal crepts + bilaterally- no wheezes, rales or rhonchi, normal air movement, in respiratory distress  Cardiovascular: normal rate, regular rhythm, normal S1 and S2, no murmurs, rubs, clicks, or gallops, no carotid bruits  Abdomen: soft, -tender, distended,+  bowel sound +  masses or organomegaly midline incision dressing+  MECHE drain +   Extremities: no cyanosis, clubbing or +edema  Musculoskeletal: normal range of motion, no joint swelling, deformity or tenderness  Neurologic: reflexes normal and symmetric, no cranial nerve deficit  Psych:  Orientation, sensorium, mood normal  Lines:  IJ+  Jaime+         Lower abd incision staple line opened packing+          Data Review:    Lab Results   Component Value Date    WBC 18.6 (H) 07/20/2020    HGB 7.2 (L) 07/20/2020    HCT 23.6 (L) 07/20/2020    MCV 68.9 (L) 07/20/2020     (H) 07/20/2020     Lab Results   Component Value Date    CREATININE 0.7 07/20/2020    BUN 6 (L) 07/20/2020     07/20/2020    K 3.4 (L) 07/20/2020     07/20/2020    CO2 24 07/20/2020       Hepatic Function Panel:   Lab Results   Component Value Date    ALKPHOS 69 07/20/2020    ALT 15 07/20/2020    AST 31 07/20/2020    PROT 6.7 07/20/2020    BILITOT 0.7 07/20/2020    BILIDIR 0.4 07/20/2020    IBILI 0.3 07/20/2020    LABALBU 2.0 07/20/2020       UA:  Lab Results   Component Value Date    COLORU DK YELLOW 07/07/2020    CLARITYU CLOUDY 07/07/2020    GLUCOSEU 100 07/07/2020    BILIRUBINUR SMALL 07/07/2020    KETUA Negative 07/07/2020    SPECGRAV 1.015 07/07/2020    BLOODU LARGE 07/07/2020    PHUR 6.0 07/07/2020    PROTEINU 100 07/07/2020    UROBILINOGEN 1.0 07/07/2020    NITRU Negative 07/07/2020 LEUKOCYTESUR SMALL 07/07/2020    LABMICR YES 07/07/2020    URINETYPE NotGiven 07/07/2020      Urine Microscopic:   Lab Results   Component Value Date    BACTERIA 1+ 07/07/2020    COMU see below 07/07/2020    HYALCAST 1 07/07/2020    WBCUA 26 07/07/2020    RBCUA  07/07/2020    EPIU 2 07/07/2020     Creat  4.5  Down to 0.6   Lactic acid  5.8  Down to  1.9      WBC  36.5  DOwn to  18         MICRO: cultures reviewed and updated by me          Culture, Anaerobic and Aerobic [[id number]]  (Abnormal)  Collected: 07/17/20 1500    Order Status: Completed  Specimen: Wound  Updated: 07/19/20 0922     Gram Stain Result  No Epithelial Cells seen   3+ WBC's (Polymorphonuclear)   No organisms seen     Anaerobic Culture  Anaerobic culture further report to follow     Organism  Candida albicansAbnormal       WOUND/ABSCESS  --     Rare growth   No further workup    Narrative:      ORDER#: [id number]                          ORDERED BY: [facility]   SOURCE: Wound Peritoneal Abscess           COLLECTED:  07/17/20 15:00   [facility] :                      RECEIVED :  07/17/20 15:13   Performed at:               Culture, Tissue [[id number]] Collected: 07/08/20 1644   Order Status: Completed Specimen: Tissue Updated: 07/10/20 0904    Gram Stain Result No Epithelial Cells seen   No WBCs or organisms seen     WOUND/ABSCESS No growth to date    Anaerobic Culture --    Anaerobic culture further report to follow   No anaerobes isolated so far, Further report to follow    Narrative:     ORDER#: [id number]                          ORDERED BY: [facility]  SOURCE: Tissue Omentum                     COLLECTED:  07/08/20 16:44  ANT[facility]IOTICS [facility] :                      RECEIVED :  07/08/20 17:13  Performed at:  [facility]  [address] [place], [place] [address]   Phone [phone]   C.trachomatis [place] Comment, Urine [[id number]] Collected: 07/08/20 1835   Order Status: Sent Candida albicansAbnormal       WOUND/ABSCESS  --     Rare growth   No further workup     Organism  Fusobacterium necrophorum ssp necrophorumAbnormal       Anaerobic Culture  --     Light growth   No further workup     Organism  Peptostreptococcus anaerobiusAbnormal       Anaerobic Culture  --     Light growth   No further workup    Narrative:      ORDER#: [id number]                          [facility]   SOURCE: Tissue Omentum                     COLLECTED:  07/08/20 16:44   ANTIBIOTICS AT [facility] :                      RECEIVED :  07/08/20 17:13   Performed at:   [facility]   [address] [facility] [place] [facility] [place] [address]   Phone [phone]        IMAGING:    CT ABDOMEN PELVIS WO CONTRAST Additional Contrast? Oral   Final Result   Mild dilation of small bowel in the mid upper abdomen. Given that the oral   contrast extends to the level of the ascending colon presumably that   represents ileus rather than obstruction. There is evidence of small bowel mural thickening, suggesting enteritis. Consider ischemic, inflammatory, and infectious etiologies. Diffuse mesenteric edema with interloop fluid. Small volume ascites. Diffuse anasarca. Interval partial dehiscence of the midline ventral incision. XR ABDOMEN (KUB) (SINGLE AP VIEW)   Final Result   1. Improved with residual dilated loops of small bowel either due to an ileus   or partial small bowel obstruction. XR ABDOMEN (KUB) (SINGLE AP VIEW)   Final Result   Increased air-filled small bowel loops concerning for developing distal small   truck shin although this may be due to ileus. CT ABDOMEN PELVIS WO CONTRAST Additional Contrast? None   Final Result   Evaluation is limited by lack of IV and oral contrast.      Diffuse soft tissue edema, new. Small pleural effusions and patchy   consolidation in the lung bases, atelectasis versus infection.       Postsurgical changes which are new.  Surgical drain terminates in the pelvis. There is small to moderate volume ill-defined fluid along the anterior   abdomen and pelvis. A few tiny foci of gas noted anteriorly which likely are   related to recent surgery. No large amount of free air. XR CHEST PORTABLE   Final Result   Status post placement of right internal jugular central venous catheter,   without gross pneumothorax. Endotracheal tube tip is approximately 1.3 cm proximal to the kelly and   could be retracted approximately 1-2 cm. Low volume study with basilar atelectasis. Findings were called by the radiology call center. XR ABDOMEN (2 VIEWS)   Final Result   Obstructed bowel gas pattern, similar to prior CT         MRI ABDOMEN WO CONTRAST MRCP   Final Result   No biliary ductal obstruction. No findings to explain elevated LFTs. CT ABDOMEN PELVIS WO CONTRAST Additional Contrast? None   Final Result   Mid to distal small bowel obstruction         US GALLBLADDER RUQ   Final Result   Questionable small amount of sludge within the gallbladder. Otherwise   unremarkable right upper quadrant ultrasound.                All the pertinent images and reports for the current Hospitalization were reviewed by me     Scheduled Meds:   lisinopril  20 mg Oral Daily    therapeutic multivitamin-minerals  1 tablet Oral Daily    pantoprazole  40 mg Oral QAM AC    amLODIPine  10 mg Oral Daily    vitamin D  50,000 Units Oral Weekly    magnesium oxide  400 mg Oral BID    calcium carbonate  500 mg Oral BID    potassium bicarb-citric acid  40 mEq Oral Daily with breakfast    iron sucrose  100 mg Intravenous Daily    piperacillin-tazobactam  3.375 g Intravenous Q8H    fluconazole  200 mg Intravenous Q24H    cloNIDine  0.2 mg Oral TID    metroNIDAZOLE  500 mg Intravenous Q8H    sodium chloride (PF)  10 mL Intravenous Daily    sodium chloride flush  10 mL Intravenous 2 times per day    heparin (porcine)  5,000 Units Subcutaneous BID    insulin lispro  0-6 Units Subcutaneous TID WC    insulin lispro  0-3 Units Subcutaneous Nightly       Continuous Infusions:   dextrose         PRN Meds:  iopamidol, potassium chloride, magnesium sulfate, acetaminophen **OR** acetaminophen, glucose, dextrose, glucagon (rDNA), dextrose, sodium chloride flush, promethazine **OR** ondansetron, iopamidol      Assessment:     Patient Active Problem List   Diagnosis    EMERY (obstructive sleep apnea)    ARF (acute renal failure) (HCC)    SBO (small bowel obstruction) (HCC)    Leukocytosis    Septicemia (HCC)    Acidosis    KIKI ([facility]) ([facility] [address]    Gallbladder disease    Hypertension    Type 2 diabetes mellitus with hyperglycemia (HCC)    Peritonitis (HCC)    Lactic acidosis    Acute pulmonary insufficiency    Metabolic acidosis    Elevated LFTs     Severe sepsis   Lactic acidosis  WBC elevation   KIKI   Purulent peritonitis per OR description  Admitted with abd pain and SBO  S/p Exp lap , omentectomy, Left salpingectomy and removal of Fibroid and repair of serosal tear on 7/8   Lft elevation  Anemia  Thrombocytosis     She was  very ill in ICU  noted purulent peritonitis and per OP notes no bowel leak but Left Tuboovarian abscess suspected based on the presentation    OR cx now strep anginosus, candida and Fusobacterium  necrophorum and Peptostreptococcus     Recent Gynecology eval was normal and previous Chlamydia and GC screen including HPV negative        Would suspected mixed infectious process GI and  seth to be in volved in the process        On going WBC elevation and slow recovery given the mixed infectious process Risk for Intra abdominal abscess    CT abd/pelvis check follow up no leak no abscess    WBC slow trend down bu GI function slowly improving     X ray with ileus noted and clinically improving slowly and WBC trend down a bit but still elevation  S/p Bed side lower incision opened and purulence drained out and packing and this may explain elevated wbc    WBC trend down after the incision was opened and purulence drained out and wound cx Candida noted    PICC line and OPAT d/w pt   Labs, Microbiology, Radiology and all the pertinent results from current hospitalization and  care every where were reviewed  by me as a part of the evaluation   Plan: 1. Cont V Fluconazole x 400 mg daily dose increased as creat is better  2. Cont  IV Zosyn x 3.375 gm  x Q 8 hrs will cover GI and  seth well will increase the dose once creat is better   3. Cont IV Flagyl x 500 mg Q 8 hrs for now will be able to dc this soon  4. CT abd/pelvis with oral contrast to be repeated on 7/17    5. Anticipate x 21 days of Iv ABX Therapy   6  HIV -ve and hepatitis screen -ve    7. ESR, CRP Now down trend   8. INCISIONAL abscess cx Candida noted  9. PICC line  When ok with renal team         Discussed with patient/Family and Nursing   Risk of Complications/Morbidity: High      · Illness(es)/ Infection present that pose threat to bodily function. · There is potential for severe exacerbation of infection/side effects of treatment. · Therapy requires intensive monitoring for antimicrobial agent toxicity      Discussed with patient/Family and Nursing staff     Thanks for allowing me to participate in your patient's care and please call me with any questions or concerns.     Marian Villegas MD  Infectious Disease  [facility] ([facility]) Physician  Phone: [phone]   Fax : [phone]

## 2020-07-20 NOTE — PROGRESS NOTES
Tolerance: Patient limited by endurance     Patient Diagnosis(es): The primary encounter diagnosis was Septicemia ([facility] [address] Diagnoses of KIKI (acute kidney injury) ([facility] [address] Gallbladder disease, and Acidosis were also pertinent to this visit. has a past medical history of Hypertension and Obesity. has a past surgical history that includes  section and laparotomy (N/A, 2020). Restrictions  Restrictions/Precautions  Restrictions/Precautions: Fall Risk  Position Activity Restriction  Other position/activity restrictions: Recent Abdominal Surg. Abdominal MECHE Drain. Subjective   General  Chart Reviewed: Yes  Additional Pertinent Hx: Pt is a 53 y.o. female admitted to ED 2020 with abdominal pain. Pt stated that this pain began one week ago, and was seen on 20 at [facility] And was diagnosed with some gastroenteritis. Pt had a CT scan performed that showed a mid to distal small bowel obstruction. S/p exploratory laparotomy, omentectomy, left salpingectomy with removal of fibriod (N/A abdomen) on 2020. Response To Previous Treatment: Patient reporting fatigue but able to participate.   Family / Caregiver Present: Yes(son in room)  Referring Practitioner: Dr. Alek Goodson          Orientation  Orientation  Overall Orientation Status: Within Functional Limits  Cognition   Cognition  Overall Cognitive Status: WFL  Objective   Bed mobility  Rolling to Left: Maximum assistance;2 Person assistance  Rolling to Right: Maximum assistance;2 Person assistance  Transfers  Bed to Chair: Dependent/Total(with maxi move)        Balance  Comments: pt needs max A to sit forward in chair to place pillow behind her back  Exercises  Comments: encouraged B LE/UE ex in chair         Comment: assisted with positioning in chair for comfort and pressure relief; pure wick placed while pt in chair      AM-PAC Score  AM-PAC Inpatient Mobility Raw Score : 8 (20 8854)  AM-PAC Inpatient T-Scale Score : 28.52 (20 1404)  Mobility Inpatient CMS 0-100% Score: 86.62 (07/20/20 1404)  Mobility Inpatient CMS G-Code Modifier : CM (07/20/20 1404)          Goals  Short term goals  Time Frame for Short term goals: Upon d/c acute care setting.-- goals updated and ongoing 7/15/20  Short term goal 1: Bed Mob Mod assist x 2  Short term goal 2: Transfers with/without assist device Min assist x 2. Short term goal 3: Amb with/without assist device 10-15' Min assist x 2. Long term goals  Time Frame for Long term goals : tbd at next level of care. Patient Goals   Patient goals : Be able to go home. Plan    Plan  Times per week: 3-5x week while in acute care setting.   Current Treatment Recommendations: Strengthening, Functional Mobility Training, Transfer Training, Gait Training, Safety Education & Training, Patient/Caregiver Education & Training  Safety Devices  Type of devices: Call light within reach, Chair alarm in place, Left in chair, Nurse notified  Restraints  Initially in place: No     Therapy Time   Individual Concurrent Group Co-treatment   Time In 1312         Time Out 1355         Minutes 43                 ALON SIMMS, PT

## 2020-07-20 NOTE — PLAN OF CARE
Problem: Pain:  Goal: Pain level will decrease  Description: Pain level will decrease  7/20/2020 0015 by Gama Art RN  Outcome: Ongoing     Problem: Falls - Risk of:  Goal: Will remain free from falls  Description: Will remain free from falls  7/20/2020 0015 by Gama Art RN  Outcome: Ongoing     Problem: Nausea/Vomiting:  Goal: Absence of nausea/vomiting  Description: Absence of nausea/vomiting  7/20/2020 0015 by Gama Art RN  Outcome: Ongoing     Problem: Nausea/Vomiting:  Goal: Able to eat  Description: Able to eat  7/20/2020 0015 by Gama Art RN  Outcome: Ongoing     Problem: Skin Integrity:  Goal: Will show no infection signs and symptoms  Description: Will show no infection signs and symptoms  7/20/2020 0015 by Gama Art RN  Outcome: Ongoing

## 2020-07-20 NOTE — PROGRESS NOTES
Occupational Therapy  Facility/Department: [facility] 5W PROGRESSIVE CARE  Daily Treatment Note  NAME: Belkis Villalpando  : 1970  MRN: [id number]    Date of Service: 2020    Discharge Recommendations:  Patient would benefit from continued therapy after discharge, 3-5 sessions per week  OT Equipment Recommendations  Other: defer to DC facility    Belkis Villalpando scored a 10/24 on the AM-PAC ADL Inpatient form. Current research shows that an AM-PAC score of 17 or less is typically not associated with a discharge to the patient's home setting. Based on the patient's AM-PAC score and their current ADL deficits, it is recommended that the patient have 3-5 sessions per week of Occupational Therapy at d/c to increase the patient's independence. Please see assessment section for further patient specific details. If patient discharges prior to next session this note will serve as a discharge summary. Please see below for the latest assessment towards goals. Assessment   Performance deficits / Impairments: Decreased functional mobility ; Decreased ADL status; Decreased endurance;Decreased strength;Decreased balance;Decreased ROM  Assessment: Pt tolerated session fairly well. Pt limited by the above deficits and requiring up to Max A x2 for bed mob, and dependent for transfers, bed to chair via [address]. Pt dependent for ADL's. Pt educated on UE exercises in ROM in chair to increase strength/edurance. Pt will benefit from cont OT at d/c prior to returning home. Cont poc. Prognosis: Good  OT Education: OT Role;Plan of Care  Patient Education: verb understanding.  Educated pt on UE HEP to increase activity tolerance for ADLs- verb understanding  REQUIRES OT FOLLOW UP: Yes  Activity Tolerance  Activity Tolerance: Patient limited by fatigue  Activity Tolerance: decreased motivation but responded well with encouragement  Safety Devices  Safety Devices in place: Yes  Type of devices: Call light within reach;Nurse notified; Patient at risk for falls; Chair alarm in place; Left in chair         Patient Diagnosis(es): The primary encounter diagnosis was Septicemia ([facility] [address] Diagnoses of KIKI (acute kidney injury) ([facility] [address] Gallbladder disease, and Acidosis were also pertinent to this visit. has a past medical history of Hypertension and Obesity. has a past surgical history that includes  section and laparotomy (N/A, 2020). Restrictions  Restrictions/Precautions  Restrictions/Precautions: Fall Risk  Position Activity Restriction  Other position/activity restrictions: Recent Abdominal Surg. Abdominal MECHE Drain. Subjective   General  Chart Reviewed: Yes  Patient assessed for rehabilitation services?: Yes  Additional Pertinent Hx: Pt is a 53 y.o. female admitted to ED 2020 with abdominal pain. Pt stated that this pain began one week ago, and was seen on 20 at [facility] And was diagnosed with some gastroenteritis. Pt had a CT scan performed that showed a mid to distal small bowel obstruction. S/p exploratory laparotomy, omentectomy, left salpingectomy with removal of fibriod (N/A abdomen) on 2020. Family / Caregiver Present: Yes  Referring Practitioner: Cat Maldonado MD  Subjective  Subjective: Pt seen bedside and agreeable to therapy. General Comment  Comments: Per RN ok for therapy      Objective    Functional Mobility  Functional Mobility Comments: bed > chair via maxi move     Bed mobility  Comment: rolled R/L to place lift pad.  able to assist with holding self in sidelying      Cognition  Overall Cognitive Status: WFL        Type of ROM/Therapeutic Exercise  Type of ROM/Therapeutic Exercise: AROM  Exercises  Shoulder Flexion: x10  Wrist Flexion: x10  Wrist Extension: 10  Grasp/Release: x10        Plan   Plan  Times per week: 3-5  Current Treatment Recommendations: Strengthening, ROM, Gait Training, Balance Training, Self-Care / ADL, Functional Mobility Training, Endurance Training,

## 2020-07-20 NOTE — PROGRESS NOTES
PICC nurse reports he had trouble placing a line in right arm because he couldn't thread guidewire. He will try to gain access on left arm. If he is unsuccessful, order will have to be re-entered tomorrow and/or physician consulted for solution.

## 2020-07-20 NOTE — PROGRESS NOTES
Contacted Dr. Gisella Betancourt regarding permission for PICC (per Dr. [place]). Awaiting callback.

## 2020-07-21 LAB
A/G RATIO: 0.4 (ref 1.1–2.2)
ALBUMIN SERPL-MCNC: 2 G/DL (ref 3.4–5)
ALP BLD-CCNC: 66 U/L (ref 40–129)
ALT SERPL-CCNC: 22 U/L (ref 10–40)
ANION GAP SERPL CALCULATED.3IONS-SCNC: 10 MMOL/L (ref 3–16)
AST SERPL-CCNC: 46 U/L (ref 15–37)
BILIRUB SERPL-MCNC: 0.7 MG/DL (ref 0–1)
BILIRUBIN DIRECT: 0.3 MG/DL (ref 0–0.3)
BILIRUBIN, INDIRECT: 0.4 MG/DL (ref 0–1)
BUN BLDV-MCNC: 6 MG/DL (ref 7–20)
CALCIUM IONIZED: 0.96 MMOL/L (ref 1.12–1.32)
CALCIUM SERPL-MCNC: 7.2 MG/DL (ref 8.3–10.6)
CHLORIDE BLD-SCNC: 104 MMOL/L (ref 99–110)
CO2: 24 MMOL/L (ref 21–32)
CREAT SERPL-MCNC: 0.6 MG/DL (ref 0.6–1.1)
GFR AFRICAN AMERICAN: >60
GFR NON-AFRICAN AMERICAN: >60
GLOBULIN: 4.8 G/DL
GLUCOSE BLD-MCNC: 104 MG/DL (ref 70–99)
GLUCOSE BLD-MCNC: 107 MG/DL (ref 70–99)
GLUCOSE BLD-MCNC: 114 MG/DL (ref 70–99)
GLUCOSE BLD-MCNC: 147 MG/DL (ref 70–99)
GLUCOSE BLD-MCNC: 172 MG/DL (ref 70–99)
INR BLD: 1.69 (ref 0.86–1.14)
PERFORMED ON: ABNORMAL
PH VENOUS: 7.57 (ref 7.35–7.45)
PHOSPHORUS: 3.2 MG/DL (ref 2.5–4.9)
POTASSIUM REFLEX MAGNESIUM: 3.6 MMOL/L (ref 3.5–5.1)
PROTHROMBIN TIME: 19.7 SEC (ref 10–13.2)
SODIUM BLD-SCNC: 138 MMOL/L (ref 136–145)
TOTAL PROTEIN: 6.8 G/DL (ref 6.4–8.2)

## 2020-07-21 PROCEDURE — 99233 SBSQ HOSP IP/OBS HIGH 50: CPT | Performed by: INTERNAL MEDICINE

## 2020-07-21 PROCEDURE — 2500000003 HC RX 250 WO HCPCS: Performed by: INTERNAL MEDICINE

## 2020-07-21 PROCEDURE — 2580000003 HC RX 258: Performed by: INTERNAL MEDICINE

## 2020-07-21 PROCEDURE — 6370000000 HC RX 637 (ALT 250 FOR IP): Performed by: SURGERY

## 2020-07-21 PROCEDURE — 6370000000 HC RX 637 (ALT 250 FOR IP): Performed by: INTERNAL MEDICINE

## 2020-07-21 PROCEDURE — 6360000002 HC RX W HCPCS: Performed by: INTERNAL MEDICINE

## 2020-07-21 PROCEDURE — 99232 SBSQ HOSP IP/OBS MODERATE 35: CPT | Performed by: INTERNAL MEDICINE

## 2020-07-21 PROCEDURE — 2580000003 HC RX 258: Performed by: SURGERY

## 2020-07-21 PROCEDURE — 6360000002 HC RX W HCPCS: Performed by: SURGERY

## 2020-07-21 PROCEDURE — 80053 COMPREHEN METABOLIC PANEL: CPT

## 2020-07-21 PROCEDURE — 36415 COLL VENOUS BLD VENIPUNCTURE: CPT

## 2020-07-21 PROCEDURE — 84100 ASSAY OF PHOSPHORUS: CPT

## 2020-07-21 PROCEDURE — 82330 ASSAY OF CALCIUM: CPT

## 2020-07-21 PROCEDURE — 85025 COMPLETE CBC W/AUTO DIFF WBC: CPT

## 2020-07-21 PROCEDURE — 2060000000 HC ICU INTERMEDIATE R&B

## 2020-07-21 PROCEDURE — 94761 N-INVAS EAR/PLS OXIMETRY MLT: CPT

## 2020-07-21 PROCEDURE — U0003 INFECTIOUS AGENT DETECTION BY NUCLEIC ACID (DNA OR RNA); SEVERE ACUTE RESPIRATORY SYNDROME CORONAVIRUS 2 (SARS-COV-2) (CORONAVIRUS DISEASE [COVID-19]), AMPLIFIED PROBE TECHNIQUE, MAKING USE OF HIGH THROUGHPUT TECHNOLOGIES AS DESCRIBED BY CMS-2020-01-R: HCPCS

## 2020-07-21 PROCEDURE — 85610 PROTHROMBIN TIME: CPT

## 2020-07-21 PROCEDURE — 2700000000 HC OXYGEN THERAPY PER DAY

## 2020-07-21 RX ORDER — LISINOPRIL 20 MG/1
20 TABLET ORAL 2 TIMES DAILY
Status: DISCONTINUED | OUTPATIENT
Start: 2020-07-21 | End: 2020-07-24 | Stop reason: HOSPADM

## 2020-07-21 RX ORDER — FUROSEMIDE 10 MG/ML
20 INJECTION INTRAMUSCULAR; INTRAVENOUS 2 TIMES DAILY
Status: DISCONTINUED | OUTPATIENT
Start: 2020-07-21 | End: 2020-07-24 | Stop reason: HOSPADM

## 2020-07-21 RX ADMIN — LISINOPRIL 20 MG: 20 TABLET ORAL at 09:31

## 2020-07-21 RX ADMIN — HEPARIN SODIUM 5000 UNITS: 5000 INJECTION INTRAVENOUS; SUBCUTANEOUS at 10:04

## 2020-07-21 RX ADMIN — Medication 400 MG: at 22:23

## 2020-07-21 RX ADMIN — SODIUM CHLORIDE, PRESERVATIVE FREE 10 ML: 5 INJECTION INTRAVENOUS at 09:38

## 2020-07-21 RX ADMIN — METRONIDAZOLE 500 MG: 500 INJECTION, SOLUTION INTRAVENOUS at 22:14

## 2020-07-21 RX ADMIN — CLONIDINE HYDROCHLORIDE 0.2 MG: 0.1 TABLET ORAL at 13:00

## 2020-07-21 RX ADMIN — HEPARIN SODIUM 5000 UNITS: 5000 INJECTION INTRAVENOUS; SUBCUTANEOUS at 22:23

## 2020-07-21 RX ADMIN — INSULIN LISPRO 1 UNITS: 100 INJECTION, SOLUTION INTRAVENOUS; SUBCUTANEOUS at 13:01

## 2020-07-21 RX ADMIN — Medication 500 MG: at 09:31

## 2020-07-21 RX ADMIN — FUROSEMIDE 20 MG: 10 INJECTION, SOLUTION INTRAMUSCULAR; INTRAVENOUS at 16:58

## 2020-07-21 RX ADMIN — CLONIDINE HYDROCHLORIDE 0.2 MG: 0.1 TABLET ORAL at 09:31

## 2020-07-21 RX ADMIN — LISINOPRIL 20 MG: 20 TABLET ORAL at 22:21

## 2020-07-21 RX ADMIN — METRONIDAZOLE 500 MG: 500 INJECTION, SOLUTION INTRAVENOUS at 13:00

## 2020-07-21 RX ADMIN — AMLODIPINE BESYLATE 10 MG: 10 TABLET ORAL at 09:31

## 2020-07-21 RX ADMIN — PIPERACILLIN AND TAZOBACTAM 3.38 G: 3; .375 INJECTION, POWDER, LYOPHILIZED, FOR SOLUTION INTRAVENOUS at 00:25

## 2020-07-21 RX ADMIN — PIPERACILLIN AND TAZOBACTAM 3.38 G: 3; .375 INJECTION, POWDER, LYOPHILIZED, FOR SOLUTION INTRAVENOUS at 16:30

## 2020-07-21 RX ADMIN — IRON SUCROSE 100 MG: 20 INJECTION, SOLUTION INTRAVENOUS at 09:34

## 2020-07-21 RX ADMIN — Medication 10 ML: at 09:38

## 2020-07-21 RX ADMIN — POTASSIUM BICARBONATE 40 MEQ: 782 TABLET, EFFERVESCENT ORAL at 09:37

## 2020-07-21 RX ADMIN — PANTOPRAZOLE SODIUM 40 MG: 40 TABLET, DELAYED RELEASE ORAL at 05:27

## 2020-07-21 RX ADMIN — PIPERACILLIN AND TAZOBACTAM 3.38 G: 3; .375 INJECTION, POWDER, LYOPHILIZED, FOR SOLUTION INTRAVENOUS at 09:37

## 2020-07-21 RX ADMIN — Medication 400 MG: at 09:31

## 2020-07-21 RX ADMIN — CLONIDINE HYDROCHLORIDE 0.2 MG: 0.1 TABLET ORAL at 22:23

## 2020-07-21 RX ADMIN — Medication 500 MG: at 22:22

## 2020-07-21 RX ADMIN — MULTIPLE VITAMINS W/ MINERALS TAB 1 TABLET: TAB at 09:31

## 2020-07-21 RX ADMIN — FLUCONAZOLE 400 MG: 400 INJECTION, SOLUTION INTRAVENOUS at 16:59

## 2020-07-21 RX ADMIN — INSULIN LISPRO 1 UNITS: 100 INJECTION, SOLUTION INTRAVENOUS; SUBCUTANEOUS at 16:59

## 2020-07-21 RX ADMIN — METRONIDAZOLE 500 MG: 500 INJECTION, SOLUTION INTRAVENOUS at 05:27

## 2020-07-21 ASSESSMENT — PAIN SCALES - GENERAL
PAINLEVEL_OUTOF10: 0
PAINLEVEL_OUTOF10: 2
PAINLEVEL_OUTOF10: 0

## 2020-07-21 ASSESSMENT — PAIN DESCRIPTION - DIRECTION: RADIATING_TOWARDS: NO

## 2020-07-21 ASSESSMENT — PAIN - FUNCTIONAL ASSESSMENT: PAIN_FUNCTIONAL_ASSESSMENT: ACTIVITIES ARE NOT PREVENTED

## 2020-07-21 ASSESSMENT — PAIN DESCRIPTION - DESCRIPTORS: DESCRIPTORS: ACHING

## 2020-07-21 ASSESSMENT — PAIN DESCRIPTION - PROGRESSION: CLINICAL_PROGRESSION: NOT CHANGED

## 2020-07-21 ASSESSMENT — PAIN DESCRIPTION - PAIN TYPE: TYPE: ACUTE PAIN

## 2020-07-21 ASSESSMENT — PAIN DESCRIPTION - ONSET: ONSET: ON-GOING

## 2020-07-21 ASSESSMENT — PAIN DESCRIPTION - FREQUENCY: FREQUENCY: CONTINUOUS

## 2020-07-21 ASSESSMENT — PAIN DESCRIPTION - LOCATION: LOCATION: RIB CAGE

## 2020-07-21 ASSESSMENT — PAIN DESCRIPTION - ORIENTATION: ORIENTATION: RIGHT;LOWER;ANTERIOR

## 2020-07-21 NOTE — PROGRESS NOTES
Hospitalist Progress Note  7/21/2020 2:27 PM  Subjective:   Admit Date: 7/6/2020  PCP: Todd Spears MD  Interval History: pt is alert and awake  Looks better  Feels that legs are heavy  Tolerating regular food  meds are helping otherwise  Denies any other complaints    Chart reviewed. Diet: DIET CARB CONTROL;   Dietary Nutrition Supplements: Diabetic Oral Supplement  Medications:   Scheduled Meds:   furosemide  20 mg Intravenous BID    lisinopril  20 mg Oral BID    fluconazole  400 mg Intravenous Q24H    lidocaine 1 % injection  5 mL Intradermal Once    therapeutic multivitamin-minerals  1 tablet Oral Daily    pantoprazole  40 mg Oral QAM AC    amLODIPine  10 mg Oral Daily    vitamin D  50,000 Units Oral Weekly    magnesium oxide  400 mg Oral BID    calcium carbonate  500 mg Oral BID    potassium bicarb-citric acid  40 mEq Oral Daily with breakfast    iron sucrose  100 mg Intravenous Daily    piperacillin-tazobactam  3.375 g Intravenous Q8H    cloNIDine  0.2 mg Oral TID    metroNIDAZOLE  500 mg Intravenous Q8H    sodium chloride (PF)  10 mL Intravenous Daily    sodium chloride flush  10 mL Intravenous 2 times per day    heparin (porcine)  5,000 Units Subcutaneous BID    insulin lispro  0-6 Units Subcutaneous TID WC    insulin lispro  0-3 Units Subcutaneous Nightly     Continuous Infusions:   dextrose       CBC:   Recent Labs     07/19/20  0630 07/20/20  0630 07/21/20  0833   WBC 19.0* 18.6* 15.4*   HGB 7.1* 7.2* 7.3*   * 870* 941*     BMP:    Recent Labs     07/19/20  0630 07/19/20  1355 07/20/20  0630 07/21/20  0833     --  137 138   K 3.2* 3.8 3.4* 3.6     --  103 104   CO2 24  --  24 24   BUN 8  --  6* 6*   CREATININE 0.8  --  0.7 0.6   GLUCOSE 113*  --  111* 107*     Hepatic:   Recent Labs     07/19/20  0630 07/20/20  0630 07/21/20  0833   AST 18 31 46*   ALT 11 15 22   BILITOT 0.6 0.7 0.7   ALKPHOS 71 69 66     Troponin: No results for input(s): TROPONINI in the last 72 hours. BNP: No results for input(s): BNP in the last 72 hours. Lipids: No results for input(s): CHOL, HDL in the last 72 hours. Invalid input(s): LDLCALCU  INR:   Recent Labs     07/19/20  0630 07/20/20  0630 07/21/20  0833   INR 1.68* 1.66* 1.69*       Objective:   Vitals: BP (!) 168/112   Pulse 99   Temp 98.3 °F (36.8 °C) (Oral)   Resp 24   Ht 4' 11\" (1.499 m)   Wt 251 lb 15.8 oz (114.3 kg)   SpO2 92%   BMI 50.89 kg/m²   General appearance: alert and awake  Has NGT  HEENT: Head: Normal, normocephalic, atraumatic, anicteric, pupils are reactive to light. Dry mucous membrane. Neck: no adenopathy, no carotid bruit, supple, symmetrical, trachea midline and thyroid not enlarged, symmetric, no tenderness/mass/nodules  Lungs: clear to auscultation bilaterally  Heart: regular rate and rhythm, S1, S2 normal, no murmur, click, rub or gallop  Abdomen: soft, -tender; bowel sounds normal; no masses,  no organomegaly, has drain  Dressing intact on the open area  Extremities: extremities mild edema  Neurologic: Mental status: alert and awake    Assessment and Plan:   1. KIKI= better  2. HTN- change meds  3. Sepsis-on iv abx. 4. Elevated WBC-better  5. Dm- monitor sugars  6. Anemia= monitor h/h  7. Iron deff-on IV iron  8. Hypokalemia-replace  9. Thrombocytosis- hematology consult  Peritonitis- on iv abx  Patient Active Problem List:     EMERY (obstructive sleep apnea)     ARF (acute renal failure) (HCC)     SBO (small bowel obstruction) (HCC)     Leukocytosis     Septicemia (HCC)     Acidosis     KIKI (acute kidney injury) ([address]     Gallbladder disease     Hypertension     Type 2 diabetes mellitus with hyperglycemia ([facility] [address]    Pt is stable. Discussed with staff  about the plan. See the orders for further plan. Will proceed further acc to pt's progress.   Time spent with management of the pt is-30 minutes      Electronically signed by: Mercedez Heranndez MD, on 7/21/2020, at 2:27 PM

## 2020-07-21 NOTE — PLAN OF CARE
Problem: Pain:  Goal: Pain level will decrease  Description: Pain level will decrease  7/20/2020 2303 by Karrin Cockayne, RN  Outcome: Ongoing     Problem: Falls - Risk of:  Goal: Will remain free from falls  Description: Will remain free from falls  7/20/2020 2303 by Karrin Cockayne, RN  Outcome: Ongoing     Problem: Falls - Risk of:  Goal: Absence of physical injury  Description: Absence of physical injury  7/20/2020 2303 by Karrin Cockayne, RN  Outcome: Ongoing     Problem: Bowel/Gastric:  Goal: Control of bowel function will improve  Description: Control of bowel function will improve  7/20/2020 2303 by Karrin Cockayne, RN  Outcome: Ongoing     Problem:  Bowel/Gastric:  Goal: Ability to achieve a regular elimination pattern will improve  Description: Ability to achieve a regular elimination pattern will improve  7/20/2020 2303 by Karrin Cockayne, RN  Outcome: Ongoing     Problem: Skin Integrity:  Goal: Will show no infection signs and symptoms  Description: Will show no infection signs and symptoms  7/20/2020 2303 by Karrin Cockayne, RN  Outcome: Ongoing     Problem: Skin Integrity:  Goal: Absence of new skin breakdown  Description: Absence of new skin breakdown  7/20/2020 2303 by Karrin Cockayne, RN  Outcome: Ongoing     Problem: Nutrition  Goal: Optimal nutrition therapy  7/20/2020 2303 by Karrin Cockayne, RN  Outcome: Ongoing

## 2020-07-21 NOTE — CONSULTS
[facility]    Consult Note      Requesting Physician: Wesley  CHIEF COMPLAINT: NOne currently       HISTORY OF PRESENT ILLNESS:      Ms. Adonis Jarquin  is a 53 y.o. female we are seeing in consultation for Thrombocytosis   She came in for abd symptoms abd pain and after an  Extensive eval she was found to have a severe intrabd infection /sepsis   She required dialysis but is improving  I was asked to see for a high plt count  Of note upon admit and in past it was not severely high       Past Medical History:        Diagnosis Date    Hypertension     Obesity      Past Surgical History:        Procedure Laterality Date     SECTION      LAPAROTOMY N/A 2020    EXPLORATORY LAPAROTOMY, OMENTECTOMY, LEFT SALPINGECTOMY WITH REMOVAL OF FIBROID performed by Lakeisha Frazier MD at [place] [address]       Current Medications:    Current Facility-Administered Medications: furosemide (LASIX) injection 20 mg, 20 mg, Intravenous, BID  lisinopril (PRINIVIL;ZESTRIL) tablet 20 mg, 20 mg, Oral, BID  fluconazole (DIFLUCAN) in 0.9 % sodium chloride IVPB 400 mg, 400 mg, Intravenous, Q24H  lidocaine PF 1 % injection 5 mL, 5 mL, Intradermal, Once  sodium chloride flush 0.9 % injection 10 mL, 10 mL, Intravenous, PRN  therapeutic multivitamin-minerals 1 tablet, 1 tablet, Oral, Daily  pantoprazole (PROTONIX) tablet 40 mg, 40 mg, Oral, QAM AC  amLODIPine (NORVASC) tablet 10 mg, 10 mg, Oral, Daily  vitamin D (ERGOCALCIFEROL) capsule 50,000 Units, 50,000 Units, Oral, Weekly  magnesium oxide (MAG-OX) tablet 400 mg, 400 mg, Oral, BID  calcium carbonate (TUMS) chewable tablet 500 mg, 500 mg, Oral, BID  potassium bicarb-citric acid (EFFER-K) effervescent tablet 40 mEq, 40 mEq, Oral, Daily with breakfast  iron sucrose (VENOFER) injection 100 mg, 100 mg, Intravenous, Daily  iopamidol (ISOVUE-370) 76 % injection 75 mL, 75 mL, Intravenous, ONCE PRN  potassium chloride 10 mEq/100 mL IVPB (Peripheral Line), 10 mEq, Intravenous, PRN  magnesium sulfate 1 g in dextrose 5% 100 mL IVPB, 1 g, Intravenous, PRN  piperacillin-tazobactam (ZOSYN) 3.375 g in dextrose 5 % 100 mL IVPB extended infusion (mini-bag), 3.375 g, Intravenous, Q8H  cloNIDine (CATAPRES) tablet 0.2 mg, 0.2 mg, Oral, TID  acetaminophen (TYLENOL) tablet 650 mg, 650 mg, Oral, Q6H PRN **OR** acetaminophen (TYLENOL) suppository 650 mg, 650 mg, Rectal, Q6H PRN  [DISCONTINUED] pantoprazole (PROTONIX) injection 40 mg, 40 mg, Intravenous, Daily **AND** sodium chloride (PF) 0.9 % injection 10 mL, 10 mL, Intravenous, Daily  glucose (GLUTOSE) 40 % oral gel 15 g, 15 g, Oral, PRN  dextrose 50 % IV solution, 12.5 g, Intravenous, PRN  glucagon (rDNA) injection 1 mg, 1 mg, Intramuscular, PRN  dextrose 5 % solution, 100 mL/hr, Intravenous, PRN  sodium chloride flush 0.9 % injection 10 mL, 10 mL, Intravenous, 2 times per day  promethazine (PHENERGAN) tablet 12.5 mg, 12.5 mg, Oral, Q6H PRN **OR** ondansetron (ZOFRAN) injection 4 mg, 4 mg, Intravenous, Q6H PRN  heparin (porcine) injection 5,000 Units, 5,000 Units, Subcutaneous, BID  insulin lispro (HUMALOG) injection vial 0-6 Units, 0-6 Units, Subcutaneous, TID WC  insulin lispro (HUMALOG) injection vial 0-3 Units, 0-3 Units, Subcutaneous, Nightly  iopamidol (ISOVUE-370) 76 % injection 75 mL, 75 mL, Intravenous, ONCE PRN  Allergies:  Patient has no known allergies.     Social History:      Social History     Socioeconomic History    Marital status: Single     Spouse name: Not on file    Number of children: Not on file    Years of education: Not on file    Highest education level: Not on file   Occupational History    Not on file   Social Needs    Financial resource strain: Not on file    Food insecurity     Worry: Not on file     Inability: Not on file    Transportation needs     Medical: Not on file     Non-medical: Not on file   Tobacco Use    Smoking status: Never Smoker    Smokeless tobacco: Never Used   Substance and Sexual Activity    Alcohol use: No    Drug use: No    Sexual activity: Not on file   Lifestyle    Physical activity     Days per week: Not on file     Minutes per session: Not on file    Stress: Not on file   Relationships    Social connections     Talks on phone: Not on file     Gets together: Not on file     Attends Yazidism service: Not on file     Active member of club or organization: Not on file     Attends meetings of clubs or organizations: Not on file     Relationship status: Not on file    Intimate partner violence     Fear of current or ex partner: Not on file     Emotionally abused: Not on file     Physically abused: Not on file     Forced sexual activity: Not on file   Other Topics Concern    Not on file   Social History Narrative    Not on file          Family History:     History reviewed. No pertinent family history. REVIEW OF SYSTEMS:    ROS per the HPI   Otherwise  10 point ROS negative     PHYSICAL EXAM:      Vitals:  BP (!) 166/93   Pulse 100   Temp 98.1 °F (36.7 °C) (Oral)   Resp 16   Ht 4' 11\" (1.499 m)   Wt 243 lb 2.7 oz (110.3 kg)   SpO2 93%   BMI 49.11 kg/m²     CONSTITUTIONAL:  awake, alert, cooperative, no apparent distress, and appears stated age NAD  EYES:  pupils equal, round and reactive to light, extra ocular muscles intact, sclera clear, conjunctiva normal  NECK:  Supple, symmetrical, trachea midline, no adenopathy, thyroid symmetric, not enlarged and no tenderness, skin normal  HEMATOLOGIC/LYMPHATICS:  no cervical lymphadenopathy, no supraclavicular lymphadenopathy,LUNGS:  No increased work of breathing, good air exchange, clear to auscultation bilaterally, no crackles or wheezing  CARDIOVASCULAR:  , regular rate and rhythm, normal S1 and S2, no S3 or S4, and no murmur noted  ABDOMEN:  Soft post op scar       MUSCULOSKELETAL:  There is no redness, warmth, or swelling of the joints. Full range of motion noted. Motor strength is 5 out of 5 all extremities bilaterally. NEUROLOGIC:  Awake, alert, oriented to name, place and time. Cranial nerves II-XII are grossly intact. Elis Staggers SKIN:  no bruising or bleeding      DATA:    PT/INR:    Recent Labs     07/20/20  0630 07/21/20  0833 07/22/20  0436   PROT 6.7 6.8 7.3   INR 1.66* 1.69*  --      PTT:  No results for input(s): APTT in the last 72 hours. CMP:    Lab Results   Component Value Date     07/22/2020    K 3.3 07/22/2020     07/22/2020    CO2 22 07/22/2020    BUN 6 07/22/2020    PROT 7.3 07/22/2020     Magnesium:    Lab Results   Component Value Date    MG 1.40 07/22/2020     Phosphorus:  No components found for: PO4  Calcium:  No components found for: CA  CBC:    Lab Results   Component Value Date    WBC 15.4 07/21/2020    RBC 3.38 07/21/2020    HGB 7.3 07/21/2020    HCT 23.5 07/21/2020    MCV 69.5 07/21/2020    RDW 30.6 07/21/2020     07/21/2020     DIFF:    Lab Results   Component Value Date    MCV 69.5 07/21/2020    RDW 30.6 07/21/2020      LDH:  @labcrnt(LDH)@  Uric Acid:  @labcrnt(URIC)@    Radiology Review: Ct Abdomen Pelvis Wo Contrast Additional Contrast? Oral    Result Date: 7/17/2020  EXAMINATION: CT OF THE ABDOMEN AND PELVIS WITHOUT CONTRAST 7/17/2020 4:46 pm TECHNIQUE: CT of the abdomen and pelvis was performed without the administration of intravenous contrast. Multiplanar reformatted images are provided for review. Dose modulation, iterative reconstruction, and/or weight based adjustment of the mA/kV was utilized to reduce the radiation dose to as low as reasonably achievable. COMPARISON: 07/12/2020 HISTORY: ORDERING SYSTEM PROVIDED HISTORY: abdominal abscess and peritonitis  WBC elevation TECHNOLOGIST PROVIDED HISTORY: Reason for exam:->abdominal abscess and peritonitis  WBC elevation Additional Contrast?->Oral Is the patient pregnant?->No Reason for Exam: abdominal abscess and peritonitis  WBC elevation FINDINGS: Lower Chest: Ground-glass opacity noted within the lower lungs.   There are small bilateral pleural effusions with adjacent airspace disease. Noncontrast imaging the base of the heart is stable. Visualized extra thoracic soft tissues are unremarkable. Organs: Evaluation of the solid abdominal viscera is limited without intravenous contrast.  Having said that, no acute hepatic abnormality is identified. Gallbladder is grossly unremarkable. Noncontrast imaging of the spleen, adrenals, and pancreas unremarkable. No acute or suspicious renal abnormalities. GI/Bowel: Oral contrast has been administered. That extends to the level of the ascending colon. There is mild dilation of small bowel loops in the mid to left upper abdomen. There is suggestion of mural thickening of small bowel in that same region. No large bowel abnormalities are identified. Distal esophagus and stomach are unremarkable. Pelvis: There is a small amount of free pelvic fluid. Drain in the leftward aspect of the pelvis is noted. Uterus unremarkable. Jaime catheter present within urinary bladder. . Peritoneum/Retroperitoneum: There is diffuse mesenteric edema with interloop fluid. Small amount of ascites is noted. Shotty retroperitoneal lymph nodes are identified, mildly increased in size when compared to the previous exam, likely reactive. Abdominal aorta normal in caliber. Bones/Soft Tissues: There has been interval divisions of a portion of the anterior incision inferior to the umbilicus. Diffuse subcutaneous edema is noted. Mild dilation of small bowel in the mid upper abdomen. Given that the oral contrast extends to the level of the ascending colon presumably that represents ileus rather than obstruction. There is evidence of small bowel mural thickening, suggesting enteritis. Consider ischemic, inflammatory, and infectious etiologies. Diffuse mesenteric edema with interloop fluid. Small volume ascites. Diffuse anasarca. Interval partial dehiscence of the midline ventral incision.      Ct Abdomen Pelvis Wo Contrast Additional Contrast? None    Result Date: 7/12/2020  EXAMINATION: CT OF THE ABDOMEN AND PELVIS WITHOUT CONTRAST 7/12/2020 3:25 pm TECHNIQUE: CT of the abdomen and pelvis was performed without the administration of intravenous contrast. Multiplanar reformatted images are provided for review. Dose modulation, iterative reconstruction, and/or weight based adjustment of the mA/kV was utilized to reduce the radiation dose to as low as reasonably achievable. COMPARISON: None. HISTORY: ORDERING SYSTEM PROVIDED HISTORY: abdominal abscess and peritonitis with persistent wbc elevation check for new abscess TECHNOLOGIST PROVIDED HISTORY: Reason for exam:->abdominal abscess and peritonitis with persistent wbc elevation check for new abscess Additional Contrast?->None Is the patient pregnant?->No Reason for Exam: abdominal abscess and peritonitis with persistent wbc elevation check for new abscess Acuity: Acute Type of Exam: Subsequent/Follow-up FINDINGS: Lower Chest: Patchy consolidation and small pleural effusions in the lower lobes. Organs: Liver, gallbladder, pancreas, and spleen are unremarkable. GI/Bowel: NG tube with tip in the distal stomach. Evaluation is limited by lack of IV and oral contrast.  No findings to suggest bowel obstruction. Pelvis: Jaime catheter in the bladder. No adenopathy. Drain enters the anterior lower abdomen just right of midline in terminates in the pelvis. Small to moderate ill-defined fluid noted within the abdomen and pelvis predominately anteriorly. A few tiny foci of gas noted anteriorly which appear extraluminal.  Surgical staples noted midline. Peritoneum/Retroperitoneum: No abdominal aortic aneurysm. Adrenal glands unremarkable. Kidneys are unremarkable. Bones/Soft Tissues: Diffuse soft tissue edema. No acute fracture. Evaluation is limited by lack of IV and oral contrast. Diffuse soft tissue edema, new.   Small pleural effusions and patchy consolidation in the lung Unauthorized disclosure or use of this information is prohibited by law. If you are not the intended recipient of this document, please advise us by calling immediately [phone]. Impression/Conclusion below 75 HISTORY:   Abd infection (incl peritonitis) centeral abd pain COMPARISON:  None TECHNIQUE: Post IV contrast multiplanar CT images of the abdomen and pelvis NOTE:  If there are questions about the content of this report, please contact [facility] radiology by calling [phone] FINDINGS: LOWER CHEST:  Unremarkable LIVER:  Unremarkable GALLBLADDER/BILE DUCTS:  Unremarkable. No opaque gallstones PANCREAS:  Unremarkable. No mass or duct dilation SPLEEN:  Unremarkable ADRENALS:  Unremarkable  KIDNEYS/URETERS:  Unremarkable. No hydronephrosis, stone, or suspicious mass GI TRACT:  No small bowel obstruction. There is mural thickening of multiple contiguous loops of small bowel in the left hemiabdomen with associated mesenteric stranding. Several of these loops appear matted together but there is no evidence of a discrete fistula or stricture. No discrete abscess. No free intraperitoneal gas. Normal appendix. No colitis or diverticulitis. VESSELS:  Unremarkable. No aneurysm or dissection LYMPH NODES: Unremarkable. No enlarged lymph nodes ABD WALL:  Midline divarication PELVIS:  Calcified uterine leiomyomas. Decompressed bladder. BONES:  Unremarkable OTHER:  None IMPRESSION: 1. Left hemiabdomen enteritis. Infectious enteritis is the leading differential consideration but inflammatory bowel disease could have this appearance. SIGNED BY:    Gallbladder Ruq    Result Date: 7/6/2020  EXAMINATION: RIGHT UPPER QUADRANT ULTRASOUND 7/6/2020 12:49 pm COMPARISON: None. HISTORY: ORDERING SYSTEM PROVIDED HISTORY: Rule out gallbladder disease TECHNOLOGIST PROVIDED HISTORY: Reason for exam:->Rule out gallbladder disease FINDINGS: Evaluation is slightly limited due to patient body habitus and underpenetration.  LIVER: The liver demonstrates normal echogenicity without evidence of intrahepatic biliary ductal dilatation. BILIARY SYSTEM: Questionable small amount of sludge within the gallbladder versus artifact. No gallstones, gallbladder wall thickening, or pericholecystic fluid identified. Gallbladder wall thickness measures 2 mm. Negative sonographic Neville's sign. Common bile duct is within normal limits measuring 3 mm at the level latisha hepatis. RIGHT KIDNEY: The right kidney is grossly unremarkable without evidence of hydronephrosis. Right kidney measures 8.1 x 4.5 x 3.7 cm. PANCREAS:  Poorly visualized due to overlying bowel gas. Visualized pancreatic head is grossly unremarkable. OTHER: No evidence of right upper quadrant ascites. Questionable small amount of sludge within the gallbladder. Otherwise unremarkable right upper quadrant ultrasound. Xr Chest Portable    Result Date: 7/8/2020  EXAMINATION: ONE XRAY VIEW OF THE CHEST 7/8/2020 5:39 pm COMPARISON: None. HISTORY: ORDERING SYSTEM PROVIDED HISTORY: s/p central line insertion TECHNOLOGIST PROVIDED HISTORY: Reason for exam:->s/p central line insertion FINDINGS: Endotracheal tube has tip approximately 1.3 cm proximal to the kelly. Nasogastric tube extends to the left upper quadrant. Right internal jugular central venous catheter extends to the expected location of cavoatrial junction. Low lung volume. No pneumothorax. Mild bibasilar opacity, likely atelectasis. No pleural effusion. Cardiac and mediastinal silhouettes are reflective low volume portable technique. Overlying artifact is seen projecting over the lateral right chest.     Status post placement of right internal jugular central venous catheter, without gross pneumothorax. Endotracheal tube tip is approximately 1.3 cm proximal to the kelly and could be retracted approximately 1-2 cm. Low volume study with basilar atelectasis. Findings were called by the radiology call center.      Mri Abdomen Wo Contrast Mrcp    Result Date: 7/7/2020  EXAMINATION: MRI OF THE ABDOMEN WITHOUT CONTRAST AND MRCP 7/7/2020 9:59 am TECHNIQUE: Multiplanar multisequence MRI of the abdomen was performed without the administration of intravenous contrast.  After initial T2 axial and coronal images, thick slab, thin slab and 3D coronal MRCP sequences were obtained without the administration of intravenous contrast.  MIP images are provided for review. COMPARISON: CT abdomen pelvis 07/06/2020 HISTORY: ORDERING SYSTEM PROVIDED HISTORY: elevated LFTs, rule out CBD obstruction/stone TECHNOLOGIST PROVIDED HISTORY: Reason for exam:->elevated LFTs, rule out CBD obstruction/stone Is the patient pregnant?->No Reason for Exam: Néstor Reza is a 53 y.o. female with a PMH of hypertension and obesity who presented on 7/6/2020 with abdominal pain. CT A/P showed mid to distal small bowel obstruction. She was also noted to have elevated LFTs. GI consulted regarding possible choledocholithiasis. Acuity: Acute Type of Exam: Initial FINDINGS: Multiple sequences are moderately degraded by patient motion. Gallbladder: Gallbladder is unremarkable. Bile Ducts: No biliary ductal dilatation. No choledocholithiasis. Pancreatic Duct: No pancreatic ductal dilatation. Other:  Partially imaged diffusely dilated small bowel, better characterized on previous day CT. Liver is normal in contour and signal.  Adrenals are unremarkable. Kidneys are unremarkable. Spleen is normal in size. No lymphadenopathy. No gross fluid collection. Trace ascites. Bibasilar atelectasis. No biliary ductal obstruction. No findings to explain elevated LFTs. Xr Abdomen (2 Views)    Result Date: 7/8/2020  EXAMINATION: TWO XRAY VIEWS OF THE ABDOMEN 7/8/2020 8:38 am COMPARISON: CT 07/06/2020 HISTORY: ORDERING SYSTEM PROVIDED HISTORY: SBO TECHNOLOGIST PROVIDED HISTORY: Reason for exam:->SBO FINDINGS: Tip of NG tube projects in region of stomach.  Dilated small bowel loops are seen in the left upper quadrant. There is relatively little gas seen.      Obstructed bowel gas pattern, similar to prior CT         Problem List  Patient Active Problem List   Diagnosis    EMERY (obstructive sleep apnea)    ARF (acute renal failure) (HCC)    SBO (small bowel obstruction) (HCC)    Leukocytosis    Septicemia (HCC)    Acidosis    KIKI (acute kidney injury) ([address]    Gallbladder disease    Hypertension    Type 2 diabetes mellitus with hyperglycemia ([address]    Peritonitis (HCC)    Lactic acidosis    Acute pulmonary insufficiency    Metabolic acidosis    Elevated LFTs       IMPRESSION/RECOMMENDATIONS:  Thrombocytosis in development with a significant abd infection:   I suspect her thrombocytosis is all reactive to the process of her infection etc  Iron studies were ok( but hard to interpret in this setting ) which could be a contributor to high plts if pt iron def    I doubt the primary bone marrow disorder -given the clinical scenerio-I did order a amilcar 2 but I doubt it will be positive   I do not recommend any plt lowering therapy   THi scan be observed and its not a higher risk for thrombosis when its reactive  I recommend observation

## 2020-07-21 NOTE — PROGRESS NOTES
[facility] [phone]                                     Daily Progress Note                                                         Pt Name: Rakesh Womack  Medical Record Number: [id number]  Date of Birth 1970   Today's Date: 7/21/2020  Chief Complaint   Patient presents with    Abdominal Pain     [place] 8 [place] [facility] [place] bowel inflammation  No emesis        ASSESSMENT/PLAN  Diffuse purulent peritonitis, suspicious for ruptured TOA  -7/8 exploratory laparotomy, omentectomy, left salpingectomy with removal of fibroid, repair serosal tear, US guided right IJ vascath. -drain serous. wound clean. WBC slowly trending down. Continue abx per ID.   -Diet as able. -OOB and ambulate, PT/OT   -T max 100.2 7/20 1500. SNF will not take pt until Tmax 99.5 or less for three days and negative covid not more than four days prior to DC.   -Left arm midline, picc unable to be placed, see note from them. Will need access for IV antibiotics. Vas cath has been removed. Not sure how long midline can remain in place for IV antibiotics after hospital DC. RN is discussing with case management. KIKI resolved  -nephro following  -Cr normalized  -vas cath removed. Elier Brenner continues to do well. Tolerating PO, having BMs. OBJECTIVE  VITALS:  height is 4' 11\" (1.499 m) and weight is 251 lb 15.8 oz (114.3 kg). Her temperature is 98.7 °F (37.1 °C). Her blood pressure is 147/83 (abnormal) and her pulse is 66. Her respiration is 28 and oxygen saturation is 98%.  INTAKE/OUTPUT:      Intake/Output Summary (Last 24 hours) at 7/21/2020 1122  Last data filed at 7/21/2020 1049  Gross per 24 hour   Intake 1260 ml   Output 1925 ml   Net -665 ml     GENERAL: alert, no distress  LUNGS: clear to ausculation, without wheezes, rales or rhonci  HEART: normal rate and regular rhythm  ABDOMEN: soft, appropriately tender, incision c/d/i. Drain SS. EXTREMITY: no cyanosis, clubbing or edema    I/O last 3 completed shifts:   In: 1440 [P.O.:840; IV Piggyback:600]  Out: 1925 [Urine:1900; Drains:25]      LABS  Recent Labs     07/20/20  0630 07/21/20  0833   WBC 18.6* 15.4*   HGB 7.2* 7.3*   HCT 23.6* 23.5*   * 941*    138   K 3.4* 3.6    104   CO2 24 24   BUN 6* 6*   CREATININE 0.7 0.6   MG 1.50*  --    PHOS 3.1 3.2   CALCIUM 7.1* 7.2*   INR 1.66* 1.69*   AST 31 46*   ALT 15 22   BILITOT 0.7 0.7   BILIDIR 0.4* 0.3       Electronically signed by FLEX Sevilla CNP-DONIS on 7/21/2020 at 11:22 AM

## 2020-07-21 NOTE — PROGRESS NOTES
Physical Therapy  Daryl Caruso  [id number]  [id number]    Attempted to see for PT session however pt refused; pt was incontinent of bowel and did not feel like she was finished; offered to get pt cleaned up and place a bedpan but she refused.   Will continue to follow  [place], [place], [address]92

## 2020-07-21 NOTE — PROGRESS NOTES
Secure message sent to [facility] NP at [phone]. Notified NP that Picc nurse was unable to place ordered picc line in either arm but was successful to place midline in LUE. This nurse asked NP if RIJ vas cath should still be pulled as ordered since a PICC was not placed, only the midline and if nursing could do blood draws from the midline. [facility] NP responded to wait on pulling the vas cath and blood draws from the midline until it can be addressed with the MD in the morning.

## 2020-07-21 NOTE — PROGRESS NOTES
Occupational Therapy    Attempted to see pt for OT Tx. Pt reporting she is having a BM and needs more time. Will follow up as pt condition permit.     Abbey Benavides, OTR/L abdominal pain/resolved , recent hsp dx

## 2020-07-21 NOTE — PROGRESS NOTES
Comprehensive Nutrition Assessment    Type and Reason for Visit:  Reassess    Nutrition Recommendations/Plan:   Carb Control diet   Will modify supplement to Glucerna TID  Will monitor nutritional adequacy, nutrition-related labs, weights, BMs, and clinical progress     Nutrition Assessment:  Pt now taking solids and tolerating, on diabetic diet with Clear Liquid ONS and Renal ONS. Pt tolerating diet well, having BMS, and intakes have improved. Pt with improvement in renal function as well and no longer needing RRT, will modify ONS. Malnutrition Assessment:  Malnutrition Status: At risk for malnutrition (Comment)    Context:  Acute Illness     Findings of the 6 clinical characteristics of malnutrition:  Energy Intake:  1 - 75% or less of estimated energy requirements for 7 or more days  Weight Loss:  No significant weight loss     Body Fat Loss:  Unable to assess     Muscle Mass Loss:  Unable to assess    Fluid Accumulation:  1 - Mild Extremities   Strength:  Not Performed    Estimated Daily Nutrient Needs:  Energy (kcal):  1988-2525 kcal (11-15 kcal/kg CBW); Weight Used for Energy Requirements:   Current  Protein (g):   gm (2-2.5 gm/kg IBW); Weight Used for Protein Requirements:  Ideal        Fluid (ml/day):  1 ml/kcal; Weight Used for Fluid Requirements:         Nutrition Related Findings:  Glucose elevated; renal labs WNL now      Wounds:  Surgical Wound(umbilicus 7/8)       Current Nutrition Therapies:    Dietary Nutrition Supplements: Clear Liquid Oral Supplement, Renal Oral Supplement  DIET CARB CONTROL;     Anthropometric Measures:  · Height: 4' 11\" (149.9 cm)  · Current Body Weight: 251 lb (113.9 kg)   · Admission Body Weight: 240 lb (108.9 kg)    · Usual Body Weight: 245 lb (111.1 kg)     · Ideal Body Weight: 95 lbs; % Ideal Body Weight 264.2 %   · BMI: 50.7  · Adjusted Body Weight:  ; No Adjustment   · BMI Categories: Obese Class 3 (BMI 40.0 or greater)       Nutrition Diagnosis:

## 2020-07-21 NOTE — PROGRESS NOTES
Unsuccessful PICC attempts bilaterally, so PICC attempt x2, one on R upper extremity and one on L upper extremity and successful left midline placement    Attempt one on right side: upon ultrasound assessment of pt, she had a very deep brachial vein, about 3cm deep. I was able to gain access, however could not get the inroducer in after advancing guidewire. I am guessing the sharp angle of the guidewire was why it would not advance and/or her vein was too tortuous. There were no other viable veins that could be used. She had a cephalic vein, but it was not greater then 5 [place]. Pt's site after attempt was scabbed over, with no inflammation, edema, or signs of infection. Attempt 2: Pt's only viable vessel for a PICC line on her left side was a cephalic that was just larger then 5 [place]. I gained access, advanced guidewire, and introducer was inserted. The PICC line would not advance past 15 or so cm. A 10 cm midline was placed in pt's left cephalic vein which is okay to use. Pt's site looks CDI and blood return noted. Midline flushes without resistance. Overall, pt is not a good PICC candidate with current vasculature.     Roxanna Light RN updated on POC

## 2020-07-21 NOTE — PROGRESS NOTES
This RN was unable to draw blood from [address] [place] [address]. Line flushed with 40 cc normal saline with multiple attempts to reposition the patient as well. No blood flash, the line flushes well. This Nurse called lab to try a peripheral stick.

## 2020-07-21 NOTE — PROGRESS NOTES
Infectious Disease Follow up Notes  Admit Date: 2020  Hospital Day: 16    Antibiotics :   IV Zosyn   IV Fluconazole       CHIEF COMPLAINT:     Purulent peritonitis  Sepsis  WBC elevation  Anemia  KIKI  S/P Exp lap   Subjective interval History :  53 y.o. woman with morbid obesity, Dysfunctional uterine bleeding and previous Endometrial Biopsy and is followed by Gynecology and last office visit  and now admitted with acute abdominal pain and not feeling well CT scan with SBO and concern for infection taken to OR on   s/p Exlap ,ometectomy, Left salpingectocmy and removal of Fibroid and repair of serosal tear with HD line placement. She is now extubated in ICU and  Her WBC continue to rise despite IV abx and tissue cx in process. No h/o STDs no HSV or HPV. H/o IUD from  to  and no recent  instrumentation. Labs abnormal with WBC elevation, lactic acidosis, KIKI and Lft elevation on on going we are consulted for recommendations.     Lying in bed no chills abd full ness better MECHE drain in  Place mid line wound packing+ and Midline in place     Past Medical History:    Past Medical History:   Diagnosis Date    Hypertension     Obesity        Past Surgical History:    Past Surgical History:   Procedure Laterality Date     SECTION      LAPAROTOMY N/A 2020    EXPLORATORY LAPAROTOMY, OMENTECTOMY, LEFT SALPINGECTOMY WITH REMOVAL OF FIBROID performed by Lieutenant Villa MD at [place] [address]       Current Medications:    Outpatient Medications Marked as Taking for the 20 encounter [facility] HOSPITAL Encounter)   Medication Sig Dispense Refill    ferrous sulfate (FE TABS) 325 (65 Fe) MG EC tablet Take 1 tablet by mouth 2 times daily 60 tablet 3    Multiple Vitamins-Minerals (THERAPEUTIC MULTIVITAMIN-MINERALS) tablet Take 1 tablet by mouth daily 30 tablet 11    furosemide (LASIX) 20 MG tablet Take 1 tablet by mouth 2 times daily 60 tablet 3    metFORMIN (GLUCOPHAGE) 500 MG tablet Take 1 tablet by mouth 2 times daily (with meals) 60 tablet 5    amLODIPine-benazepril (LOTREL) 5-20 MG per capsule Take 1 capsule by mouth daily 30 capsule 0    vitamin D (ERGOCALCIFEROL) 1.25 MG (22788 UT) CAPS capsule Take 1 capsule by mouth once a week 4 capsule 5       Allergies:  Patient has no known allergies. Immunizations : There is no immunization history on file for this patient. Social History:    Social History     Tobacco Use    Smoking status: Never Smoker    Smokeless tobacco: Never Used   Substance Use Topics    Alcohol use: No    Drug use: No     Social History     Tobacco Use   Smoking Status Never Smoker   Smokeless Tobacco Never Used      Family History : Other (Other) Other   ovarian cancer- moteher    Other (Other) Other   sister with \"traces of lupus\"    Ovarian cancer Mother    Diabetes Father    Breast cancer Neg Hx      Copied from Care everywhere note 6/29    REVIEW OF SYSTEMS:    No fever / chills / sweats. No weight loss. No visual change, eye pain, eye discharge. No oral lesion, sore throat, dysphagia. Denies cough / sputum/Sob   Denies chest pain, palpitations/ dizziness  Denies nausea/ vomiting/abdominal pain/diarrhea. Denies dysuria or change in urinary function. Denies joint swelling or pain. No myalgia, arthralgia. No rashes, skin lesions   Denies focal weakness, sensory change or other neurologic symptoms  No lymph node swelling or tenderness.     abd pain, bloating, nausea+ cramps     PHYSICAL EXAM:      Vitals:    BP (!) 168/112   Pulse 99   Temp 98.3 °F (36.8 °C) (Oral)   Resp 24   Ht 4' 11\" (1.499 m)   Wt 251 lb 15.8 oz (114.3 kg)   SpO2 92%   BMI 50.89 kg/m²     General Appearance: alert,in some acute distress, +  pallor, + icterus     Skin: warm and dry, no rash or erythema  Head: normocephalic and atraumatic  Eyes: pupils equal, round, and reactive to light, conjunctivae normal  ENT: tympanic membrane, external ear and ear canal normal bilaterally, nose without deformity, nasal mucosa and turbinates normal without polyps  Neck: supple and non-tender without mass, no thyromegaly  no cervical lymphadenopathy  Pulmonary/Chest: bi basal crepts + bilaterally- no wheezes, rales or rhonchi, normal air movement, in respiratory distress  Cardiovascular: normal rate, regular rhythm, normal S1 and S2, no murmurs, rubs, clicks, or gallops, no carotid bruits  Abdomen: soft, -tender, distended,+  bowel sound +  masses or organomegaly midline incision dressing+  MECHE drain +   Extremities: no cyanosis, clubbing or +edema  Musculoskeletal: normal range of motion, no joint swelling, deformity or tenderness  Neurologic: reflexes normal and symmetric, no cranial nerve deficit  Psych:  Orientation, sensorium, mood normal  Lines:  Midline   Jaime+         Lower abd incision staple line opened packing+          Data Review:    Lab Results   Component Value Date    WBC 15.4 (H) 07/21/2020    HGB 7.3 (L) 07/21/2020    HCT 23.5 (L) 07/21/2020    MCV 69.5 (L) 07/21/2020     (H) 07/21/2020     Lab Results   Component Value Date    CREATININE 0.6 07/21/2020    BUN 6 (L) 07/21/2020     07/21/2020    K 3.6 07/21/2020     07/21/2020    CO2 24 07/21/2020       Hepatic Function Panel:   Lab Results   Component Value Date    ALKPHOS 66 07/21/2020    ALT 22 07/21/2020    AST 46 07/21/2020    PROT 6.8 07/21/2020    BILITOT 0.7 07/21/2020    BILIDIR 0.3 07/21/2020    IBILI 0.4 07/21/2020    LABALBU 2.0 07/21/2020       UA:  Lab Results   Component Value Date    COLORU DK YELLOW 07/07/2020    CLARITYU CLOUDY 07/07/2020    GLUCOSEU 100 07/07/2020    BILIRUBINUR SMALL 07/07/2020    KETUA Negative 07/07/2020    SPECGRAV 1.015 07/07/2020    BLOODU LARGE 07/07/2020    PHUR 6.0 07/07/2020    PROTEINU 100 07/07/2020    UROBILINOGEN 1.0 07/07/2020    NITRU Negative 07/07/2020    LEUKOCYTESUR SMALL 07/07/2020 LABMICR YES 07/07/2020    URINETYPE NotGiven 07/07/2020      Urine Microscopic:   Lab Results   Component Value Date    BACTERIA 1+ 07/07/2020    COMU see below 07/07/2020    HYALCAST 1 07/07/2020    WBCUA 26 07/07/2020    RBCUA  07/07/2020    EPIU 2 07/07/2020     Creat  4.5  Down to 0.6   Lactic acid  5.8  Down to  1.9      WBC  36.5  DOwn to  18         MICRO: cultures reviewed and updated by me          Culture, Anaerobic and Aerobic [[id number]]  (Abnormal)  Collected: 07/17/20 1500    Order Status: Completed  Specimen: Wound  Updated: 07/19/20 0922     Gram Stain Result  No Epithelial Cells seen   3+ WBC's (Polymorphonuclear)   No organisms seen     Anaerobic Culture  Anaerobic culture further report to follow     Organism  Candida albicansAbnormal       WOUND/ABSCESS  --     Rare growth   No further workup    Narrative:      ORDER#: [id number]                          ORDERED BY: [facility]   SOURCE: Wound Peritoneal Abscess           COLLECTED:  07/17/20 15:00   [facility] :                      RECEIVED :  07/17/20 15:13   Performed at:               Culture, Tissue [[id number]] Collected: 07/08/20 1644   Order Status: Completed Specimen: Tissue Updated: 07/10/20 0904    Gram Stain Result No Epithelial Cells seen   No WBCs or organisms seen     WOUND/ABSCESS No growth to date    Anaerobic Culture --    Anaerobic culture further report to follow   No anaerobes isolated so far, Further report to follow    Narrative:     ORDER#: [id number]                          ORDERED BY: [facility]  SOURCE: Tissue Omentum                     COLLECTED:  07/08/20 16:44  [facility] :                      RECEIVED :  07/08/20 17:13  Performed at:  [facility]  [address] [facility] [facility] [address]   Phone [phone]   C.trachomatis Yunier Betts, Urine [[id number]] Collected: 07/08/20 1835   Order Status: Sent Specimen: Urine Updated: 07/09/20 0110 HSV PCR [[id number]] Collected: 07/08/20 0000   Order Status: Sent Specimen: Blood Updated: 07/08/20 1426   HSV PCR [[id number]]    Order Status: Canceled Specimen: Blood    HSV PCR [[id number]]    Order Status: Canceled Specimen: Blood    HSV PCR [[id number]]    Order Status: Canceled Specimen: Blood    Culture, Blood 1 [[id number]] Collected: 07/06/20 1536   Order Status: Completed Specimen: Blood Updated: 07/07/20 1715    Blood Culture, Routine No Growth to date.  Any change in status will be called. Narrative:     ORDER#: [id number]                          ORDERED BY: DANIAL LYNN  SOURCE: Blood                              COLLECTED:  07/06/20 15:36  [facility] :                      RECEIVED :  07/06/20 15:42  If child <=2 yrs old please draw pediatric bottle. ~Blood Culture #1  Performed at:  [address] [place] [address]   Phone [phone]   Culture, Blood 2 [[id number]] Collected: 07/06/20 1602   Order Status: Completed Specimen: Blood Updated: 07/07/20 1715    Culture, Blood 2 No Growth to date.  Any change in status will be called. Narrative:     ORDER#: [id number]                          ORDERED BY: DANIAL LYNN  SOURCE: Blood                              COLLECTED:  07/06/20 16:02  [facility] :                      RECEIVED :  07/06/20 16:09  If child <=2 yrs old please draw pediatric bottle. ~Blood Culture #2  Performed at:  [place] [address] [address] [address]   Phone [phone]            Culture, Tissue [[id number]]  (Abnormal)  Collected: 07/08/20 1644    Order Status: Completed  Specimen: Tissue  Updated: 07/11/20 1643     Gram Stain Result  No Epithelial Cells seen   No WBCs or organisms seen     Organism  Streptococcus anginosusAbnormal       WOUND/ABSCESS  --     Rare growth   No further workup     Organism  Candida albicansAbnormal WOUND/ABSCESS  --     Rare growth   No further workup     Organism  Fusobacterium necrophorum ssp necrophorumAbnormal       Anaerobic Culture  --     Light growth   No further workup     Organism  Peptostreptococcus anaerobiusAbnormal       Anaerobic Culture  --     Light growth   No further workup    Narrative:      ORDER#: [id number]                          ORDERED BY: [facility]   SOURCE: Tissue Omentum                     COLLECTED:  07/08/20 16:44   [facility] :                      RECEIVED :  07/08/20 17:13   Performed at:   [facility]   [address] [place] [address]   Phone [phone]        IMAGING:    CT ABDOMEN PELVIS WO CONTRAST Additional Contrast? Oral   Final Result   Mild dilation of small bowel in the mid upper abdomen. Given that the oral   contrast extends to the level of the ascending colon presumably that   represents ileus rather than obstruction. There is evidence of small bowel mural thickening, suggesting enteritis. Consider ischemic, inflammatory, and infectious etiologies. Diffuse mesenteric edema with interloop fluid. Small volume ascites. Diffuse anasarca. Interval partial dehiscence of the midline ventral incision. XR ABDOMEN (KUB) (SINGLE AP VIEW)   Final Result   1. Improved with residual dilated loops of small bowel either due to an ileus   or partial small bowel obstruction. XR ABDOMEN (KUB) (SINGLE AP VIEW)   Final Result   Increased air-filled small bowel loops concerning for developing distal small   truck shin although this may be due to ileus. CT ABDOMEN PELVIS WO CONTRAST Additional Contrast? None   Final Result   Evaluation is limited by lack of IV and oral contrast.      Diffuse soft tissue edema, new. Small pleural effusions and patchy   consolidation in the lung bases, atelectasis versus infection. Postsurgical changes which are new.   Surgical drain terminates in the pelvis. There is small to moderate volume ill-defined fluid along the anterior   abdomen and pelvis. A few tiny foci of gas noted anteriorly which likely are   related to recent surgery. No large amount of free air. XR CHEST PORTABLE   Final Result   Status post placement of right internal jugular central venous catheter,   without gross pneumothorax. Endotracheal tube tip is approximately 1.3 cm proximal to the kelly and   could be retracted approximately 1-2 cm. Low volume study with basilar atelectasis. Findings were called by the radiology call center. XR ABDOMEN (2 VIEWS)   Final Result   Obstructed bowel gas pattern, similar to prior CT         MRI ABDOMEN WO CONTRAST MRCP   Final Result   No biliary ductal obstruction. No findings to explain elevated LFTs. CT ABDOMEN PELVIS WO CONTRAST Additional Contrast? None   Final Result   Mid to distal small bowel obstruction         US GALLBLADDER RUQ   Final Result   Questionable small amount of sludge within the gallbladder. Otherwise   unremarkable right upper quadrant ultrasound.                All the pertinent images and reports for the current Hospitalization were reviewed by me     Scheduled Meds:   furosemide  20 mg Intravenous BID    lisinopril  20 mg Oral BID    fluconazole  400 mg Intravenous Q24H    lidocaine 1 % injection  5 mL Intradermal Once    therapeutic multivitamin-minerals  1 tablet Oral Daily    pantoprazole  40 mg Oral QAM AC    amLODIPine  10 mg Oral Daily    vitamin D  50,000 Units Oral Weekly    magnesium oxide  400 mg Oral BID    calcium carbonate  500 mg Oral BID    potassium bicarb-citric acid  40 mEq Oral Daily with breakfast    iron sucrose  100 mg Intravenous Daily    piperacillin-tazobactam  3.375 g Intravenous Q8H    cloNIDine  0.2 mg Oral TID    metroNIDAZOLE  500 mg Intravenous Q8H    sodium chloride (PF)  10 mL Intravenous Daily    sodium chloride flush  10 mL elevation  S/p Bed side lower incision opened and purulence drained out and packing and this may explain elevated wbc    WBC trend down after the incision was opened and purulence drained out and wound cx Candida noted    PICC line could not be placed and Midline placed and will up date [address], Microbiology, Radiology and all the pertinent results from current hospitalization and  care every where were reviewed  by me as a part of the evaluation   Plan: 1. Cont V Fluconazole x 400 mg daily dose increased as creat is better  2. Cont  IV Zosyn x 3.375 gm  x Q 8 hrs will cover GI and  seth well will increase the dose once creat is better   3. D/c IV  Flagyl x 500 mg Q 8   4. CT abd/pelvis with oral contrast to be repeated on 7/17  NOTED   5. Will up date IRMA    6  HIV -ve and hepatitis screen -ve    7. ESR, CRP Now down trend   8. INCISIONAL abscess cx Candida noted           Discussed with patient/Family and Nursing   Discussed with patient/Family and Nursing staff     Thanks for allowing me to participate in your patient's care and please call me with any questions or concerns.     Tremaine Elder MD  Infectious Disease  [facility]) Physician  Phone: [phone]   Fax : [phone]

## 2020-07-21 NOTE — PLAN OF CARE
Pain/discomfort being managed with PRN analgesics per MD orders. Pt able to express presence and absence of pain and rate pain appropriately using numerical scale. Fall risk assessment completed every shift. All precautions in place. Pt has call light within reach at all times. Room clear of clutter. Pt aware to call for assistance when getting up. Skin assessment completed every shift. Pt assessed for incontinence, appropriate barrier cream applied prn. Pt encouraged to turn/rotate every 2 hours. Assistance provided if pt unable to do so themselves.

## 2020-07-22 LAB
A/G RATIO: 0.4 (ref 1.1–2.2)
ALBUMIN SERPL-MCNC: 2 G/DL (ref 3.4–5)
ALP BLD-CCNC: 69 U/L (ref 40–129)
ALT SERPL-CCNC: 28 U/L (ref 10–40)
ANION GAP SERPL CALCULATED.3IONS-SCNC: 15 MMOL/L (ref 3–16)
AST SERPL-CCNC: 47 U/L (ref 15–37)
BASOPHILS ABSOLUTE: 0.1 K/UL (ref 0–0.2)
BASOPHILS RELATIVE PERCENT: 0.6 %
BILIRUB SERPL-MCNC: 0.6 MG/DL (ref 0–1)
BILIRUBIN DIRECT: 0.3 MG/DL (ref 0–0.3)
BILIRUBIN, INDIRECT: 0.3 MG/DL (ref 0–1)
BUN BLDV-MCNC: 6 MG/DL (ref 7–20)
CALCIUM SERPL-MCNC: 7.5 MG/DL (ref 8.3–10.6)
CHLORIDE BLD-SCNC: 100 MMOL/L (ref 99–110)
CO2: 22 MMOL/L (ref 21–32)
CREAT SERPL-MCNC: 0.7 MG/DL (ref 0.6–1.1)
EOSINOPHILS ABSOLUTE: 0.1 K/UL (ref 0–0.6)
EOSINOPHILS RELATIVE PERCENT: 0.4 %
GFR AFRICAN AMERICAN: >60
GFR NON-AFRICAN AMERICAN: >60
GLOBULIN: 5.3 G/DL
GLUCOSE BLD-MCNC: 115 MG/DL (ref 70–99)
GLUCOSE BLD-MCNC: 119 MG/DL (ref 70–99)
GLUCOSE BLD-MCNC: 121 MG/DL (ref 70–99)
GLUCOSE BLD-MCNC: 133 MG/DL (ref 70–99)
GLUCOSE BLD-MCNC: 174 MG/DL (ref 70–99)
HCT VFR BLD CALC: 23.5 % (ref 36–48)
HEMATOLOGY PATH CONSULT: NO
HEMOGLOBIN: 7.3 G/DL (ref 12–16)
LYMPHOCYTES ABSOLUTE: 1 K/UL (ref 1–5.1)
LYMPHOCYTES RELATIVE PERCENT: 6.2 %
MAGNESIUM: 1.4 MG/DL (ref 1.8–2.4)
MCH RBC QN AUTO: 21.5 PG (ref 26–34)
MCHC RBC AUTO-ENTMCNC: 31 G/DL (ref 31–36)
MCV RBC AUTO: 69.5 FL (ref 80–100)
MONOCYTES ABSOLUTE: 1.1 K/UL (ref 0–1.3)
MONOCYTES RELATIVE PERCENT: 7 %
NEUTROPHILS ABSOLUTE: 13.2 K/UL (ref 1.7–7.7)
NEUTROPHILS RELATIVE PERCENT: 85.8 %
PDW BLD-RTO: 30.6 % (ref 12.4–15.4)
PERFORMED ON: ABNORMAL
PHOSPHORUS: 3.3 MG/DL (ref 2.5–4.9)
PLATELET # BLD: 941 K/UL (ref 135–450)
PMV BLD AUTO: 6.8 FL (ref 5–10.5)
POTASSIUM REFLEX MAGNESIUM: 3.3 MMOL/L (ref 3.5–5.1)
RBC # BLD: 3.38 M/UL (ref 4–5.2)
SODIUM BLD-SCNC: 137 MMOL/L (ref 136–145)
TOTAL PROTEIN: 7.3 G/DL (ref 6.4–8.2)
WBC # BLD: 15.4 K/UL (ref 4–11)

## 2020-07-22 PROCEDURE — 6370000000 HC RX 637 (ALT 250 FOR IP): Performed by: INTERNAL MEDICINE

## 2020-07-22 PROCEDURE — 83735 ASSAY OF MAGNESIUM: CPT

## 2020-07-22 PROCEDURE — 2580000003 HC RX 258: Performed by: SURGERY

## 2020-07-22 PROCEDURE — 2580000003 HC RX 258: Performed by: INTERNAL MEDICINE

## 2020-07-22 PROCEDURE — 80053 COMPREHEN METABOLIC PANEL: CPT

## 2020-07-22 PROCEDURE — 99233 SBSQ HOSP IP/OBS HIGH 50: CPT | Performed by: INTERNAL MEDICINE

## 2020-07-22 PROCEDURE — 2060000000 HC ICU INTERMEDIATE R&B

## 2020-07-22 PROCEDURE — 36415 COLL VENOUS BLD VENIPUNCTURE: CPT

## 2020-07-22 PROCEDURE — 6370000000 HC RX 637 (ALT 250 FOR IP): Performed by: SURGERY

## 2020-07-22 PROCEDURE — 6360000002 HC RX W HCPCS: Performed by: INTERNAL MEDICINE

## 2020-07-22 PROCEDURE — 6360000002 HC RX W HCPCS: Performed by: SURGERY

## 2020-07-22 PROCEDURE — 94760 N-INVAS EAR/PLS OXIMETRY 1: CPT

## 2020-07-22 PROCEDURE — 99232 SBSQ HOSP IP/OBS MODERATE 35: CPT | Performed by: INTERNAL MEDICINE

## 2020-07-22 PROCEDURE — 84100 ASSAY OF PHOSPHORUS: CPT

## 2020-07-22 RX ORDER — METOPROLOL TARTRATE 50 MG/1
50 TABLET, FILM COATED ORAL 2 TIMES DAILY
Status: DISCONTINUED | OUTPATIENT
Start: 2020-07-22 | End: 2020-07-24 | Stop reason: HOSPADM

## 2020-07-22 RX ORDER — MAGNESIUM SULFATE IN WATER 40 MG/ML
2 INJECTION, SOLUTION INTRAVENOUS ONCE
Status: DISCONTINUED | OUTPATIENT
Start: 2020-07-22 | End: 2020-07-23

## 2020-07-22 RX ADMIN — PIPERACILLIN AND TAZOBACTAM 3.38 G: 3; .375 INJECTION, POWDER, LYOPHILIZED, FOR SOLUTION INTRAVENOUS at 17:07

## 2020-07-22 RX ADMIN — LISINOPRIL 20 MG: 20 TABLET ORAL at 21:21

## 2020-07-22 RX ADMIN — FLUCONAZOLE 400 MG: 400 INJECTION, SOLUTION INTRAVENOUS at 18:17

## 2020-07-22 RX ADMIN — PANTOPRAZOLE SODIUM 40 MG: 40 TABLET, DELAYED RELEASE ORAL at 06:02

## 2020-07-22 RX ADMIN — Medication 10 ML: at 10:10

## 2020-07-22 RX ADMIN — FUROSEMIDE 20 MG: 10 INJECTION, SOLUTION INTRAMUSCULAR; INTRAVENOUS at 18:17

## 2020-07-22 RX ADMIN — PIPERACILLIN AND TAZOBACTAM 3.38 G: 3; .375 INJECTION, POWDER, LYOPHILIZED, FOR SOLUTION INTRAVENOUS at 00:09

## 2020-07-22 RX ADMIN — CLONIDINE HYDROCHLORIDE 0.2 MG: 0.1 TABLET ORAL at 15:01

## 2020-07-22 RX ADMIN — MULTIPLE VITAMINS W/ MINERALS TAB 1 TABLET: TAB at 10:09

## 2020-07-22 RX ADMIN — INSULIN LISPRO 1 UNITS: 100 INJECTION, SOLUTION INTRAVENOUS; SUBCUTANEOUS at 13:08

## 2020-07-22 RX ADMIN — METOPROLOL TARTRATE 50 MG: 50 TABLET, FILM COATED ORAL at 21:21

## 2020-07-22 RX ADMIN — PIPERACILLIN AND TAZOBACTAM 3.38 G: 3; .375 INJECTION, POWDER, LYOPHILIZED, FOR SOLUTION INTRAVENOUS at 09:47

## 2020-07-22 RX ADMIN — Medication 500 MG: at 21:21

## 2020-07-22 RX ADMIN — Medication 400 MG: at 21:21

## 2020-07-22 RX ADMIN — POTASSIUM CHLORIDE 10 MEQ: 7.46 INJECTION, SOLUTION INTRAVENOUS at 10:03

## 2020-07-22 RX ADMIN — Medication 400 MG: at 10:09

## 2020-07-22 RX ADMIN — IRON SUCROSE 100 MG: 20 INJECTION, SOLUTION INTRAVENOUS at 09:50

## 2020-07-22 RX ADMIN — POTASSIUM CHLORIDE 10 MEQ: 7.46 INJECTION, SOLUTION INTRAVENOUS at 11:13

## 2020-07-22 RX ADMIN — LISINOPRIL 20 MG: 20 TABLET ORAL at 10:09

## 2020-07-22 RX ADMIN — MAGNESIUM SULFATE HEPTAHYDRATE 1 G: 1 INJECTION, SOLUTION INTRAVENOUS at 11:10

## 2020-07-22 RX ADMIN — POTASSIUM CHLORIDE 10 MEQ: 7.46 INJECTION, SOLUTION INTRAVENOUS at 13:08

## 2020-07-22 RX ADMIN — SODIUM CHLORIDE, PRESERVATIVE FREE 10 ML: 5 INJECTION INTRAVENOUS at 21:22

## 2020-07-22 RX ADMIN — Medication 500 MG: at 10:13

## 2020-07-22 RX ADMIN — FUROSEMIDE 20 MG: 10 INJECTION, SOLUTION INTRAMUSCULAR; INTRAVENOUS at 09:50

## 2020-07-22 RX ADMIN — POTASSIUM CHLORIDE 10 MEQ: 7.46 INJECTION, SOLUTION INTRAVENOUS at 15:01

## 2020-07-22 RX ADMIN — SODIUM CHLORIDE, PRESERVATIVE FREE 10 ML: 5 INJECTION INTRAVENOUS at 10:10

## 2020-07-22 RX ADMIN — HEPARIN SODIUM 5000 UNITS: 5000 INJECTION INTRAVENOUS; SUBCUTANEOUS at 21:16

## 2020-07-22 RX ADMIN — METOPROLOL TARTRATE 50 MG: 50 TABLET, FILM COATED ORAL at 15:01

## 2020-07-22 RX ADMIN — MAGNESIUM SULFATE HEPTAHYDRATE 1 G: 1 INJECTION, SOLUTION INTRAVENOUS at 09:58

## 2020-07-22 RX ADMIN — CLONIDINE HYDROCHLORIDE 0.2 MG: 0.1 TABLET ORAL at 10:09

## 2020-07-22 RX ADMIN — AMLODIPINE BESYLATE 10 MG: 10 TABLET ORAL at 10:09

## 2020-07-22 RX ADMIN — HEPARIN SODIUM 5000 UNITS: 5000 INJECTION INTRAVENOUS; SUBCUTANEOUS at 10:13

## 2020-07-22 RX ADMIN — CLONIDINE HYDROCHLORIDE 0.2 MG: 0.1 TABLET ORAL at 21:20

## 2020-07-22 ASSESSMENT — PAIN SCALES - GENERAL
PAINLEVEL_OUTOF10: 0
PAINLEVEL_OUTOF10: 0

## 2020-07-22 NOTE — PLAN OF CARE
Problem: Pain:  Goal: Pain level will decrease  Description: Pain level will decrease  7/22/2020 0311 by Juan Neville RN  Outcome: Ongoing     Problem: Pain:  Goal: Control of acute pain  Description: Control of acute pain  7/22/2020 0311 by Juan Neville RN  Outcome: Ongoing     Problem: Pain:  Goal: Control of chronic pain  Description: Control of chronic pain  7/22/2020 0311 by Juan Neville RN  Outcome: Ongoing     Problem: Falls - Risk of:  Goal: Will remain free from falls  Description: Will remain free from falls  7/22/2020 0311 by Juan Neville RN  Outcome: Ongoing     Problem: Falls - Risk of:  Goal: Absence of physical injury  Description: Absence of physical injury  7/22/2020 0311 by Juan Neville RN  Outcome: Ongoing     Problem: Skin Integrity:  Goal: Absence of new skin breakdown  Description: Absence of new skin breakdown  7/22/2020 0311 by Juan Neville RN  Outcome: Ongoing     Problem: Skin Integrity:  Goal: Will show no infection signs and symptoms  Description: Will show no infection signs and symptoms  7/22/2020 0311 by Juan Neville RN  Outcome: Ongoing

## 2020-07-22 NOTE — PROGRESS NOTES
Infectious Disease Follow up Notes  Admit Date: 2020  Hospital Day: 17    Antibiotics :   IV Zosyn   IV Fluconazole       CHIEF COMPLAINT:     Purulent peritonitis  Sepsis  WBC elevation  Anemia  KIKI  S/P Exp lap   Subjective interval History :  53 y.o. woman with morbid obesity, Dysfunctional uterine bleeding and previous Endometrial Biopsy and is followed by Gynecology and last office visit  and now admitted with acute abdominal pain and not feeling well CT scan with SBO and concern for infection taken to OR on   s/p Exlap ,ometectomy, Left salpingectocmy and removal of Fibroid and repair of serosal tear with HD line placement. She is now extubated in ICU and  Her WBC continue to rise despite IV abx and tissue cx in process. No h/o STDs no HSV or HPV. H/o IUD from  to  and no recent  instrumentation. Labs abnormal with WBC elevation, lactic acidosis, KIKI and Lft elevation on on going we are consulted for recommendations.     No fevers and appetite improving but not doing therapy much and mid line wound packing+ Daughter at bed side had several Questions on D/C planning was able to d/w pt and family about IV abx and labs     Past Medical History:    Past Medical History:   Diagnosis Date    Hypertension     Obesity        Past Surgical History:    Past Surgical History:   Procedure Laterality Date     SECTION      LAPAROTOMY N/A 2020    EXPLORATORY LAPAROTOMY, OMENTECTOMY, LEFT SALPINGECTOMY WITH REMOVAL OF FIBROID performed by Irby Bloch, MD at [place] [address]       Current Medications:    Outpatient Medications Marked as Taking for the 20 encounter [facility] Encounter)   Medication Sig Dispense Refill    ferrous sulfate (FE TABS) 325 (65 Fe) MG EC tablet Take 1 tablet by mouth 2 times daily 60 tablet 3    Multiple Vitamins-Minerals (THERAPEUTIC MULTIVITAMIN-MINERALS) tablet Take 1 tablet by mouth daily 30 tablet 11    furosemide (LASIX) 20 MG tablet Take 1 tablet by mouth 2 times daily 60 tablet 3    metFORMIN (GLUCOPHAGE) 500 MG tablet Take 1 tablet by mouth 2 times daily (with meals) 60 tablet 5    amLODIPine-benazepril (LOTREL) 5-20 MG per capsule Take 1 capsule by mouth daily 30 capsule 0    vitamin D (ERGOCALCIFEROL) 1.25 MG (52693 UT) CAPS capsule Take 1 capsule by mouth once a week 4 capsule 5       Allergies:  Patient has no known allergies. Immunizations : There is no immunization history on file for this patient. Social History:    Social History     Tobacco Use    Smoking status: Never Smoker    Smokeless tobacco: Never Used   Substance Use Topics    Alcohol use: No    Drug use: No     Social History     Tobacco Use   Smoking Status Never Smoker   Smokeless Tobacco Never Used      Family History : Other (Other) Other   ovarian cancer- moteher    Other (Other) Other   sister with \"traces of lupus\"    Ovarian cancer Mother    Diabetes Father    Breast cancer Neg Hx      Copied from Care everywhere note 6/29    REVIEW OF SYSTEMS:    No fever / chills / sweats. No weight loss. No visual change, eye pain, eye discharge. No oral lesion, sore throat, dysphagia. Denies cough / sputum/Sob   Denies chest pain, palpitations/ dizziness  Denies nausea/ vomiting/abdominal pain/diarrhea. Denies dysuria or change in urinary function. Denies joint swelling or pain. No myalgia, arthralgia. No rashes, skin lesions   Denies focal weakness, sensory change or other neurologic symptoms  No lymph node swelling or tenderness.     abd pain, bloating, nausea+ cramps     PHYSICAL EXAM:      Vitals:    BP (!) 168/99   Pulse 108   Temp 98.7 °F (37.1 °C) (Oral)   Resp 20   Ht 4' 11\" (1.499 m)   Wt 243 lb 2.7 oz (110.3 kg)   SpO2 92%   BMI 49.11 kg/m²     General Appearance: alert,in some acute distress, +  pallor, + icterus     Skin: warm and dry, no rash or erythema  Head: normocephalic and atraumatic  Eyes: pupils equal, round, and reactive to light, conjunctivae normal  ENT: tympanic membrane, external ear and ear canal normal bilaterally, nose without deformity, nasal mucosa and turbinates normal without polyps  Neck: supple and non-tender without mass, no thyromegaly  no cervical lymphadenopathy  Pulmonary/Chest: bi basal crepts + bilaterally- no wheezes, rales or rhonchi, normal air movement, in respiratory distress  Cardiovascular: normal rate, regular rhythm, normal S1 and S2, no murmurs, rubs, clicks, or gallops, no carotid bruits  Abdomen: soft, -tender, distended,+  bowel sound +  masses or organomegaly midline incision dressing+  MECHE drain +   Extremities: no cyanosis, clubbing or +edema  Musculoskeletal: normal range of motion, no joint swelling, deformity or tenderness  Neurologic: reflexes normal and symmetric, no cranial nerve deficit  Psych:  Orientation, sensorium, mood normal  Lines:  Midline   Jaime+         Lower abd incision staple line opened packing+          Data Review:    Lab Results   Component Value Date    WBC 15.4 (H) 07/21/2020    HGB 7.3 (L) 07/21/2020    HCT 23.5 (L) 07/21/2020    MCV 69.5 (L) 07/21/2020     (H) 07/21/2020     Lab Results   Component Value Date    CREATININE 0.7 07/22/2020    BUN 6 (L) 07/22/2020     07/22/2020    K 3.3 (L) 07/22/2020     07/22/2020    CO2 22 07/22/2020       Hepatic Function Panel:   Lab Results   Component Value Date    ALKPHOS 69 07/22/2020    ALT 28 07/22/2020    AST 47 07/22/2020    PROT 7.3 07/22/2020    BILITOT 0.6 07/22/2020    BILIDIR 0.3 07/22/2020    IBILI 0.3 07/22/2020    LABALBU 2.0 07/22/2020       UA:  Lab Results   Component Value Date    COLORU DK YELLOW 07/07/2020    CLARITYU CLOUDY 07/07/2020    GLUCOSEU 100 07/07/2020    BILIRUBINUR SMALL 07/07/2020    KETUA Negative 07/07/2020    SPECGRAV 1.015 07/07/2020    BLOODU LARGE 07/07/2020    PHUR 6.0 07/07/2020    PROTEINU 100 Urine [[id number]] Collected: 07/08/20 1835   Order Status: Sent Specimen: Urine Updated: 07/09/20 0110   HSV PCR [[id number]] Collected: 07/08/20 0000   Order Status: Sent Specimen: Blood Updated: 07/08/20 1426   HSV PCR [[id number]]    Order Status: Canceled Specimen: Blood    HSV PCR [[id number]]    Order Status: Canceled Specimen: Blood    HSV PCR [[id number]]    Order Status: Canceled Specimen: Blood    Culture, Blood 1 [[id number]] Collected: 07/06/20 1536   Order Status: Completed Specimen: Blood Updated: 07/07/20 1715    Blood Culture, Routine No Growth to date.  Any change in status will be called. Narrative:     ORDER#: [id number]                          ORDERED BY: DANIAL LYNN  SOURCE: Blood                              COLLECTED:  07/06/20 15:36  [facility] :                      RECEIVED :  07/06/20 15:42  If child <=2 yrs old please draw pediatric bottle. ~Blood Culture #1  Performed at:  [facility]  [address], [place] [address]   Phone [phone]   Culture, Blood 2 [[id number]] Collected: 07/06/20 1602   Order Status: Completed Specimen: Blood Updated: 07/07/20 1715    Culture, Blood 2 No Growth to date.  Any change in status will be called. Narrative:     ORDER#: [id number]                          ORDERED BY: DANIAL LYNN  SOURCE: Blood                              COLLECTED:  07/06/20 16:02  ANT[facility]OTICS [facility] :                      RECEIVED :  07/06/20 16:09  If child <=2 yrs old please draw pediatric bottle. ~Blood Culture #2  Performed at:  [facility]  [address], [place] [address]   Phone [phone]            Culture, Tissue [[id number]]  (Abnormal)  Collected: 07/08/20 1644    Order Status: Completed  Specimen: Tissue  Updated: 07/11/20 1643     Gram Stain Result  No Epithelial Cells seen   No WBCs or organisms seen     Organism  Streptococcus anginosusAmadonnaormal WOUND/ABSCESS  --     Rare growth   No further workup     Organism  Candida albicansAbnormal       WOUND/ABSCESS  --     Rare growth   No further workup     Organism  Fusobacterium necrophorum ssp necrophorumAbnormal       Anaerobic Culture  --     Light growth   No further workup     Organism  Peptostreptococcus anaerobiusAbnormal       Anaerobic Culture  --     Light growth   No further workup    Narrative:      ORDER#: [id number]                          ORDERED BY: [facility] MARY   SOURCE: Tissue Omentum                     COLLECTED:  07/08/20 16:44   [facility]ICS [facility] :                      RECEIVED :  07/08/20 17:13   Performed at:   [facility]   [address] [place] [facility] [address]   Phone [phone]        IMAGING:    CT ABDOMEN PELVIS WO CONTRAST Additional Contrast? Oral   Final Result   Mild dilation of small bowel in the mid upper abdomen. Given that the oral   contrast extends to the level of the ascending colon presumably that   represents ileus rather than obstruction. There is evidence of small bowel mural thickening, suggesting enteritis. Consider ischemic, inflammatory, and infectious etiologies. Diffuse mesenteric edema with interloop fluid. Small volume ascites. Diffuse anasarca. Interval partial dehiscence of the midline ventral incision. XR ABDOMEN (KUB) (SINGLE AP VIEW)   Final Result   1. Improved with residual dilated loops of small bowel either due to an ileus   or partial small bowel obstruction. XR ABDOMEN (KUB) (SINGLE AP VIEW)   Final Result   Increased air-filled small bowel loops concerning for developing distal small   truck shin although this may be due to ileus. CT ABDOMEN PELVIS WO CONTRAST Additional Contrast? None   Final Result   Evaluation is limited by lack of IV and oral contrast.      Diffuse soft tissue edema, new.   Small pleural effusions and patchy   consolidation in the lung bases, atelectasis versus infection. Postsurgical changes which are new. Surgical drain terminates in the pelvis. There is small to moderate volume ill-defined fluid along the anterior   abdomen and pelvis. A few tiny foci of gas noted anteriorly which likely are   related to recent surgery. No large amount of free air. XR CHEST PORTABLE   Final Result   Status post placement of right internal jugular central venous catheter,   without gross pneumothorax. Endotracheal tube tip is approximately 1.3 cm proximal to the kelly and   could be retracted approximately 1-2 cm. Low volume study with basilar atelectasis. Findings were called by the radiology call center. XR ABDOMEN (2 VIEWS)   Final Result   Obstructed bowel gas pattern, similar to prior CT         MRI ABDOMEN WO CONTRAST MRCP   Final Result   No biliary ductal obstruction. No findings to explain elevated LFTs. CT ABDOMEN PELVIS WO CONTRAST Additional Contrast? None   Final Result   Mid to distal small bowel obstruction         US GALLBLADDER RUQ   Final Result   Questionable small amount of sludge within the gallbladder. Otherwise   unremarkable right upper quadrant ultrasound.                All the pertinent images and reports for the current Hospitalization were reviewed by me     Scheduled Meds:   magnesium sulfate  2 g Intravenous Once    furosemide  20 mg Intravenous BID    lisinopril  20 mg Oral BID    fluconazole  400 mg Intravenous Q24H    lidocaine 1 % injection  5 mL Intradermal Once    therapeutic multivitamin-minerals  1 tablet Oral Daily    pantoprazole  40 mg Oral QAM AC    amLODIPine  10 mg Oral Daily    vitamin D  50,000 Units Oral Weekly    magnesium oxide  400 mg Oral BID    calcium carbonate  500 mg Oral BID    potassium bicarb-citric acid  40 mEq Oral Daily with breakfast    iron sucrose  100 mg Intravenous Daily    piperacillin-tazobactam  3.375 g Intravenous Q8H    cloNIDine 0.2 mg Oral TID    sodium chloride (PF)  10 mL Intravenous Daily    sodium chloride flush  10 mL Intravenous 2 times per day    heparin (porcine)  5,000 Units Subcutaneous BID    insulin lispro  0-6 Units Subcutaneous TID WC    insulin lispro  0-3 Units Subcutaneous Nightly       Continuous Infusions:   dextrose         PRN Meds:  sodium chloride flush, iopamidol, potassium chloride, magnesium sulfate, acetaminophen **OR** acetaminophen, glucose, dextrose, glucagon (rDNA), dextrose, promethazine **OR** ondansetron, iopamidol      Assessment:     Patient Active Problem List   Diagnosis    EMERY (obstructive sleep apnea)    ARF (acute renal failure) (HCC)    SBO (small bowel obstruction) (HCC)    Leukocytosis    Septicemia (HCC)    Acidosis    KIKI (acute kidney injury) ([address]    Gallbladder disease    Hypertension    Type 2 diabetes mellitus with hyperglycemia (HCC)    Peritonitis (HCC)    Lactic acidosis    Acute pulmonary insufficiency    Metabolic acidosis    Elevated LFTs     Severe sepsis   Lactic acidosis  WBC elevation   KIKI   Purulent peritonitis per OR description  Admitted with abd pain and SBO  S/p Exp lap , omentectomy, Left salpingectomy and removal of Fibroid and repair of serosal tear on 7/8   Lft elevation  Anemia  Thrombocytosis     She was  very ill in ICU  noted purulent peritonitis and per OP notes no bowel leak but Left Tuboovarian abscess suspected based on the presentation    OR cx now strep anginosus, candida and Fusobacterium  necrophorum and Peptostreptococcus     Recent Gynecology eval was normal and previous Chlamydia and GC screen including HPV negative        CT abd/pelvis check follow up no leak no abscess    WBC slow trend down bu GI function slowly improving     X ray with ileus noted and clinically improving slowly and WBC trend down a bit but still elevation    S/p Bed side lower incision opened and purulence drained out and packing and this may explain elevated wbc now better    WBC trend down after the incision was opened and purulence drained out and wound cx Candida noted    PICC line could not be placed and Midline placed and will up date IRMA     Clinically improving would be ok for d/c planning        Labs, Microbiology, Radiology and all the pertinent results from current hospitalization and  care every where were reviewed  by me as a part of the evaluation   Plan: 1. Cont V Fluconazole x 400 mg daily dose increased as creat is better change to oral formulation at d/c   2. Cont  IV Zosyn x 3.375 gm  x Q 8 hrs will cover GI and  seth  Stop date  8/7  3. IRMA done   4. CT abd/pelvis with oral contrast to be repeated on 7/17  NOTED   5. Bennett Keenan for d/c planning   6  HIV -ve and hepatitis screen -ve    7. ESR, CRP Now down trend     IRMA done will sign off today         Discussed with patient/Family and Nursing staff     Thanks for allowing me to participate in your patient's care and please call me with any questions or concerns.     Anthony Lindo MD  Infectious Disease  [facility] ([facility]) Physician  Phone: [phone]   Fax : [phone]

## 2020-07-22 NOTE — PROGRESS NOTES
[facility] [phone]                                     Daily Progress Note                                                         Pt Name: Luli Denney  Medical Record Number: [id number]  Date of Birth 1970   Today's Date: 7/22/2020  Chief Complaint   Patient presents with    Abdominal Pain     [place] 8 [place] [facility] bowel inflammation  No emesis        ASSESSMENT/PLAN  Diffuse purulent peritonitis, suspicious for ruptured TOA  -7/8 exploratory laparotomy, omentectomy, left salpingectomy with removal of fibroid, repair serosal tear, US guided right IJ vascath. -drain serous. wound clean. WBC slowly trending down. Continue abx per ID.   -Diet as able. -OOB and ambulate, PT/OT   -T max 100.2 7/20 1500. SNF will not take pt until Tmax 99.5 or less for three days and negative covid not more than four days prior to DC.   -Left arm midline, picc unable to be placed, see note from them. SNF OK with midline. Pt is a hard stick and labs unable to be drawn from midline so there may be some benefit to getting a PICC prior to DC>      KIKI resolved  -nephro following  -Cr normalized  -vas cath removed. Edy Trejo continues to do well. Tolerating PO, having BMs. OBJECTIVE  VITALS:  height is 4' 11\" (1.499 m) and weight is 243 lb 2.7 oz (110.3 kg). Her oral temperature is 98.7 °F (37.1 °C). Her blood pressure is 168/99 (abnormal) and her pulse is 108. Her respiration is 20 and oxygen saturation is 92%. INTAKE/OUTPUT:      Intake/Output Summary (Last 24 hours) at 7/22/2020 1407  Last data filed at 7/22/2020 1000  Gross per 24 hour   Intake 620 ml   Output 1915 ml   Net -1295 ml     GENERAL: alert, no distress  LUNGS: clear to ausculation, without wheezes, rales or rhonci  HEART: normal rate and regular rhythm  ABDOMEN: soft, appropriately tender, incision c/d/i. Drain SS.    EXTREMITY: no cyanosis, clubbing or edema    I/O last 3 completed shifts:   In: 440 [P.O.:240; IV Piggyback:200]  Out: 1915 [Urine:1900; Drains:15]      LABS  Recent Labs     07/21/20  0833 07/22/20  0436   WBC 15.4*  --    HGB 7.3*  --    HCT 23.5*  --    *  --     137   K 3.6 3.3*    100   CO2 24 22   BUN 6* 6*   CREATININE 0.6 0.7   MG  --  1.40*   PHOS 3.2 3.3   CALCIUM 7.2* 7.5*   INR 1.69*  --    AST 46* 47*   ALT 22 28   BILITOT 0.7 0.6   BILIDIR 0.3 0.3       Electronically signed by FLEX Pittman on 7/22/2020 at 2:07 PM

## 2020-07-22 NOTE — PROGRESS NOTES
CHOL, HDL in the last 72 hours. Invalid input(s): LDLCALCU  INR:   Recent Labs     07/20/20  0630 07/21/20  0833   INR 1.66* 1.69*       Objective:   Vitals: BP (!) 168/99   Pulse 108   Temp 98.7 °F (37.1 °C) (Oral)   Resp 20   Ht 4' 11\" (1.499 m)   Wt 243 lb 2.7 oz (110.3 kg)   SpO2 92%   BMI 49.11 kg/m²   General appearance: alert and awake  Has NGT  HEENT: Head: Normal, normocephalic, atraumatic, anicteric, pupils are reactive to light. Dry mucous membrane. Neck: no adenopathy, no carotid bruit, supple, symmetrical, trachea midline and thyroid not enlarged, symmetric, no tenderness/mass/nodules  Lungs: clear to auscultation bilaterally  Heart: regular rate and rhythm, S1, S2 normal, no murmur, click, rub or gallop  Abdomen: soft, -tender; bowel sounds normal; no masses,  no organomegaly, has drain  Dressing intact on the open area  Extremities: extremities mild edema  Neurologic: Mental status: alert and awake    Assessment and Plan:   1. KIKI= better  2. HTN- change meds  3. Sepsis-on iv abx. 4. Elevated WBC-better  5. Dm- monitor sugars  6. Anemia= monitor h/h  7. Iron deff-on IV iron  8. Hypokalemia-replace  9. Thrombocytosis- hematology consult appreciated  Peritonitis- on iv abx  Patient Active Problem List:     EMERY (obstructive sleep apnea)     ARF (acute renal failure) (HCC)     SBO (small bowel obstruction) (HCC)     Leukocytosis     Septicemia (HCC)     Acidosis     KIKI (acute kidney injury) ([address]     Gallbladder disease     Hypertension     Type 2 diabetes mellitus with hyperglycemia ([address]    Pt is stable. Discussed with staff  about the plan. See the orders for further plan. Possible d/c in am  Will proceed further acc to pt's progress.   Time spent with management of the pt is-30 minutes      Electronically signed by: Charmayne Hamper, MD, on 7/22/2020, at 1:07 PM

## 2020-07-23 LAB
A/G RATIO: 0.5 (ref 1.1–2.2)
ALBUMIN SERPL-MCNC: 2.4 G/DL (ref 3.4–5)
ALP BLD-CCNC: 73 U/L (ref 40–129)
ALT SERPL-CCNC: 38 U/L (ref 10–40)
ANAEROBIC CULTURE: ABNORMAL
ANION GAP SERPL CALCULATED.3IONS-SCNC: 12 MMOL/L (ref 3–16)
AST SERPL-CCNC: 51 U/L (ref 15–37)
BASOPHILS ABSOLUTE: 0.1 K/UL (ref 0–0.2)
BASOPHILS RELATIVE PERCENT: 0.9 %
BILIRUB SERPL-MCNC: 0.6 MG/DL (ref 0–1)
BILIRUBIN DIRECT: 0.3 MG/DL (ref 0–0.3)
BILIRUBIN, INDIRECT: 0.3 MG/DL (ref 0–1)
BUN BLDV-MCNC: 6 MG/DL (ref 7–20)
CALCIUM IONIZED: 1.03 MMOL/L (ref 1.12–1.32)
CALCIUM SERPL-MCNC: 7.7 MG/DL (ref 8.3–10.6)
CHLORIDE BLD-SCNC: 100 MMOL/L (ref 99–110)
CO2: 24 MMOL/L (ref 21–32)
CREAT SERPL-MCNC: 0.6 MG/DL (ref 0.6–1.1)
EOSINOPHILS ABSOLUTE: 0.1 K/UL (ref 0–0.6)
EOSINOPHILS RELATIVE PERCENT: 0.5 %
GFR AFRICAN AMERICAN: >60
GFR NON-AFRICAN AMERICAN: >60
GLOBULIN: 5.1 G/DL
GLUCOSE BLD-MCNC: 112 MG/DL (ref 70–99)
GLUCOSE BLD-MCNC: 125 MG/DL (ref 70–99)
GLUCOSE BLD-MCNC: 130 MG/DL (ref 70–99)
GLUCOSE BLD-MCNC: 131 MG/DL (ref 70–99)
GLUCOSE BLD-MCNC: 135 MG/DL (ref 70–99)
GRAM STAIN RESULT: ABNORMAL
HCT VFR BLD CALC: 26.1 % (ref 36–48)
HEMOGLOBIN: 8.1 G/DL (ref 12–16)
LYMPHOCYTES ABSOLUTE: 1.1 K/UL (ref 1–5.1)
LYMPHOCYTES RELATIVE PERCENT: 8 %
MAGNESIUM: 1.5 MG/DL (ref 1.8–2.4)
MCH RBC QN AUTO: 22.3 PG (ref 26–34)
MCHC RBC AUTO-ENTMCNC: 30.8 G/DL (ref 31–36)
MCV RBC AUTO: 72.3 FL (ref 80–100)
MONOCYTES ABSOLUTE: 1 K/UL (ref 0–1.3)
MONOCYTES RELATIVE PERCENT: 6.9 %
NEUTROPHILS ABSOLUTE: 11.9 K/UL (ref 1.7–7.7)
NEUTROPHILS RELATIVE PERCENT: 83.7 %
ORGANISM: ABNORMAL
PDW BLD-RTO: 33 % (ref 12.4–15.4)
PERFORMED ON: ABNORMAL
PH VENOUS: 7.49 (ref 7.35–7.45)
PHOSPHORUS: 3 MG/DL (ref 2.5–4.9)
PLATELET # BLD: 847 K/UL (ref 135–450)
PLATELET SLIDE REVIEW: ABNORMAL
PMV BLD AUTO: 7 FL (ref 5–10.5)
POTASSIUM REFLEX MAGNESIUM: 3.2 MMOL/L (ref 3.5–5.1)
RBC # BLD: 3.61 M/UL (ref 4–5.2)
SARS-COV-2, NAAT: NOT DETECTED
SODIUM BLD-SCNC: 136 MMOL/L (ref 136–145)
TOTAL PROTEIN: 7.5 G/DL (ref 6.4–8.2)
WBC # BLD: 14.3 K/UL (ref 4–11)
WOUND/ABSCESS: ABNORMAL

## 2020-07-23 PROCEDURE — 99024 POSTOP FOLLOW-UP VISIT: CPT | Performed by: SURGERY

## 2020-07-23 PROCEDURE — 97530 THERAPEUTIC ACTIVITIES: CPT

## 2020-07-23 PROCEDURE — 84100 ASSAY OF PHOSPHORUS: CPT

## 2020-07-23 PROCEDURE — 97530 THERAPEUTIC ACTIVITIES: CPT | Performed by: PHYSICAL THERAPIST

## 2020-07-23 PROCEDURE — 6360000002 HC RX W HCPCS: Performed by: SURGERY

## 2020-07-23 PROCEDURE — 6360000002 HC RX W HCPCS: Performed by: INTERNAL MEDICINE

## 2020-07-23 PROCEDURE — C1751 CATH, INF, PER/CENT/MIDLINE: HCPCS

## 2020-07-23 PROCEDURE — 36569 INSJ PICC 5 YR+ W/O IMAGING: CPT

## 2020-07-23 PROCEDURE — 6370000000 HC RX 637 (ALT 250 FOR IP): Performed by: INTERNAL MEDICINE

## 2020-07-23 PROCEDURE — 36415 COLL VENOUS BLD VENIPUNCTURE: CPT

## 2020-07-23 PROCEDURE — 83735 ASSAY OF MAGNESIUM: CPT

## 2020-07-23 PROCEDURE — 2060000000 HC ICU INTERMEDIATE R&B

## 2020-07-23 PROCEDURE — 76937 US GUIDE VASCULAR ACCESS: CPT

## 2020-07-23 PROCEDURE — 2580000003 HC RX 258: Performed by: SURGERY

## 2020-07-23 PROCEDURE — U0002 COVID-19 LAB TEST NON-CDC: HCPCS

## 2020-07-23 PROCEDURE — 94760 N-INVAS EAR/PLS OXIMETRY 1: CPT

## 2020-07-23 PROCEDURE — 2580000003 HC RX 258: Performed by: INTERNAL MEDICINE

## 2020-07-23 PROCEDURE — 80053 COMPREHEN METABOLIC PANEL: CPT

## 2020-07-23 PROCEDURE — 82330 ASSAY OF CALCIUM: CPT

## 2020-07-23 PROCEDURE — 99232 SBSQ HOSP IP/OBS MODERATE 35: CPT | Performed by: INTERNAL MEDICINE

## 2020-07-23 PROCEDURE — 85025 COMPLETE CBC W/AUTO DIFF WBC: CPT

## 2020-07-23 PROCEDURE — 2500000003 HC RX 250 WO HCPCS: Performed by: SURGERY

## 2020-07-23 RX ORDER — LIDOCAINE HYDROCHLORIDE 10 MG/ML
5 INJECTION, SOLUTION EPIDURAL; INFILTRATION; INTRACAUDAL; PERINEURAL ONCE
Status: COMPLETED | OUTPATIENT
Start: 2020-07-23 | End: 2020-07-23

## 2020-07-23 RX ORDER — SODIUM CHLORIDE 0.9 % (FLUSH) 0.9 %
10 SYRINGE (ML) INJECTION PRN
Status: DISCONTINUED | OUTPATIENT
Start: 2020-07-23 | End: 2020-07-23 | Stop reason: SDUPTHER

## 2020-07-23 RX ORDER — SODIUM CHLORIDE 0.9 % (FLUSH) 0.9 %
10 SYRINGE (ML) INJECTION EVERY 12 HOURS SCHEDULED
Status: DISCONTINUED | OUTPATIENT
Start: 2020-07-23 | End: 2020-07-23 | Stop reason: SDUPTHER

## 2020-07-23 RX ADMIN — CLONIDINE HYDROCHLORIDE 0.2 MG: 0.1 TABLET ORAL at 14:27

## 2020-07-23 RX ADMIN — CLONIDINE HYDROCHLORIDE 0.2 MG: 0.1 TABLET ORAL at 08:38

## 2020-07-23 RX ADMIN — FUROSEMIDE 20 MG: 10 INJECTION, SOLUTION INTRAMUSCULAR; INTRAVENOUS at 08:39

## 2020-07-23 RX ADMIN — FUROSEMIDE 20 MG: 10 INJECTION, SOLUTION INTRAMUSCULAR; INTRAVENOUS at 17:30

## 2020-07-23 RX ADMIN — HEPARIN SODIUM 5000 UNITS: 5000 INJECTION INTRAVENOUS; SUBCUTANEOUS at 08:56

## 2020-07-23 RX ADMIN — HEPARIN SODIUM 5000 UNITS: 5000 INJECTION INTRAVENOUS; SUBCUTANEOUS at 21:45

## 2020-07-23 RX ADMIN — PIPERACILLIN AND TAZOBACTAM 3.38 G: 3; .375 INJECTION, POWDER, LYOPHILIZED, FOR SOLUTION INTRAVENOUS at 08:56

## 2020-07-23 RX ADMIN — PANTOPRAZOLE SODIUM 40 MG: 40 TABLET, DELAYED RELEASE ORAL at 06:41

## 2020-07-23 RX ADMIN — Medication 500 MG: at 21:44

## 2020-07-23 RX ADMIN — CLONIDINE HYDROCHLORIDE 0.2 MG: 0.1 TABLET ORAL at 21:44

## 2020-07-23 RX ADMIN — Medication 500 MG: at 08:39

## 2020-07-23 RX ADMIN — METOPROLOL TARTRATE 50 MG: 50 TABLET, FILM COATED ORAL at 08:39

## 2020-07-23 RX ADMIN — AMLODIPINE BESYLATE 10 MG: 10 TABLET ORAL at 08:38

## 2020-07-23 RX ADMIN — LISINOPRIL 20 MG: 20 TABLET ORAL at 21:44

## 2020-07-23 RX ADMIN — MULTIPLE VITAMINS W/ MINERALS TAB 1 TABLET: TAB at 08:38

## 2020-07-23 RX ADMIN — Medication 400 MG: at 08:39

## 2020-07-23 RX ADMIN — LISINOPRIL 20 MG: 20 TABLET ORAL at 08:39

## 2020-07-23 RX ADMIN — Medication 400 MG: at 21:44

## 2020-07-23 RX ADMIN — METOPROLOL TARTRATE 50 MG: 50 TABLET, FILM COATED ORAL at 21:44

## 2020-07-23 RX ADMIN — SODIUM CHLORIDE, PRESERVATIVE FREE 10 ML: 5 INJECTION INTRAVENOUS at 21:45

## 2020-07-23 RX ADMIN — POTASSIUM BICARBONATE 40 MEQ: 782 TABLET, EFFERVESCENT ORAL at 08:38

## 2020-07-23 RX ADMIN — PIPERACILLIN AND TAZOBACTAM 3.38 G: 3; .375 INJECTION, POWDER, LYOPHILIZED, FOR SOLUTION INTRAVENOUS at 00:39

## 2020-07-23 RX ADMIN — PIPERACILLIN AND TAZOBACTAM 3.38 G: 3; .375 INJECTION, POWDER, LYOPHILIZED, FOR SOLUTION INTRAVENOUS at 15:56

## 2020-07-23 RX ADMIN — FLUCONAZOLE 400 MG: 400 INJECTION, SOLUTION INTRAVENOUS at 21:46

## 2020-07-23 RX ADMIN — IRON SUCROSE 100 MG: 20 INJECTION, SOLUTION INTRAVENOUS at 08:39

## 2020-07-23 RX ADMIN — LIDOCAINE HYDROCHLORIDE 5 ML: 10 INJECTION, SOLUTION EPIDURAL; INFILTRATION; INTRACAUDAL; PERINEURAL at 13:50

## 2020-07-23 ASSESSMENT — PAIN SCALES - GENERAL
PAINLEVEL_OUTOF10: 0

## 2020-07-23 NOTE — PLAN OF CARE
Problem: Pain:  Goal: Pain level will decrease  Description: Pain level will decrease  7/23/2020 0802 by Christiano Miles RN  Outcome: Ongoing  Goal: Control of acute pain  Description: Control of acute pain  7/23/2020 0802 by Christiano Miles RN  Outcome: Ongoing  Goal: Control of chronic pain  Description: Control of chronic pain  7/23/2020 0802 by Christaino Miles RN  Outcome: Ongoing

## 2020-07-23 NOTE — PROGRESS NOTES
Physical Therapy  Facility/Department: [address] PROGRESSIVE CARE  Daily Treatment Note  NAME: Lily Astudillo  : 1970  MRN: [id number]    Date of Service: 2020    Discharge Recommendations:  Patient would benefit from continued therapy after discharge, 3-5 sessions per week   PT Equipment Recommendations  Other: Will monitor for potential equipt needs. Lily Astudillo scored a  on the AM-PAC short mobility form. Current research shows that an AM-PAC score of 17 or less is typically not associated with a discharge to the patient's home setting. Based on the patient's AM-PAC score and their current functional mobility deficits, it is recommended that the patient have 3-5 sessions per week of Physical Therapy at d/c to increase the patient's independence. Please see assessment section for further patient specific details. If patient discharges prior to next session this note will serve as a discharge summary. Please see below for the latest assessment towards goals. Assessment   Body structures, Functions, Activity limitations: Decreased functional mobility ; Decreased strength;Decreased endurance  Assessment: Pt continues to need max A of 2 for bed mob and transfers to chair via stedy (nursing will need maxi move to transfer back due to lower height of chair); pt will need significant slow progression of therapy at discharge to address deficits to allow pt to regain her premorbid status  Prognosis: Fair  History: Pt is a 53 y.o. female admitted to ED 2020 with abdominal pain. Pt stated that this pain began one week ago, and was seen on 20 at [facility] And was diagnosed with some gastroenteritis. Pt had a CT scan performed that showed a mid to distal small bowel obstruction. S/p exploratory laparotomy, omentectomy, left salpingectomy with removal of fibriod (N/A abdomen) on 2020.   Clinical Presentation: evolving  PT Education: General Safety  Patient Education: reviewed need to provide full effort with therapy to achieve her goal of returning home with family  Barriers to Learning: decreased motivation to provide full effort  REQUIRES PT FOLLOW UP: Yes  Activity Tolerance  Activity Tolerance: Patient limited by cognitive status  Activity Tolerance: needs max verbal cues to provide increased effort   Social/Functional History  Lives With: Family(Currently lives with father and two children, but will be recovering at daughter's home- following will be home set up of daughter's home)  Type of Home: House  Home Layout: Multi-level(daughter states pt will be able to recover on main floor if needed)  Home Access: Stairs to enter with rails  Entrance Stairs - Number of Steps: 1  Bathroom Shower/Tub: Tub/Shower unit, Walk-in shower(tub/shower main floor, walk-in shower upstairs)  Bathroom Toilet: Standard  Bathroom Equipment: Shower chair  Home Equipment: Rolling walker  Receives Help From: Family  ADL Assistance: Independent  Homemaking Assistance: Independent  Ambulation Assistance: Independent  Transfer Assistance: Independent  Active : Yes  Occupation: Full time employment(working two jobs)  Additional Comments: Will be living with daughter, son-in-law, grandchildren and daughter's mother-in-law upon d/c  Patient Diagnosis(es): The primary encounter diagnosis was Septicemia ([facility] [address] Diagnoses of KIKI (acute kidney injury) ([facility] [address] Gallbladder disease, and Acidosis were also pertinent to this visit. has a past medical history of Hypertension and Obesity. has a past surgical history that includes  section and laparotomy (N/A, 2020). Restrictions  Restrictions/Precautions  Restrictions/Precautions: Fall Risk  Position Activity Restriction  Other position/activity restrictions: Recent Abdominal Surg. Abdominal MECHE Drain. (Simultaneous filing. User may not have seen previous data.)  Subjective   General  Chart Reviewed:  Yes  Additional Pertinent Hx: Pt is a 53 y.o. female admitted to ED 7/6/2020 with abdominal pain. Pt stated that this pain began one week ago, and was seen on 7/2/20 at [facility] And was diagnosed with some gastroenteritis. Pt had a CT scan performed that showed a mid to distal small bowel obstruction. S/p exploratory laparotomy, omentectomy, left salpingectomy with removal of fibriod (N/A abdomen) on 7/8/2020. Response To Previous Treatment: Patient reporting fatigue but able to participate.   Family / Caregiver Present: No  Referring Practitioner: Dr. Nafisa Coburn  Subjective  Subjective: pt agreeable to getting up with therapy; denies any pain          Orientation  Orientation  Overall Orientation Status: Within Functional Limits  Cognition   Cognition  Overall Cognitive Status: [facility]  Cognition Comment: decreased insight into need to get up and moving to achieve her goals of returning home; pt kept saying \"I can't believe its this hard\"  Objective   Bed mobility  Rolling to Left: Maximum assistance;2 Person assistance  Supine to Sit: Maximum assistance;2 Person assistance  Transfers  Sit to Stand: Maximum Assistance;2 Person Assistance(with stedy)  Stand to sit: Maximum Assistance;2 Person Assistance(with stedy)  Bed to Chair: Dependent/Total(with stedy)  Ambulation  Ambulation?: No     Balance  Comments: initially max A then CGA for sitting balance at EOB; max A (mod A of 2) for standing on stedy for 1 min (needs constant verbal cues to stand erect and not lean on elbows on front bar of stedy)  Exercises  Comments: encouraged B LE/UE ex in chair expecially hip IR due to ER \"frog leg\" in bed         Comment: assisted with positioning in chair for comfort and pressure relief; pure wick placed while pt in chair, LEs elevated on pillows and all needs in reach        AM-PAC Score  AM-PAC Inpatient Mobility Raw Score : 8 (07/23/20 0824)  AM-PAC Inpatient T-Scale Score : 28.52 (07/23/20 0824)  Mobility Inpatient CMS 0-100% Score: 86.62 (07/23/20 4619)  Mobility Inpatient CMS

## 2020-07-23 NOTE — PROGRESS NOTES
Midline leaking   kaveh notified  Surgery nilson horowitz notified  picc company notified requested to  Return and attempt to replace midline .   IR consulted to place picc asap

## 2020-07-23 NOTE — PROGRESS NOTES
Hospitalist Progress Note  7/23/2020 11:26 AM  Subjective:   Admit Date: 7/6/2020  PCP: Mendel Robes, MD  Interval History: pt is alert and awake  Looks better  oob to chair  Tolerating regular food  meds are helping otherwise  Denies any other complaints    Chart reviewed. Diet: DIET CARB CONTROL; Dietary Nutrition Supplements: Diabetic Oral Supplement  Medications:   Scheduled Meds:   metoprolol tartrate  50 mg Oral BID    furosemide  20 mg Intravenous BID    lisinopril  20 mg Oral BID    fluconazole  400 mg Intravenous Q24H    lidocaine 1 % injection  5 mL Intradermal Once    therapeutic multivitamin-minerals  1 tablet Oral Daily    pantoprazole  40 mg Oral QAM AC    amLODIPine  10 mg Oral Daily    vitamin D  50,000 Units Oral Weekly    magnesium oxide  400 mg Oral BID    calcium carbonate  500 mg Oral BID    potassium bicarb-citric acid  40 mEq Oral Daily with breakfast    iron sucrose  100 mg Intravenous Daily    piperacillin-tazobactam  3.375 g Intravenous Q8H    cloNIDine  0.2 mg Oral TID    sodium chloride (PF)  10 mL Intravenous Daily    sodium chloride flush  10 mL Intravenous 2 times per day    heparin (porcine)  5,000 Units Subcutaneous BID    insulin lispro  0-6 Units Subcutaneous TID WC    insulin lispro  0-3 Units Subcutaneous Nightly     Continuous Infusions:   dextrose       CBC:   Recent Labs     07/21/20  0833   WBC 15.4*   HGB 7.3*   *     BMP:    Recent Labs     07/21/20  0833 07/22/20  0436    137   K 3.6 3.3*    100   CO2 24 22   BUN 6* 6*   CREATININE 0.6 0.7   GLUCOSE 107* 119*     Hepatic:   Recent Labs     07/21/20  0833 07/22/20  0436   AST 46* 47*   ALT 22 28   BILITOT 0.7 0.6   ALKPHOS 66 69     Troponin: No results for input(s): TROPONINI in the last 72 hours. BNP: No results for input(s): BNP in the last 72 hours. Lipids: No results for input(s): CHOL, HDL in the last 72 hours.     Invalid input(s): LDLCALCU  INR:   Recent Labs 07/21/20  0833   INR 1.69*       Objective:   Vitals: BP (!) 156/91   Pulse 109   Temp 98.7 °F (37.1 °C) (Oral)   Resp 18   Ht 4' 11\" (1.499 m)   Wt 238 lb 8.6 oz (108.2 kg)   SpO2 90%   BMI 48.18 kg/m²   General appearance: alert and awake  Has NGT  HEENT: Head: Normal, normocephalic, atraumatic, anicteric, pupils are reactive to light. Dry mucous membrane. Neck: no adenopathy, no carotid bruit, supple, symmetrical, trachea midline and thyroid not enlarged, symmetric, no tenderness/mass/nodules  Lungs: clear to auscultation bilaterally  Heart: regular rate and rhythm, S1, S2 normal, no murmur, click, rub or gallop  Abdomen: soft, -tender; bowel sounds normal; no masses,  no organomegaly, has drain  Dressing intact on the open area  Extremities: extremities mild edema  Neurologic: Mental status: alert and awake    Assessment and Plan:   1. KIKI= better  2. HTN- change meds  3. Sepsis-on iv abx. 4. Elevated WBC-better  5. Dm- monitor sugars  6. Anemia= monitor h/h  7. Peritonitis- on iv abx  Patient Active Problem List:     EMERY (obstructive sleep apnea)     ARF (acute renal failure) (HCC)     SBO (small bowel obstruction) (HCC)     Leukocytosis     Septicemia (HCC)     Acidosis     KIKI (acute kidney injury) ([facility] [address]     Gallbladder disease     Hypertension     Type 2 diabetes mellitus with hyperglycemia ([facility] [address]    Pt is stable. Discussed with staff  about the plan. See the orders for further plan. Possible d/c in am  Will proceed further acc to pt's progress.   Time spent with management of the pt is-30 minutes      Electronically signed by: Charmayne Hamper, MD, on 7/23/2020, at 11:26 AM

## 2020-07-23 NOTE — PROGRESS NOTES
Patient seen for bedside PICC placement. Screening and consent verified. Procedure explained to patient, all questions answered. Timeout performed at bedside with RN Lexi Ricks. Patient had previous attempted PICC line bilaterally that was unsuccessful and resulted in midline placement in LUE, per conversation with previous PICC RN the patient had narrow and deep vasculature that was difficult to access and PICCs would not advance in cephalic veins. Per RN Lexi Ricks, the current midline is leaking. Determination made to assess and attempt RUE only due to need to preserve existing access. RUE basilic vein noted on ultrasound approx 4-5fr diameter, close to [facility] but then quickly descended deep into tissue where visualization was lost, cephalic vein had similar presentation though was visualize at approx 2-3cm deep in tissue running up extremity, no brachial vein visualized. RUE basilic romaine accessed without issue but guidewire would not advance - wire and needle removed and hemostasis achieved. RUE cephalic vein then accessed and PICC placed per protocol successfully and uneventfully. Care instructions provided and remaining questions answered. Patient left with call light and safety measures in place. Handoff complete to [facility].

## 2020-07-23 NOTE — ADT AUTH CERT
sulfate iv prn (2 doses today)       Per ID PN:  Assessment:         Patient Active Problem List    Diagnosis    · EMERY (obstructive sleep apnea)    · ARF (acute renal failure) (HCC)    · SBO (small bowel obstruction) (HCC)    · Leukocytosis    · Septicemia (HCC)    · Acidosis    · KIKI (acute kidney injury) ([facility] [address]    · Gallbladder disease    · Hypertension    · Type 2 diabetes mellitus with hyperglycemia (HCC)    · Peritonitis (HCC)    · Lactic acidosis    · Acute pulmonary insufficiency    · Metabolic acidosis    · Elevated LFTs       Severe sepsis    Lactic acidosis    WBC elevation    KIKI    Purulent peritonitis per OR description    Admitted with abd pain and SBO    S/p Exp lap , omentectomy, Left salpingectomy and removal of Fibroid and repair of serosal tear on 7/8     Lft elevation    Anemia    Thrombocytosis         She was  very ill in ICU  noted purulent peritonitis and per OP notes no bowel leak but Left Tuboovarian abscess suspected based on the presentation         OR cx now strep anginosus, candida and Fusobacterium  necrophorum and Peptostreptococcus         Recent Gynecology eval was normal and previous Chlamydia and GC screen including HPV negative            Would suspected mixed infectious process GI and  seth to be in volved in the process              On going WBC elevation and slow recovery given the mixed infectious process Risk for Intra abdominal abscess         CT abd/pelvis check follow up no leak no abscess         WBC slow trend down bu GI function slowly improving         X ray with ileus noted and clinically improving slowly and WBC trend down a bit but still elevation    S/p Bed side lower incision opened and purulence drained out and packing and this may explain elevated wbc         WBC trend down after the incision was opened and purulence drained out and wound cx Candida noted         PICC line could not be placed and Midline placed and will up date IRAM              Labs, Microbiology, Radiology and all the pertinent results from current hospitalization and  care every where were reviewed  by me as a part of the evaluation    Plan: 1. Cont V Fluconazole x 400 mg daily dose increased as creat is better    2.  Cont  IV Zosyn x 3.375 gm  x Q 8 hrs will cover GI and  seth well will increase the dose once creat is better    3.  D/c IV  Flagyl x 500 mg Q 8    4. CT abd/pelvis with oral contrast to be repeated on 7/17  NOTED    5. Will up date IRMA      6  HIV -ve and hepatitis screen -ve      7.  ESR, CRP Now down trend    8. INCISIONAL abscess cx Candida noted       Per Surgery PN:  ASSESSMENT/PLAN    Diffuse purulent peritonitis, suspicious for ruptured TOA    -7/8 exploratory laparotomy, omentectomy, left salpingectomy with removal of fibroid, repair serosal tear, US guided right IJ vascath. -drain serous.  wound clean.  WBC slowly trending down.  Continue abx per ID.    -Diet as able.      -OOB and ambulate, PT/OT    -T max 100.2 7/20 1500. SNF will not take pt until Tmax 99.5 or less for three days and negative covid not more than four days prior to DC.    -Left arm midline, picc unable to be placed, see note from them. SNF OK with midline.  Pt is a hard stick and labs unable to be drawn from midline so there may be some benefit to getting a PICC prior to DC>        Renal Failure, Acute - Care Day 16 (7/21/2020) by Adriano Tate RN         Review Status  Review Entered    Completed  7/22/2020 15:58        Criteria Review       Care Day: 16 Care Date: 7/21/2020 Level of Care: Intermediate Care    Guideline Day 4    Level Of Care    (X) Floor to discharge    Clinical Status    (X) * Hemodynamic stability    ( ) * Mental status at baseline    ( ) * Tachypnea absent    ( ) * Hypoxemia absent    ( ) * Etiology requiring continued inpatient care absent    ( ) * Electrolyte abnormalities absent or acceptable for next level of care    ( ) * Acid-base abnormalities absent    ( ) * Volume status at baseline or acceptable for next level of care    ( ) * Diet tolerated    ( ) * Dialysis not needed, or access and plan established    ( ) * Discharge plans and education understood    Activity    ( ) * Ambulatory [I]    Routes    ( ) * Oral hydration, medications, and diet    Interventions    (X) Monitor electrolytes, renal function tests, acid-base, and volume status    Medications    (X) Possible medical therapies    * Milestone        Renal Failure, Acute - Care Day 15 (7/20/2020) by Priscila Lu RN         Review Status  Review Entered    Completed  7/22/2020 15:58        Criteria Review       Care Day: 15 Care Date: 7/20/2020 Level of Care: Intermediate Care    Guideline Day 4    Level Of Care    (X) Floor to discharge    7/22/2020 3:58 PM EDT by Shravan Edwards      medsurg    Clinical Status    (X) * Hemodynamic stability    7/22/2020 3:58 PM EDT by Shravan Edwards      hr   bp: 152/92    ( ) * Mental status at baseline    ( ) * Tachypnea absent    7/22/2020 3:58 PM EDT by Shravan Edwards      rr 18    ( ) * Hypoxemia absent    7/22/2020 3:58 PM EDT by Shravan Edwards      92% ra    ( ) * Etiology requiring continued inpatient care absent    ( ) * Electrolyte abnormalities absent or acceptable for next level of care    7/22/2020 3:58 PM EDT by Shravan Edwards      k+: 3.4    ( ) * Acid-base abnormalities absent    ( ) * Volume status at baseline or acceptable for next level of care    ( ) * Diet tolerated    ( ) * Dialysis not needed, or access and plan established    ( ) * Discharge plans and education understood    Activity    ( ) * Ambulatory [I]    Routes    ( ) * Oral hydration, medications, and diet    Interventions    (X) Monitor electrolytes, renal function tests, acid-base, and volume status    7/22/2020 3:58 PM EDT by Shravan Edwards      bun/cr wdl    Medications    (X) Possible medical therapies    7/22/2020 3:58 PM EDT by Shravan Edwards      see orders * Milestone    Additional Notes      Day 15       Pt remains on medsurg unit       Vitals: t: 37.4 p: 114 rr: 18 bp: 143/99 spo2: 93% ra       Abn labs:  k+: 3.4, ionic calcium 1.02, wbc: 19.0, hgb: 7.1, plt: 819, m.50       Orders:    Diflucan 400mg iv daily    Heparin 5000 units sq tid    Accu checks ac/hs with ssi coverage    Venofer 100mg iv daily    Flagyl 500mg iv every 8 hrs    Zosyn 3.375g iv every 8 hrs    Mag sulfate iv prn (2 doses today)       Per Surgery PN:         ASSESSMENT/PLAN    Diffuse purulent peritonitis, suspicious for ruptured TOA    - exploratory laparotomy, omentectomy, left salpingectomy with removal of fibroid, repair serosal tear, US guided right IJ vascath.         -drain serous.  wound clean.  WBC trending down.  Continue abx, antifungal per ID.    -Diet as able.      -OOB and ambulate, PT/OPT         KIKI resolved    -vas cath in place, nephro following    -Cr normalized       Per Nephrology PN:  Assessment/Plan:    Reviewed old records and labs.          1) KIKI                - baseline 20 Cr 0.6                - ddx:  ATN vs. pre-renal and benazepril and ibuprofen did not help                - renal function slightly worse                - appreciate dialysis catheter by Dr. Shabnam Alves, but we have not used it (yet)                - hold benazepril                - counseled against NSAID use                - azotemia improving with D5W at 150 ml/hr in order to reduce serum osmolality, which will also reduce urea recycling                - serum creatinine is actually better today, so hopefully, she will not need dialysis         2) SBO                - per surgery         3) ID                - on empiric zosyn, levaquin         4) FEN                - hyponatremia                            - monitor                - metabolic acidosis                            - off NaHCO3 gtt                - hypocalcemia                            - phos high, so do not supplement unless symptomatic                - renal osteodystrophy                            - will start renvela when diet is advanced                - hyponatremia                            - expected as it is a result of D5W and attempt to reduce serum osmolality, which is high (serum osmolality is more important that serum sodium), so continue D5W         5) \"hematuria\"                - UA does show moderate blood                - unclear significance

## 2020-07-23 NOTE — PROGRESS NOTES
Occupational Therapy  Facility/Department: [facility] PROGRESSIVE CARE  Daily Treatment Note  NAME: Hanna Bronson  : 1970  MRN: [id number]    Date of Service: 2020    Discharge Recommendations:  Patient would benefit from continued therapy after discharge, 3-5 sessions per week  OT Equipment Recommendations  Other: defer to [place] facility    Hanna Bronson scored a 10/24 on the AM-PAC ADL Inpatient form. Current research shows that an AM-PAC score of 17 or less is typically not associated with a discharge to the patient's home setting. Based on the patient's AM-PAC score and their current ADL deficits, it is recommended that the patient have 3-5 sessions per week of Occupational Therapy at d/c to increase the patient's independence. Please see assessment section for further patient specific details. If patient discharges prior to next session this note will serve as a discharge summary. Please see below for the latest assessment towards goals. Assessment   Performance deficits / Impairments: Decreased functional mobility ; Decreased ADL status; Decreased endurance;Decreased strength;Decreased balance;Decreased ROM  Assessment: Pt tolerated session fairly well. Pt limited by the above deficits and requiring up to Max A x2 for bed mob. Pt was able to stand to angela stedy with max A x 2. Pt progressing with standing tolerance and stood ~ 1 min with max A to maintain standig balance. Pt easily fatigues and requires rest breaks. Pt dependent for ADL's. Pt will benefit from cont OT at d/c prior to returning home. Cont poc. Treatment Diagnosis: decreased ADLs and transfers  Prognosis: Good  OT Education: OT Role;Plan of Care  REQUIRES OT FOLLOW UP: Yes  Activity Tolerance  Activity Tolerance: Patient Tolerated treatment well;Patient limited by fatigue  Safety Devices  Safety Devices in place: Yes  Type of devices: Call light within reach;Nurse notified; Patient at risk for falls; Chair alarm in place; Left in chair         Patient Diagnosis(es): The primary encounter diagnosis was Septicemia ([facility] [address] Diagnoses of KIKI (acute kidney injury) ([facility] [address] Gallbladder disease, and Acidosis were also pertinent to this visit. has a past medical history of Hypertension and Obesity. has a past surgical history that includes  section and laparotomy (N/A, 2020). Restrictions  Restrictions/Precautions  Restrictions/Precautions: Fall Risk  Position Activity Restriction  Other position/activity restrictions: Recent Abdominal Surg. Abdominal MECHE Drain. (Simultaneous filing. User may not have seen previous data.)     Subjective   General  Chart Reviewed: Yes  Patient assessed for rehabilitation services?: Yes  Additional Pertinent Hx: Pt is a 53 y.o. female admitted to ED 2020 with abdominal pain. Pt stated that this pain began one week ago, and was seen on 20 at [facility] And was diagnosed with some gastroenteritis. Pt had a CT scan performed that showed a mid to distal small bowel obstruction. S/p exploratory laparotomy, omentectomy, left salpingectomy with removal of fibriod (N/A abdomen) on 2020. Family / Caregiver Present: No  Referring Practitioner: René Frazier MD  Subjective  Subjective: Pt seen bedside and agreeable to therapy. Pt c/o dizziness with change in position that resolved with time  General Comment  Comments: Per RN ok for therapy         Objective    Balance  Sitting Balance: (EOB ~ 3-5 min in prep for transfer. initially max A but with cuing to flex hips pt able to maintain with CGA)  Standing Balance: Maximum assistance  Standing Balance  Time: stood 1 min with angela stedy support- max A to keep hips forward.  stood prn with angela stedy to transfer to chair  Functional Mobility  Functional Mobility Comments: bed > chair with angela stedy    Bed mobility  Rolling to Left: Maximum assistance;2 Person assistance  Supine to Sit: Maximum assistance;2 Person assistance  Sit to Supine: (pt in chair at end of session)     Transfers  Sit to stand: Maximum assistance;2 Person assistance(from deflated bed and from stedy seat)  Stand to sit: 2 Person assistance;Maximum assistance     Cognition  Overall Cognitive Status: WFL  Cognition Comment: decreased insight into need to get up and moving to achieve her goals of returning home; pt kept saying \"I can't believe its this hard\"           Plan   Plan  Times per week: 3-5  Current Treatment Recommendations: Strengthening, ROM, Gait Training, Balance Training, Self-Care / ADL, Functional Mobility Training, Endurance Training, Patient/Caregiver Education & Training    AM-PAC Score  AM-[facility] Inpatient Daily Activity Raw Score: 10 (07/20/20 1350)  AM-PAC Inpatient ADL T-Scale Score : 27.31 (07/20/20 1350)  ADL Inpatient CMS 0-100% Score: 74.7 (07/20/20 1350)  ADL Inpatient CMS G-Code Modifier : CL (07/20/20 1350)    Goals  Short term goals  Time Frame for Short term goals: Prior to d/c: goals ongoing  Short term goal 1: Pt will complete fxl transfer to/from ADL surfaces with mod A x2. Short term goal 2: Pt will complete B UE HEP x 15 reps to increase activity tolerance for ADLs  Short term goal 3: Pt will tolerate sitting EOB >3 minutes in prep for ADL transfers with mod A x 1  Short term goal 4: Pt will complete bed mobility with mod A x2  Short term goal 5: Pt will complete UB dressing with min A  Long term goals  Time Frame for Long term goals : STGS=LTGS  Patient Goals   Patient goals : to recover and return home with family       Therapy Time   Individual Concurrent Group Co-treatment   Time In [id number]         Time Out 0822         Minutes 32         Timed Code Treatment Minutes: 32 Minutes     This note to serve as OT d/c summary if pt is d/c-ed prior to next therapy session.     Ivan Wakefield, OTR/L

## 2020-07-24 VITALS
OXYGEN SATURATION: 92 % | TEMPERATURE: 98.3 F | SYSTOLIC BLOOD PRESSURE: 149 MMHG | BODY MASS INDEX: 46.04 KG/M2 | HEIGHT: 59 IN | DIASTOLIC BLOOD PRESSURE: 100 MMHG | HEART RATE: 107 BPM | RESPIRATION RATE: 14 BRPM | WEIGHT: 228.4 LBS

## 2020-07-24 LAB
A/G RATIO: 0.5 (ref 1.1–2.2)
ALBUMIN SERPL-MCNC: 2.5 G/DL (ref 3.4–5)
ALP BLD-CCNC: 71 U/L (ref 40–129)
ALT SERPL-CCNC: 41 U/L (ref 10–40)
ANION GAP SERPL CALCULATED.3IONS-SCNC: 13 MMOL/L (ref 3–16)
AST SERPL-CCNC: 57 U/L (ref 15–37)
BASOPHILS ABSOLUTE: 0.1 K/UL (ref 0–0.2)
BASOPHILS RELATIVE PERCENT: 0.6 %
BILIRUB SERPL-MCNC: 0.6 MG/DL (ref 0–1)
BILIRUBIN DIRECT: 0.3 MG/DL (ref 0–0.3)
BILIRUBIN, INDIRECT: 0.3 MG/DL (ref 0–1)
BUN BLDV-MCNC: 6 MG/DL (ref 7–20)
CALCIUM IONIZED: 1.03 MMOL/L (ref 1.12–1.32)
CALCIUM SERPL-MCNC: 7.3 MG/DL (ref 8.3–10.6)
CHLORIDE BLD-SCNC: 99 MMOL/L (ref 99–110)
CO2: 27 MMOL/L (ref 21–32)
CREAT SERPL-MCNC: 0.6 MG/DL (ref 0.6–1.1)
EOSINOPHILS ABSOLUTE: 0.1 K/UL (ref 0–0.6)
EOSINOPHILS RELATIVE PERCENT: 0.7 %
GFR AFRICAN AMERICAN: >60
GFR NON-AFRICAN AMERICAN: >60
GLOBULIN: 4.8 G/DL
GLUCOSE BLD-MCNC: 109 MG/DL (ref 70–99)
GLUCOSE BLD-MCNC: 112 MG/DL (ref 70–99)
GLUCOSE BLD-MCNC: 130 MG/DL (ref 70–99)
GLUCOSE BLD-MCNC: 159 MG/DL (ref 70–99)
HCT VFR BLD CALC: 22.2 % (ref 36–48)
HEMOGLOBIN: 7.2 G/DL (ref 12–16)
INR BLD: 1.58 (ref 0.86–1.14)
LYMPHOCYTES ABSOLUTE: 0.9 K/UL (ref 1–5.1)
LYMPHOCYTES RELATIVE PERCENT: 8.6 %
MAGNESIUM: 1.3 MG/DL (ref 1.8–2.4)
MCH RBC QN AUTO: 23 PG (ref 26–34)
MCHC RBC AUTO-ENTMCNC: 32.3 G/DL (ref 31–36)
MCV RBC AUTO: 71.4 FL (ref 80–100)
MONOCYTES ABSOLUTE: 0.7 K/UL (ref 0–1.3)
MONOCYTES RELATIVE PERCENT: 6.4 %
NEUTROPHILS ABSOLUTE: 9.2 K/UL (ref 1.7–7.7)
NEUTROPHILS RELATIVE PERCENT: 83.7 %
PDW BLD-RTO: 32.6 % (ref 12.4–15.4)
PERFORMED ON: ABNORMAL
PH VENOUS: 7.5 (ref 7.35–7.45)
PHOSPHORUS: 3.5 MG/DL (ref 2.5–4.9)
PLATELET # BLD: 644 K/UL (ref 135–450)
PMV BLD AUTO: 6.5 FL (ref 5–10.5)
POTASSIUM REFLEX MAGNESIUM: 3 MMOL/L (ref 3.5–5.1)
PROTHROMBIN TIME: 18.4 SEC (ref 10–13.2)
RBC # BLD: 3.11 M/UL (ref 4–5.2)
SODIUM BLD-SCNC: 139 MMOL/L (ref 136–145)
TOTAL PROTEIN: 7.3 G/DL (ref 6.4–8.2)
WBC # BLD: 11 K/UL (ref 4–11)

## 2020-07-24 PROCEDURE — 2580000003 HC RX 258: Performed by: INTERNAL MEDICINE

## 2020-07-24 PROCEDURE — APPSS30 APP SPLIT SHARED TIME 16-30 MINUTES: Performed by: NURSE PRACTITIONER

## 2020-07-24 PROCEDURE — APPNB30 APP NON BILLABLE TIME 0-30 MINS: Performed by: NURSE PRACTITIONER

## 2020-07-24 PROCEDURE — 6370000000 HC RX 637 (ALT 250 FOR IP): Performed by: SURGERY

## 2020-07-24 PROCEDURE — 6370000000 HC RX 637 (ALT 250 FOR IP): Performed by: INTERNAL MEDICINE

## 2020-07-24 PROCEDURE — 99024 POSTOP FOLLOW-UP VISIT: CPT | Performed by: SURGERY

## 2020-07-24 PROCEDURE — 6360000002 HC RX W HCPCS: Performed by: INTERNAL MEDICINE

## 2020-07-24 PROCEDURE — 81270 JAK2 GENE: CPT

## 2020-07-24 PROCEDURE — 94761 N-INVAS EAR/PLS OXIMETRY MLT: CPT

## 2020-07-24 PROCEDURE — 85025 COMPLETE CBC W/AUTO DIFF WBC: CPT

## 2020-07-24 PROCEDURE — 6360000002 HC RX W HCPCS: Performed by: SURGERY

## 2020-07-24 PROCEDURE — 94760 N-INVAS EAR/PLS OXIMETRY 1: CPT

## 2020-07-24 PROCEDURE — 84100 ASSAY OF PHOSPHORUS: CPT

## 2020-07-24 PROCEDURE — 85610 PROTHROMBIN TIME: CPT

## 2020-07-24 PROCEDURE — 99238 HOSP IP/OBS DSCHRG MGMT 30/<: CPT | Performed by: INTERNAL MEDICINE

## 2020-07-24 PROCEDURE — 83735 ASSAY OF MAGNESIUM: CPT

## 2020-07-24 PROCEDURE — 82330 ASSAY OF CALCIUM: CPT

## 2020-07-24 PROCEDURE — 80053 COMPREHEN METABOLIC PANEL: CPT

## 2020-07-24 RX ORDER — AMLODIPINE BESYLATE 10 MG/1
10 TABLET ORAL DAILY
Qty: 30 TABLET | Refills: 3 | Status: SHIPPED | OUTPATIENT
Start: 2020-07-25 | End: 2020-09-23 | Stop reason: SDUPTHER

## 2020-07-24 RX ORDER — HEPARIN SODIUM 5000 [USP'U]/ML
5000 INJECTION, SOLUTION INTRAVENOUS; SUBCUTANEOUS 2 TIMES DAILY
Qty: 1 ML | Refills: 0 | Status: SHIPPED | OUTPATIENT
Start: 2020-07-24 | End: 2020-09-23

## 2020-07-24 RX ORDER — PROMETHAZINE HYDROCHLORIDE 12.5 MG/1
12.5 TABLET ORAL EVERY 6 HOURS PRN
Qty: 10 TABLET | Refills: 2 | Status: SHIPPED | OUTPATIENT
Start: 2020-07-24 | End: 2020-07-31

## 2020-07-24 RX ORDER — LISINOPRIL 20 MG/1
20 TABLET ORAL 2 TIMES DAILY
Qty: 30 TABLET | Refills: 3 | Status: SHIPPED | OUTPATIENT
Start: 2020-07-24 | End: 2020-09-23 | Stop reason: SDUPTHER

## 2020-07-24 RX ORDER — MAGNESIUM SULFATE IN WATER 40 MG/ML
2 INJECTION, SOLUTION INTRAVENOUS ONCE
Status: COMPLETED | OUTPATIENT
Start: 2020-07-24 | End: 2020-07-24

## 2020-07-24 RX ORDER — MAGNESIUM SULFATE HEPTAHYDRATE 500 MG/ML
2 INJECTION, SOLUTION INTRAMUSCULAR; INTRAVENOUS ONCE
Status: DISCONTINUED | OUTPATIENT
Start: 2020-07-24 | End: 2020-07-24

## 2020-07-24 RX ORDER — ACETAMINOPHEN 500 MG
500 TABLET ORAL 4 TIMES DAILY PRN
Qty: 360 TABLET | Refills: 1 | Status: SHIPPED | OUTPATIENT
Start: 2020-07-24 | End: 2021-09-10

## 2020-07-24 RX ORDER — CALCIUM CARBONATE 200(500)MG
500 TABLET,CHEWABLE ORAL 2 TIMES DAILY
Qty: 60 TABLET | Refills: 0 | Status: SHIPPED | OUTPATIENT
Start: 2020-07-24 | End: 2020-08-23

## 2020-07-24 RX ORDER — CLONIDINE HYDROCHLORIDE 0.2 MG/1
0.2 TABLET ORAL 3 TIMES DAILY
Qty: 60 TABLET | Refills: 3 | Status: SHIPPED | OUTPATIENT
Start: 2020-07-24 | End: 2020-09-23 | Stop reason: SDUPTHER

## 2020-07-24 RX ORDER — PANTOPRAZOLE SODIUM 40 MG/1
40 TABLET, DELAYED RELEASE ORAL
Qty: 30 TABLET | Refills: 3 | Status: SHIPPED | OUTPATIENT
Start: 2020-07-25 | End: 2020-09-23 | Stop reason: SDUPTHER

## 2020-07-24 RX ORDER — METOPROLOL TARTRATE 50 MG/1
50 TABLET, FILM COATED ORAL 2 TIMES DAILY
Qty: 60 TABLET | Refills: 3 | Status: SHIPPED | OUTPATIENT
Start: 2020-07-24 | End: 2020-09-23 | Stop reason: SDUPTHER

## 2020-07-24 RX ORDER — LANOLIN ALCOHOL/MO/W.PET/CERES
400 CREAM (GRAM) TOPICAL 2 TIMES DAILY
Qty: 30 TABLET | Refills: 1 | Status: SHIPPED | OUTPATIENT
Start: 2020-07-24 | End: 2020-09-23 | Stop reason: SDUPTHER

## 2020-07-24 RX ADMIN — Medication 500 MG: at 08:49

## 2020-07-24 RX ADMIN — METOPROLOL TARTRATE 50 MG: 50 TABLET, FILM COATED ORAL at 08:49

## 2020-07-24 RX ADMIN — AMLODIPINE BESYLATE 10 MG: 10 TABLET ORAL at 08:50

## 2020-07-24 RX ADMIN — PIPERACILLIN AND TAZOBACTAM 3.38 G: 3; .375 INJECTION, POWDER, LYOPHILIZED, FOR SOLUTION INTRAVENOUS at 08:49

## 2020-07-24 RX ADMIN — MULTIPLE VITAMINS W/ MINERALS TAB 1 TABLET: TAB at 08:49

## 2020-07-24 RX ADMIN — Medication 400 MG: at 08:49

## 2020-07-24 RX ADMIN — INSULIN LISPRO 1 UNITS: 100 INJECTION, SOLUTION INTRAVENOUS; SUBCUTANEOUS at 13:03

## 2020-07-24 RX ADMIN — MAGNESIUM SULFATE IN WATER 2 G: 40 INJECTION, SOLUTION INTRAVENOUS at 16:35

## 2020-07-24 RX ADMIN — FUROSEMIDE 20 MG: 10 INJECTION, SOLUTION INTRAMUSCULAR; INTRAVENOUS at 08:49

## 2020-07-24 RX ADMIN — CLONIDINE HYDROCHLORIDE 0.2 MG: 0.1 TABLET ORAL at 13:03

## 2020-07-24 RX ADMIN — PANTOPRAZOLE SODIUM 40 MG: 40 TABLET, DELAYED RELEASE ORAL at 06:48

## 2020-07-24 RX ADMIN — LISINOPRIL 20 MG: 20 TABLET ORAL at 08:50

## 2020-07-24 RX ADMIN — IRON SUCROSE 100 MG: 20 INJECTION, SOLUTION INTRAVENOUS at 08:50

## 2020-07-24 RX ADMIN — POTASSIUM BICARBONATE 40 MEQ: 782 TABLET, EFFERVESCENT ORAL at 08:52

## 2020-07-24 RX ADMIN — PIPERACILLIN AND TAZOBACTAM 3.38 G: 3; .375 INJECTION, POWDER, LYOPHILIZED, FOR SOLUTION INTRAVENOUS at 00:05

## 2020-07-24 RX ADMIN — FLUCONAZOLE 400 MG: 400 INJECTION, SOLUTION INTRAVENOUS at 16:35

## 2020-07-24 RX ADMIN — FUROSEMIDE 20 MG: 10 INJECTION, SOLUTION INTRAMUSCULAR; INTRAVENOUS at 16:34

## 2020-07-24 RX ADMIN — PIPERACILLIN AND TAZOBACTAM 3.38 G: 3; .375 INJECTION, POWDER, LYOPHILIZED, FOR SOLUTION INTRAVENOUS at 16:34

## 2020-07-24 RX ADMIN — HEPARIN SODIUM 5000 UNITS: 5000 INJECTION INTRAVENOUS; SUBCUTANEOUS at 09:02

## 2020-07-24 RX ADMIN — CLONIDINE HYDROCHLORIDE 0.2 MG: 0.1 TABLET ORAL at 08:50

## 2020-07-24 ASSESSMENT — PAIN SCALES - GENERAL
PAINLEVEL_OUTOF10: 0

## 2020-07-24 NOTE — CARE COORDINATION
DISCHARGE SUMMARY     DATE OF DISCHARGE:  7/24/2020     DISCHARGE DESTINATION:  SNF     FACILITY  [facility]     Level of Care: skilled     Report Number: [phone]  Fax Number:  [phone]    Hens completed  7/24/2020

## 2020-07-24 NOTE — PROGRESS NOTES
[facility] [phone]                                     Daily Progress Note                                                         Pt Name: Roro Madrid  Medical Record Number: [id number]  Date of Birth 1970   Today's Date: 7/24/2020    ASSESSMENT/PLAN  Diffuse purulent peritonitis, ruptured TOA  -7/8 exploratory laparotomy, omentectomy, left salpingectomy with removal of fibroid, repair serosal tear, US guided right IJ vascath. -drain serous-removed today. Wound clean. WBC slowly trending down. Continue abx per ID.   -Diet as able. -OOB and ambulate, PT/OT. -PICC was able to be placed 7/23. KIKI resolved  -nephro following  -Cr normalized  -vas cath removed 7/21. Denver Villagomez continues to do well. Tolerating PO, having BMs. OBJECTIVE  VITALS:  height is 4' 11\" (1.499 m) and weight is 228 lb 6.3 oz (103.6 kg). Her oral temperature is 99.5 °F (37.5 °C). Her blood pressure is 155/89 (abnormal) and her pulse is 112. Her respiration is 14 and oxygen saturation is 92%. INTAKE/OUTPUT:      Intake/Output Summary (Last 24 hours) at 7/24/2020 0902  Last data filed at 7/24/2020 0354  Gross per 24 hour   Intake 900 ml   Output 1870 ml   Net -970 ml     GENERAL: alert, no distress  LUNGS: clear to ausculation, without wheezes, rales or rhonci  HEART: normal rate and regular rhythm  ABDOMEN: soft, appropriately tender, incision c/d/i. EXTREMITY: no cyanosis, clubbing or edema    I/O last 3 completed shifts:   In: 900 [P.O.:600; IV Piggyback:300]  Out: 1870 [Urine:1850; Drains:20]      LABS  Recent Labs     07/24/20  0655   WBC 11.0   HGB 7.2*   HCT 22.2*   *      K 3.0*   CL 99   CO2 27   BUN 6*   CREATININE 0.6   MG 1.30*   PHOS 3.5   CALCIUM 7.3*   INR 1.58*   AST 57*   ALT 41*   BILITOT 0.6   BILIDIR 0.3       Electronically signed by FLEX Chappell - CNP-C on 7/24/2020 at 9:02 AM      Surgery Staff  I have examined this patient and read and agree with the note by FLEX Galvez from today. Continues to do well. PICC placed    Vitals:    07/24/20 0354 07/24/20 0815 07/24/20 0844 07/24/20 1112   BP: (!) 150/92  (!) 155/89 (!) 143/90   Pulse: 109  112 97   Resp: 16  14 13   Temp: 98.8 °F (37.1 °C)  99.5 °F (37.5 °C) 99 °F (37.2 °C)   TempSrc: Oral  Oral Oral   SpO2: 91% 94% 92% 91%   Weight: 228 lb 6.3 oz (103.6 kg)      Height:         NAD, AO x3  Abdomen soft, nontender.   Wound clean    A/P s/p exploration with L salpingectomy  OK to D/C from my standpoint  F/U next week for staple removal  Electronically signed by Cas Angelo MD on 7/24/2020 at 11:29 AM

## 2020-07-27 ENCOUNTER — TELEPHONE (OUTPATIENT)
Dept: SURGERY | Age: 50
End: 2020-07-27

## 2020-07-27 LAB — JAK2 V617F MUTATION: 0 %

## 2020-07-27 NOTE — TELEPHONE ENCOUNTER
Spoke with RUBÉN Jeffery. Jonah aware to watch area, should heal in fine. May cover with Steri-strips if they would like. Continue with gauze and mepilex. Call if worsens or does not improve.

## 2020-07-27 NOTE — TELEPHONE ENCOUNTER
Jonah calling to inform us that the patients wound dehisced up at the top of the incision. He reports 2-3 inches already. They have it covered with gauze and mepilex. Needs orders on how to proceed. Will send message to Dr. Sonja Prader.

## 2020-07-28 NOTE — TELEPHONE ENCOUNTER
Patsy Watkins called to report the wound has split open even more from yesterday. Reports its now at 4cm. Will pack with wet to dry until her apt on Thursday. Patient BP is 171/96  p 91 and recheck of 185/93 and p103.

## 2020-07-30 NOTE — DISCHARGE SUMMARY
Hospitalist Discharge Summary   7/30/2020 9:57 AM  Subjective:   Admit Date: 7/6/2020  PCP: Jenny Mcallister MD  Interval History: pt is ready for the discharge  Feels better  Admitted with sepsis and intestinal obstruction and KIKI  Seen by surgery  Had exp lap and diagnosed with ruptured tubal abscess  Rest of the events as in progress notes and chart  Denies any other complaints  Chart reviewed. Diet: No diet orders on file  Medications:   Scheduled Meds:  Continuous Infusions:  CBC: No results for input(s): WBC, HGB, PLT in the last 72 hours. BMP:  No results for input(s): NA, K, CL, CO2, BUN, CREATININE, GLUCOSE in the last 72 hours. Hepatic: No results for input(s): AST, ALT, ALB, BILITOT, ALKPHOS in the last 72 hours. Troponin: No results for input(s): TROPONINI in the last 72 hours. BNP: No results for input(s): BNP in the last 72 hours. Lipids: No results for input(s): CHOL, HDL in the last 72 hours. Invalid input(s): LDLCALCU  INR: No results for input(s): INR in the last 72 hours. Orders Placed This Encounter   Procedures    SURGERY PHOTO    Culture, Blood 1     Standing Status:   Standing     Number of Occurrences:   1    Culture, Blood 2     Standing Status:   Standing     Number of Occurrences:   1    C.trachomatis N.gonorrhoeae DNA, Urine     Get from veras bag     Standing Status:   Standing     Number of Occurrences:   1    Culture, Tissue     Omentum  Pre-op diagnosis:  Abdominal pain  .      Standing Status:   Standing     Number of Occurrences:   1    Culture, Anaerobic and Aerobic     Standing Status:   Standing     Number of Occurrences:   1    US GALLBLADDER RUQ     Standing Status:   Standing     Number of Occurrences:   1     Order Specific Question:   Reason for exam:     Answer:   Rule out gallbladder disease    CT ABDOMEN PELVIS WO CONTRAST Additional Contrast? None     Standing Status:   Standing     Number of Occurrences:   1     Order Specific Question:   Reason for exam:     Answer:   Abdominal pain and distention.   Rule out malignancy     Order Specific Question:   Additional Contrast?     Answer:   None    MRI ABDOMEN WO CONTRAST MRCP     Standing Status:   Standing     Number of Occurrences:   1     Order Specific Question:   Reason for exam:     Answer:   elevated LFTs, rule out CBD obstruction/stone    XR ABDOMEN (2 VIEWS)     Standing Status:   Standing     Number of Occurrences:   1     Order Specific Question:   Reason for exam:     Answer:   SBO    XR CHEST PORTABLE     Standing Status:   Standing     Number of Occurrences:   1     Order Specific Question:   Reason for exam:     Answer:   s/p central line insertion    CT ABDOMEN PELVIS WO CONTRAST Additional Contrast? None     Standing Status:   Standing     Number of Occurrences:   1     Order Specific Question:   Reason for exam:     Answer:   abdominal abscess and peritonitis with persistent wbc elevation check for new abscess     Order Specific Question:   Additional Contrast?     Answer:   None    XR ABDOMEN (KUB) (SINGLE AP VIEW)     Standing Status:   Standing     Number of Occurrences:   1     Order Specific Question:   Reason for exam:     Answer:   nausea    XR ABDOMEN (KUB) (SINGLE AP VIEW)     Standing Status:   Standing     Number of Occurrences:   1     Order Specific Question:   Reason for exam:     Answer:   pain    CT ABDOMEN PELVIS WO CONTRAST Additional Contrast? Oral     Standing Status:   Standing     Number of Occurrences:   1     Order Specific Question:   Reason for exam:     Answer:   abdominal abscess and peritonitis  WBC elevation     Order Specific Question:   Additional Contrast?     Answer:   Oral    CBC Auto Differential     Standing Status:   Standing     Number of Occurrences:   1    Comprehensive Metabolic Panel w/ Reflex to MG     Standing Status:   Standing     Number of Occurrences:   1    Lipase     Standing Status:   Standing     Number of Occurrences:   1    Lactic Acid, Plasma     Standing Status:   Standing     Number of Occurrences:   1    HCG Qualitative, Serum     Standing Status:   Standing     Number of Occurrences:   1    Lactate, Sepsis     Standing Status:   Standing     Number of Occurrences:   2    Basic Metabolic Panel     Standing Status:   Standing     Number of Occurrences:   1    CBC auto differential     Standing Status:   Standing     Number of Occurrences:   1    Urinalysis     Standing Status:   Standing     Number of Occurrences:   1    Creatinine, Random Urine     Standing Status:   Standing     Number of Occurrences:   1    Sodium, Urine, Random     Standing Status:   Standing     Number of Occurrences:   1    Urea Nitrogen, Urine     Standing Status:   Standing     Number of Occurrences:   1    Hepatitis A Antibody, Total     Standing Status:   Standing     Number of Occurrences:   1    Cytomegalovirus Antibody, IgM     Standing Status:   Standing     Number of Occurrences:   1    Ubaldo Barr Virus (EBV) Antibody Panel I     Standing Status:   Standing     Number of Occurrences:   1    CK     Standing Status:   Standing     Number of Occurrences:   1    Hepatitis A Antibody, IgM     Standing Status:   Standing     Number of Occurrences:   1    Microscopic Urinalysis     Standing Status:   Standing     Number of Occurrences:   1    COVID-19     Standing Status:   Standing     Number of Occurrences:   1    HCG Qualitative, Serum     Standing Status:   Standing     Number of Occurrences:   1    Surgical Pathology     A) left fallopian tube  Pre-op diagnosis:  .small bowel obstruction     Standing Status:   Standing     Number of Occurrences:   1    Lactic Acid, Plasma     Standing Status:   Standing     Number of Occurrences:   1    Blood Gas, Arterial     Standing Status:   Standing     Number of Occurrences:   1    Blood Gas, Arterial     Standing Status:   Standing     Number of Occurrences:   1    Blood Gas, Arterial Number of Occurrences:   1    Potassium     Standing Status:   Standing     Number of Occurrences:   1    Magnesium     Standing Status:   Standing     Number of Occurrences:   1    Magnesium     Standing Status:   Standing     Number of Occurrences:   1    PTH, Intact     Standing Status:   Standing     Number of Occurrences:   1    Potassium     Standing Status:   Standing     Number of Occurrences:   1    Magnesium     Standing Status:   Standing     Number of Occurrences:   1    JAK2 Gene Mutation Quant     Standing Status:   Standing     Number of Occurrences:   1     Order Specific Question:   Clinical History     Answer:   high plt count     Order Specific Question:   Diagnosis     Answer:   N/A     Order Specific Question:   Special Instructions/Surgery Date     Answer:   N/A    Magnesium     Standing Status:   Standing     Number of Occurrences:   1    Magnesium     Standing Status:   Standing     Number of Occurrences:   1    COVID-19     Standing Status:   Standing     Number of Occurrences:   1    Magnesium     Standing Status:   Standing     Number of Occurrences:   1    Consult to Gastroenterology     Standing Status:   Standing     Number of Occurrences:   1     Order Specific Question:   Reason for Consult? Answer:   possible re    Inpatient consult to Nephrology     Standing Status:   Standing     Number of Occurrences:   1     Order Specific Question:   Reason for Consult? Answer:   [facility] to Dose: Other - See Comments: levaquin     levaquin     Standing Status:   Standing     Number of Occurrences:   1     Order Specific Question:   Pharmacy to Dose     Answer: Other - See Comments:    Inpatient consult to General Surgery     Standing Status:   Standing     Number of Occurrences:   1     Order Specific Question:   Reason for Consult?      Answer:   Mid to distal small bowel obstruction    Inpatient consult to Critical Care     Standing Status:   Standing Number of Occurrences:   1     Order Specific Question:   Reason for Consult? Answer:   POSTOP VENT MANAGEMENT    Inpatient consult to Infectious Diseases     Standing Status:   Standing     Number of Occurrences:   1     Order Specific Question:   Reason for Consult? Answer:   antibiotic selection    Inpatient consult to Social Work     Standing Status:   Standing     Number of Occurrences:   1     Order Specific Question:   Reason for Consult? Answer:   ecf    Inpatient consult to Oncology     Standing Status:   Standing     Number of Occurrences:   1     Order Specific Question:   Reason for Consult?      Answer:   thrombocytosis    OT eval and treat     Standing Status:   Standing     Number of Occurrences:   1    OT eval and treat     Standing Status:   Standing     Number of Occurrences:   1    OT eval and treat     Standing Status:   Standing     Number of Occurrences:   1    PT eval and treat     Standing Status:   Standing     Number of Occurrences:   1    PT eval and treat     Standing Status:   Standing     Number of Occurrences:   1    Extubation     Standing Status:   Standing     Number of Occurrences:   1    POCT Glucose     Standing Status:   Standing     Number of Occurrences:   1    POCT Glucose     Standing Status:   Standing     Number of Occurrences:   1    POCT Glucose     Standing Status:   Standing     Number of Occurrences:   1    POCT Glucose     Standing Status:   Standing     Number of Occurrences:   1    POCT Glucose     Standing Status:   Standing     Number of Occurrences:   1    POCT Glucose     Standing Status:   Standing     Number of Occurrences:   1    POCT Glucose     Standing Status:   Standing     Number of Occurrences:   1    POCT Glucose     Standing Status:   Standing     Number of Occurrences:   1    POCT Glucose     Standing Status:   Standing     Number of Occurrences:   1    POCT Glucose     Standing Status:   Standing     Number of Occurrences:   1    POCT Glucose     Standing Status:   Standing     Number of Occurrences:   1    POCT Glucose     Standing Status:   Standing     Number of Occurrences:   1    POCT Glucose     Standing Status:   Standing     Number of Occurrences:   1    POCT Glucose     Standing Status:   Standing     Number of Occurrences:   1    POCT Glucose     Standing Status:   Standing     Number of Occurrences:   1    POCT Glucose     Standing Status:   Standing     Number of Occurrences:   1    POCT Glucose     Standing Status:   Standing     Number of Occurrences:   1    POCT Glucose     Standing Status:   Standing     Number of Occurrences:   1    POCT Glucose     Standing Status:   Standing     Number of Occurrences:   1    POCT Glucose     Standing Status:   Standing     Number of Occurrences:   1    POCT Glucose     Standing Status:   Standing     Number of Occurrences:   1    POCT Glucose     Standing Status:   Standing     Number of Occurrences:   1    POCT Glucose     Standing Status:   Standing     Number of Occurrences:   1    POCT Glucose     Standing Status:   Standing     Number of Occurrences:   1    POCT Glucose     Standing Status:   Standing     Number of Occurrences:   1    POCT Glucose     Standing Status:   Standing     Number of Occurrences:   1    POCT Glucose     Standing Status:   Standing     Number of Occurrences:   1    POCT Glucose     Standing Status:   Standing     Number of Occurrences:   1    POCT Glucose     Standing Status:   Standing     Number of Occurrences:   1    POCT Glucose     Standing Status:   Standing     Number of Occurrences:   1    POCT Glucose     Standing Status:   Standing     Number of Occurrences:   1    POCT Glucose     Standing Status:   Standing     Number of Occurrences:   1    POCT Glucose     Standing Status:   Standing     Number of Occurrences:   1    POCT Glucose     Standing Status:   Standing     Number of Occurrences:   1    POCT Glucose     Standing Status:   Standing     Number of Occurrences:   1    POCT Glucose     Standing Status:   Standing     Number of Occurrences:   1    POCT Glucose     Standing Status:   Standing     Number of Occurrences:   1    POCT Glucose     Standing Status:   Standing     Number of Occurrences:   1    POCT Glucose     Standing Status:   Standing     Number of Occurrences:   1    POCT Glucose     Standing Status:   Standing     Number of Occurrences:   1    POCT Glucose     Standing Status:   Standing     Number of Occurrences:   1    POCT Glucose     Standing Status:   Standing     Number of Occurrences:   1    POCT Glucose     Standing Status:   Standing     Number of Occurrences:   1    POCT Glucose     Standing Status:   Standing     Number of Occurrences:   1    POCT Glucose     Standing Status:   Standing     Number of Occurrences:   1    POCT Glucose     Standing Status:   Standing     Number of Occurrences:   1    POCT Glucose     Standing Status:   Standing     Number of Occurrences:   1    POCT Glucose     Standing Status:   Standing     Number of Occurrences:   1    POCT Glucose     Standing Status:   Standing     Number of Occurrences:   1    POCT Glucose     Standing Status:   Standing     Number of Occurrences:   1    POCT Glucose     Standing Status:   Standing     Number of Occurrences:   1    POCT Glucose     Standing Status:   Standing     Number of Occurrences:   1    POCT Glucose     Standing Status:   Standing     Number of Occurrences:   1    POCT Glucose     Standing Status:   Standing     Number of Occurrences:   1    POCT Glucose     Standing Status:   Standing     Number of Occurrences:   1    POCT Glucose     Standing Status:   Standing     Number of Occurrences:   1    POCT Glucose     Standing Status:   Standing     Number of Occurrences:   1    POCT Glucose     Standing Status:   Standing     Number of Occurrences:   1    POCT Glucose     Standing Status: Standing     Number of Occurrences:   1    POCT Glucose     Standing Status:   Standing     Number of Occurrences:   1    POCT Glucose     Standing Status:   Standing     Number of Occurrences:   1    POCT Glucose     Standing Status:   Standing     Number of Occurrences:   1    POCT Glucose     Standing Status:   Standing     Number of Occurrences:   1    POCT Glucose     Standing Status:   Standing     Number of Occurrences:   1    POCT Glucose     Standing Status:   Standing     Number of Occurrences:   1    POCT Glucose     Standing Status:   Standing     Number of Occurrences:   1    POCT Glucose     Standing Status:   Standing     Number of Occurrences:   1    POCT Glucose     Standing Status:   Standing     Number of Occurrences:   1    POCT Glucose     Standing Status:   Standing     Number of Occurrences:   1    POCT Glucose     Standing Status:   Standing     Number of Occurrences:   1    POCT Glucose     Standing Status:   Standing     Number of Occurrences:   1    POCT Glucose     Standing Status:   Standing     Number of Occurrences:   1    POCT Glucose     Standing Status:   Standing     Number of Occurrences:   1    POCT Glucose     Standing Status:   Standing     Number of Occurrences:   1    POCT Glucose     Standing Status:   Standing     Number of Occurrences:   1    POCT Glucose     Standing Status:   Standing     Number of Occurrences:   1    POCT Glucose     Standing Status:   Standing     Number of Occurrences:   1    POCT Glucose     Standing Status:   Standing     Number of Occurrences:   1    POCT Glucose     Standing Status:   Standing     Number of Occurrences:   1    POCT Glucose     Standing Status:   Standing     Number of Occurrences:   1    POCT Glucose     Standing Status:   Standing     Number of Occurrences:   1    POCT Glucose     Standing Status:   Standing     Number of Occurrences:   1    POCT Glucose     Standing Status:   Standing     Number of Occurrences:   1    POCT Glucose     Standing Status:   Standing     Number of Occurrences:   1    POCT Glucose     Standing Status:   Standing     Number of Occurrences:   1    TYPE AND SCREEN     Specimen is valid for 3 days - nurse to verify valid specimen     Standing Status:   Standing     Number of Occurrences:   1    PREPARE RBC (CROSSMATCH), 1 Units     Standing Status:   Standing     Number of Occurrences:   1     Order Specific Question:   When Needed? (If for surgery please specify date in Special Instructions)     Answer:   When Available    Insert peripheral IV     Standing Status:   Standing     Number of Occurrences:   1    Insert PICC line     Standing Status:   Standing     Number of Occurrences:   1     Order Specific Question:   Reason for PICC? Answer:   Limited Access     Order Specific Question:   Reason for PICC? Answer:   Antibiotics/Home Antibiotics     Order Specific Question:   PICC Line Type? Answer:   Double Lumen     Order Specific Question:   What site is the preferred site? Answer:   No preference     Order Specific Question:   What side should this line be placed? Answer:   Either    Insert PICC line     Standing Status:   Standing     Number of Occurrences:   1     Order Specific Question:   Reason for PICC? Answer:   Antibiotics/Home Antibiotics     Order Specific Question:   PICC Line Type? Answer:   Single Lumen     Order Specific Question:   What site is the preferred site? Answer:   No preference     Comments:   nephrology okay with picc line  signed off md day     Order Specific Question:   What side should this line be placed?      Answer:   Either    PATIENT STATUS (FROM ED OR OR/PROCEDURAL) Inpatient     Standing Status:   Standing     Number of Occurrences:   1     Order Specific Question:   Patient Class     Answer:   Inpatient [101]     Order Specific Question:   REQUIRED: Diagnosis     Answer:   ARF (acute renal failure) ([facility] [address] [125774]     Order Specific Question:   Estimated Length of Stay     Answer:   Estimated stay of more than 2 midnights     Order Specific Question:   Future Attending Provider     Answer:   Luis Hooks [[id number]]     Order Specific Question:   Telemetry Bed Required? Answer: Yes    Transfer Patient     Standing Status:   Standing     Number of Occurrences:   1    Transfer patient     Standing Status:   Standing     Number of Occurrences:   1    Transfer patient     Standing Status:   Standing     Number of Occurrences:   1    Discharge patient     Standing Status:   Standing     Number of Occurrences:   1     Order Specific Question:   Discharge Disposition     Answer:   Marquis Orozcouel       No current facility-administered medications for this encounter.       Current Outpatient Medications   Medication Sig Dispense Refill    calcium carbonate (TUMS) 500 MG chewable tablet Take 1 tablet by mouth 2 times daily 60 tablet 0    Heparin Sodium, Porcine, (HEPARIN, PORCINE,) 5000 UNIT/ML injection Inject 1 mL into the skin 2 times daily 1 mL 0    insulin lispro (HUMALOG) 100 UNIT/ML injection vial Inject 0-6 Units into the skin 3 times daily (with meals) 1 vial 3    promethazine (PHENERGAN) 12.5 MG tablet Take 1 tablet by mouth every 6 hours as needed for Nausea 10 tablet 2    cloNIDine (CATAPRES) 0.2 MG tablet Take 1 tablet by mouth 3 times daily 60 tablet 3    lisinopril (PRINIVIL;ZESTRIL) 20 MG tablet Take 1 tablet by mouth 2 times daily 30 tablet 3    metoprolol tartrate (LOPRESSOR) 50 MG tablet Take 1 tablet by mouth 2 times daily 60 tablet 3    amLODIPine (NORVASC) 10 MG tablet Take 1 tablet by mouth daily 30 tablet 3    magnesium oxide (MAG-OX) 400 (240 Mg) MG tablet Take 1 tablet by mouth 2 times daily 30 tablet 1    pantoprazole (PROTONIX) 40 MG tablet Take 1 tablet by mouth every morning (before breakfast) 30 tablet 3    acetaminophen (TYLENOL) 500 MG tablet Take 1 tablet by extended release tablet 40 mEq    furosemide (LASIX) tablet 40 mg    DISCONTD: calcium carbonate (TUMS) chewable tablet 500 mg    DISCONTD: potassium bicarb-citric acid (EFFER-K) effervescent tablet 40 mEq    potassium bicarb-citric acid (EFFER-K) effervescent tablet 40 mEq    DISCONTD: lisinopril (PRINIVIL;ZESTRIL) tablet 20 mg    DISCONTD: therapeutic multivitamin-minerals 1 tablet    DISCONTD: pantoprazole (PROTONIX) tablet 40 mg    DISCONTD: amLODIPine (NORVASC) tablet 10 mg    DISCONTD: fluconazole (DIFLUCAN) in 0.9 % sodium chloride IVPB 400 mg    DISCONTD: lidocaine PF 1 % injection 5 mL    DISCONTD: sodium chloride flush 0.9 % injection 10 mL    DISCONTD: sodium chloride flush 0.9 % injection 10 mL    DISCONTD: furosemide (LASIX) injection 20 mg    DISCONTD: lisinopril (PRINIVIL;ZESTRIL) tablet 20 mg    DISCONTD: magnesium sulfate 2 g in 50 mL IVPB premix    DISCONTD: metoprolol tartrate (LOPRESSOR) tablet 50 mg    lidocaine PF 1 % injection 5 mL    DISCONTD: sodium chloride flush 0.9 % injection 10 mL    DISCONTD: sodium chloride flush 0.9 % injection 10 mL    calcium carbonate (TUMS) 500 MG chewable tablet     Sig: Take 1 tablet by mouth 2 times daily     Dispense:  60 tablet     Refill:  0    Heparin Sodium, Porcine, (HEPARIN, PORCINE,) 5000 UNIT/ML injection     Sig: Inject 1 mL into the skin 2 times daily     Dispense:  1 mL     Refill:  0    insulin lispro (HUMALOG) 100 UNIT/ML injection vial     Sig: Inject 0-6 Units into the skin 3 times daily (with meals)     Dispense:  1 vial     Refill:  3    promethazine (PHENERGAN) 12.5 MG tablet     Sig: Take 1 tablet by mouth every 6 hours as needed for Nausea     Dispense:  10 tablet     Refill:  2    cloNIDine (CATAPRES) 0.2 MG tablet     Sig: Take 1 tablet by mouth 3 times daily     Dispense:  60 tablet     Refill:  3    lisinopril (PRINIVIL;ZESTRIL) 20 MG tablet     Sig: Take 1 tablet by mouth 2 times daily     Dispense:  30 tablet Refill:  3    metoprolol tartrate (LOPRESSOR) 50 MG tablet     Sig: Take 1 tablet by mouth 2 times daily     Dispense:  60 tablet     Refill:  3    amLODIPine (NORVASC) 10 MG tablet     Sig: Take 1 tablet by mouth daily     Dispense:  30 tablet     Refill:  3    magnesium oxide (MAG-OX) 400 (240 Mg) MG tablet     Sig: Take 1 tablet by mouth 2 times daily     Dispense:  30 tablet     Refill:  1    pantoprazole (PROTONIX) 40 MG tablet     Sig: Take 1 tablet by mouth every morning (before breakfast)     Dispense:  30 tablet     Refill:  3    acetaminophen (TYLENOL) 500 MG tablet     Sig: Take 1 tablet by mouth 4 times daily as needed for Pain     Dispense:  360 tablet     Refill:  1    DISCONTD: magnesium sulfate injection 2 g    magnesium sulfate 2 g in 50 mL IVPB premix           Objective:   Vitals: BP (!) 149/100   Pulse 107   Temp 98.3 °F (36.8 °C) (Oral)   Resp 14   Ht 4' 11\" (1.499 m)   Wt 228 lb 6.3 oz (103.6 kg)   SpO2 92%   BMI 46.13 kg/m²   General appearance: alert,awake,oriented x 3 and cooperative with exam  HEENT: Head: Normal, normocephalic, atraumatic, anicteric, pupils are reactive to light. Dry mucous membrane.   Neck: no adenopathy, no carotid bruit, supple, symmetrical, trachea midline and thyroid not enlarged, symmetric, no tenderness/mass/nodules  Lungs: clear  Heart: regular rate and rhythm, S1, S2 normal, no murmur, click, rub or gallop  Abdomen: soft, non-tender; bowel sounds normal; no masses,  no organomegaly  Extremities: extremities normal, Neurologic: Mental status: Alert, oriented, thought content appropriate  Generalized weakness  Incision on abd is open with drainge    Assessment and Plan:   Acute peritonitis  S/psurgery for ruptured tubal abscess  Sepsis  julian  htn  Morbid obesity  anemia  Patient Active Problem List:     EMERY (obstructive sleep apnea)     ARF (acute renal failure) ([facility])     SBO (small bowel obstruction) ([facility])     Leukocytosis     Septicemia ([facility] [address] Acidosis     KIKI (acute kidney injury) ([facility] [address]     Gallbladder disease     Hypertension     Type 2 diabetes mellitus with hyperglycemia (HCC)     Peritonitis (HCC)     Lactic acidosis     Acute pulmonary insufficiency     Metabolic acidosis     Elevated LFTs    Pt is stable. Discussed with staff and pt about the plan. See the orders for further plan. Will proceed further acc to pt's progress. Time spent with management of the pt is- 20 mts  Wound care  See the [address] and [facility] Hospital rec forms  Follow up with specialists as instructed by them. Advised the pt to call me in case of questions or worsening of symptoms. Pt voiced understanding of the instructions.   Condition and prognosis is guarded      Electronically signed by: Anselmo Anderson MD, on 7/30/2020, at 9:57 AM

## 2020-08-05 ENCOUNTER — OFFICE VISIT (OUTPATIENT)
Dept: SURGERY | Age: 50
End: 2020-08-05

## 2020-08-05 PROBLEM — N70.93 TOA (TUBO-OVARIAN ABSCESS): Status: ACTIVE | Noted: 2020-08-05

## 2020-08-05 PROCEDURE — 99024 POSTOP FOLLOW-UP VISIT: CPT | Performed by: SURGERY

## 2020-08-05 NOTE — PROGRESS NOTES
Subjective:      Patient ID: Taylor Gaspar is a 53 y.o. female. HPI  S/p exlap, L salpingectomy. Doing well. Remains at [facility] but making progress towards going home. Normal Bms. Eating well. Afebrile. No apparent complications. Review of Systems    Objective:   Physical Exam  Staples removed  Two small openings around navel clean  Assessment:       Diagnosis Orders   1. Peritonitis ([address]     2.  TOA (tubo-ovarian abscess)             Plan:      Instructed ECF to Pack wounds daily  Ok to shower  F/U 2 weeks for wound check        Radha Madison MD

## 2020-08-10 ENCOUNTER — TELEPHONE (OUTPATIENT)
Dept: SURGERY | Age: 50
End: 2020-08-10

## 2020-08-10 NOTE — TELEPHONE ENCOUNTER
Spoke with Aftab Kaba. Brandon for Urthromycin ointment, oral antibiotic and wash with hibiclens since her wound culture came back POSITIVE STAPH.

## 2020-08-19 ENCOUNTER — OFFICE VISIT (OUTPATIENT)
Dept: SURGERY | Age: 50
End: 2020-08-19

## 2020-08-19 PROCEDURE — 99024 POSTOP FOLLOW-UP VISIT: CPT | Performed by: SURGERY

## 2020-08-19 NOTE — PROGRESS NOTES
Subjective:      Patient ID: Carmela Mohs is a 53 y.o. female. HPI  F/U exploration for ruptured TOA. Remains at [facility]. Mobility slowly improving. Unable to climb stairs yet. Wounds opened and draining. Regular BMs    Review of Systems    Objective:   Physical Exam  4 areas of wound dehiscence. Second and fourth opening with tunneling  Assessment:       Diagnosis Orders   1. TOA (tubo-ovarian abscess)             Plan:      Instructed ECF on packing to depths of wound. Other alternative would be opening wound up and placing vac.   Monitor with local wound care for now  abx per ID  Increase mobility  F/U 2-3 weeks        Moi Allred MD

## 2020-09-03 ENCOUNTER — TELEPHONE (OUTPATIENT)
Dept: SURGERY | Age: 50
End: 2020-09-03

## 2020-09-03 NOTE — TELEPHONE ENCOUNTER
Patient reports she is still in the nursing home. She feels like she is not ready to go back to work with open wounds and packing them. She would like to be D/C from [facility] and do home care or have the opportunity to pack wound at home on her own. Would like to return 9-20.

## 2020-09-03 NOTE — TELEPHONE ENCOUNTER
Pt has questions about paperwork and return to work date. She needs to know when she can go back to work. Her Bary Brooke says she needs to go back to work on the 9/10/2020, but she is not sure she can do it. She also needs to know what her restrictions are. She would like Dr. Deandra Murdock to call her.

## 2020-09-10 ENCOUNTER — OFFICE VISIT (OUTPATIENT)
Dept: SURGERY | Age: 50
End: 2020-09-10

## 2020-09-10 PROCEDURE — 99024 POSTOP FOLLOW-UP VISIT: CPT | Performed by: SURGERY

## 2020-09-15 ENCOUNTER — TELEPHONE (OUTPATIENT)
Dept: SURGERY | Age: 50
End: 2020-09-15

## 2020-09-15 NOTE — TELEPHONE ENCOUNTER
PT was seen on 9/10 for a handicap plaque. PT was told to see her PCP for this. PT's PCP told her that Dr. Jose R Aceves would have the be the provider to write for this.

## 2020-09-15 NOTE — TELEPHONE ENCOUNTER
Patient has no restrictions. She needs to be up and walking, it will be good for her. Patient needs to push her self more and be up and active. Patient is already 2 months out from surgery. No Placard. Patient is aware.

## 2020-09-24 ENCOUNTER — OFFICE VISIT (OUTPATIENT)
Dept: SURGERY | Age: 50
End: 2020-09-24

## 2020-09-24 VITALS — BODY MASS INDEX: 40.6 KG/M2 | WEIGHT: 201 LBS

## 2020-09-24 PROCEDURE — 99024 POSTOP FOLLOW-UP VISIT: CPT | Performed by: SURGERY

## 2020-09-24 NOTE — LETTER
[facility]  [address] [place] [address]  [facility] [address]  Phone: [phone]  Fax: [phone]    Ilda Razo MD        September 24, 2020     Patient: Hanna Bronson   YOB: 1970   Date of Visit: 9/24/2020       To Whom It May Concern: It is my medical opinion that Hanna Bronson may return to work on October 12, 2020 without restrictions. If you have any questions or concerns, please don't hesitate to call.     Sincerely,          Ilda Razo MD

## 2020-10-05 ENCOUNTER — TELEPHONE (OUTPATIENT)
Dept: SURGERY | Age: 50
End: 2020-10-05

## 2020-11-03 PROBLEM — N17.9 ARF (ACUTE RENAL FAILURE) (HCC): Status: RESOLVED | Noted: 2020-07-06 | Resolved: 2020-11-03

## 2020-11-13 ENCOUNTER — HOSPITAL ENCOUNTER (OUTPATIENT)
Dept: WOUND CARE | Age: 50
Discharge: HOME | End: 2020-11-13
Payer: COMMERCIAL

## 2020-11-13 VITALS
RESPIRATION RATE: 16 BRPM | HEART RATE: 73 BPM | SYSTOLIC BLOOD PRESSURE: 124 MMHG | HEIGHT: 59 IN | DIASTOLIC BLOOD PRESSURE: 82 MMHG | WEIGHT: 207.89 LBS | BODY MASS INDEX: 41.91 KG/M2 | TEMPERATURE: 97.2 F

## 2020-11-13 PROBLEM — T81.89XA NONHEALING SURGICAL WOUND, INITIAL ENCOUNTER: Status: ACTIVE | Noted: 2020-11-13

## 2020-11-13 PROBLEM — T81.30XA ABDOMINAL WOUND DEHISCENCE: Status: ACTIVE | Noted: 2020-11-13

## 2020-11-13 PROBLEM — T81.321A ABDOMINAL WOUND DEHISCENCE: Status: ACTIVE | Noted: 2020-11-13

## 2020-11-13 PROCEDURE — 99202 OFFICE O/P NEW SF 15 MIN: CPT | Performed by: NURSE PRACTITIONER

## 2020-11-13 PROCEDURE — 87205 SMEAR GRAM STAIN: CPT

## 2020-11-13 PROCEDURE — 87070 CULTURE OTHR SPECIMN AEROBIC: CPT

## 2020-11-13 PROCEDURE — 87077 CULTURE AEROBIC IDENTIFY: CPT

## 2020-11-13 PROCEDURE — 99213 OFFICE O/P EST LOW 20 MIN: CPT

## 2020-11-13 PROCEDURE — 11042 DBRDMT SUBQ TIS 1ST 20SQCM/<: CPT | Performed by: NURSE PRACTITIONER

## 2020-11-13 PROCEDURE — 87186 SC STD MICRODIL/AGAR DIL: CPT

## 2020-11-13 RX ORDER — BETAMETHASONE DIPROPIONATE 0.05 %
OINTMENT (GRAM) TOPICAL ONCE
Status: CANCELLED | OUTPATIENT
Start: 2020-11-13

## 2020-11-13 RX ORDER — GINSENG 100 MG
CAPSULE ORAL ONCE
Status: CANCELLED | OUTPATIENT
Start: 2020-11-13

## 2020-11-13 RX ORDER — CLOBETASOL PROPIONATE 0.5 MG/G
OINTMENT TOPICAL ONCE
Status: CANCELLED | OUTPATIENT
Start: 2020-11-13

## 2020-11-13 RX ORDER — GENTAMICIN SULFATE 1 MG/G
OINTMENT TOPICAL ONCE
Status: CANCELLED | OUTPATIENT
Start: 2020-11-13

## 2020-11-13 RX ORDER — LIDOCAINE HYDROCHLORIDE 20 MG/ML
JELLY TOPICAL ONCE
Status: CANCELLED | OUTPATIENT
Start: 2020-11-13

## 2020-11-13 RX ORDER — BACITRACIN ZINC AND POLYMYXIN B SULFATE 500; 1000 [USP'U]/G; [USP'U]/G
OINTMENT TOPICAL ONCE
Status: CANCELLED | OUTPATIENT
Start: 2020-11-13

## 2020-11-13 RX ORDER — LIDOCAINE 40 MG/G
CREAM TOPICAL ONCE
Status: CANCELLED | OUTPATIENT
Start: 2020-11-13

## 2020-11-13 RX ORDER — BACITRACIN, NEOMYCIN, POLYMYXIN B 400; 3.5; 5 [USP'U]/G; MG/G; [USP'U]/G
OINTMENT TOPICAL ONCE
Status: CANCELLED | OUTPATIENT
Start: 2020-11-13

## 2020-11-13 RX ORDER — LIDOCAINE HYDROCHLORIDE 40 MG/ML
SOLUTION TOPICAL ONCE
Status: COMPLETED | OUTPATIENT
Start: 2020-11-13 | End: 2020-11-13

## 2020-11-13 RX ORDER — LIDOCAINE 50 MG/G
OINTMENT TOPICAL ONCE
Status: CANCELLED | OUTPATIENT
Start: 2020-11-13

## 2020-11-13 RX ORDER — LIDOCAINE HYDROCHLORIDE 40 MG/ML
SOLUTION TOPICAL ONCE
Status: CANCELLED | OUTPATIENT
Start: 2020-11-13

## 2020-11-13 RX ADMIN — LIDOCAINE HYDROCHLORIDE 2.5 ML: 40 SOLUTION TOPICAL at 10:23

## 2020-11-13 NOTE — LETTER
[facility]  [address] [place], [place] [address]g[place] Pineda  [address]  Phone: [phone] [facility], APRN - [facility]        November 13, 2020     Patient: Noah Duran   YOB: 1950   Date of Visit: 11/12/2020       To Whom It May Concern: It is my medical opinion that Noah Duran may return to work on 11/15/2020 with the following restrictions: lifting/carrying not to exceed 15 lbs. If you have any questions or concerns, please don't hesitate to call.     Sincerely,          Markel Loredo, FLEX - CNP

## 2020-11-13 NOTE — PLAN OF CARE
Problem: Wound:  Goal: Will show signs of wound healing; wound closure and no evidence of infection  Description: Will show signs of wound healing; wound closure and no evidence of infection  Outcome: Ongoing     Problem: Weight control:  Goal: Ability to maintain an optimal weight for height and age will be supported  Description: Ability to maintain an optimal weight for height and age will be supported  Outcome: Ongoing     Problem: Falls - Risk of:  Goal: Will remain free from falls  Description: Will remain free from falls  Outcome: Ongoing     Problem: Blood Glucose:  Goal: Ability to maintain appropriate glucose levels will improve  Description: Ability to maintain appropriate glucose levels will improve  Outcome: Ongoing

## 2020-11-13 NOTE — PROGRESS NOTES
[address]   Progress Note and Procedure Note      [address] RECORD NUMBER:  [id number]  AGE: 53 y.o. GENDER: female  : 1970  EPISODE DATE:  2020    Subjective:     Chief Complaint   Patient presents with    Wound Check     Initial visit for wound on mid abdomen. Had surgery 2020 to remove an ovary and tube. Has been tucking gauze into wound and covering with a dry dressng         HISTORY of PRESENT ILLNESS HPI     Ravinder Sena is a 53 y.o. female who presents today for wound/ulcer evaluation. History of Wound Context: non-healing surgical.  Had omentectomy, left salpingectomy with removal of fibroid on 2020 by Dr. Jason Madden. Patient reports that she has an open area along the midline incision. Patient is diabetic. Last A1C was 6.4 on 7/10/2020. She is currently on Keflex 500 mg PO TID X10 days, started on 11/10/2020.   Wound/Ulcer Pain Timing/Severity: none  Quality of pain: N/A  Severity:  0 / 10   Modifying Factors: None  Associated Signs/Symptoms: drainage    Ulcer Identification:  Ulcer Type: non-healing surgical    Contributing Factors: diabetes and obesity    Acute Wound: Nonhealing surgical    PAST MEDICAL HISTORY        Diagnosis Date    Diabetes mellitus ([address]     Hypertension     Obesity        PAST SURGICAL HISTORY    Past Surgical History:   Procedure Laterality Date     SECTION      LAPAROTOMY N/A 2020    EXPLORATORY LAPAROTOMY, OMENTECTOMY, LEFT SALPINGECTOMY WITH REMOVAL OF FIBROID performed by Frederick Gamez MD at [facility] HISTORY    Family History   Problem Relation Age of Onset    Cancer Mother     Diabetes Father        SOCIAL HISTORY    Social History     Tobacco Use    Smoking status: Never Smoker    Smokeless tobacco: Never Used   Substance Use Topics    Alcohol use: No    Drug use: No       ALLERGIES    No Known Allergies    MEDICATIONS    Current Outpatient Medications on File Prior to Encounter   Medication Sig Dispense Refill    cephALEXin (KEFLEX) 500 MG capsule Take 1 capsule by mouth 3 times daily for 10 days 30 capsule 0    magnesium oxide (MAG-OX) 400 (240 Mg) MG tablet Take 1 tablet by mouth 2 times daily 30 tablet 1    ketoconazole (NIZORAL) 2 % cream Apply topically daily. 60 g 1    lisinopril (PRINIVIL;ZESTRIL) 20 MG tablet Take 1 tablet by mouth 2 times daily 30 tablet 3    metoprolol tartrate (LOPRESSOR) 50 MG tablet Take 1 tablet by mouth 2 times daily 60 tablet 3    amLODIPine (NORVASC) 10 MG tablet Take 1 tablet by mouth daily (Patient not taking: Reported on 9/24/2020) 30 tablet 3    metFORMIN (GLUCOPHAGE) 500 MG tablet Take 1 tablet by mouth 2 times daily (with meals) 60 tablet 5    Multiple Vitamins-Minerals (THERAPEUTIC MULTIVITAMIN-MINERALS) tablet Take 1 tablet by mouth daily 30 tablet 11    vitamin D (ERGOCALCIFEROL) 1.25 MG (94260 UT) CAPS capsule Take 1 capsule by mouth once a week 4 capsule 5    pantoprazole (PROTONIX) 40 MG tablet Take 1 tablet by mouth every morning (before breakfast) 30 tablet 3    cloNIDine (CATAPRES) 0.2 MG tablet Take 1 tablet by mouth 3 times daily 60 tablet 3    furosemide (LASIX) 20 MG tablet Take 1 tablet by mouth 2 times daily 60 tablet 3    acetaminophen (TYLENOL) 500 MG tablet Take 1 tablet by mouth 4 times daily as needed for Pain 360 tablet 1     No current facility-administered medications on file prior to encounter. REVIEW OF SYSTEMS    Pertinent items are noted in HPI.       Objective:      /82   Pulse 73   Temp 97.2 °F (36.2 °C) (Temporal)   Resp 16   Ht 4' 11\" (1.499 m)   Wt 207 lb 14.3 oz (94.3 kg)   BMI 41.99 kg/m²     Wt Readings from Last 3 Encounters:   11/13/20 207 lb 14.3 oz (94.3 kg)   09/24/20 201 lb (91.2 kg)   07/24/20 228 lb 6.3 oz (103.6 kg)       PHYSICAL EXAM  Physical Exam    General Appearance: alert and oriented to person, place and time, well developed and well- nourished, in no acute distress  Skin: warm and dry  Head: normocephalic and atraumatic  Eyes: pupils equal, round, and reactive to light, extraocular eye movements intact, conjunctivae normal  Neck: supple and non-tender without mass  Pulmonary/Chest: clear to auscultation bilaterally- no wheezes, rales or rhonchi, normal air movement, no respiratory distress  Cardiovascular: normal rate, regular rhythm, normal S1 and S2, no murmurs, rubs, clicks, or gallops, distal pulses intact, no carotid bruits  Abdomen: soft, non-tender, non-distended, normal bowel sounds, nonhealing surgical wound along midline incision tunnels 2.3 cm  Extremities: no cyanosis, clubbing or edema  Musculoskeletal: normal range of motion, no joint swelling, deformity or tenderness  Neurologic: reflexes normal and symmetric, no cranial nerve deficit, gait, coordination and speech normal      Assessment:        Problem List Items Addressed This Visit     Nonhealing surgical wound, initial encounter    Abdominal wound dehiscence           Procedure Note  Indications:  Based on my examination of this patient's wound(s)/ulcer(s) today, debridement is required to promote healing and evaluate the wound base. Performed by: FLEX Murphy CNP    Consent obtained:  Yes    Time out taken:  Yes    Pain Control: Anesthetic  Anesthetic: 4% Lidocaine Liquid Topical(2.5 ml)       Debridement: Excisional Debridement    Using curette the wound(s)/ulcer(s) was/were debrided down through and including the removal of epidermis, dermis and subcutaneous tissue.         Devitalized Tissue Debrided:  slough    Pre Debridement Measurements:  Are located in the [place]  Documentation Flow Sheet    Wound/Ulcer #: 1    Post Debridement Measurements:  Wound/Ulcer Descriptions are Pre Debridement except measurements:    Wound 11/13/20 Abdomen Mid Wound #1 mid abdomen acquired 7/6/2020 (Active)   Wound Image   11/13/20 0851   Wound Etiology Surgical 11/13/20 0851   Dressing Status Intact; Old drainage noted 11/13/20 0851   Wound Length (cm) 0.3 cm 11/13/20 0851   Wound Width (cm) 0.3 cm 11/13/20 0851   Wound Depth (cm) 1.4 cm 11/13/20 0851   Wound Surface Area (cm^2) 0.09 cm^2 11/13/20 0851   Wound Volume (cm^3) 0.13 cm^3 11/13/20 0851   Post-Procedure Length (cm) 0.4 cm 11/13/20 0902   Post-Procedure Width (cm) 0.4 cm 11/13/20 0902   Post-Procedure Depth (cm) 2.3 cm 11/13/20 0902   Post-Procedure Surface Area (cm^2) 0.16 cm^2 11/13/20 0902   Post-Procedure Volume (cm^3) 0.37 cm^3 11/13/20 0902   Wound Assessment Granulation tissue;Slough 11/13/20 0851   Drainage Amount Small 11/13/20 0851   Drainage Description Serosanguinous 11/13/20 0851   Odor None 11/13/20 0851   Chantal-wound Assessment Dry/flaky; Intact 11/13/20 0851   Margins Attached edges 11/13/20 0851   Wound Thickness Description not for Pressure Injury Full thickness 11/13/20 0851   Number of days: 0     Incision 67/34/05 Umbilicus (Active)   Number of days: 127       Percent of Wound(s)/Ulcer(s) Debrided: 100%    Total Surface Area Debrided:  0.16 sq cm + tunneling    Diabetic/Pressure/Non Pressure Ulcers only:  Ulcer: N/A     Estimated Blood Loss:  Minimal    Hemostasis Achieved:  not needed    Procedural Pain:  0  / 10     Post Procedural Pain:  0 / 10     Response to treatment:  Well tolerated by patient. Wound culture obtained. PATIENT EDUCATION focused on dressing changes. Patient watched with a mirror while I packed the incision with 1/4\" Iodoform packing strip. I covered the site with 2x2's and cover-roll. She plans to change the dressing every other day. Plan:     Treatment Note please see attached Discharge Instructions    Written patient dismissal instructions given to patient and signed by patient or POA.          Discharge Instructions       [address] and [facility] Oxygen Therapy   Physician Orders and Discharge Instructions  [facility]  [address] [facility] [address] [facility] [address]  Telephone: [phone]    NAME:  Malick Alcantar  YOB: 1970  MEDICAL RECORD NUMBER:  [id number]  DATE:  11/13/2020    Wound Cleansing:   Do not scrub or use excessive force. Cleanse wound prior to applying a clean dressing with:  [] Normal Saline  [x] Keep Wound Dry in Shower    [] Wound Cleanser   [] Cleanse wound with Mild Soap & Water  [] May Shower at Discharge   [] Other:       Topical Treatments:  Do not apply lotions, creams, or ointments to wound bed unless directed. [] Apply moisturizing lotion to skin surrounding the wound prior to dressing change.  [] Apply antifungal ointment to skin surrounding the wound prior to dressing change.  [] Apply thin film of moisture barrier ointment to skin immediately around wound. [] Other:       Dressings:           Wound Location: Abdomen    [x] Apply Primary Dressing:        [x] Pack wound loosely with  [x] Iodoform- 1/4 in Packing       [x] Cover and Secure with:     [x] Gauze [x] Cover Roll Tape    [] Other:    Avoid contact of tape with skin. [x] Change dressing:   [x] Every Other Day        Dietary:  [x] Diet as tolerated:   [] Calorie Diabetic Diet:   [] No Added Salt:  [x] Increase Protein:   [] Other:     Activity:  [x] Activity as tolerated:  [] Patient has no activity restrictions     [] Strict Bedrest: [] Remain off Work:     [] May return to full duty work:                                   [] Return to work with restrictions: If you are still having pain after you go home:  [x] Elevate the affected limb. [x] Use over-the-counter medications you would normally use for pain as permitted by your family doctor. [x] For persistent pain not relieved by the above interventions, please call your family doctor.              Return Appointment:  [] Wound and dressing supply provider:   [] ECF or Home Healthcare:  [] Wound Assessment: [] Physician or NP scheduled for Wound

## 2020-11-15 LAB
GRAM STAIN RESULT: ABNORMAL
ORGANISM: ABNORMAL
ORGANISM: ABNORMAL
WOUND/ABSCESS: ABNORMAL

## 2020-11-16 ENCOUNTER — TELEPHONE (OUTPATIENT)
Dept: WOUND CARE | Age: 50
End: 2020-11-16

## 2020-11-16 RX ORDER — SULFAMETHOXAZOLE AND TRIMETHOPRIM 800; 160 MG/1; MG/1
1 TABLET ORAL 2 TIMES DAILY
Qty: 20 TABLET | Refills: 0 | Status: SHIPPED | OUTPATIENT
Start: 2020-11-16 | End: 2020-11-26

## 2020-11-16 NOTE — TELEPHONE ENCOUNTER
Patient was notified of wound culture results and need for antibiotics. Bactrim DS was ordered by Lissy Goodwin CNP and sent to her [facility]. Patient is aware and already received a text message from her pharmacy.

## 2020-11-20 ENCOUNTER — HOSPITAL ENCOUNTER (OUTPATIENT)
Dept: WOUND CARE | Age: 50
Discharge: HOME | End: 2020-11-20
Payer: COMMERCIAL

## 2020-11-20 VITALS
DIASTOLIC BLOOD PRESSURE: 80 MMHG | SYSTOLIC BLOOD PRESSURE: 118 MMHG | HEART RATE: 66 BPM | TEMPERATURE: 97 F | RESPIRATION RATE: 16 BRPM

## 2020-11-20 PROBLEM — T81.89XD NONHEALING SURGICAL WOUND, SUBSEQUENT ENCOUNTER: Status: ACTIVE | Noted: 2020-11-13

## 2020-11-20 PROCEDURE — 11042 DBRDMT SUBQ TIS 1ST 20SQCM/<: CPT

## 2020-11-20 PROCEDURE — 11042 DBRDMT SUBQ TIS 1ST 20SQCM/<: CPT | Performed by: NURSE PRACTITIONER

## 2020-11-20 RX ORDER — AMOXICILLIN 500 MG/1
500 CAPSULE ORAL 3 TIMES DAILY
Qty: 30 CAPSULE | Refills: 0 | Status: SHIPPED | OUTPATIENT
Start: 2020-11-20 | End: 2020-11-30

## 2020-11-20 RX ORDER — LIDOCAINE HYDROCHLORIDE 40 MG/ML
SOLUTION TOPICAL ONCE
Status: COMPLETED | OUTPATIENT
Start: 2020-11-20 | End: 2020-11-20

## 2020-11-20 RX ORDER — LIDOCAINE HYDROCHLORIDE 40 MG/ML
SOLUTION TOPICAL ONCE
Status: CANCELLED | OUTPATIENT
Start: 2020-11-20

## 2020-11-20 RX ORDER — LIDOCAINE 50 MG/G
OINTMENT TOPICAL ONCE
Status: CANCELLED | OUTPATIENT
Start: 2020-11-20

## 2020-11-20 RX ORDER — BETAMETHASONE DIPROPIONATE 0.05 %
OINTMENT (GRAM) TOPICAL ONCE
Status: CANCELLED | OUTPATIENT
Start: 2020-11-20

## 2020-11-20 RX ORDER — LIDOCAINE 40 MG/G
CREAM TOPICAL ONCE
Status: CANCELLED | OUTPATIENT
Start: 2020-11-20

## 2020-11-20 RX ORDER — BACITRACIN, NEOMYCIN, POLYMYXIN B 400; 3.5; 5 [USP'U]/G; MG/G; [USP'U]/G
OINTMENT TOPICAL ONCE
Status: CANCELLED | OUTPATIENT
Start: 2020-11-20

## 2020-11-20 RX ORDER — LIDOCAINE HYDROCHLORIDE 20 MG/ML
JELLY TOPICAL ONCE
Status: CANCELLED | OUTPATIENT
Start: 2020-11-20

## 2020-11-20 RX ORDER — CLOBETASOL PROPIONATE 0.5 MG/G
OINTMENT TOPICAL ONCE
Status: CANCELLED | OUTPATIENT
Start: 2020-11-20

## 2020-11-20 RX ORDER — BACITRACIN ZINC AND POLYMYXIN B SULFATE 500; 1000 [USP'U]/G; [USP'U]/G
OINTMENT TOPICAL ONCE
Status: CANCELLED | OUTPATIENT
Start: 2020-11-20

## 2020-11-20 RX ORDER — GENTAMICIN SULFATE 1 MG/G
OINTMENT TOPICAL ONCE
Status: CANCELLED | OUTPATIENT
Start: 2020-11-20

## 2020-11-20 RX ORDER — GINSENG 100 MG
CAPSULE ORAL ONCE
Status: CANCELLED | OUTPATIENT
Start: 2020-11-20

## 2020-11-20 RX ADMIN — LIDOCAINE HYDROCHLORIDE 2.5 ML: 40 SOLUTION TOPICAL at 08:51

## 2020-11-20 ASSESSMENT — PAIN SCALES - GENERAL: PAINLEVEL_OUTOF10: 0

## 2020-11-20 NOTE — PROGRESS NOTES
[address]   Progress Note and Procedure Note      Edson MACEDO [address]. RECORD NUMBER:  [id number]  AGE: 53 y.o. GENDER: female  : 1970  EPISODE DATE:  2020    Subjective:     Chief Complaint   Patient presents with    Wound Check     f/u visit - abdominal wound         HISTORY of PRESENT ILLNESS    Jessi Givens is a 53 y.o. female who presents today for wound/ulcer evaluation. History of Wound Context: non-healing surgical.  Had omentectomy, left salpingectomy with removal of fibroid on 2020 by Dr. Kahlil Mixon. Patient reports that she has an open area along the midline incision. Patient is diabetic. Last A1C was 6.4 on 7/10/2020. Wound culture + for [employer] and Providencia stuartii. She had been on Keflex 500 mg PO TID by her PCP and I added Bactrim DS. Given that the wound has increased in depth, I added amoxicillin and DC'd Keflex.   Wound/Ulcer Pain Timing/Severity: none  Quality of pain: N/A  Severity:  0 / 10   Modifying Factors: None  Associated Signs/Symptoms: drainage     Ulcer Identification:  Ulcer Type: non-healing surgical     Contributing Factors: diabetes and obesity     Acute Wound: Nonhealing surgical    PAST MEDICAL HISTORY        Diagnosis Date    Diabetes mellitus ([address]     Hypertension     Obesity        PAST SURGICAL HISTORY    Past Surgical History:   Procedure Laterality Date     SECTION      LAPAROTOMY N/A 2020    EXPLORATORY LAPAROTOMY, OMENTECTOMY, LEFT SALPINGECTOMY WITH REMOVAL OF FIBROID performed by Vinny Zheng [facility] at [facility] HISTORY    Family History   Problem Relation Age of Onset    Cancer Mother     Diabetes Father        SOCIAL HISTORY    Social History     Tobacco Use    Smoking status: Never Smoker    Smokeless tobacco: Never Used   Substance Use Topics    Alcohol use: No    Drug use: No       ALLERGIES    No Known Allergies    MEDICATIONS    Current Outpatient Medications on File Prior to Encounter   Medication Sig Dispense Refill    sulfamethoxazole-trimethoprim (BACTRIM DS;SEPTRA DS) 800-160 MG per tablet Take 1 tablet by mouth 2 times daily for 10 days 20 tablet 0    cephALEXin (KEFLEX) 500 MG capsule Take 1 capsule by mouth 3 times daily for 10 days 30 capsule 0    magnesium oxide (MAG-OX) 400 (240 Mg) MG tablet Take 1 tablet by mouth 2 times daily 30 tablet 1    ketoconazole (NIZORAL) 2 % cream Apply topically daily. 60 g 1    lisinopril (PRINIVIL;ZESTRIL) 20 MG tablet Take 1 tablet by mouth 2 times daily 30 tablet 3    metoprolol tartrate (LOPRESSOR) 50 MG tablet Take 1 tablet by mouth 2 times daily 60 tablet 3    amLODIPine (NORVASC) 10 MG tablet Take 1 tablet by mouth daily (Patient not taking: Reported on 9/24/2020) 30 tablet 3    metFORMIN (GLUCOPHAGE) 500 MG tablet Take 1 tablet by mouth 2 times daily (with meals) 60 tablet 5    Multiple Vitamins-Minerals (THERAPEUTIC MULTIVITAMIN-MINERALS) tablet Take 1 tablet by mouth daily 30 tablet 11    vitamin D (ERGOCALCIFEROL) 1.25 MG (13045 UT) CAPS capsule Take 1 capsule by mouth once a week 4 capsule 5    pantoprazole (PROTONIX) 40 MG tablet Take 1 tablet by mouth every morning (before breakfast) 30 tablet 3    cloNIDine (CATAPRES) 0.2 MG tablet Take 1 tablet by mouth 3 times daily 60 tablet 3    furosemide (LASIX) 20 MG tablet Take 1 tablet by mouth 2 times daily 60 tablet 3    acetaminophen (TYLENOL) 500 MG tablet Take 1 tablet by mouth 4 times daily as needed for Pain 360 tablet 1     No current facility-administered medications on file prior to encounter. REVIEW OF SYSTEMS    Pertinent items are noted in HPI.       Objective:      /80   Pulse 66   Temp 97 °F (36.1 °C) (Temporal)   Resp 16     Wt Readings from Last 3 Encounters:   11/13/20 207 lb 14.3 oz (94.3 kg)   09/24/20 201 lb (91.2 kg)   07/24/20 228 lb 6.3 oz (103.6 kg)       PHYSICAL EXAM    General Appearance: alert and oriented to person, place and time, well developed and well- nourished, in no acute distress  Skin: warm and dry  Head: normocephalic and atraumatic  Eyes: pupils equal, round, and reactive to light, extraocular eye movements intact, conjunctivae normal  Neck: supple and non-tender without mass  Pulmonary/Chest: normal air movement, no respiratory distress  Abdomen: soft, non-tender, non-distended, normal bowel sounds, nonhealing surgical wound along midline incision tunnels 2.6 cm  Extremities: no cyanosis, clubbing or edema  Musculoskeletal: normal range of motion, no joint swelling, deformity or tenderness  Neurologic: reflexes normal and symmetric, no cranial nerve deficit, gait, coordination and speech normal      Assessment:        Problem List Items Addressed This Visit     Nonhealing surgical wound, subsequent encounter - Primary    Abdominal wound dehiscence           Procedure Note  Indications:  Based on my examination of this patient's wound(s)/ulcer(s) today, debridement is required to promote healing and evaluate the wound base. Performed by: FLEX Lee CNP    Consent obtained:  Yes    Time out taken:  Yes    Pain Control: Anesthetic  Anesthetic: 4% Lidocaine Liquid Topical(2.5ml)       Debridement: Excisional Debridement    Using curette the wound(s)/ulcer(s) was/were debrided down through and including the removal of subcutaneous tissue. Devitalized Tissue Debrided:  slough    Pre Debridement Measurements:  Are located in the [facility]  Documentation Flow Sheet    Wound/Ulcer #: 1    Post Debridement Measurements:  Wound/Ulcer Descriptions are Pre Debridement except measurements:    Wound 11/13/20 Abdomen Mid Wound #1 mid abdomen acquired 7/6/2020 (Active)   Wound Image   11/13/20 0851   Wound Etiology Surgical 11/13/20 0851   Dressing Status Intact; Old drainage noted 11/13/20 0851   Wound Cleansed Cleansed with saline 11/13/20 0902   Dressing/Treatment Packing 11/13/20 0902   Wound Length (cm) 0.3 cm 11/20/20 0837   Wound Width (cm) 0.4 cm 11/20/20 0837   Wound Depth (cm) 2.6 cm 11/20/20 0837   Wound Surface Area (cm^2) 0.12 cm^2 11/20/20 0837   Change in Wound Size % (l*w) -33.33 11/20/20 0837   Wound Volume (cm^3) 0.31 cm^3 11/20/20 0837   Wound Healing % -138 11/20/20 0837   Post-Procedure Length (cm) 0.4 cm 11/20/20 0856   Post-Procedure Width (cm) 0.5 cm 11/20/20 0856   Post-Procedure Depth (cm) 2.6 cm 11/20/20 0856   Post-Procedure Surface Area (cm^2) 0.2 cm^2 11/20/20 0856   Post-Procedure Volume (cm^3) 0.52 cm^3 11/20/20 0856   Wound Assessment Granulation tissue;Slough 11/20/20 0837   Drainage Amount Moderate 11/20/20 0837   Drainage Description Serosanguinous;Green 11/20/20 0837   Odor None 11/20/20 0837   Chantal-wound Assessment Dry/flaky; Intact 11/20/20 0837   Margins Attached edges 11/20/20 0837   Wound Thickness Description not for Pressure Injury Full thickness 11/20/20 0837   Number of days: 7     Incision 32/92/57 Umbilicus (Active)   Number of days: 134       Percent of Wound(s)/Ulcer(s) Debrided: 100%    Total Surface Area Debrided:  0.2 sq cm     Diabetic/Pressure/Non Pressure Ulcers only:  Ulcer: N/A     Estimated Blood Loss:  Minimal    Hemostasis Achieved:  not needed    Procedural Pain:  0  / 10     Post Procedural Pain:  0 / 10     Response to treatment:  Well tolerated by patient. PATIENT EDUCATION focused on dressing changes. Patient's son is doing her dressing changes with 1/4\" Iodoform packing strip. The dressing is followed by 2x2's and cover-roll. Dressing changes are scheduled for every other day. Plan:     Treatment Note please see attached Discharge Instructions    Written patient dismissal instructions given to patient and signed by patient or POA.          Discharge Instructions         Discharge Instructions        [address] and Hyperbaric Oxygen Therapy   Physician Orders and Discharge [facility]  [address] [facility]   [address], [address]  Telephone: [phone]     NAME:  Laurie Mcelroy  YOB: 1970  MEDICAL RECORD NUMBER:  [id number]  DATE:  11/20/2020     Wound Cleansing:   Do not scrub or use excessive force. Cleanse wound prior to applying a clean dressing with:  []? Normal Saline  [x]? Keep Wound Dry in Shower    []? Wound Cleanser   []? Cleanse wound with Mild Soap & Water  []? May Shower at Discharge   []? Other:        Topical Treatments:  Do not apply lotions, creams, or ointments to wound bed unless directed. []? Apply moisturizing lotion to skin surrounding the wound prior to dressing change. []? Apply antifungal ointment to skin surrounding the wound prior to dressing change. []? Apply thin film of moisture barrier ointment to skin immediately around wound. []? Other:                  Dressings:                  Wound Location: Abdomen             [x]? Apply Primary Dressing:                                           [x]? Pack wound loosely with    [x]? Iodoform- 1/4 in Packing        [x]? Cover and Secure with:                   [x]? Gauze         [x]? Cover Roll Tape                   []? Other:               Avoid contact of tape with skin.     [x]? Change dressing:   [x]? Every Other Day                       Dietary:  [x]? Diet as tolerated:     []? Calorie Diabetic Diet:           []? No Added Salt:  [x]? Increase Protein:     []? Other:              Activity:  [x]? Activity as tolerated:  []? Patient has no activity restrictions     []? Strict Bedrest: []? Remain off Work:     []? May return to full duty work:                                    []? Return to work with restrictions:                 If you are still having pain after you go home:  [x]? Elevate the affected limb. [x]? Use over-the-counter medications you would normally use for pain as permitted by your family doctor. [x]?  For persistent pain not relieved by the above interventions, please call your family doctor.   Jayden Merrill   Return Appointment:  []? Wound and dressing supply provider:   []? ECF or Home Healthcare:  []? Wound Assessment:          []? Physician or NP scheduled for Wound Assessment:   [x]? Return Appointment: With Tiago Leavitt CNP  in  2  [facility])  []? Ordered tests:       your antibiotic Amoxicillin and continue taking your Bactrim DS!     Nurse Case Manger:  Franca Joy  Electronically signed by Renna Schwab, RN on 11/20/2020 at 8:58 AM            [address] [place] [address] Information: Should you experience any significant changes in your wound(s) or have questions about your wound care, please contact the [address] [place] [address] at [address] 8:00 am - 4:30 pm and Friday 8:00 am - 12:30 pm.  If you need help with your wound outside these hours and cannot wait until we are again available, contact your PCP or go to the hospital emergency room.      PLEASE NOTE: IF YOU ARE UNABLE TO OBTAIN WOUND SUPPLIES, CONTINUE TO USE THE SUPPLIES YOU HAVE AVAILABLE UNTIL YOU ARE ABLE TO [facility]. IT IS MOST IMPORTANT TO KEEP THE WOUND COVERED AT ALL TIMES.      Physician Signature:_______________________     Date: ___________ Time:  ____________                                Tiago Leavitt CNP                                Electronically signed by FLEX Shirley CNP on 11/20/2020 at 9:17 AM

## 2020-11-20 NOTE — PLAN OF CARE
[address]:     [employer] [id number] [address] p: [phone] f: [phone]     Ordering Center:     [place] [address] [address]  [address] [facility] [address]  [place] [address]  [phone]  WOUND CARE Dept: [phone]   FAX NUMBER [unfilled]    Patient Information:      Kizzy Yi Posrcgwendolyn [address] [place] [address]   [phone]   : 1970  AGE: 53 y.o. GENDER: female   TODAYS DATE:  2020    Insurance:      PRIMARY INSURANCE:  Plan: BC[employer] - [place] HMO  Coverage: BCBS  Effective Date: 2015  [id number] - (BX Traditional)    SECONDARY INSURANCE:  Plan:   Coverage:   Effective Date:   [unfilled]    [unfilled]   [unfilled]     Patient Wound Information:      Problem List Items Addressed This Visit     Nonhealing surgical wound, subsequent encounter - Primary    Abdominal wound dehiscence        ICD-10 codes: T81.89XA     WOUNDS REQUIRING DRESSING SUPPLIES:     Wound 20 Abdomen Mid Wound #1 mid abdomen acquired 2020 (Active)   Wound Image   20 0851   Wound Etiology Surgical 20 0851   Dressing Status Intact; Old drainage noted 20 0851   Wound Cleansed Cleansed with saline 20 0902   Dressing/Treatment Packing 20 0902   Wound Length (cm) 0.3 cm 20 0837   Wound Width (cm) 0.4 cm 20 0837   Wound Depth (cm) 2.6 cm 20 0837   Wound Surface Area (cm^2) 0.12 cm^2 20 0837   Change in Wound Size % (l*w) -33.33 20 0837   Wound Volume (cm^3) 0.31 cm^3 20 0837   Wound Healing % -138 20 0837   Post-Procedure Length (cm) 0.4 cm 20 0856   Post-Procedure Width (cm) 0.5 cm 20 0856   Post-Procedure Depth (cm) 2.6 cm 20 0856   Post-Procedure Surface Area (cm^2) 0.2 cm^2 20 0856   Post-Procedure Volume (cm^3) 0.52 cm^3 20 0856   Wound Assessment Granulation tissue;Slough 20 7594 Drainage Amount Moderate 11/20/20 0837   Drainage Description Serosanguinous;Green 11/20/20 0837   Odor None 11/20/20 0837   Chantal-wound Assessment Dry/flaky; Intact 11/20/20 0837   Margins Attached edges 11/20/20 0837   Wound Thickness Description not for Pressure Injury Full thickness 11/20/20 0837   Number of days: 7     Incision 72/10/41 Umbilicus (Active)   Number of days: 134       Supplies Requested :      WOUND #: 1   PRIMARY DRESSING:  Iodoform packing strip  1/4 inch   Cover and Secure with: 2X2 gauze pad  Other Cover Roll     FREQUENCY OF DRESSING CHANGES:  Every other day           ADDITIONAL ITEMS:  [] Gloves Small  [] Gloves Medium [x] Gloves Large [] Gloves XLarge  [] Tape 1\" [] Tape 2\" [] Tape 3\"  [x] Medipore Tape  [x] Saline  [] Skin Prep   [] Adhesive Remover   [x] Cotton Tip Applicators   [] Other:    Patient Wound(s) Debrided: [x] Yes   [] No    Debribement Type: subcutaneous tissue    Debridement Date: 11/202/2020    Patient currently being seen by Home Health: [] Yes   [x] No    Duration for needed supplies:  []15  [x]30  []60  []90 Days    Provider Information:      PROVIDER'S NAME: FLEX Shirley - KATELIN     NPI: Gonzalo BLACK  [id number]

## 2020-12-04 ENCOUNTER — HOSPITAL ENCOUNTER (OUTPATIENT)
Dept: WOUND CARE | Age: 50
Discharge: HOME | End: 2020-12-04
Payer: COMMERCIAL

## 2020-12-04 VITALS
TEMPERATURE: 97.3 F | HEART RATE: 57 BPM | WEIGHT: 209.66 LBS | SYSTOLIC BLOOD PRESSURE: 114 MMHG | RESPIRATION RATE: 16 BRPM | BODY MASS INDEX: 42.35 KG/M2 | DIASTOLIC BLOOD PRESSURE: 70 MMHG

## 2020-12-04 PROCEDURE — 99213 OFFICE O/P EST LOW 20 MIN: CPT

## 2020-12-04 PROCEDURE — 99212 OFFICE O/P EST SF 10 MIN: CPT | Performed by: NURSE PRACTITIONER

## 2020-12-04 RX ORDER — LIDOCAINE 50 MG/G
OINTMENT TOPICAL ONCE
Status: CANCELLED | OUTPATIENT
Start: 2020-12-04

## 2020-12-04 RX ORDER — BETAMETHASONE DIPROPIONATE 0.05 %
OINTMENT (GRAM) TOPICAL ONCE
Status: CANCELLED | OUTPATIENT
Start: 2020-12-04

## 2020-12-04 RX ORDER — LIDOCAINE HYDROCHLORIDE 40 MG/ML
SOLUTION TOPICAL ONCE
Status: CANCELLED | OUTPATIENT
Start: 2020-12-04

## 2020-12-04 RX ORDER — LIDOCAINE HYDROCHLORIDE 40 MG/ML
SOLUTION TOPICAL ONCE
Status: COMPLETED | OUTPATIENT
Start: 2020-12-04 | End: 2020-12-04

## 2020-12-04 RX ORDER — GENTAMICIN SULFATE 1 MG/G
OINTMENT TOPICAL ONCE
Status: CANCELLED | OUTPATIENT
Start: 2020-12-04

## 2020-12-04 RX ORDER — BACITRACIN, NEOMYCIN, POLYMYXIN B 400; 3.5; 5 [USP'U]/G; MG/G; [USP'U]/G
OINTMENT TOPICAL ONCE
Status: CANCELLED | OUTPATIENT
Start: 2020-12-04

## 2020-12-04 RX ORDER — GINSENG 100 MG
CAPSULE ORAL ONCE
Status: CANCELLED | OUTPATIENT
Start: 2020-12-04

## 2020-12-04 RX ORDER — BACITRACIN ZINC AND POLYMYXIN B SULFATE 500; 1000 [USP'U]/G; [USP'U]/G
OINTMENT TOPICAL ONCE
Status: CANCELLED | OUTPATIENT
Start: 2020-12-04

## 2020-12-04 RX ORDER — CLOBETASOL PROPIONATE 0.5 MG/G
OINTMENT TOPICAL ONCE
Status: CANCELLED | OUTPATIENT
Start: 2020-12-04

## 2020-12-04 RX ORDER — LIDOCAINE 40 MG/G
CREAM TOPICAL ONCE
Status: CANCELLED | OUTPATIENT
Start: 2020-12-04

## 2020-12-04 RX ORDER — LIDOCAINE HYDROCHLORIDE 20 MG/ML
JELLY TOPICAL ONCE
Status: CANCELLED | OUTPATIENT
Start: 2020-12-04

## 2020-12-04 RX ADMIN — LIDOCAINE HYDROCHLORIDE 2.5 ML: 40 SOLUTION TOPICAL at 08:38

## 2020-12-04 ASSESSMENT — PAIN DESCRIPTION - FREQUENCY: FREQUENCY: INTERMITTENT

## 2020-12-04 ASSESSMENT — PAIN - FUNCTIONAL ASSESSMENT: PAIN_FUNCTIONAL_ASSESSMENT: ACTIVITIES ARE NOT PREVENTED

## 2020-12-04 ASSESSMENT — PAIN DESCRIPTION - LOCATION: LOCATION: ABDOMEN

## 2020-12-04 ASSESSMENT — PAIN DESCRIPTION - ONSET: ONSET: ON-GOING

## 2020-12-04 ASSESSMENT — PAIN DESCRIPTION - PAIN TYPE: TYPE: ACUTE PAIN

## 2020-12-04 ASSESSMENT — PAIN DESCRIPTION - ORIENTATION: ORIENTATION: LOWER

## 2020-12-04 ASSESSMENT — PAIN SCALES - GENERAL: PAINLEVEL_OUTOF10: 1

## 2020-12-04 ASSESSMENT — PAIN DESCRIPTION - PROGRESSION: CLINICAL_PROGRESSION: NOT CHANGED

## 2020-12-04 ASSESSMENT — PAIN DESCRIPTION - DESCRIPTORS: DESCRIPTORS: ACHING;DISCOMFORT

## 2020-12-04 NOTE — PROGRESS NOTES
[address]   Progress Note and Procedure Note      Edson Ruff RECORD NUMBER:  [id number]  AGE: 53 y.o. GENDER: female  : 1970  EPISODE DATE:  2020    Subjective:     Chief Complaint   Patient presents with    Wound Check     Follow up for mid abdominal wound. HISTORY of PRESENT ILLNESS HPI    Jonh Mendez a 53 y.o. female who presents today for wound/ulcer evaluation. History of Wound Context: non-healing surgical.  Had omentectomy, left salpingectomy with removal of fibroid on 2020 by Dr. Marty Aragon reports that she has an open area along the midline incision.  Patient is diabetic.  Last A1C was 6.4 on 7/10/2020.  Wound culture + for [employer] and Providencia stuartii. She had been on Keflex 500 mg PO TID by her PCP and I added Bactrim DS. Given that the wound had increased in depth, I added amoxicillin and DC'd Keflex. Today the wound has healed significantly; the depth went from 2.6 cm to 0.7 cm since last visit.   Wound/Ulcer Pain Timing/Severity: none  Quality of pain: N/A  Severity:  0 / 10   Modifying Factors: None  Associated Signs/Symptoms: drainage     Ulcer Identification:  Ulcer Type: non-healing surgical     Contributing Factors: diabetes and obesity     Acute Wound: Nonhealing surgical    PAST MEDICAL HISTORY        Diagnosis Date    Diabetes mellitus ([address]     Hypertension     Obesity        PAST SURGICAL HISTORY    Past Surgical History:   Procedure Laterality Date     SECTION      LAPAROTOMY N/A 2020    EXPLORATORY LAPAROTOMY, OMENTECTOMY, LEFT SALPINGECTOMY WITH REMOVAL OF FIBROID performed by Janna Montenegro MD [facility]    Family History   Problem Relation Age of Onset    Cancer Mother     Diabetes Father        SOCIAL HISTORY    Social History     Tobacco Use    Smoking status: Never Smoker    Smokeless tobacco: Never Used   Substance Use Topics    Alcohol use: No    Drug use: No       ALLERGIES    No Known Allergies    MEDICATIONS    Current Outpatient Medications on File Prior to Encounter   Medication Sig Dispense Refill    magnesium oxide (MAG-OX) 400 (240 Mg) MG tablet Take 1 tablet by mouth 2 times daily 30 tablet 1    ketoconazole (NIZORAL) 2 % cream Apply topically daily. 60 g 1    lisinopril (PRINIVIL;ZESTRIL) 20 MG tablet Take 1 tablet by mouth 2 times daily 30 tablet 3    metoprolol tartrate (LOPRESSOR) 50 MG tablet Take 1 tablet by mouth 2 times daily 60 tablet 3    amLODIPine (NORVASC) 10 MG tablet Take 1 tablet by mouth daily (Patient not taking: Reported on 9/24/2020) 30 tablet 3    metFORMIN (GLUCOPHAGE) 500 MG tablet Take 1 tablet by mouth 2 times daily (with meals) 60 tablet 5    Multiple Vitamins-Minerals (THERAPEUTIC MULTIVITAMIN-MINERALS) tablet Take 1 tablet by mouth daily 30 tablet 11    vitamin D (ERGOCALCIFEROL) 1.25 MG (76433 UT) CAPS capsule Take 1 capsule by mouth once a week 4 capsule 5    pantoprazole (PROTONIX) 40 MG tablet Take 1 tablet by mouth every morning (before breakfast) 30 tablet 3    cloNIDine (CATAPRES) 0.2 MG tablet Take 1 tablet by mouth 3 times daily 60 tablet 3    furosemide (LASIX) 20 MG tablet Take 1 tablet by mouth 2 times daily 60 tablet 3    acetaminophen (TYLENOL) 500 MG tablet Take 1 tablet by mouth 4 times daily as needed for Pain 360 tablet 1     No current facility-administered medications on file prior to encounter. REVIEW OF SYSTEMS    Pertinent items are noted in HPI.       Objective:      /70   Pulse 57   Temp 97.3 °F (36.3 °C) (Temporal)   Resp 16   Wt 209 lb 10.5 oz (95.1 kg)   BMI 42.35 kg/m²     Wt Readings from Last 3 Encounters:   12/04/20 209 lb 10.5 oz (95.1 kg)   11/13/20 207 lb 14.3 oz (94.3 kg)   09/24/20 201 lb (91.2 kg)       PHYSICAL EXAM    General Appearance: alert and oriented to person, place and time, well developed and well- nourished, in no acute distress  Skin: warm and dry  Head: normocephalic and atraumatic  Eyes: pupils equal, round, and reactive to light, extraocular eye movements intact, conjunctivae normal  Neck: supple and non-tender without mass  Pulmonary/Chest: normal air movement, no respiratory distress  Abdomen: soft, non-tender, non-distended, normal bowel sounds, nonhealing surgical wound along midline incision only tunnels 0.7 cm  Extremities: no cyanosis, clubbing or edema  Musculoskeletal: normal range of motion, no joint swelling, deformity or tenderness  Neurologic: reflexes normal and symmetric, no cranial nerve deficit, gait, coordination and speech normal      Assessment:        Problem List Items Addressed This Visit     Nonhealing surgical wound, subsequent encounter - Primary    Relevant Orders    Supply: Wound Cleanser    Supply: Chantal Wound    Supply: Wound Dressings    Supply: Cover and Secure    Abdominal wound dehiscence    Relevant Orders    Supply: Wound Cleanser    Supply: Chantal Wound    Supply: Wound Dressings    Supply: Cover and Secure          Wound 11/13/20 Abdomen Mid Wound #1 mid abdomen acquired 7/6/2020 (Active)   Wound Image   12/04/20 0830   Wound Etiology Surgical 11/13/20 0851   Dressing Status Intact; Old drainage noted 11/13/20 0851   Wound Cleansed Cleansed with saline 11/13/20 0902   Dressing/Treatment Pharmaceutical agent (see MAR); Silicone border 83/78/89 0958   Wound Length (cm) 0.2 cm 12/04/20 0830   Wound Width (cm) 0.2 cm 12/04/20 0830   Wound Depth (cm) 0.7 cm 12/04/20 0830   Wound Surface Area (cm^2) 0.04 cm^2 12/04/20 0830   Change in Wound Size % (l*w) 55.56 12/04/20 0830   Wound Volume (cm^3) 0.03 cm^3 12/04/20 0830   Wound Healing % 77 12/04/20 0830   Post-Procedure Length (cm) 0.2 cm 12/04/20 0859   Post-Procedure Width (cm) 0.2 cm 12/04/20 0859   Post-Procedure Depth (cm) 0.7 cm 12/04/20 0859   Post-Procedure Surface Area (cm^2) 0.04 cm^2 12/04/20 0859   Post-Procedure Volume (cm^3) 0.03 cm^3 12/04/20 0859 Wound Assessment Granulation tissue;Slough 11/20/20 0837   Drainage Amount Moderate 11/20/20 0837   Drainage Description Serosanguinous;Green 11/20/20 0837   Odor None 11/20/20 0837   Chantal-wound Assessment Dry/flaky; Intact 11/20/20 0837   Margins Attached edges 11/20/20 0837   Wound Thickness Description not for Pressure Injury Full thickness 11/20/20 0837   Number of days: 21     Incision 11/80/04 Umbilicus (Active)   Number of days: 148       PATIENT EDUCATION  Wound has healed significantly since last visit. Patient is no longer going to be able to pack the wound. She was instructed to apply triple antibiotic ointment and cover with dry dressing every other day. Plan:     Treatment Note please see attached Discharge Instructions    Written patient dismissal instructions given to patient and signed by patient or POA. Discharge Instructions         Discharge [address] [facility] Wound Care and Hyperbaric Oxygen Therapy   Physician Orders and Discharge Instructions  [facility]   [address] [facility] [address] [facility] [facility] [address]  Telephone: [phone]      FAX [phone]     NAME: Janny Cohen  DATE OF BIRTH:  1970  MEDICAL RECORD NUMBER:  [id number]  DATE:  12/4/2020     Wound Cleansing:   Do not scrub or use excessive force. Cleanse wound prior to applying a clean dressing with:  []?? Normal Saline  [x]? ? Keep Wound Dry in Shower    []? ? Wound Cleanser   []? ? Cleanse wound with Mild Soap & Water  []? ? May Shower at Discharge   []? ? Other:        Topical Treatments:  Do not apply lotions, creams, or ointments to wound bed unless directed. []?? Apply moisturizing lotion to skin surrounding the wound prior to dressing change. []?? Apply antifungal ointment to skin surrounding the wound prior to dressing change. []?? Apply thin film of moisture barrier ointment to skin immediately around wound.   []?? Other:                  Dressings:                  Wound Location: Abdomen             [x]? ? Apply Primary Dressing:                                                 [x]? ? Triple Antibiotic Ointment       [x]? ? Cover and Secure with:                   [x]? ? Gauze         [x]? ? Bandaid                []? ? Other:               Avoid contact of tape with skin.     [x]? ? Change dressing:   [x]? ? Every Other Day                       Dietary:  [x]? ? Diet as tolerated:     []? ? Calorie Diabetic Diet:           []? ? No Added Salt:  [x]? ? Increase Protein:     []? ? Other:              Activity:  [x]? ? Activity as tolerated:  []?? Patient has no activity restrictions     []? ? Strict Bedrest: []? ? Remain off Work:     []? ? May return to full duty work:                                    []? ? Return to work with [address]     If you are still having pain after you go home:  [x]? ? Elevate the affected limb.    [x]? ? Use over-the-counter medications you would normally use for pain as permitted by your family doctor. [x]? ? For persistent pain not relieved by the above interventions, please call your family doctor.             Return Appointment:  []?? Wound and dressing supply provider:   []?? ECF or Home Healthcare:  []?? Wound Assessment:          []? ? Physician or NP scheduled for Wound Assessment:   [x]? ? Return Appointment: With Doron Casas CNP  in  1  Week(s)  []? ? Ordered tests:       Nurse Case Manger:  Kate  Electronically signed by Corrinne Kennedy, RN on 12/4/2020 at 9:01 AM    [address] [place] [address] Information: Should you experience any significant changes in your wound(s) or have questions about your wound care, please contact the [facility] [facility] [phone] [address] 8:00 am - 4:30 pm and Friday 8:00 am - 12:30 pm.  If you need help with your wound outside these hours and cannot wait until we are again available, contact your PCP or go to the hospital emergency room.      PLEASE NOTE: IF

## 2020-12-04 NOTE — COMMUNICATION BODY
[address]  [address] [address]  Phone: [phone]      FLEX Griffith CNP        December 4, 2020     Patient: Vangie Shannon   YOB: 1970   Date of Visit: 12/4/2020       To Whom It May Concern: It is my medical opinion that Vangie Shannon should maintain the following restriction for 2 more weeks: lifting/carrying not to exceed 15 lbs. If you have any questions or concerns, please don't hesitate to call.     Sincerely,          FLEX Griffith CNP

## 2020-12-04 NOTE — LETTER
[address]  [address] OFFICE BLDG 2 [address] [address] [facility]  [phone]  Dept: [facility] RECORD NUMBER: [id number]   AGE: 53 y.o. GENDER: female : 1970   TODAYS DATE: 2020     To whom it may concern,    Ms. Alatorres was seen in the [address] [place] [address] Road on 2020. It is my medical opinion that Venora Earing should maintain the following restriction for 2 more weeks. Lifting or carrying not to exceed more than 15 pounds. If you have any questions or concerns, please don't hesitate to call.     Sincerely,      [address] [place] [address] Pkwy and Hyperbaric Oxygen Therapy

## 2020-12-11 ENCOUNTER — HOSPITAL ENCOUNTER (OUTPATIENT)
Dept: WOUND CARE | Age: 50
Discharge: HOME | End: 2020-12-11
Payer: COMMERCIAL

## 2020-12-11 VITALS
SYSTOLIC BLOOD PRESSURE: 117 MMHG | TEMPERATURE: 96.9 F | HEART RATE: 69 BPM | DIASTOLIC BLOOD PRESSURE: 78 MMHG | RESPIRATION RATE: 16 BRPM

## 2020-12-11 PROCEDURE — 99212 OFFICE O/P EST SF 10 MIN: CPT

## 2020-12-11 PROCEDURE — 99212 OFFICE O/P EST SF 10 MIN: CPT | Performed by: NURSE PRACTITIONER

## 2020-12-11 PROCEDURE — 87070 CULTURE OTHR SPECIMN AEROBIC: CPT

## 2020-12-11 PROCEDURE — 87205 SMEAR GRAM STAIN: CPT

## 2020-12-11 PROCEDURE — 87186 SC STD MICRODIL/AGAR DIL: CPT

## 2020-12-11 PROCEDURE — 87077 CULTURE AEROBIC IDENTIFY: CPT

## 2020-12-11 RX ORDER — BETAMETHASONE DIPROPIONATE 0.05 %
OINTMENT (GRAM) TOPICAL ONCE
Status: CANCELLED | OUTPATIENT
Start: 2020-12-11

## 2020-12-11 RX ORDER — GINSENG 100 MG
CAPSULE ORAL ONCE
Status: CANCELLED | OUTPATIENT
Start: 2020-12-11

## 2020-12-11 RX ORDER — LIDOCAINE HYDROCHLORIDE 20 MG/ML
JELLY TOPICAL ONCE
Status: CANCELLED | OUTPATIENT
Start: 2020-12-11

## 2020-12-11 RX ORDER — LIDOCAINE HYDROCHLORIDE 40 MG/ML
SOLUTION TOPICAL ONCE
Status: COMPLETED | OUTPATIENT
Start: 2020-12-11 | End: 2020-12-11

## 2020-12-11 RX ORDER — BACITRACIN, NEOMYCIN, POLYMYXIN B 400; 3.5; 5 [USP'U]/G; MG/G; [USP'U]/G
OINTMENT TOPICAL ONCE
Status: CANCELLED | OUTPATIENT
Start: 2020-12-11

## 2020-12-11 RX ORDER — LIDOCAINE HYDROCHLORIDE 40 MG/ML
SOLUTION TOPICAL ONCE
Status: CANCELLED | OUTPATIENT
Start: 2020-12-11

## 2020-12-11 RX ORDER — CLOBETASOL PROPIONATE 0.5 MG/G
OINTMENT TOPICAL ONCE
Status: CANCELLED | OUTPATIENT
Start: 2020-12-11

## 2020-12-11 RX ORDER — LIDOCAINE 50 MG/G
OINTMENT TOPICAL ONCE
Status: CANCELLED | OUTPATIENT
Start: 2020-12-11

## 2020-12-11 RX ORDER — GENTAMICIN SULFATE 1 MG/G
OINTMENT TOPICAL ONCE
Status: CANCELLED | OUTPATIENT
Start: 2020-12-11

## 2020-12-11 RX ORDER — LIDOCAINE 40 MG/G
CREAM TOPICAL ONCE
Status: CANCELLED | OUTPATIENT
Start: 2020-12-11

## 2020-12-11 RX ORDER — BACITRACIN ZINC AND POLYMYXIN B SULFATE 500; 1000 [USP'U]/G; [USP'U]/G
OINTMENT TOPICAL ONCE
Status: CANCELLED | OUTPATIENT
Start: 2020-12-11

## 2020-12-11 RX ADMIN — LIDOCAINE HYDROCHLORIDE 2.5 ML: 40 SOLUTION TOPICAL at 08:26

## 2020-12-11 ASSESSMENT — PAIN SCALES - GENERAL: PAINLEVEL_OUTOF10: 0

## 2020-12-11 NOTE — PROGRESS NOTES
[address]   Progress Note and Procedure Note      Edson Webster. RECORD NUMBER:  [id number]  AGE: 53 y.o. GENDER: female  : 1970  EPISODE DATE:  2020    Subjective:     Chief Complaint   Patient presents with    Wound Check     wound on abdomen         HISTORY of PRESENT ILLNESS HPI    Audrey White a 53 y.o. female who presents today for wound/ulcer evaluation. History of Wound Context: non-healing surgical.  Had omentectomy, left salpingectomy with removal of fibroid on 2020 by Dr. Ulises Lewis reports that she has an open area along the midline incision.  Patient is diabetic.  Last A1C was 6.4 on 7/10/2020.  Wound culture + for [employer] and Providencia stuartii. Shahnaz Glass was treated with amoxicillin and Bactrim DS. Last week the wound only measured 0.7 cm deep. Today it measures 1.5 cm and the amount of drainage has increased significantly. Will do another wound culture.                                                                                                     Wound/Ulcer Pain Timing/Severity: none  Quality of pain: N/A  Severity:  0 / 10   Modifying Factors: None  Associated Signs/Symptoms: drainage     Ulcer Identification:  Ulcer Type: non-healing surgical    PAST MEDICAL HISTORY        Diagnosis Date    Diabetes mellitus ([facility] [address]     Hypertension     Obesity        PAST SURGICAL HISTORY    Past Surgical History:   Procedure Laterality Date     SECTION      LAPAROTOMY N/A 2020    EXPLORATORY LAPAROTOMY, OMENTECTOMY, LEFT SALPINGECTOMY WITH REMOVAL OF FIBROID performed by Khoa Reddy MD at [facility]    Family History   Problem Relation Age of Onset    Cancer Mother     Diabetes Father        SOCIAL HISTORY    Social History     Tobacco Use    Smoking status: Never Smoker    Smokeless tobacco: Never Used   Substance Use Topics    Alcohol use: No    Drug use: No       ALLERGIES    No Known Allergies      MEDICATIONS            Current Outpatient Medications on File Prior to Encounter   Medication Sig Dispense Refill    magnesium oxide (MAG-OX) 400 (240 Mg) MG tablet Take 1 tablet by mouth 2 times daily 30 tablet 1    ketoconazole (NIZORAL) 2 % cream Apply topically daily. 60 g 1    lisinopril (PRINIVIL;ZESTRIL) 20 MG tablet Take 1 tablet by mouth 2 times daily 30 tablet 3    metoprolol tartrate (LOPRESSOR) 50 MG tablet Take 1 tablet by mouth 2 times daily 60 tablet 3    amLODIPine (NORVASC) 10 MG tablet Take 1 tablet by mouth daily (Patient not taking: Reported on 9/24/2020) 30 tablet 3    metFORMIN (GLUCOPHAGE) 500 MG tablet Take 1 tablet by mouth 2 times daily (with meals) 60 tablet 5    Multiple Vitamins-Minerals (THERAPEUTIC MULTIVITAMIN-MINERALS) tablet Take 1 tablet by mouth daily 30 tablet 11    vitamin D (ERGOCALCIFEROL) 1.25 MG (69633 UT) CAPS capsule Take 1 capsule by mouth once a week 4 capsule 5    pantoprazole (PROTONIX) 40 MG tablet Take 1 tablet by mouth every morning (before breakfast) 30 tablet 3    cloNIDine (CATAPRES) 0.2 MG tablet Take 1 tablet by mouth 3 times daily 60 tablet 3    furosemide (LASIX) 20 MG tablet Take 1 tablet by mouth 2 times daily 60 tablet 3    acetaminophen (TYLENOL) 500 MG tablet Take 1 tablet by mouth 4 times daily as needed for Pain 360 tablet 1      No current facility-administered medications on file prior to encounter.          REVIEW OF SYSTEMS     Pertinent items are noted in HPI.         Objective:      /78   Pulse 69   Temp 96.9 °F (36.1 °C) (Temporal)   Resp 16     Wt Readings from Last 3 Encounters:   12/04/20 209 lb 10.5 oz (95.1 kg)   11/13/20 207 lb 14.3 oz (94.3 kg)   09/24/20 201 lb (91.2 kg)       PHYSICAL EXAM      General Appearance: alert and oriented to person, place and time, well developed and well- nourished, in no acute distress  Skin: warm and dry  Head: normocephalic and atraumatic  Eyes: pupils equal, round, and reactive to light, extraocular eye movements intact, conjunctivae normal  Neck: supple and non-tender without mass  Pulmonary/Chest: normal air movement, no respiratory distress  Abdomen: soft, non-tender, non-distended, normal bowel sounds, nonhealing surgical wound along midline incision tunnels 1.5 cm (last week it was 0.7 cm). Today the dressing has a significant amount of drainage. Extremities: no cyanosis, clubbing or edema  Musculoskeletal: normal range of motion, no joint swelling, deformity or tenderness  Neurologic: reflexes normal and symmetric, no cranial nerve deficit, gait, coordination and speech normal      Assessment:        Problem List Items Addressed This Visit     Nonhealing surgical wound, subsequent encounter - Primary    Relevant Orders    Supply: Chantal Wound    Supply: Wound Dressings    Supply: Cover and Secure    Abdominal wound dehiscence    Relevant Orders    Supply: Chantal Wound    Supply: Wound Dressings    Supply: Cover and Secure           Procedure Note  Indications:  Based on my examination of this patient's wound(s)/ulcer(s) today, debridement is not required to promote healing and evaluate the wound base.         Wound 11/13/20 Abdomen Mid Wound #1 mid abdomen acquired 7/6/2020 (Active)   Wound Image   12/04/20 0830   Wound Etiology Surgical 11/13/20 0851   Dressing Status New dressing applied 12/11/20 0904   Wound Cleansed Cleansed with saline 11/13/20 0902   Dressing/Treatment Barrier film;Collagen with Ag;Gauze dressing/dressing sponge 12/11/20 0904   Wound Length (cm) 0.2 cm 12/11/20 0821   Wound Width (cm) 0.3 cm 12/11/20 0821   Wound Depth (cm) 1.5 cm 12/11/20 0821   Wound Surface Area (cm^2) 0.06 cm^2 12/11/20 0821   Change in Wound Size % (l*w) 33.33 12/11/20 0821   Wound Volume (cm^3) 0.09 cm^3 12/11/20 0821   Wound Healing % 31 12/11/20 0821   Post-Procedure Length (cm) 0.3 cm 12/11/20 0858   Post-Procedure Width (cm) 0.4 cm 12/11/20 0858   Post-Procedure Depth unless directed.   []??? Apply moisturizing lotion to skin surrounding the wound prior to dressing change.  []??? Apply antifungal ointment to skin surrounding the wound prior to dressing change.  []??? Apply thin film of moisture barrier ointment to skin immediately around wound.  []??? Other:                  Dressings:                  Wound Location: Abdomen             [x]? ?? Apply Primary Dressing:                                                 [x]? ?? Moistened Collagen with Silver     [x]? ?? Cover and Secure with:                   [x]? ?? Gauze         [x]? ?? Cover Roll               []? ?? Other:               Avoid contact of tape with skin.     [x]? ?? Change dressing:   [x]? ?? Every Other Day                       Dietary:  [x]??? Diet as tolerated:     []??? Calorie Diabetic Diet:           []??? No Added Salt:  [x]??? Increase Protein:     []??? Other:              Activity:  [x]??? Activity as tolerated:  []??? Patient has no activity restrictions     []??? Strict Bedrest: []??? Remain off Work:     []? ?? May return to full duty work:                                    []? ?? Return to work with [address]     If you are still having pain after you go home:  [x]??? Elevate the affected limb.    [x]? ?? Use over-the-counter medications you would normally use for pain as permitted by your family doctor. [x]??? For persistent pain not relieved by the above interventions, please call your family doctor.             Return Appointment:  []??? Wound and dressing supply provider:   []??? ECF or Home Healthcare:  []??? Wound Assessment:          []? ?? Physician or NP scheduled for Wound Assessment:   [x]??? Return Appointment: With Merrick Collet CNP  in  1  Week(s)  [x]??? Ordered tests:  Wound Culture-Pending     Nurse Case Manger:  Kate   Electronically signed by Rosalinda Parrish RN on 12/11/2020 at 8:57 AM      [address] [place] [address] Information: Should you experience any significant changes in your wound(s) or have questions about your wound care, please contact the [address] [place] [address] [phone] [address] 8:00 am - 4:30 pm and Friday 8:00 am - 12:30 pm.  If you need help with your wound outside these hours and cannot wait until we are again available, contact your PCP or go to the hospital emergency room.      PLEASE NOTE: IF YOU ARE UNABLE TO OBTAIN WOUND SUPPLIES, CONTINUE TO USE THE SUPPLIES YOU HAVE AVAILABLE UNTIL YOU ARE ABLE TO [facility].  IT IS MOST IMPORTANT TO KEEP THE WOUND COVERED AT ALL TIMES.     Physician Signature:_______________________     Date: ___________ Time:  ____________        Anthony Pinto CNP                                         Electronically signed by FLEX Gtz CNP on 12/11/2020 at 11:59 AM

## 2020-12-13 LAB
GRAM STAIN RESULT: ABNORMAL
ORGANISM: ABNORMAL
ORGANISM: ABNORMAL
WOUND/ABSCESS: ABNORMAL
WOUND/ABSCESS: ABNORMAL

## 2020-12-14 ENCOUNTER — TELEPHONE (OUTPATIENT)
Dept: WOUND CARE | Age: 50
End: 2020-12-14

## 2020-12-14 RX ORDER — SULFAMETHOXAZOLE AND TRIMETHOPRIM 800; 160 MG/1; MG/1
1 TABLET ORAL 2 TIMES DAILY
Qty: 28 TABLET | Refills: 0 | Status: SHIPPED | OUTPATIENT
Start: 2020-12-14 | End: 2020-12-28

## 2020-12-18 ENCOUNTER — HOSPITAL ENCOUNTER (OUTPATIENT)
Dept: WOUND CARE | Age: 50
Discharge: HOME | End: 2020-12-18
Payer: COMMERCIAL

## 2020-12-18 VITALS
DIASTOLIC BLOOD PRESSURE: 79 MMHG | SYSTOLIC BLOOD PRESSURE: 127 MMHG | HEART RATE: 62 BPM | RESPIRATION RATE: 16 BRPM | TEMPERATURE: 96.4 F

## 2020-12-18 PROCEDURE — 11042 DBRDMT SUBQ TIS 1ST 20SQCM/<: CPT | Performed by: NURSE PRACTITIONER

## 2020-12-18 PROCEDURE — 11042 DBRDMT SUBQ TIS 1ST 20SQCM/<: CPT

## 2020-12-18 RX ORDER — GINSENG 100 MG
CAPSULE ORAL ONCE
Status: CANCELLED | OUTPATIENT
Start: 2020-12-18 | End: 2020-12-18

## 2020-12-18 RX ORDER — BACITRACIN ZINC AND POLYMYXIN B SULFATE 500; 1000 [USP'U]/G; [USP'U]/G
OINTMENT TOPICAL ONCE
Status: CANCELLED | OUTPATIENT
Start: 2020-12-18 | End: 2020-12-18

## 2020-12-18 RX ORDER — LIDOCAINE 40 MG/G
CREAM TOPICAL ONCE
Status: CANCELLED | OUTPATIENT
Start: 2020-12-18 | End: 2020-12-18

## 2020-12-18 RX ORDER — BACITRACIN, NEOMYCIN, POLYMYXIN B 400; 3.5; 5 [USP'U]/G; MG/G; [USP'U]/G
OINTMENT TOPICAL ONCE
Status: CANCELLED | OUTPATIENT
Start: 2020-12-18 | End: 2020-12-18

## 2020-12-18 RX ORDER — LIDOCAINE HYDROCHLORIDE 40 MG/ML
SOLUTION TOPICAL ONCE
Status: CANCELLED | OUTPATIENT
Start: 2020-12-18 | End: 2020-12-18

## 2020-12-18 RX ORDER — LIDOCAINE HYDROCHLORIDE 40 MG/ML
SOLUTION TOPICAL ONCE
Status: COMPLETED | OUTPATIENT
Start: 2020-12-18 | End: 2020-12-18

## 2020-12-18 RX ORDER — LIDOCAINE HYDROCHLORIDE 20 MG/ML
JELLY TOPICAL ONCE
Status: CANCELLED | OUTPATIENT
Start: 2020-12-18 | End: 2020-12-18

## 2020-12-18 RX ORDER — BETAMETHASONE DIPROPIONATE 0.05 %
OINTMENT (GRAM) TOPICAL ONCE
Status: CANCELLED | OUTPATIENT
Start: 2020-12-18 | End: 2020-12-18

## 2020-12-18 RX ORDER — LIDOCAINE 50 MG/G
OINTMENT TOPICAL ONCE
Status: CANCELLED | OUTPATIENT
Start: 2020-12-18 | End: 2020-12-18

## 2020-12-18 RX ORDER — GENTAMICIN SULFATE 1 MG/G
OINTMENT TOPICAL ONCE
Status: CANCELLED | OUTPATIENT
Start: 2020-12-18 | End: 2020-12-18

## 2020-12-18 RX ORDER — CLOBETASOL PROPIONATE 0.5 MG/G
OINTMENT TOPICAL ONCE
Status: CANCELLED | OUTPATIENT
Start: 2020-12-18 | End: 2020-12-18

## 2020-12-18 RX ORDER — SODIUM HYPOCHLORITE 2.5 MG/ML
SOLUTION TOPICAL
Qty: 1 BOTTLE | Refills: 0 | Status: SHIPPED | OUTPATIENT
Start: 2020-12-18 | End: 2020-12-25

## 2020-12-18 RX ADMIN — LIDOCAINE HYDROCHLORIDE 2.5 ML: 40 SOLUTION TOPICAL at 08:27

## 2020-12-18 ASSESSMENT — PAIN SCALES - GENERAL: PAINLEVEL_OUTOF10: 0

## 2020-12-18 NOTE — PROGRESS NOTES
[address]   Progress Note and Procedure Note      [address] RECORD NUMBER:  [id number]  AGE: 53 y.o. GENDER: female  : 1970  EPISODE DATE:  2020    Subjective:     Chief Complaint   Patient presents with    Wound Check     Follow up Wound Abdomen        Tonia Shah a 53 y.o. female who presents today for wound/ulcer evaluation. History of Wound Context: non-healing surgical.  Had omentectomy, left salpingectomy with removal of fibroid on 2020 by Dr. Francisca Levine. Violet Leon reports that she has an open area along the midline incision.  Patient is diabetic.  Last A1C was 6.4 on 7/10/2020.  Wound culture + for Providencia stuartii. Kisha Hahn was started on Bactrim DS 20. Last week the wound only measured 1.5 cm deep; today it measures 2.2 cm.                                         Wound/Ulcer Pain Timing/Severity: none  Quality of pain: N/A  Severity:  0 / 10   Modifying Factors: None  Associated Signs/Symptoms: drainage     Ulcer Identification:  Ulcer Type: non-healing surgical    PAST MEDICAL HISTORY        Diagnosis Date    Diabetes mellitus ([facility] [address]     Hypertension     Obesity        PAST SURGICAL HISTORY    Past Surgical History:   Procedure Laterality Date     SECTION      LAPAROTOMY N/A 2020    EXPLORATORY LAPAROTOMY, OMENTECTOMY, LEFT SALPINGECTOMY WITH REMOVAL OF FIBROID performed [facility]ins [facility] HISTORY    Family History   Problem Relation Age of Onset    Cancer Mother     Diabetes Father        SOCIAL HISTORY    Social History     Tobacco Use    Smoking status: Never Smoker    Smokeless tobacco: Never Used   Substance Use Topics    Alcohol use: No    Drug use: No       ALLERGIES    No Known Allergies    MEDICATIONS    Current Outpatient Medications on File Prior to Encounter   Medication Sig Dispense Refill    sulfamethoxazole-trimethoprim (BACTRIM DS;SEPTRA DS) 800-160 MG per tablet Take 1 tablet by mouth 2 times daily for 14 days 28 tablet 0    magnesium oxide (MAG-OX) 400 (240 Mg) MG tablet Take 1 tablet by mouth 2 times daily 30 tablet 1    ketoconazole (NIZORAL) 2 % cream Apply topically daily. 60 g 1    lisinopril (PRINIVIL;ZESTRIL) 20 MG tablet Take 1 tablet by mouth 2 times daily 30 tablet 3    metoprolol tartrate (LOPRESSOR) 50 MG tablet Take 1 tablet by mouth 2 times daily 60 tablet 3    amLODIPine (NORVASC) 10 MG tablet Take 1 tablet by mouth daily (Patient not taking: Reported on 9/24/2020) 30 tablet 3    metFORMIN (GLUCOPHAGE) 500 MG tablet Take 1 tablet by mouth 2 times daily (with meals) 60 tablet 5    Multiple Vitamins-Minerals (THERAPEUTIC MULTIVITAMIN-MINERALS) tablet Take 1 tablet by mouth daily 30 tablet 11    vitamin D (ERGOCALCIFEROL) 1.25 MG (19183 UT) CAPS capsule Take 1 capsule by mouth once a week 4 capsule 5    pantoprazole (PROTONIX) 40 MG tablet Take 1 tablet by mouth every morning (before breakfast) 30 tablet 3    cloNIDine (CATAPRES) 0.2 MG tablet Take 1 tablet by mouth 3 times daily 60 tablet 3    furosemide (LASIX) 20 MG tablet Take 1 tablet by mouth 2 times daily 60 tablet 3    acetaminophen (TYLENOL) 500 MG tablet Take 1 tablet by mouth 4 times daily as needed for Pain 360 tablet 1     No current facility-administered medications on file prior to encounter. REVIEW OF SYSTEMS    Pertinent items are noted in HPI.       Objective:      /79   Pulse 62   Temp 96.4 °F (35.8 °C) (Temporal)   Resp 16     Wt Readings from Last 3 Encounters:   12/04/20 209 lb 10.5 oz (95.1 kg)   11/13/20 207 lb 14.3 oz (94.3 kg)   09/24/20 201 lb (91.2 kg)       PHYSICAL EXAM      General Appearance: alert and oriented to person, place and time, well developed and well- nourished, in no acute distress  Skin: warm and dry  Head: normocephalic and atraumatic  Eyes: pupils equal, round, and reactive to light, extraocular eye movements intact, conjunctivae normal  Neck: supple and non-tender without mass  Pulmonary/Chest: normal air movement, no respiratory distress  Abdomen: soft, non-tender, non-distended, normal bowel sounds, nonhealing surgical wound along midline incision tunnels 2.2 cm (last week it was 1.5 cm). Musculoskeletal: normal range of motion, no joint swelling, deformity or tenderness  Neurologic: reflexes normal and symmetric, no cranial nerve deficit, gait, coordination and speech normal      Assessment:        Problem List Items Addressed This Visit     Nonhealing surgical wound, subsequent encounter - Primary    Abdominal wound dehiscence           Procedure Note  Indications:  Based on my examination of this patient's wound(s)/ulcer(s) today, debridement is required to promote healing and evaluate the wound base. Performed by: FLEX Catherine - CNP    Consent obtained:  Yes    Time out taken:  Yes    Pain Control: Anesthetic  Anesthetic: 4% Lidocaine Liquid Topical(2.5mL's)       Debridement: Excisional Debridement    Using curette the wound(s)/ulcer(s) was/were debrided down through and including the removal of subcutaneous tissue.         Devitalized Tissue Debrided:  slough    Pre Debridement Measurements:  Are located in the [place]  Documentation Flow Sheet    Wound/Ulcer #: 1    Post Debridement Measurements:  Wound/Ulcer Descriptions are Pre Debridement except measurements:    Wound 11/13/20 Abdomen Mid Wound #1 mid abdomen acquired 7/6/2020 (Active)   Wound Image   12/04/20 0830   Wound Etiology Surgical 11/13/20 0851   Dressing Status Old drainage noted 12/18/20 0824   Wound Cleansed Cleansed with saline 11/13/20 0902   Dressing/Treatment Barrier film;Gauze dressing/dressing sponge;Tape/Soft cloth adhesive tape 12/18/20 1043   Wound Length (cm) 0.2 cm 12/18/20 0824   Wound Width (cm) 0.2 cm 12/18/20 0824   Wound Depth (cm) 2.2 cm 12/18/20 0824   Wound Surface Area (cm^2) 0.04 cm^2 12/18/20 0824   Change in Wound Size % (l*w) 55.56 12/18/20 0824   Wound Volume (cm^3) 0.09 cm^3 12/18/20 0824   Wound Healing % 31 12/18/20 0824   Post-Procedure Length (cm) 0.3 cm 12/18/20 0840   Post-Procedure Width (cm) 0.3 cm 12/18/20 0840   Post-Procedure Depth (cm) 2.2 cm 12/18/20 0840   Post-Procedure Surface Area (cm^2) 0.09 cm^2 12/18/20 0840   Post-Procedure Volume (cm^3) 0.2 cm^3 12/18/20 0840   Wound Assessment Granulation tissue;Slough 12/18/20 0824   Drainage Amount Moderate 12/18/20 0824   Drainage Description Nora Goldberg 12/18/20 0824   Odor None 12/18/20 0824   Chantal-wound Assessment Dry/flaky 12/18/20 0824   Margins Undefined edges 12/18/20 0824   Wound Thickness Description not for Pressure Injury Full thickness 12/18/20 0824   Number of days: 38     Incision 64/29/33 Umbilicus (Active)   Number of days: 165       Percent of Wound(s)/Ulcer(s) Debrided: 100%    Total Surface Area Debrided:  0.09 sq cm     Diabetic/Pressure/Non Pressure Ulcers only:  Ulcer: N/A     Estimated Blood Loss:  Minima blood loss, pocket of serosanguinous fluid evacuated    Hemostasis Achieved:  not needed    Procedural Pain:  0  / 10     Post Procedural Pain:  0 / 10     Response to treatment:  Well tolerated by patient. Post debridement, a pocket of serosanguinous fluid spontaneously evacuated from the wound. I stretched the opening with hemostats and a piece of gauze moistened with Dakin's solution was tucked in the wound to wick out any remaining fluid. The site was covered with a dry dressing. Patient is on Bactrim DS for Providencia stuartii. PATIENT EDUCATION focused on doing dressing changes QOD by tucking gauze moistened in Dakin's solution into the wound to wick out remaining fluid, and covering the site with a dry dressing. Plan:     Treatment Note please see attached Discharge Instructions    Written patient dismissal instructions given to patient and signed by patient or POA.          Discharge Instructions         Discharge Instructions      [facility] Wound Care and Hyperbaric Oxygen Therapy   Physician Orders and Discharge Instructions  [facility]  [address] [facility]   [address] [facility]  Telephone: [phone]      FAX [phone]     NAME: Varun Cochran  DATE OF BIRTH:  1970  MEDICAL RECORD NUMBER:  [id number]  DATE:  12/11/2020     Wound Cleansing:   Do not scrub or use excessive force. Cleanse wound prior to applying a clean dressing with:  []???? Normal Saline  [x]???? Keep Wound Dry in Shower    []???? Wound Cleanser   []???? Cleanse wound with Mild Soap & Water  []???? May Shower at Discharge   []???? Other:        Topical Treatments:  Do not apply lotions, creams, or ointments to wound bed unless directed.   []???? Apply moisturizing lotion to skin surrounding the wound prior to dressing change.  []???? Apply antifungal ointment to skin surrounding the wound prior to dressing change.  []???? Apply thin film of moisture barrier ointment to skin immediately around wound.  []???? Other:                  Dressings:                  Wound Location: Abdomen             [x]? ??? Apply Primary Dressing:                                                 [x]? ??? Dakin's Moistened Gauze (2 x 2)     [x]? ??? Cover and Secure with:                   [x]? ??? Gauze         [x]? ??? Cover Roll               []???? Other:               Avoid contact of tape with skin.     [x]? ??? Change dressing:   [x]? ??? Every Other Day                       Dietary:  [x]???? Diet as tolerated:     []???? Calorie Diabetic Diet:           []???? No Added Salt:  [x]???? Increase Protein:     []???? Other:              Activity:  [x]???? Activity as tolerated:  []???? Patient has no activity restrictions     []???? Strict Bedrest: []???? Remain off Work:     []???? May return to full duty work:                                    []???? Return to work with [address]     If you are still having pain after you go home:  [x]???? Elevate the affected limb.    [x]???? Use over-the-counter medications you would normally use for pain as permitted by your family doctor. [x]???? For persistent pain not relieved by the above interventions, please call your family doctor.             Return Appointment:  []???? Wound and dressing supply provider:   []???? ECF or Home Healthcare:  []???? Wound Assessment:          []???? Physician or NP scheduled for Wound Assessment:   [x]???? Return Appointment: With Lucero Saeed CNP  in  2  Week(s) (Monday Afternoon)   []???? Ordered tests:        [employer] at your pharmacy! Nurse Case Manger:  Kate   Electronically signed by Reji Johnson RN on 12/18/2020 at 8:47 AM                [address] [place] [address] Information: Should you experience any significant changes in your wound(s) or have questions about your wound care, please contact the [address] [place] [address] [phone] [address] [place], [place] [address] 8:00 am - 4:30 pm and Friday 8:00 am - 12:30 pm.  If you need help with your wound outside these hours and cannot wait until we are again available, contact your PCP or go to the hospital emergency room.      PLEASE NOTE: IF YOU ARE UNABLE TO OBTAIN WOUND SUPPLIES, CONTINUE TO USE THE SUPPLIES YOU HAVE AVAILABLE UNTIL YOU ARE ABLE TO [facility].  IT IS MOST IMPORTANT TO KEEP THE WOUND COVERED AT ALL TIMES.     Physician Signature:_______________________     Date: ___________ Time:  ____________        Holli Shone CNP             Electronically signed by FLEX Catherine CNP on 12/21/2020 at 9:31 AM

## 2020-12-28 ENCOUNTER — HOSPITAL ENCOUNTER (OUTPATIENT)
Dept: WOUND CARE | Age: 50
Discharge: HOME | End: 2020-12-28
Payer: COMMERCIAL

## 2020-12-28 VITALS
SYSTOLIC BLOOD PRESSURE: 108 MMHG | RESPIRATION RATE: 16 BRPM | DIASTOLIC BLOOD PRESSURE: 68 MMHG | TEMPERATURE: 97 F | HEART RATE: 68 BPM

## 2020-12-28 PROCEDURE — 11042 DBRDMT SUBQ TIS 1ST 20SQCM/<: CPT

## 2020-12-28 PROCEDURE — 87077 CULTURE AEROBIC IDENTIFY: CPT

## 2020-12-28 PROCEDURE — 87186 SC STD MICRODIL/AGAR DIL: CPT

## 2020-12-28 PROCEDURE — 11042 DBRDMT SUBQ TIS 1ST 20SQCM/<: CPT | Performed by: SURGERY

## 2020-12-28 PROCEDURE — 87205 SMEAR GRAM STAIN: CPT

## 2020-12-28 PROCEDURE — 87070 CULTURE OTHR SPECIMN AEROBIC: CPT

## 2020-12-28 RX ORDER — LIDOCAINE 40 MG/G
CREAM TOPICAL ONCE
Status: CANCELLED | OUTPATIENT
Start: 2020-12-28 | End: 2020-12-28

## 2020-12-28 RX ORDER — LIDOCAINE HYDROCHLORIDE 40 MG/ML
SOLUTION TOPICAL ONCE
Status: CANCELLED | OUTPATIENT
Start: 2020-12-28 | End: 2020-12-28

## 2020-12-28 RX ORDER — BACITRACIN ZINC AND POLYMYXIN B SULFATE 500; 1000 [USP'U]/G; [USP'U]/G
OINTMENT TOPICAL ONCE
Status: CANCELLED | OUTPATIENT
Start: 2020-12-28 | End: 2020-12-28

## 2020-12-28 RX ORDER — GENTAMICIN SULFATE 1 MG/G
OINTMENT TOPICAL ONCE
Status: CANCELLED | OUTPATIENT
Start: 2020-12-28 | End: 2020-12-28

## 2020-12-28 RX ORDER — BETAMETHASONE DIPROPIONATE 0.05 %
OINTMENT (GRAM) TOPICAL ONCE
Status: CANCELLED | OUTPATIENT
Start: 2020-12-28 | End: 2020-12-28

## 2020-12-28 RX ORDER — BACITRACIN, NEOMYCIN, POLYMYXIN B 400; 3.5; 5 [USP'U]/G; MG/G; [USP'U]/G
OINTMENT TOPICAL ONCE
Status: CANCELLED | OUTPATIENT
Start: 2020-12-28 | End: 2020-12-28

## 2020-12-28 RX ORDER — LIDOCAINE HYDROCHLORIDE 20 MG/ML
JELLY TOPICAL ONCE
Status: CANCELLED | OUTPATIENT
Start: 2020-12-28 | End: 2020-12-28

## 2020-12-28 RX ORDER — LIDOCAINE 50 MG/G
OINTMENT TOPICAL ONCE
Status: CANCELLED | OUTPATIENT
Start: 2020-12-28 | End: 2020-12-28

## 2020-12-28 RX ORDER — LIDOCAINE HYDROCHLORIDE 40 MG/ML
SOLUTION TOPICAL ONCE
Status: COMPLETED | OUTPATIENT
Start: 2020-12-28 | End: 2020-12-28

## 2020-12-28 RX ORDER — GINSENG 100 MG
CAPSULE ORAL ONCE
Status: CANCELLED | OUTPATIENT
Start: 2020-12-28 | End: 2020-12-28

## 2020-12-28 RX ORDER — CLOBETASOL PROPIONATE 0.5 MG/G
OINTMENT TOPICAL ONCE
Status: CANCELLED | OUTPATIENT
Start: 2020-12-28 | End: 2020-12-28

## 2020-12-28 RX ADMIN — LIDOCAINE HYDROCHLORIDE: 40 SOLUTION TOPICAL at 13:14

## 2020-12-28 ASSESSMENT — PAIN SCALES - GENERAL
PAINLEVEL_OUTOF10: 0
PAINLEVEL_OUTOF10: 0

## 2020-12-28 NOTE — PROGRESS NOTES
Chief Complaint   Patient presents with    Wound Check       Follow up Wound Abdomen         Dorothy Luna a 53 y.o. female who presents today for wound/ulcer evaluation. History of Wound Context: non-healing surgical.  Had omentectomy, left salpingectomy with removal of fibroid on 2020 by Dr. Seth Watson reports that she has an open area along the midline incision.  Patient is diabetic.  Last A1C was 6.4 on 7/10/2020.  Wound culture + for Providencia stuartii. Niraj Francis started on Bactrim DS 20.  Last week the wound only measured 1.5 cm deep; today it measures 2.2 cm.                                        Wound/Ulcer Pain Timing/Severity: none  Quality of pain: N/A  Severity:  0 / 10   Modifying Factors: None  Associated Signs/Symptoms: drainage     Ulcer Identification:  Ulcer Type: non-healing surgical     PAST MEDICAL HISTORY     Past Medical History             Diagnosis Date    Diabetes mellitus ([facility] [address]      Hypertension      Obesity              PAST SURGICAL HISTORY     Past Surgical History         Past Surgical History:   Procedure Laterality Date     SECTION        LAPAROTOMY N/A 2020     EXPLORATORY LAPAROTOMY, OMENTECTOMY, LEFT SALPINGECTOMY WITH REMOVAL OF FIBROID performed by Mario Hernandez MD at [facility]     Family History         Family History   Problem Relation Age of Onset    Cancer Mother      Diabetes Father              SOCIAL HISTORY     Social History           Tobacco Use    Smoking status: Never Smoker    Smokeless tobacco: Never Used   Substance Use Topics    Alcohol use: No    Drug use:  No         ALLERGIES     No Known Allergies     MEDICATIONS            Current Outpatient Medications on File Prior to Encounter   Medication Sig Dispense Refill    sulfamethoxazole-trimethoprim (BACTRIM DS;SEPTRA DS) 800-160 MG per tablet Take 1 tablet by mouth 2 times daily for 14 days 28 tablet 0    magnesium oxide (MAG-OX) 400 (240 respiratory distress  Abdomen: soft, non-tender, non-distended, normal bowel sounds, nonhealing surgical wound along midline incision tunnels 2.2 cm (last week it was 1.5 cm). Musculoskeletal: normal range of motion, no joint swelling, deformity or tenderness  Neurologic: reflexes normal and symmetric, no cranial nerve deficit, gait, coordination and speech normal        Assessment:              Problem List Items Addressed This Visit           Nonhealing surgical wound, subsequent encounter - Primary      Abdominal wound dehiscence            Excisional Debridement Procedure Note  Indications:  Based on my examination of this patient's wound(s)/ulcer(s) today, debridement is required to promote healing and evaluate the wound base. Performed by: Jon Velasquez MD    Consent obtained? Yes    Time out taken: Yes    Pain Control: Anesthetic: 4% Lidocaine Liquid Topical(2.5 ml )     Debridement:Excisional Debridement    Using curette and scissors the wound/ulcer was sharply debrided    down through and included excision of  subcutaneous tissue.         Devitalized Tissue Debrided:  fibrin, biofilm, slough and Old 2 x 2 found that was apparently lost in wound      Pre Debridement Measurements:  Are located in the [place]  Documentation Flow Sheet   Wound/Ulcer #: 1     Post  Debridement Measurements:  Wound 11/13/20 Abdomen Mid Wound #1 mid abdomen acquired 7/6/2020 (Active)   Wound Image   12/04/20 0830   Wound Etiology Surgical 11/13/20 0851   Dressing Status Old drainage noted 12/18/20 0824   Wound Cleansed Cleansed with saline 11/13/20 0902   Dressing/Treatment Barrier film;Gauze dressing/dressing sponge;Tape/Soft cloth adhesive tape 12/18/20 1043   Wound Length (cm) 0.1 cm 12/28/20 1315   Wound Width (cm) 0.2 cm 12/28/20 1315   Wound Depth (cm) 0.1 cm 12/28/20 1315   Wound Surface Area (cm^2) 0.02 cm^2 12/28/20 1315   Change in Wound Size % (l*w) 77.78 12/28/20 1315   Wound Volume (cm^3) 0 cm^3 12/28/20 1315   Wound Healing % 100 12/28/20 1315   Post-Procedure Length (cm) 0.2 cm 12/28/20 1355   Post-Procedure Width (cm) 0.5 cm 12/28/20 1355   Post-Procedure Depth (cm) 2.6 cm 12/28/20 1355   Post-Procedure Surface Area (cm^2) 0.1 cm^2 12/28/20 1355   Post-Procedure Volume (cm^3) 0.26 cm^3 12/28/20 1355   Wound Assessment Granulation tissue;Slough 12/28/20 1315   Drainage Amount Large 12/28/20 1315   Drainage Description Brown 12/28/20 1315   Odor None 12/28/20 1315   Chantal-wound Assessment Dry/flaky 12/28/20 1315   Margins Undefined edges 12/28/20 1315   Wound Thickness Description not for Pressure Injury Full thickness 12/28/20 1315   Number of days: 45       Percent of Wound/Ulcer Debrided: 100%    Total Surface Area Debrided:  0.1 sq cm    Diabetic/Pressure/Non Pressure Ulcers only:  Ulcer: N/A    Bleeding: Minimal    Hemostasis Achieved: by pressure    Procedural Pain: 5  / 10     Post Procedural Pain: 1 / 10     Response to treatment:  Well tolerated by patient., With complaints of pain.

## 2020-12-31 ENCOUNTER — TELEPHONE (OUTPATIENT)
Dept: WOUND CARE | Age: 50
End: 2020-12-31

## 2020-12-31 LAB
GRAM STAIN RESULT: ABNORMAL
ORGANISM: ABNORMAL
WOUND/ABSCESS: ABNORMAL
WOUND/ABSCESS: ABNORMAL

## 2020-12-31 RX ORDER — SULFAMETHOXAZOLE AND TRIMETHOPRIM 800; 160 MG/1; MG/1
1 TABLET ORAL 2 TIMES DAILY
Qty: 20 TABLET | Refills: 0 | Status: SHIPPED | OUTPATIENT
Start: 2020-12-31 | End: 2021-01-08 | Stop reason: ALTCHOICE

## 2021-01-08 ENCOUNTER — HOSPITAL ENCOUNTER (OUTPATIENT)
Dept: WOUND CARE | Age: 51
Discharge: HOME | End: 2021-01-08
Payer: COMMERCIAL

## 2021-01-08 VITALS
RESPIRATION RATE: 16 BRPM | TEMPERATURE: 97 F | WEIGHT: 217.59 LBS | HEART RATE: 57 BPM | SYSTOLIC BLOOD PRESSURE: 117 MMHG | BODY MASS INDEX: 43.95 KG/M2 | DIASTOLIC BLOOD PRESSURE: 76 MMHG

## 2021-01-08 DIAGNOSIS — T81.30XD ABDOMINAL WOUND DEHISCENCE, SUBSEQUENT ENCOUNTER: ICD-10-CM

## 2021-01-08 DIAGNOSIS — T81.89XD NONHEALING SURGICAL WOUND, SUBSEQUENT ENCOUNTER: Primary | ICD-10-CM

## 2021-01-08 PROCEDURE — 99211 OFF/OP EST MAY X REQ PHY/QHP: CPT

## 2021-01-08 PROCEDURE — 99212 OFFICE O/P EST SF 10 MIN: CPT | Performed by: NURSE PRACTITIONER

## 2021-01-08 NOTE — PROGRESS NOTES
[address]   Progress Note and Procedure Note      [address] RECORD NUMBER:  [id number]  AGE: 53 y.o. GENDER: female  : 1970  EPISODE DATE:  2021    Subjective:     Chief Complaint   Patient presents with    Wound Check     wound on abdomen         HISTORY of PRESENT ILLNESS HPI    Carol Most a 53 y.o. female who presents today for wound/ulcer evaluation. History of Wound Context: non-healing surgical.  Had omentectomy, left salpingectomy with removal of fibroid on 2020 by Dr. Dann Boogie had an open area along the midline incision. Julio Quiros last wound culture was + for Providencia stuartii.  She was started on another course of Bactrim DS last week.   Today her wound is closed.                                        Wound/Ulcer Pain Timing/Severity: none  Quality of pain: N/A  Severity:  0 / 10   Modifying Factors: None  Associated Signs/Symptoms: none     Ulcer Identification:  Ulcer Type: non-healing surgical    PAST MEDICAL HISTORY        Diagnosis Date    Diabetes mellitus ([address]     Hypertension     Obesity        PAST SURGICAL HISTORY    Past Surgical History:   Procedure Laterality Date     SECTION      LAPAROTOMY N/A 2020    EXPLORATORY LAPAROTOMY, OMENTECTOMY, LEFT SALPINGECTOMY WITH REMOVAL OF FIBROID performed by Charmayne Baseman, MD at [facility] HISTORY    Family History   Problem Relation Age of Onset    Cancer Mother     Diabetes Father        SOCIAL HISTORY    Social History     Tobacco Use    Smoking status: Never Smoker    Smokeless tobacco: Never Used   Substance Use Topics    Alcohol use: No    Drug use: No       ALLERGIES    No Known Allergies    MEDICATIONS    Current Outpatient Medications on File Prior to Encounter   Medication Sig Dispense Refill    omeprazole (PRILOSEC) 40 MG delayed release capsule TAKE 1 CAPSULE BY MOUTH DAILY 90 capsule 0    vitamin D (ERGOCALCIFEROL) 1.25 MG (46739 UT) CAPS capsule TAKE 1 CAPSULE BY MOUTH 1 TIME A WEEK 13 capsule 1    magnesium oxide (MAG-OX) 400 (240 Mg) MG tablet Take 1 tablet by mouth 2 times daily 30 tablet 2    ketoconazole (NIZORAL) 2 % cream Apply topically daily. 60 g 1    lisinopril (PRINIVIL;ZESTRIL) 20 MG tablet Take 1 tablet by mouth 2 times daily 30 tablet 3    metoprolol tartrate (LOPRESSOR) 50 MG tablet Take 1 tablet by mouth 2 times daily 60 tablet 3    amLODIPine (NORVASC) 10 MG tablet Take 1 tablet by mouth daily (Patient not taking: Reported on 9/24/2020) 30 tablet 3    metFORMIN (GLUCOPHAGE) 500 MG tablet Take 1 tablet by mouth 2 times daily (with meals) 60 tablet 5    Multiple Vitamins-Minerals (THERAPEUTIC MULTIVITAMIN-MINERALS) tablet Take 1 tablet by mouth daily 30 tablet 11    pantoprazole (PROTONIX) 40 MG tablet Take 1 tablet by mouth every morning (before breakfast) 30 tablet 3    cloNIDine (CATAPRES) 0.2 MG tablet Take 1 tablet by mouth 3 times daily 60 tablet 3    acetaminophen (TYLENOL) 500 MG tablet Take 1 tablet by mouth 4 times daily as needed for Pain 360 tablet 1     No current facility-administered medications on file prior to encounter. REVIEW OF SYSTEMS    Pertinent items are noted in HPI.       Objective:      /76   Pulse 57   Temp 97 °F (36.1 °C) (Temporal)   Resp 16   Wt 217 lb 9.5 oz (98.7 kg)   BMI 43.95 kg/m²     Wt Readings from Last 3 Encounters:   01/08/21 217 lb 9.5 oz (98.7 kg)   12/04/20 209 lb 10.5 oz (95.1 kg)   11/13/20 207 lb 14.3 oz (94.3 kg)       PHYSICAL EXAM    General Appearance: alert and oriented to person, place and time, well developed and well- nourished, in no acute distress  Skin: warm and dry  Head: normocephalic and atraumatic  Eyes: pupils equal, round, and reactive to light, extraocular eye movements intact, conjunctivae normal  Neck: supple and non-tender without mass  Pulmonary/Chest: normal air movement, no respiratory distress  Abdomen: soft, non-tender, non-distended, normal bowel sounds, wound along midline incision has healed since last visit  Musculoskeletal: normal range of motion, no joint swelling, deformity or tenderness  Neurologic: reflexes normal and symmetric, no cranial nerve deficit, gait, coordination and speech normal      Assessment:        Problem List Items Addressed This Visit     Nonhealing surgical wound, subsequent encounter - Primary    Abdominal wound dehiscence            Wound 11/13/20 Abdomen Mid Wound #1 mid abdomen acquired 7/6/2020 (Active)   Wound Image   01/08/21 0834   Wound Etiology Surgical 11/13/20 0851   Dressing Status Old drainage noted 12/18/20 0824   Wound Cleansed Cleansed with saline 11/13/20 0902   Dressing/Treatment Barrier film;Gauze dressing/dressing sponge;Tape/Soft cloth adhesive tape;Packing 12/28/20 1604   Wound Length (cm) 0 cm 01/08/21 0834   Wound Width (cm) 0 cm 01/08/21 0834   Wound Depth (cm) 0 cm 01/08/21 0834   Wound Surface Area (cm^2) 0 cm^2 01/08/21 0834   Change in Wound Size % (l*w) 100 01/08/21 0834   Wound Volume (cm^3) 0 cm^3 01/08/21 0834   Wound Healing % 100 01/08/21 0834   Post-Procedure Length (cm) 0 cm 01/08/21 0848   Post-Procedure Width (cm) 0 cm 01/08/21 0848   Post-Procedure Depth (cm) 0 cm 01/08/21 0848   Post-Procedure Surface Area (cm^2) 0 cm^2 01/08/21 0848   Post-Procedure Volume (cm^3) 0 cm^3 01/08/21 0848   Wound Assessment Epithelialization 01/08/21 0834   Drainage Amount None 01/08/21 0834   Drainage Description Brown 12/28/20 1315   Odor None 01/08/21 0834   Chantal-wound Assessment Dry/flaky 12/28/20 1315   Margins Undefined edges 12/28/20 1315   Wound Thickness Description not for Pressure Injury Full thickness 12/28/20 1315   Number of days: 58     Incision 43/15/65 Umbilicus (Active)   Number of days: 186     PATIENT EDUCATION focused on not using a product like Mederma for the scar for at least 30 days.         Plan:     Treatment Note please see attached Discharge Instructions    Written patient dismissal instructions given to patient and signed by patient or POA. Discharge Instructions         [address] Physician Orders   [facility] AT [address], [place]  Telephone: [phone]    NAME:  Coretta Parry  YOB: 1970  MEDICAL RECORD NUMBER:  [id number]  DATE:  1/8/2021    Congratulations! You have completed your treatment. Return to your Primary Care Physician for all your health issues. Resume your ordinary activities as tolerated. Take your medications as prescribed by your primary care physician. Check your skin daily for cracks, bruises, sores, or dryness. Use a moisturizer as needed. Clean and dry your skin, using mild soap and warm water (not hot). Avoid alcohol and caffeine and do not smoke. Maintain a nutritious diet. Avoid pressure on your wound site. Keep your legs elevated above the level of the heart whenever possible.       Physician Signature:______________________    Date: ___________ Time:  ____________    Marsha Oliva CNP            Electronically signed by Shahnaz Narvaez RN on 1/8/2021 at 8:49 AM                          Electronically signed by FLEX Basilio CNP on 1/8/2021 at 8:49 AM

## 2021-03-26 DIAGNOSIS — E11.65 TYPE 2 DIABETES MELLITUS WITH HYPERGLYCEMIA, UNSPECIFIED WHETHER LONG TERM INSULIN USE (HCC): ICD-10-CM

## 2021-03-27 LAB
A/G RATIO: 1.3 (ref 1.1–2.2)
ALBUMIN SERPL-MCNC: 4.3 G/DL (ref 3.4–5)
ALP BLD-CCNC: 90 U/L (ref 40–129)
ALT SERPL-CCNC: 11 U/L (ref 10–40)
ANION GAP SERPL CALCULATED.3IONS-SCNC: 13 MMOL/L (ref 3–16)
AST SERPL-CCNC: 10 U/L (ref 15–37)
BASOPHILS ABSOLUTE: 0 K/UL (ref 0–0.2)
BASOPHILS RELATIVE PERCENT: 0.3 %
BILIRUB SERPL-MCNC: 0.6 MG/DL (ref 0–1)
BUN BLDV-MCNC: 16 MG/DL (ref 7–20)
CALCIUM SERPL-MCNC: 9.2 MG/DL (ref 8.3–10.6)
CHLORIDE BLD-SCNC: 99 MMOL/L (ref 99–110)
CHOLESTEROL, TOTAL: 172 MG/DL (ref 0–199)
CO2: 27 MMOL/L (ref 21–32)
CREAT SERPL-MCNC: 0.6 MG/DL (ref 0.6–1.1)
EOSINOPHILS ABSOLUTE: 0.1 K/UL (ref 0–0.6)
EOSINOPHILS RELATIVE PERCENT: 1.3 %
FOLATE: 10.27 NG/ML (ref 4.78–24.2)
GFR AFRICAN AMERICAN: >60
GFR NON-AFRICAN AMERICAN: >60
GLOBULIN: 3.4 G/DL
GLUCOSE BLD-MCNC: 85 MG/DL (ref 70–99)
HCT VFR BLD CALC: 38.7 % (ref 36–48)
HDLC SERPL-MCNC: 45 MG/DL (ref 40–60)
HEMOGLOBIN: 13 G/DL (ref 12–16)
LDL CHOLESTEROL CALCULATED: 102 MG/DL
LYMPHOCYTES ABSOLUTE: 2.9 K/UL (ref 1–5.1)
LYMPHOCYTES RELATIVE PERCENT: 36 %
MCH RBC QN AUTO: 26.7 PG (ref 26–34)
MCHC RBC AUTO-ENTMCNC: 33.5 G/DL (ref 31–36)
MCV RBC AUTO: 79.7 FL (ref 80–100)
MONOCYTES ABSOLUTE: 0.5 K/UL (ref 0–1.3)
MONOCYTES RELATIVE PERCENT: 5.8 %
NEUTROPHILS ABSOLUTE: 4.6 K/UL (ref 1.7–7.7)
NEUTROPHILS RELATIVE PERCENT: 56.6 %
PDW BLD-RTO: 14.2 % (ref 12.4–15.4)
PLATELET # BLD: 449 K/UL (ref 135–450)
PMV BLD AUTO: 8.6 FL (ref 5–10.5)
POTASSIUM SERPL-SCNC: 3.9 MMOL/L (ref 3.5–5.1)
RBC # BLD: 4.86 M/UL (ref 4–5.2)
SEDIMENTATION RATE, ERYTHROCYTE: 33 MM/HR (ref 0–20)
SODIUM BLD-SCNC: 139 MMOL/L (ref 136–145)
TOTAL PROTEIN: 7.7 G/DL (ref 6.4–8.2)
TRIGL SERPL-MCNC: 127 MG/DL (ref 0–150)
TSH SERPL DL<=0.05 MIU/L-ACNC: 2.98 UIU/ML (ref 0.27–4.2)
VITAMIN B-12: 355 PG/ML (ref 211–911)
VLDLC SERPL CALC-MCNC: 25 MG/DL
WBC # BLD: 8.1 K/UL (ref 4–11)

## 2021-03-28 LAB
ESTIMATED AVERAGE GLUCOSE: 139.9 MG/DL
HBA1C MFR BLD: 6.5 %

## 2021-12-13 ENCOUNTER — HOSPITAL ENCOUNTER (OUTPATIENT)
Age: 51
Discharge: HOME | End: 2021-12-13
Payer: COMMERCIAL

## 2021-12-13 ENCOUNTER — HOSPITAL ENCOUNTER (OUTPATIENT)
Dept: GENERAL RADIOLOGY | Age: 51
Discharge: HOME | End: 2021-12-13
Payer: COMMERCIAL

## 2021-12-13 DIAGNOSIS — U07.1 COVID-19: ICD-10-CM

## 2021-12-13 PROCEDURE — 71046 X-RAY EXAM CHEST 2 VIEWS: CPT

## 2022-12-02 NOTE — PROGRESS NOTES
Dr. Karyn Metzger,    Patient was started on Chlorthalidone on 08/02/2022  He underwent radiofrequency ablation of atrial fibrillation and pulmonary vein isolation on 8/3/2022. In review of his chart it appears that he was previously on atenolol-chlorthalidone [facility]) which was prescribed by his  his previous PCP Angelica Carlson MD looks like he is now following with ZENY Machado. Patient would like to know if he should continue the medication as he thought he was only going to be on for a short time    Should I advise to follow up with PCP as medication is being used for BP control?     Please advise,    Thanks,  Morales Lazo RN Physical Therapy  Facility/Department: [facility] 5W PROGRESSIVE CARE  Daily Treatment Note/Cotx with OT   NAME: Milagros Alarcon  : 1970  MRN: [id number]    Date of Service: 7/15/2020  Discharge Recommendations:  Continue to assess pending progress, Patient would benefit from continued therapy after discharge, 3-5 sessions per week   PT Equipment Recommendations  Other: Will monitor for potential equipt needs. Assessment   Body structures, Functions, Activity limitations: Decreased functional mobility ; Decreased strength;Decreased endurance  Assessment: Pt is a 53 y.o. female admitted to ED 2020 with abdominal pain. Pt stated that this pain began one week ago, and was seen on 20 at [facility] And was diagnosed with some gastroenteritis. Pt had a CT scan performed that showed a mid to distal small bowel obstruction. S/p exploratory laparotomy, omentectomy, left salpingectomy with removal of fibriod (N/A abdomen) on 2020. PTA pt living with family in multi level home; This date, pt is needing significant amount of assistance and is not safe to attempt the [facility] lift due to poor tolerance of trunk bend/sitting upright. Pt needed Max A x 2 persons for rolling and is dependent for out of bed via Maxi Move lift. Working on feroz trunk strenghtening activities. Pt cont with pain and limited endurance. Anticipate the need for cont skilled PT services upon d/c; at low to mod intensity setting. Will cont to monitor pt's progress. Milagros Alarcon scored a  on the AM-PAC short mobility form. Current research shows that an AM-PAC score of 17 or less is typically not associated with a discharge to the patient's home setting. Based on the patient's AM-PAC score and their current functional mobility deficits, it is recommended that the patient have 3-5 sessions per week of Physical Therapy at d/c to increase the patient's independence.   Please see assessment section for further patient specific details. If patient discharges prior to next session this note will serve as a discharge summary. Please see below for the latest assessment towards goals. Prognosis: Fair  Barriers to Learning: abd pain/bloating  REQUIRES PT FOLLOW UP: Yes  Activity Tolerance  Activity Tolerance: Treatment limited secondary to medical complications (free text); Patient limited by fatigue;Patient limited by pain; Patient limited by endurance     Patient Diagnosis(es): The primary encounter diagnosis was Septicemia ([facility] [address] Diagnoses of KIKI (acute kidney injury) ([facility] [address] Gallbladder disease, and Acidosis were also pertinent to this visit. has a past medical history of Hypertension and Obesity. has a past surgical history that includes  section and laparotomy (N/A, 2020). Restrictions  Restrictions/Precautions  Restrictions/Precautions: Fall Risk  Position Activity Restriction  Other position/activity restrictions: Recent Abdominal Surg. Abdominal MECHE Drain. Diet : Clear Liquid. Subjective   General  Chart Reviewed: Yes  Additional Pertinent Hx: Pt is a 53 y.o. female admitted to ED 2020 with abdominal pain. Pt stated that this pain began one week ago, and was seen on 20 at [facility] And was diagnosed with some gastroenteritis. Pt had a CT scan performed that showed a mid to distal small bowel obstruction. S/p exploratory laparotomy, omentectomy, left salpingectomy with removal of fibriod (N/A abdomen) on 2020. Response To Previous Treatment: Patient reporting fatigue but able to participate. Referring Practitioner: Dr. Luiza Rubio: Pt in supine, agreed to getting up and working with therapy, although reluctant due to abd pain with any/all movements.        Orientation  Orientation  Overall Orientation Status: (some decreased recall of all events leading to hospitatlizatin.)     Objective   Bed mobility  Rolling to Left: Maximum assistance;2 Person assistance  Rolling to Right: Maximum assistance;2 Person assistance  Supine to Sit: Unable to assess  Sit to Supine: Unable to assess  Comment: sitting up to eob NT, and feel slighlty unsafe due to pt's shorter stature and high height of speciality bed  Transfers  Sit to Stand: Unable to assess  Stand to sit: Unable to assess  Bed to Chair: Dependent/Total(via Maxi Move--pt was unable to attempt any standing activity at [facility] die to poor trunk strength/inability to bend forward at waist.  . )  Comment: Once pt sitting up in recliner, practices sitting forward/backwards, needed Max A x 2 persons to sit/rock in recliner. Pt was unable to reach forward enough to reach the steady bar to even attempt this. Pt needed Max A x 2 for mild forward sitting. After session, pt remained up in recliner with call light and needs in reach. Ambulation  Ambulation?: No     Balance  Sitting - Static: Poor(Unable)  Sitting - Dynamic: (unable)  Comments: Pt unable to maintain sitting upright without max A of 2 to sit forward; Standing NT/unsafe to attempt at this time. Exercises  Comments: in supine, pt needed Max A to complete 5 reps heel slides B LEs. (R is weaker than L)      AM-PAC Score  AM-PAC Inpatient Mobility Raw Score : 7 (07/15/20 1125)  AM-PAC Inpatient T-Scale Score : 26.42 (07/15/20 1125)  Mobility Inpatient CMS 0-100% Score: 92.36 (07/15/20 1125)  Mobility Inpatient CMS G-Code Modifier : CM (07/15/20 1125)     Goals  Short term goals  Time Frame for Short term goals: Upon d/c acute care setting.-- goals updated and ongoing 7/15/20  Short term goal 1: Bed Mob Mod assist x 2  Short term goal 2: Transfers with/without assist device Min assist x 2. Short term goal 3: Amb with/without assist device 10-15' Min assist x 2. Long term goals  Time Frame for Long term goals : tbd at next level of care. Patient Goals   Patient goals : Be able to go home. Plan    Plan  Times per week: 3-5x week while in acute care setting.   Current Treatment Recommendations: Strengthening, Functional Mobility Training, Transfer Training, Gait Training, Safety Education & Training, Patient/Caregiver Education & Training  Safety Devices  Type of devices: Call light within reach, Nurse notified, Chair alarm in place, Left in chair   Therapy Time   Individual Concurrent Group Co-treatment   Time In [phone]         Time Out 1115         Minutes [address] [place], [place] Electronically signed by Layla Wright PT on 7/15/2020 at 11:26 AM

## 2023-04-18 NOTE — PROGRESS NOTES
Medication Reconciliation    List of medications patient is currently taking is complete. Source of information: 1. Conversation with patient at bedside                                      2. Epic records    Notes regarding home medications:  Patient did not receive any of her home medications prior to arrival to the emergency department today.     Christ SmileyD, BCPS  7/6/2020 4:29 PM [Initial Eval - Existing Diagnosis] : an initial evaluation of an existing diagnosis [FreeTextEntry1] : QTF+

## 2023-05-05 ENCOUNTER — HOSPITAL ENCOUNTER (OUTPATIENT)
Age: 53
Discharge: HOME | End: 2023-05-05

## 2023-05-05 ENCOUNTER — HOSPITAL ENCOUNTER (OUTPATIENT)
Dept: GENERAL RADIOLOGY | Age: 53
Discharge: HOME | End: 2023-05-05
Payer: COMMERCIAL

## 2023-05-05 DIAGNOSIS — M25.561 ACUTE PAIN OF RIGHT KNEE: ICD-10-CM

## 2023-05-05 PROCEDURE — 73560 X-RAY EXAM OF KNEE 1 OR 2: CPT

## 2023-05-15 NOTE — PROGRESS NOTES
Pt on the phone talking family members and stated no need to call for updates as she is updating them herself.    Electronically signed by Isaura Dorantes RN on 7/10/2020 at 9:43 PM [Father] : father

## 2023-07-16 NOTE — CARE COORDINATION
Case Management:  Up dated [place] on patient status. The facility wants temp to be less than 99.5 x 3 days , a COVID test done and negative within 4 days of discharge and they need to know which antibiotics patient will need to be on so they can determine if they can manage the cost and care.   Electronically signed by Francisco Torre on 7/20/2020 at 3:00 PM Yes

## 2024-09-08 NOTE — PLAN OF CARE
Ok to hold Coumadin 5 days prior to procedure.     Problem: Nutrition  Goal: Optimal nutrition therapy  7/21/2020 1038 by Michael Ledbetter RD, LD  Outcome: Ongoing  7/20/2020 2303 by Rebekah Patel RN  Outcome: Ongoing     Nutrition Problem #1: Inadequate oral intake  Intervention: Food and/or Nutrient Delivery: Continue Current Diet, Modify Oral Nutrition Supplement  Nutritional Goals: tolerate most appropriate form of nutrition per MD

## 2025-02-03 ENCOUNTER — TELEPHONE (OUTPATIENT)
Dept: CARDIOLOGY CLINIC | Age: 55
End: 2025-02-03

## 2025-02-03 NOTE — TELEPHONE ENCOUNTER
Spoke to pt about referral from [facility] for episodic chest pain. Scanned into media. Pt will call back to schedule.

## (undated) DEVICE — SOLUTION PREP POVIDONE IOD FOR SKIN MUCOUS MEM PRIOR TO

## (undated) DEVICE — SPONGE GZ W4XL4IN COT 12 PLY TYP VII WVN C FLD DSGN

## (undated) DEVICE — SOLUTION IV IRRIG POUR BRL 0.9% SODIUM CHL 2F7124

## (undated) DEVICE — SEALER ENDOSCP NANO COAT OPN DIV CRV L JAW LIGASURE IMPACT

## (undated) DEVICE — GARMENT COMPR STD FOR 17IN CALF UNIF THER FLOTRN

## (undated) DEVICE — STAPLER INT L75MM CUT LN L73MM STPL LN L77MM BLU B FRM 8

## (undated) DEVICE — SUTURE PERMAHAND SZ 4-0 L12X30IN NONABSORBABLE BLK SILK A303H

## (undated) DEVICE — TOTAL TRAY, 16FR 10ML SIL FOLEY, URN: Brand: MEDLINE

## (undated) DEVICE — SPONGE LAP W18XL18IN WHT COT 4 PLY FLD STRUNG RADPQ DISP ST

## (undated) DEVICE — CHLORAPREP 26ML ORANGE

## (undated) DEVICE — SUTURE PERMAHAND SZ 2-0 L30IN NONABSORBABLE BLK SILK W/O A305H

## (undated) DEVICE — SUTURE PDS II SZ 0 L60IN ABSRB VLT L48MM CTX 1/2 CIR Z990G

## (undated) DEVICE — SUTURE PERMA-HAND SZ 2-0 L30IN NONABSORBABLE BLK L26MM SH K833H

## (undated) DEVICE — CLEANER,CAUTERY TIP,2X2",STERILE: Brand: MEDLINE

## (undated) DEVICE — Z DISCONTINUED BY MEDLINE USE 2711682 TRAY SKIN PREP DRY W/ PREM GLV

## (undated) DEVICE — MAJOR SET UP PK

## (undated) DEVICE — STAPLER SKIN H3.9MM WIRE DIA0.58MM CRWN 6.9MM 35 STPL ROT

## (undated) DEVICE — RESERVOIR,SUCTION,100CC,SILICONE: Brand: MEDLINE

## (undated) DEVICE — SOLUTION SCRB 4OZ 10% POVIDONE IOD ANTIMIC BTL

## (undated) DEVICE — STAPLER INT L60MM REG TISS BLU B FRM 8 FIRING 2 ROW AUTO

## (undated) DEVICE — ELECTRODE ES L65IN DIA3MM S STL BLDE MPLR OPN APPRCH EZ TO

## (undated) DEVICE — Z DUP USE 2257490 ADHESIVE SKIN CLSRE 036ML TPCL 2CTL CNCRLTE HIGH VSCSTY DRMB

## (undated) DEVICE — SHEET, T, LAPAROTOMY, STERILE: Brand: MEDLINE

## (undated) DEVICE — GOWN,SIRUS,POLYRNF,BRTHSLV,XL,30/CS: Brand: MEDLINE

## (undated) DEVICE — GLOVE ORANGE PI 7 1/2   MSG9075

## (undated) DEVICE — DRAIN SURG 19FR 100% SIL RADPQ RND CHN FULL FLUT

## (undated) DEVICE — ELECTRODE PT RET AD L9FT HI MOIST COND ADH HYDRGEL CORDED

## (undated) DEVICE — RELOAD STPL L75MM OPN H3.8MM CLS 1.5MM WIRE DIA0.2MM REG

## (undated) DEVICE — CANISTER, RIGID, 1200CC: Brand: MEDLINE INDUSTRIES, INC.

## (undated) DEVICE — CONTAINER SPEC 165OZ POLYPR PATH SNAP LOK CAP W/ LID

## (undated) DEVICE — GLOVE ORANGE PI 7   MSG9070

## (undated) DEVICE — COVER LT HNDL BLU PLAS

## (undated) DEVICE — MERCY HEALTH WEST TURNOVER: Brand: MEDLINE INDUSTRIES, INC.